# Patient Record
Sex: FEMALE | Race: WHITE | Employment: FULL TIME | URBAN - NONMETROPOLITAN AREA
[De-identification: names, ages, dates, MRNs, and addresses within clinical notes are randomized per-mention and may not be internally consistent; named-entity substitution may affect disease eponyms.]

---

## 2017-04-13 ENCOUNTER — HOSPITAL ENCOUNTER (OUTPATIENT)
Dept: LAB | Age: 67
Discharge: HOME | End: 2017-04-13
Attending: CLINIC/CENTER | Admitting: CLINIC/CENTER

## 2017-04-13 LAB
BASOPHILS ABSOLUTE: 0.1 K/CU MM
BASOPHILS RELATIVE PERCENT: 0.6 % (ref 0–1)
DIFFERENTIAL TYPE: ABNORMAL
EOSINOPHILS ABSOLUTE: 0.3 K/CU MM
EOSINOPHILS RELATIVE PERCENT: 2.9 % (ref 0–3)
HCT VFR BLD CALC: 44.8 % (ref 37–47)
HEMOGLOBIN: 14.2 GM/DL (ref 12.5–16)
IMMATURE NEUTROPHIL %: 0.4 % (ref 0–0.43)
LYMPHOCYTES ABSOLUTE: 1.7 K/CU MM
LYMPHOCYTES RELATIVE PERCENT: 16.5 % (ref 24–44)
MCH RBC QN AUTO: 30.6 PG (ref 27–31)
MCHC RBC AUTO-ENTMCNC: 31.7 % (ref 32–36)
MCV RBC AUTO: 96.6 FL (ref 78–100)
MONOCYTES ABSOLUTE: 0.7 K/CU MM
MONOCYTES RELATIVE PERCENT: 6.5 % (ref 0–4)
PDW BLD-RTO: 12.8 % (ref 11.7–14.9)
PLATELET # BLD: 262 K/CU MM (ref 140–440)
PMV BLD AUTO: 9.9 FL (ref 7.5–11.1)
RBC # BLD: 4.64 M/CU MM (ref 4.2–5.4)
SEGMENTED NEUTROPHILS ABSOLUTE COUNT: 7.7 K/CU MM
SEGMENTED NEUTROPHILS RELATIVE PERCENT: 73.1 % (ref 36–66)
TOTAL IMMATURE NEUTOROPHIL: 0.04 K/CU MM
WBC # BLD: 10.5 K/CU MM (ref 4–10.5)

## 2017-04-14 LAB — CLOSTRIDIUM DIFFICILE, PCR: NORMAL

## 2017-05-02 ENCOUNTER — HOSPITAL ENCOUNTER (OUTPATIENT)
Dept: GENERAL RADIOLOGY | Age: 67
Discharge: HOME | End: 2017-05-02
Attending: UROLOGY | Admitting: UROLOGY

## 2017-05-02 DIAGNOSIS — N20.0 KIDNEY STONE: ICD-10-CM

## 2017-11-01 ENCOUNTER — HOSPITAL ENCOUNTER (OUTPATIENT)
Dept: OTHER | Age: 67
Discharge: HOME | End: 2017-11-01
Attending: SKILLED NURSING FACILITY | Admitting: SKILLED NURSING FACILITY

## 2017-11-07 LAB
HCT VFR BLD CALC: 30.7 % (ref 37–47)
HEMOGLOBIN: 9.5 GM/DL (ref 12.5–16)
MCH RBC QN AUTO: 30.9 PG (ref 27–31)
MCHC RBC AUTO-ENTMCNC: 30.9 % (ref 32–36)
MCV RBC AUTO: 100 FL (ref 78–100)
PDW BLD-RTO: 15 % (ref 11.7–14.9)
PLATELET # BLD: 440 K/CU MM (ref 140–440)
PMV BLD AUTO: 9.4 FL (ref 7.5–11.1)
RBC # BLD: 3.07 M/CU MM (ref 4.2–5.4)
WBC # BLD: 13.3 K/CU MM (ref 4–10.5)

## 2017-11-07 PROCEDURE — 99306 1ST NF CARE HIGH MDM 50: CPT | Performed by: INTERNAL MEDICINE

## 2017-11-08 ENCOUNTER — OUTSIDE SERVICES (OUTPATIENT)
Dept: INTERNAL MEDICINE CLINIC | Age: 67
End: 2017-11-08

## 2017-11-14 LAB
HCT VFR BLD CALC: 31.7 % (ref 37–47)
HEMOGLOBIN: 10 GM/DL (ref 12.5–16)
MCH RBC QN AUTO: 31.3 PG (ref 27–31)
MCHC RBC AUTO-ENTMCNC: 31.5 % (ref 32–36)
MCV RBC AUTO: 99.1 FL (ref 78–100)
PDW BLD-RTO: 14.2 % (ref 11.7–14.9)
PLATELET # BLD: 379 K/CU MM (ref 140–440)
PMV BLD AUTO: 9.7 FL (ref 7.5–11.1)
RBC # BLD: 3.2 M/CU MM (ref 4.2–5.4)
WBC # BLD: 8.6 K/CU MM (ref 4–10.5)

## 2017-11-15 PROCEDURE — 99315 NF DSCHRG MGMT 30 MIN/LESS: CPT | Performed by: INTERNAL MEDICINE

## 2017-11-16 LAB
ALBUMIN SERPL-MCNC: 3.3 GM/DL (ref 3.4–5)
ALP BLD-CCNC: 136 IU/L (ref 40–129)
ALT SERPL-CCNC: 21 U/L (ref 10–40)
ANION GAP SERPL CALCULATED.3IONS-SCNC: 11 MMOL/L (ref 4–16)
AST SERPL-CCNC: 27 IU/L (ref 15–37)
BILIRUB SERPL-MCNC: 0.3 MG/DL (ref 0–1)
BUN BLDV-MCNC: 14 MG/DL (ref 6–23)
CALCIUM SERPL-MCNC: 9.1 MG/DL (ref 8.3–10.6)
CHLORIDE BLD-SCNC: 100 MMOL/L (ref 99–110)
CO2: 28 MMOL/L (ref 21–32)
CREAT SERPL-MCNC: 0.9 MG/DL (ref 0.6–1.1)
GFR AFRICAN AMERICAN: >60 ML/MIN/1.73M2
GFR NON-AFRICAN AMERICAN: >60 ML/MIN/1.73M2
GLUCOSE BLD-MCNC: 159 MG/DL (ref 70–140)
POTASSIUM SERPL-SCNC: 4.2 MMOL/L (ref 3.5–5.1)
SODIUM BLD-SCNC: 139 MMOL/L (ref 135–145)
TOTAL PROTEIN: 6.9 GM/DL (ref 6.4–8.2)

## 2017-11-17 ENCOUNTER — OUTSIDE SERVICES (OUTPATIENT)
Dept: INTERNAL MEDICINE CLINIC | Age: 67
End: 2017-11-17

## 2017-11-17 DIAGNOSIS — Z79.4 DIABETES MELLITUS TYPE 2, INSULIN DEPENDENT (HCC): ICD-10-CM

## 2017-11-17 DIAGNOSIS — T81.89XD NON-HEALING SURGICAL WOUND, SUBSEQUENT ENCOUNTER: ICD-10-CM

## 2017-11-17 DIAGNOSIS — J45.909 MODERATE ASTHMA WITHOUT COMPLICATION, UNSPECIFIED WHETHER PERSISTENT: ICD-10-CM

## 2017-11-17 DIAGNOSIS — Z90.710 STATUS POST TOTAL ABDOMINAL HYSTERECTOMY: ICD-10-CM

## 2017-11-17 DIAGNOSIS — C54.1 ENDOMETRIAL CARCINOMA (HCC): Primary | ICD-10-CM

## 2017-11-17 DIAGNOSIS — E11.9 DIABETES MELLITUS TYPE 2, INSULIN DEPENDENT (HCC): ICD-10-CM

## 2017-11-17 DIAGNOSIS — E03.9 HYPOTHYROIDISM, UNSPECIFIED TYPE: ICD-10-CM

## 2017-11-21 ENCOUNTER — HOSPITAL ENCOUNTER (OUTPATIENT)
Dept: WOUND CARE | Age: 67
Discharge: HOME | End: 2017-11-21
Attending: INTERNAL MEDICINE | Admitting: INTERNAL MEDICINE

## 2017-11-21 VITALS
RESPIRATION RATE: 16 BRPM | TEMPERATURE: 97.8 F | SYSTOLIC BLOOD PRESSURE: 152 MMHG | HEART RATE: 108 BPM | DIASTOLIC BLOOD PRESSURE: 78 MMHG

## 2017-11-21 DIAGNOSIS — T81.32XA POSTOPERATIVE DEHISCENCE OF INTERNAL WOUND, INITIAL ENCOUNTER: ICD-10-CM

## 2017-11-21 DIAGNOSIS — T81.89XA NON-HEALING SURGICAL WOUND, INITIAL ENCOUNTER: ICD-10-CM

## 2017-11-21 PROCEDURE — 11045 DBRDMT SUBQ TISS EACH ADDL: CPT | Performed by: INTERNAL MEDICINE

## 2017-11-21 PROCEDURE — 11042 DBRDMT SUBQ TIS 1ST 20SQCM/<: CPT | Performed by: INTERNAL MEDICINE

## 2017-11-21 NOTE — PLAN OF CARE
Problem: Wound:  Intervention: Assess pain status  See flow sheet  Intervention: Assess wound size, appearance and drainage  See flow sheet    Goal: Will show signs of wound healing; wound closure and no evidence of infection  Will show signs of wound healing; wound closure and no evidence of infection  Outcome: Ongoing

## 2017-11-21 NOTE — PROGRESS NOTES
Wound cleansing:                           Do not scrub or use excessive force. Wash hands with soap and water before and after dressing changes. Prior to applying a clean dressing, cleanse wound with normal saline,                          wound cleanser, or mild soap and water. Ask your physician or nurse before getting the wound(s) wet in the shower. Daily Wound management:                          Keep weight off wounds and reposition every 2 hours. Avoid standing for long periods of time. Apply wraps/stockings in AM and remove at bedtime. Elevate legs to the level of the heart or above for 30 minutes 4-5 times a day and/or when sitting. When taking antibiotics take entire prescription as ordered by MD do not stop taking until medicine is all gone.                                                                 Orders for this week:  11/21/17              Lower abd incision--WOUND VAC THERAPY:     APPLY santyl to wound bed, cover with BLACK FOAM TO WOUND BED and also tuck black foam to tunnel at 0300--5.0 depth,  MAY USE DUODERM TO PERIWOUND FOR PROTECTION.      SET WOUND VAC  CONTINUOUS SUCTION.  CANISTER CHANGE WITH EACH DRESSING CHANGE OR ACCORDING TO VOLUME OF DRAINAGE.     WOUND VAC DRESSING TO BE CHANGED Thursday and Saturday per homecare and clinic to change on Tuesday during doctors visit        Follow up with Dr Jessica Cisneros  In 1 week in the wound care centerWound care order history:                 ERASMO's   Right       Left               Date none--abd wound              Vascular studies:   Date NA abd wound              Imaging:   Date               Cultures:   Date               Labs/ HbA1c:   Date VsW2f--60.0-              Grafts:  Date               HBO:  To be determined Antibiotics: bactrim--11/10/17              Earlier Wound care treatments:                Authorizations:                        Consults:   Date                           PCP: Zahraa Clark     Continuing wound care orders and information:              Residence: Home              [place] [facility]              Your wound-care supplies will be provided by: Ghassan GOLDSTEIN provider:              Compression with              Off loading:  Date               Wound Meds:              Wound cleansing:                           Do not scrub or use excessive force. Wash hands with soap and water before and after dressing changes. Prior to applying a clean dressing, cleanse wound with normal saline,                          wound cleanser, or mild soap and water. Ask your physician or nurse before getting the wound(s) wet in the shower. Daily Wound management:                          Keep weight off wounds and reposition every 2 hours. Avoid standing for long periods of time. Apply wraps/stockings in AM and remove at bedtime. Elevate legs to the level of the heart or above for 30 minutes 4-5 times a day and/or when sitting. When taking antibiotics take entire prescription as ordered by MD do not stop taking until medicine is all gone.                                                                 Orders for this week:  11/21/17              Lower abd incision--WOUND VAC THERAPY:     APPLY santyl to wound bed, cover with BLACK FOAM TO WOUND BED and also tuck black foam to tunnel at 0300--5.0 depth,  MAY USE DUODERM TO PERIWOUND FOR PROTECTION.      SET WOUND VAC  CONTINUOUS SUCTION.  CANISTER CHANGE WITH EACH DRESSING CHANGE OR ACCORDING TO VOLUME OF DRAINAGE.     WOUND VAC DRESSING TO BE CHANGED

## 2017-11-22 NOTE — H&P
Normal S1 and S2. No gallops or murmurs. Rate appears to be  regular. ABDOMEN:  The patient has a large abdominal hernia present more to the  right side of the abdomen. Below the abdominal hernia, she has a large transverse  dehisced surgical wound. See the measurements in the wound document. There is an associated tunnel. EXTREMITIES:  The patient has 2+ pitting edema bilaterally. This wound is debrided. See separate debridement this date. She will have  the wound VAC replaced with appropriate foam including foam pushed into the  tunnel area. She will follow up in the Wound Clinic on Tuesday with Dr. Isa Broussard. IMPRESSION:  1. Nonhealing abdominal wound. 2.  Dehisced surgical wound.         John Brizuela MD    D: 11/21/2017 16:23:20       T: 11/21/2017 19:53:57     [email or web address]  Job#: [id number]     Doc#: [id number]    CC:   MD Mary Morales MD

## 2017-11-23 ENCOUNTER — HOSPITAL ENCOUNTER (OUTPATIENT)
Dept: OTHER | Age: 67
Discharge: HOME | End: 2017-11-23

## 2017-11-26 LAB
CULTURE: NORMAL
ORGANISM: NORMAL
REPORT STATUS: NORMAL
REQUEST PROBLEM: NORMAL
SPECIMEN: NORMAL
TOTAL COLONY COUNT: NORMAL

## 2017-12-05 ENCOUNTER — HOSPITAL ENCOUNTER (OUTPATIENT)
Dept: WOUND CARE | Age: 67
Discharge: HOME | End: 2017-12-05
Attending: SURGERY | Admitting: SURGERY

## 2017-12-05 VITALS — HEART RATE: 112 BPM | SYSTOLIC BLOOD PRESSURE: 164 MMHG | TEMPERATURE: 97.6 F | DIASTOLIC BLOOD PRESSURE: 72 MMHG

## 2017-12-05 DIAGNOSIS — T81.89XA NON-HEALING SURGICAL WOUND, INITIAL ENCOUNTER: Primary | ICD-10-CM

## 2017-12-05 NOTE — PLAN OF CARE
Problem: Wound:  Intervention: Assess pain status  See flowsheet  Intervention: Assess wound size, appearance and drainage  See flowsheet    Goal: Will show signs of wound healing; wound closure and no evidence of infection  Will show signs of wound healing; wound closure and no evidence of infection   Outcome: Ongoing
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
## 2017-12-05 NOTE — PROGRESS NOTES
Nonselective enzymatic debridement performed with Santyl per physician order to wound(s) of the lower abdomen  Patient tolerated the procedure well.

## 2017-12-21 ENCOUNTER — HOSPITAL ENCOUNTER (OUTPATIENT)
Dept: WOUND CARE | Age: 67
Discharge: HOME | End: 2017-12-21
Attending: INTERNAL MEDICINE | Admitting: INTERNAL MEDICINE

## 2017-12-21 VITALS
HEART RATE: 82 BPM | RESPIRATION RATE: 18 BRPM | SYSTOLIC BLOOD PRESSURE: 142 MMHG | TEMPERATURE: 96.8 F | DIASTOLIC BLOOD PRESSURE: 79 MMHG

## 2017-12-21 DIAGNOSIS — T81.32XA POSTOPERATIVE DEHISCENCE OF INTERNAL WOUND, INITIAL ENCOUNTER: ICD-10-CM

## 2017-12-21 DIAGNOSIS — T81.89XA NON-HEALING SURGICAL WOUND, INITIAL ENCOUNTER: Primary | ICD-10-CM

## 2017-12-21 NOTE — PROGRESS NOTES
Nonselective enzymatic debridement performed with Santyl per physician order to wound(s) of the ABD   Patient tolerated the procedure well.

## 2017-12-21 NOTE — PROGRESS NOTES
status: Former Smoker     Quit date: 1/24/1995    Smokeless tobacco: Never Used    Alcohol use No       ALLERGIES    Allergies   Allergen Reactions    Erythromycin Nausea Only    Gabapentin Other (See Comments)     Dizziness      Lyrica [Pregabalin] Swelling     With rapid weight gain       MEDICATIONS    Current Outpatient Prescriptions on File Prior to Encounter   Medication Sig Dispense Refill    potassium chloride (MICRO-K) 10 MEQ extended release capsule Take 10 mEq by mouth 2 times daily      Flaxseed, Linseed, (FLAXSEED OIL) 1000 MG CAPS Take 1 capsule by mouth daily      potassium chloride (KLOR-CON M) 20 MEQ extended release tablet Take 1 tablet by mouth 2 times daily for 7 days 14 tablet 0    Fluticasone Furoate-Vilanterol (BREO ELLIPTA) 100-25 MCG/INH AEPB Inhale 100 mcg into the lungs 2 times daily      naproxen (NAPROSYN) 500 MG tablet Take 500 mg by mouth 2 times daily (with meals)      Insulin Aspart (NOVOLOG SC) Inject 60 Units into the skin 2 times daily (with meals) Lunch and dinner      Multiple Vitamins-Minerals (THERAPEUTIC MULTIVITAMIN-MINERALS) tablet Take 1 tablet by mouth daily      rosuvastatin (CRESTOR) 20 MG tablet Take 5 mg by mouth daily       furosemide (LASIX) 40 MG tablet Take 1 tablet by mouth daily. 30 tablet 1    insulin glargine (LANTUS) 100 UNIT/ML injection Inject 80 Units into the skin nightly.  levothyroxine (SYNTHROID) 100 MCG tablet Take 100 mcg by mouth daily. No current facility-administered medications on file prior to encounter. REVIEW OF SYSTEMS    Pertinent items are noted in HPI. Constitutional: Negative for systemic symptoms including fever, chills and malaise. Objective:      BP (!) 142/79   Pulse 82   Temp 96.8 °F (36 °C) (Temporal)   Resp 18     PHYSICAL EXAM      General: The patient is in no acute distress. Mental status:  Patient is appropriate, is  oriented to place and plan of care.   Dermatologic exam: Visual inspection of the periwound reveals the skin to be normal in turgor and texture  Wound exam: see wound description below in procedure note      Assessment:     Problem List Items Addressed This Visit     WD-Non-healing surgical wound of the abdomen - Primary    WD-Postoperative dehiscence of internal wound, initial encounter      Other Visit Diagnoses    None. Procedure Note    Indications:  Based on my examination of this patient's wound(s) today, sharp excision into necrotic epidermis, dermis and subcutaneous tissue is required to promote healing and evaluate the extent of previous healing. Performed by: Mary Gannon MD    Consent obtained: Yes    Time out taken:  Yes    Pain Control: none needed       Debridement:Excisional Debridement    Using curette, scissors and forceps the wound(s) was/were sharply debrided down through and including the removal of epidermis, dermis and subcutaneous tissue. Devitalized Tissue Debrided:  fibrin, biofilm and slough I also removed some black sponge that had been enveloped in the wound bed. Pre Debridement Measurements:  Are located in the Wound Documentation Flow Sheet    All active wounds listed below with today's date are evaluated  Wound(s)    debrided this date include # : 4     Post  Debridement Measurements:  Negative Pressure Wound Therapy Abdomen (Active)   Wound Type Surgical 12/5/2017 11:58 AM   Unit Type KCI 12/5/2017 11:58 AM   Dressing Type Black foam 12/5/2017 11:58 AM   Number of pieces used 1 12/5/2017 11:58 AM   Cycle Continuous 12/5/2017 11:58 AM   Target Pressure (mmHg) 125 12/5/2017 11:58 AM   Intensity 5 12/5/2017 11:58 AM   Canister changed? Yes 12/5/2017 11:58 AM   Dressing Changed Changed/New 12/5/2017 11:58 AM   Drainage Amount Large 12/5/2017 11:58 AM   Drainage Description Serosanguinous 12/5/2017 11:58 AM   Dressing Change Due 12/07/17 12/5/2017 11:58 AM   Wound Assessment Red;Slough; Yellow 12/5/2017 11:58 AM   Tiffany-wound Assessment Red 12/5/2017 11:58 AM   Odor None 12/5/2017 11:58 AM   Number of days: 29       Wound 11/21/17 Abdomen wound 4 left distal abdomin( onset 3 weeks ago)--nonhealing surgical (Active)   Wound Image   12/5/2017 10:25 AM   Wound Type Wound 12/21/2017  8:58 AM   Wound Other 11/21/2017  1:11 PM   Dressing Status Clean;Dry; Intact 12/5/2017 11:58 AM   Dressing Changed Changed/New 12/5/2017 11:58 AM   Wound Cleansed Wound cleanser 12/21/2017  8:58 AM   Wound Length (cm) 5.5 cm 12/21/2017  9:25 AM   Wound Width (cm) 24.3 cm 12/21/2017  9:25 AM   Wound Depth (cm)  4.1 12/21/2017  9:25 AM   Calculated Wound Size (cm^2) (l*w) 133.65 cm^2 12/21/2017  9:25 AM   Change in Wound Size % (l*w) 27.95 12/21/2017  9:25 AM   Distance Tunneling (cm) 2.5 cm 12/21/2017  8:58 AM   Tunneling Position ___ O'Clock 3 12/21/2017  8:58 AM   Undermining Starts ___ O'Clock 0 12/21/2017  8:58 AM   Undermining Ends___ O'Clock 0 12/21/2017  8:58 AM   Undermining Maxium Distance (cm) 0 12/21/2017  8:58 AM   Wound Assessment Red;Slough; Yellow 12/21/2017  8:58 AM   Drainage Amount Large 12/21/2017  8:58 AM   Drainage Description Serosanguinous 12/21/2017  8:58 AM   Odor None 12/21/2017  8:58 AM   Margins Defined edges; Undefined edges 12/21/2017  8:58 AM   Tiffany-wound Assessment Red 12/21/2017  8:58 AM   Non-staged Wound Description Full thickness 12/21/2017  8:58 AM   [place]%Wound Bed 0 12/21/2017  8:58 AM   Red%Wound Bed 90 12/21/2017  8:58 AM   Yellow%Wound Bed 10 12/21/2017  8:58 AM   Black%Wound Bed 0 12/21/2017  8:58 AM   Purple%Wound Bed 0 12/21/2017  8:58 AM   Other%Wound Bed 0 12/21/2017  8:58 AM   Debridement per physician Subcutaneous 12/21/2017  9:25 AM   Number of days: 29       Percent of Wound(s) Debrided: approximately 50%    Total  Area  Debrided:  66 sq cm     Bleeding:  Minimal    Hemostasis Achieved:  by pressure    Procedural Pain:  0  / 10     Post Procedural Pain:  0 / 10     Response to treatment:  Well tolerated by patient. Status of wound progress and description from last visit:   Wound is measuring smaller. I would continue with the current. Plan:       Discharge Instructions         Note:   PHYSICIAN ORDERS AND DISCHARGE INSTRUCTIONS     NOTE: Upon discharge from the [address], you will receive a patient experience survey. We would be grateful if you would take the time to fill this survey out.     Wound care order history:                 ERASMO's   Right       Left               Date none--abd wound              Vascular studies:   Date NA abd wound              Imaging:   Date               Cultures:   Date               Labs/ HbA1c:   Date CrT8k--71.0-              Grafts:  Date               HBO:  To be determined              Antibiotics: bactrim--11/10/17              Earlier Wound care treatments:                Authorizations:                        Consults:   Date                           PCP: Earle     Continuing wound care orders and information:              Residence: Home              [facility]              Your wound-care supplies will be provided by: Margot Ramsay provider:              [id number] with              Off loading:  Date               CTA Meds:              PCACA cleansing:                           YL not scrub or use excessive force.                          Wash hands with soap and water before and after dressing changes.                           Prior to applying a clean dressing, cleanse wound with normal saline,                          wound cleanser, or mild soap and water.                           Ask your physician or nurse before getting the wound(s) wet in the shower.              Daily Wound management:                          Keep weight off wounds and reposition every 2 hours.                          EUQDG standing for long periods of time.                          IZFHG wraps/stockings in AM and remove at bedtime.                          Elevate legs to the level of the heart or above for 30 minutes 4-5 times a day and/or when sitting.                                               When taking antibiotics take entire prescription as ordered by MD do not stop taking until medicine is all gone.                                                                 Orders for this week:  12/21/17              Lower abd incision--WOUND VAC THERAPY:     APPLY santyl to wound bed, cover with BLACK FOAM TO WOUND BED and also tuck black foam to tunnel at 0300--2.5 depth, AND TO DEPTH AT RIGHT LATERAL SIDE OF WOUND TO DEPTH OF 2.8.   MAY USE DUODERM TO PERIWOUND FOR PROTECTION.      SET WOUND VAC  CONTINUOUS SUCTION. CANISTER CHANGE WITH EACH DRESSING CHANGE OR ACCORDING TO VOLUME OF DRAINAGE.     WOUND VAC DRESSING TO BE CHANGED Thursday and Saturday per homecare and clinic to change on Tuesday during doctors visit        Follow up with Dr José Luis England 1 week in the wound care center     Call [phone] for any questions or concerns.   Date__________   Time____________                             Treatment Note      Written Patient Dismissal Instructions Given            Electronically signed by Tmaera Diaz MD on 12/21/2017 at 9:35 AM

## 2017-12-26 ENCOUNTER — HOSPITAL ENCOUNTER (OUTPATIENT)
Dept: WOUND CARE | Age: 67
Discharge: HOME | End: 2017-12-26
Attending: INTERNAL MEDICINE | Admitting: INTERNAL MEDICINE

## 2017-12-26 VITALS
DIASTOLIC BLOOD PRESSURE: 75 MMHG | RESPIRATION RATE: 16 BRPM | HEART RATE: 106 BPM | SYSTOLIC BLOOD PRESSURE: 126 MMHG | TEMPERATURE: 96.6 F

## 2017-12-26 DIAGNOSIS — T81.89XA NON-HEALING SURGICAL WOUND, INITIAL ENCOUNTER: Primary | ICD-10-CM

## 2017-12-26 DIAGNOSIS — T81.32XA POSTOPERATIVE DEHISCENCE OF INTERNAL WOUND, INITIAL ENCOUNTER: ICD-10-CM

## 2017-12-26 PROCEDURE — 11042 DBRDMT SUBQ TIS 1ST 20SQCM/<: CPT | Performed by: INTERNAL MEDICINE

## 2017-12-26 NOTE — PROGRESS NOTES
Undermining Max[place] Distance (cm) 0 12/26/2017  9:39 AM   Wound Assessment Granulation tissue;Red;Slough; Yellow 12/26/2017  9:39 AM   Drainage Amount Large 12/26/2017  9:39 AM   Drainage Description Serosanguinous 12/26/2017  9:39 AM   Odor None 12/26/2017  9:39 AM   Margins Defined edges; Unattached edges 12/26/2017  9:39 AM   Tiffany-wound Assessment [place];Pink 12/26/2017  9:39 AM   Non-staged Wound Description Full thickness 12/26/2017  9:39 AM   [place]%Wound Bed 0 12/26/2017  9:39 AM   Red%Wound Bed 90 12/26/2017  9:39 AM   Yellow%Wound Bed 10 12/26/2017  9:39 AM   Black%Wound Bed 0 12/26/2017  9:39 AM   Purple%Wound Bed 0 12/26/2017  9:39 AM   Other%Wound Bed 0 12/26/2017  9:39 AM   Debridement per physician Subcutaneous 12/26/2017 10:11 AM   Number of days: 35       Percent of Wound(s) Debrided: approximately 10%    Total  Area  Debrided:  13.2 sq cm     Bleeding:  Minimal    Hemostasis Achieved:  by pressure and by silver nitrate stick    Procedural Pain:  0  / 10     Post Procedural Pain:  0 / 10     Response to treatment:  Well tolerated by patient. Status of wound progress and description from last visit:   Wound is looking better with good granulation tissue. There are some areas that may have hypergranulation tissue present. A trial of silver nitrate was tried at the upper surface of the wound. Plan:       Discharge Instructions         Note:   PHYSICIAN ORDERS AND DISCHARGE INSTRUCTIONS     NOTE: Upon discharge from the [address], you will receive a patient experience survey.  We would be grateful if you would take the time to fill this survey out.     Wound care order history:                 ERASMO's   Right       Left               Date none--abd wound              Vascular studies:   Date NA abd wound              Imaging:   Date               Cultures:   Date               Labs/ HbA1c:   Date XfX0c--58.0-              Grafts:  Date               HBO:  To be determined              Antibiotics: bactrim--11/10/17              Earlier Wound care treatments:                Authorizations:                        Consults:   Date                           PCP: Earle     Continuing wound care orders and information:              Residence: Home              Continue home health care with: [address] [facility]              Your wound-care supplies will be provided by: Doyle Clement provider:              MICHAEL with  Matthew Daniel loading:  Date               JGCGX Meds: Lukasz Byers              KDAZA cleansing:                           KL not scrub or use excessive force.                          Wash hands with soap and water before and after dressing changes.                         Prior to applying a clean dressing, cleanse wound with normal saline,                          wound cleanser, or mild soap and water.                           Ask your physician or nurse before getting the wound(s) wet in the shower.              Daily Wound management:                          Keep weight off wounds and reposition every 2 hours.                          Avoid standing for long periods of time.                          Apply wraps/stockings in AM and remove at bedtime.                          Elevate legs to the level of the heart or above for 30 minutes 4-5 times a day and/or when sitting.                                               When taking antibiotics take entire prescription as ordered by MD do not stop taking until medicine is all gone.                                                                 Orders for this week:  12/26/17              Lower abd incision--WOUND VAC THERAPY:     APPLY santyl to wound bed, cover with BLACK FOAM TO WOUND BED and also tuck black foam to tunnel at 0300--2.5 depth, AND TO DEPTH AT RIGHT LATERAL SIDE OF WOUND TO DEPTH OF 2.8.   MAY USE DUODERM TO PERIWOUND FOR PROTECTION.      SET WOUND VAC  CONTINUOUS SUCTION.  CANISTER CHANGE WITH EACH DRESSING CHANGE OR ACCORDING TO VOLUME OF DRAINAGE.     WOUND VAC DRESSING TO BE CHANGED Thursday and Saturday per homecare and clinic to change on Tuesday during doctors visit        Follow up with Dr Lisha Abrams 1 week in the wound care center     Call [phone] for any questions or concerns.   Date__________   Time____________                             Treatment Note Wound 11/21/17 Abdomen wound 4 left distal abdomin( onset 3 weeks ago)--nonhealing surgical-Dressing/Treatment: Vacuum dressing (duoderm-bernice, sanytl, black foam(4), vac drape,)    Written Patient Dismissal Instructions Given            Electronically signed by Estevan Armenta MD on 12/26/2017 at 2:37 PM

## 2017-12-26 NOTE — PROGRESS NOTES
Nonselective enzymatic debridement performed with Santyl per physician order to wound(s) of the abdomen  Patient tolerated the procedure well.

## 2018-01-02 ENCOUNTER — HOSPITAL ENCOUNTER (OUTPATIENT)
Dept: WOUND CARE | Age: 68
Discharge: HOME | End: 2018-01-02
Attending: SURGERY | Admitting: SURGERY

## 2018-01-02 VITALS
TEMPERATURE: 96.2 F | HEART RATE: 103 BPM | SYSTOLIC BLOOD PRESSURE: 136 MMHG | DIASTOLIC BLOOD PRESSURE: 82 MMHG | RESPIRATION RATE: 16 BRPM

## 2018-01-02 DIAGNOSIS — L98.492 ABDOMINAL WALL SKIN ULCER, WITH FAT LAYER EXPOSED (HCC): Chronic | ICD-10-CM

## 2018-01-02 DIAGNOSIS — T81.32XA POSTOPERATIVE DEHISCENCE OF INTERNAL WOUND, INITIAL ENCOUNTER: Primary | ICD-10-CM

## 2018-01-09 ENCOUNTER — HOSPITAL ENCOUNTER (OUTPATIENT)
Dept: WOUND CARE | Age: 68
Discharge: HOME | End: 2018-01-09
Attending: SURGERY | Admitting: SURGERY

## 2018-01-09 VITALS
DIASTOLIC BLOOD PRESSURE: 77 MMHG | TEMPERATURE: 96.6 F | RESPIRATION RATE: 16 BRPM | HEART RATE: 96 BPM | SYSTOLIC BLOOD PRESSURE: 145 MMHG

## 2018-01-09 DIAGNOSIS — L98.493 ABDOMINAL WALL SKIN ULCER, WITH NECROSIS OF MUSCLE (HCC): Primary | Chronic | ICD-10-CM

## 2018-01-09 NOTE — PROGRESS NOTES
treatment:  Well tolerated by patient. Status of wound progress and description from last visit:   Slightly improved. Majority of wound has excellent bed of granulation tissue present. Plan:       Discharge Instructions         Note:   PHYSICIAN ORDERS AND DISCHARGE INSTRUCTIONS     NOTE: Upon discharge from the [address], you will receive a patient experience survey. We would be grateful if you would take the time to fill this survey out.     Wound care order history:                 ERASMO's   Right       Left               Date none--abd wound              Vascular studies:   Date NA abd wound              Imaging:   Date               Cultures:   Date               Labs/ HbA1c:   Date TkO0a--06.0-              Grafts:  Date               HBO:  To be determined              Antibiotics: bactrim--11/10/17              Earlier Wound care treatments:                Authorizations:                        Consults:   Date                           PCP: Earle     Continuing wound care orders and information:              Residence: Home              Continue home health care with: [facility]              Your wound-care supplies will be provided by: Azar Evangelista provider:              JEROME with  Kristal Mays loading:  Date               [facility] Med[facility]: Misha Rosenthal              CFU cleansing:                           VA not scrub or use excessive force.                          Wash hands with soap and water before and after dressing changes.                           Prior to applying a clean dressing, cleanse wound with normal saline,                          wound cleanser, or mild soap and water.                           Ask your physician or nurse before getting the wound(s) wet in the shower.              Daily Wound management:                          Keep weight off wounds and reposition every 2 hours.                          DKXRQ standing for long periods of

## 2018-01-16 ENCOUNTER — HOSPITAL ENCOUNTER (OUTPATIENT)
Dept: WOUND CARE | Age: 68
Discharge: HOME | End: 2018-01-16
Attending: SURGERY | Admitting: SURGERY

## 2018-01-16 VITALS
BODY MASS INDEX: 35.97 KG/M2 | RESPIRATION RATE: 16 BRPM | SYSTOLIC BLOOD PRESSURE: 147 MMHG | HEART RATE: 51 BPM | DIASTOLIC BLOOD PRESSURE: 67 MMHG | HEIGHT: 63 IN | WEIGHT: 203 LBS | TEMPERATURE: 97.4 F

## 2018-01-16 DIAGNOSIS — L98.492 ABDOMINAL WALL SKIN ULCER, WITH FAT LAYER EXPOSED (HCC): Primary | Chronic | ICD-10-CM

## 2018-01-16 NOTE — PLAN OF CARE
Problem: Wound:  Intervention: Assess pain status  See Flowsheet  Intervention: Assess wound size, appearance and drainage  See Flowsheet  Intervention: Assess pedal pulses bilaterally if patient has a foot or leg ulcer  See Flowsheet  Intervention: Doppler if unable to palpate pedal pulse  See Parviz    Goal: Will show signs of wound healing; wound closure and no evidence of infection  Will show signs of wound healing; wound closure and no evidence of infection   Outcome: Ongoing  See Flowsheet

## 2018-01-16 NOTE — PROGRESS NOTES
Diabetes Maternal Grandmother     Diabetes Maternal Grandfather     Diabetes Paternal Grandmother     Diabetes Paternal Grandfather        SOCIAL HISTORY    Social History   Substance Use Topics    Smoking status: Former Smoker     Quit date: 1/24/1995    Smokeless tobacco: Never Used    Alcohol use No       ALLERGIES    Allergies   Allergen Reactions    Erythromycin Nausea Only    Gabapentin Other (See Comments)     Dizziness      Lyrica [Pregabalin] Swelling     With rapid weight gain       MEDICATIONS    Current Outpatient Prescriptions on File Prior to Encounter   Medication Sig Dispense Refill    potassium chloride (MICRO-K) 10 MEQ extended release capsule Take 10 mEq by mouth 2 times daily      Flaxseed, Linseed, (FLAXSEED OIL) 1000 MG CAPS Take 1 capsule by mouth daily      potassium chloride (KLOR-CON M) 20 MEQ extended release tablet Take 1 tablet by mouth 2 times daily for 7 days 14 tablet 0    Fluticasone Furoate-Vilanterol (BREO ELLIPTA) 100-25 MCG/INH AEPB Inhale 100 mcg into the lungs 2 times daily      naproxen (NAPROSYN) 500 MG tablet Take 500 mg by mouth 2 times daily (with meals)      Insulin Aspart (NOVOLOG SC) Inject 60 Units into the skin 2 times daily (with meals) Lunch and dinner      Multiple Vitamins-Minerals (THERAPEUTIC MULTIVITAMIN-MINERALS) tablet Take 1 tablet by mouth daily      rosuvastatin (CRESTOR) 20 MG tablet Take 5 mg by mouth daily       furosemide (LASIX) 40 MG tablet Take 1 tablet by mouth daily. 30 tablet 1    insulin glargine (LANTUS) 100 UNIT/ML injection Inject 80 Units into the skin nightly.  levothyroxine (SYNTHROID) 100 MCG tablet Take 100 mcg by mouth daily. No current facility-administered medications on file prior to encounter. REVIEW OF SYSTEMS      Constitutional: Negative for systemic symptoms including fever, chills and malaise.     Objective:      BP (!) 147/67   Pulse 51   Temp 97.4 °F (36.3 °C) (Temporal)   Resp 16 Ht 5' 3\" (1.6 m)   Wt 203 lb (92.1 kg)   BMI 35.96 kg/m²     PHYSICAL EXAM      General: The patient is in no acute distress. Mental status:  Patient is appropriate, is  oriented to place and plan of care. Dermatologic exam: Visual inspection of the periwound reveals the skin to be normal in turgor and texture. Wound exam:  see wound description below     All active wounds listed below with today's date are evaluated      Negative Pressure Wound Therapy Abdomen (Active)   Wound Type Surgical 1/9/2018 10:19 AM   Unit Type KCI 1/9/2018 10:19 AM   Dressing Type Black foam 1/9/2018 10:19 AM   Number of pieces used 3 1/9/2018 10:19 AM   Cycle Continuous 1/9/2018 10:19 AM   Target Pressure (mmHg) 125 1/9/2018 10:19 AM   Intensity 5 1/9/2018 10:19 AM   Canister changed? Yes 1/9/2018 10:19 AM   Dressing Status Clean;Dry; Intact 1/9/2018 10:19 AM   Dressing Changed Changed/New 1/9/2018 10:19 AM   Drainage Amount Large 1/9/2018 10:19 AM   Drainage Description Serosanguinous 1/9/2018 10:19 AM   Dressing Change Due 01/11/18 1/9/2018 10:19 AM   Wound Assessment Red;Yellow 1/9/2018 10:19 AM   Tiffany-wound Assessment [place] 1/9/2018 10:19 AM   Odor None 1/9/2018 10:19 AM   Number of days: 55       Wound 11/21/17 Abdomen wound 4 left distal abdomin( onset 3 weeks ago)--nonhealing surgical (Active)   Wound Image   12/5/2017 10:25 AM   Wound Type Wound 1/16/2018  8:57 AM   Wound Other 11/21/2017  1:11 PM   Dressing Status Clean;Dry; Intact 1/16/2018  9:49 AM   Dressing Changed Changed/New 1/16/2018  9:49 AM   Dressing/Treatment Vacuum dressing 1/9/2018 10:19 AM   Wound Cleansed Wound cleanser 1/16/2018  8:57 AM   Wound Length (cm) 3.5 cm 1/16/2018  8:57 AM   Wound Width (cm) 21.3 cm 1/16/2018  8:57 AM   Wound Depth (cm)  3.0 1/16/2018  8:57 AM   Calculated Wound Size (cm^2) (l*w) 74.55 cm^2 1/16/2018  8:57 AM   Change in Wound Size % (l*w) 59.81 1/16/2018  8:57 AM   Distance Tunneling (cm) 1.5 cm 1/16/2018  8:57 AM Tunneling Position ___ O'Clock 300 1/16/2018  8:57 AM   Undermining Starts ___ O'Clock 0 1/16/2018  8:57 AM   Undermining Ends___ O'Clock 0 1/16/2018  8:57 AM   Undermining Maxium Distance (cm) 0 1/16/2018  8:57 AM   Wound Assessment Red;Yellow 1/16/2018  8:57 AM   Drainage Amount Large 1/16/2018  8:57 AM   Drainage Description Serosanguinous 1/16/2018  8:57 AM   Odor None 1/16/2018  8:57 AM   Margins Defined edges; Unattached edges 1/16/2018  8:57 AM   Tiffany-wound Assessment Pink;[place]; White 1/16/2018  8:57 AM   Non-staged Wound Description Full thickness 1/16/2018  8:57 AM   [place]%Wound Bed 0 1/16/2018  8:57 AM   Red%Wound Bed 95 1/16/2018  8:57 AM   Yellow%Wound Bed 5 1/16/2018  8:57 AM   Black%Wound Bed 0 1/16/2018  8:57 AM   Purple%Wound Bed 0 1/16/2018  8:57 AM   Other%Wound Bed 0 1/16/2018  8:57 AM   Debridement per physician Subcutaneous 1/9/2018 10:00 AM   Number of days: 55       Assessment:       Problem List Items Addressed This Visit     Abdominal wall skin ulcer, 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 Status of wound progress and description from last visit:   Moderately improved. Plan:     Discharge Instructions         Note:   PHYSICIAN ORDERS AND DISCHARGE INSTRUCTIONS     NOTE: Upon discharge from the [address], you will receive a patient experience survey.  We would be grateful if you would take the time to fill this survey out.     Wound care order history:                 ERASMO's   Right       Left               Date none--abd wound              Vascular studies:   Date NA abd wound              Imaging:   Date               Cultures:   Date               Labs/ HbA1c:   Date YvH3g--63.0-              Grafts:  Date               HBO:  To be determined              Antibiotics: bactrim--11/10/17              Earlier Wound care treatments:                Authorizations:                        Consults:   Date 125 CONTINUOUS SUCTION. CANISTER CHANGE WITH EACH DRESSING CHANGE OR ACCORDING TO VOLUME OF DRAINAGE.     WOUND VAC DRESSING TO BE CHANGED Thursday and Saturday per homecare and clinic to change on Tuesday during doctors visit      Follow up with Dr Rolf Pyle 1 week in the wound care center     Call [phone] for any questions or concerns.   Date__________   Time____________                                      Treatment Note Wound 11/21/17 Abdomen wound 4 left distal abdomin( onset 3 weeks ago)--nonhealing surgical-Dressing/Treatment:  (Sorbact, Calcium alginate, ABD vac drape )    Written Patient Dismissal Instructions Given            Electronically signed by Lalla Merlin, MD on 1/16/2018 at 10:01 AM

## 2018-01-23 ENCOUNTER — HOSPITAL ENCOUNTER (OUTPATIENT)
Dept: WOUND CARE | Age: 68
Discharge: HOME | End: 2018-01-23
Attending: SURGERY | Admitting: SURGERY

## 2018-01-23 ENCOUNTER — HOSPITAL ENCOUNTER (OUTPATIENT)
Dept: LAB | Age: 68
Discharge: HOME | End: 2018-01-23
Attending: FAMILY MEDICINE | Admitting: FAMILY MEDICINE

## 2018-01-23 VITALS
DIASTOLIC BLOOD PRESSURE: 77 MMHG | SYSTOLIC BLOOD PRESSURE: 125 MMHG | HEART RATE: 75 BPM | RESPIRATION RATE: 16 BRPM | TEMPERATURE: 96.7 F

## 2018-01-23 DIAGNOSIS — L98.492 ABDOMINAL WALL SKIN ULCER, WITH FAT LAYER EXPOSED (HCC): ICD-10-CM

## 2018-01-23 LAB
ALBUMIN SERPL-MCNC: 3.2 GM/DL (ref 3.4–5)
ALP BLD-CCNC: 133 IU/L (ref 40–129)
ALT SERPL-CCNC: 16 U/L (ref 10–40)
ANION GAP SERPL CALCULATED.3IONS-SCNC: 6 MMOL/L (ref 4–16)
AST SERPL-CCNC: 27 IU/L (ref 15–37)
BASOPHILS ABSOLUTE: 0 K/CU MM
BASOPHILS RELATIVE PERCENT: 0.4 % (ref 0–1)
BILIRUB SERPL-MCNC: 0.3 MG/DL (ref 0–1)
BILIRUBIN DIRECT: 0.2 MG/DL (ref 0–0.3)
BILIRUBIN, INDIRECT: 0.1 MG/DL (ref 0–0.7)
BUN BLDV-MCNC: 13 MG/DL (ref 6–23)
CALCIUM SERPL-MCNC: 9.1 MG/DL (ref 8.3–10.6)
CHLORIDE BLD-SCNC: 101 MMOL/L (ref 99–110)
CHOLESTEROL, FASTING: 176 MG/DL
CO2: 35 MMOL/L (ref 21–32)
CREAT SERPL-MCNC: 0.7 MG/DL (ref 0.6–1.1)
CREATININE URINE: 80.8 MG/DL (ref 28–217)
DIFFERENTIAL TYPE: ABNORMAL
EOSINOPHILS ABSOLUTE: 0.4 K/CU MM
EOSINOPHILS RELATIVE PERCENT: 5.6 % (ref 0–3)
ESTIMATED AVERAGE GLUCOSE: 169 MG/DL
GFR AFRICAN AMERICAN: >60 ML/MIN/1.73M2
GFR NON-AFRICAN AMERICAN: >60 ML/MIN/1.73M2
GLUCOSE FASTING: 108 MG/DL (ref 70–99)
HBA1C MFR BLD: 7.5 % (ref 4.2–6.3)
HCT VFR BLD CALC: 37.9 % (ref 37–47)
HDLC SERPL-MCNC: 45 MG/DL
HEMOGLOBIN: 12.2 GM/DL (ref 12.5–16)
IMMATURE NEUTROPHIL %: 0.6 % (ref 0–0.43)
LDL CHOLESTEROL DIRECT: 110 MG/DL
LYMPHOCYTES ABSOLUTE: 1.4 K/CU MM
LYMPHOCYTES RELATIVE PERCENT: 20.6 % (ref 24–44)
MCH RBC QN AUTO: 30.3 PG (ref 27–31)
MCHC RBC AUTO-ENTMCNC: 32.2 % (ref 32–36)
MCV RBC AUTO: 94.3 FL (ref 78–100)
MICROALBUMIN/CREAT 24H UR: 4 MG/DL
MICROALBUMIN/CREAT UR-RTO: 49.5 MG/G CREAT (ref 0–30)
MONOCYTES ABSOLUTE: 0.5 K/CU MM
MONOCYTES RELATIVE PERCENT: 7.7 % (ref 0–4)
PDW BLD-RTO: 13.4 % (ref 11.7–14.9)
PLATELET # BLD: 288 K/CU MM (ref 140–440)
PMV BLD AUTO: 9 FL (ref 7.5–11.1)
POTASSIUM SERPL-SCNC: 3.9 MMOL/L (ref 3.5–5.1)
RBC # BLD: 4.02 M/CU MM (ref 4.2–5.4)
SEGMENTED NEUTROPHILS ABSOLUTE COUNT: 4.5 K/CU MM
SEGMENTED NEUTROPHILS RELATIVE PERCENT: 65.1 % (ref 36–66)
SODIUM BLD-SCNC: 142 MMOL/L (ref 135–145)
T3 FREE: 2.3 PG/ML (ref 2.3–4.2)
T4 FREE: 1.35 NG/DL (ref 0.9–1.8)
TOTAL IMMATURE NEUTOROPHIL: 0.04 K/CU MM
TOTAL PROTEIN: 7.3 GM/DL (ref 6.4–8.2)
TRIGLYCERIDE, FASTING: 131 MG/DL
TSH HIGH SENSITIVITY: 0.37 UIU/ML (ref 0.27–4.2)
WBC # BLD: 6.8 K/CU MM (ref 4–10.5)

## 2018-01-30 ENCOUNTER — HOSPITAL ENCOUNTER (OUTPATIENT)
Dept: WOUND CARE | Age: 68
Discharge: HOME | End: 2018-01-30
Attending: SURGERY | Admitting: SURGERY

## 2018-01-30 VITALS
DIASTOLIC BLOOD PRESSURE: 73 MMHG | RESPIRATION RATE: 16 BRPM | TEMPERATURE: 97.5 F | SYSTOLIC BLOOD PRESSURE: 126 MMHG | HEART RATE: 83 BPM

## 2018-01-30 DIAGNOSIS — L98.492 ABDOMINAL WALL SKIN ULCER, WITH FAT LAYER EXPOSED (HCC): Primary | Chronic | ICD-10-CM

## 2018-01-30 NOTE — PLAN OF CARE
Problem: Wound:  Intervention: Assess pain status  SEE FLOW SHEET  Intervention: Assess wound size, appearance and drainage  SEE FLOW SHEET    Goal: Will show signs of wound healing; wound closure and no evidence of infection  Will show signs of wound healing; wound closure and no evidence of infection   Outcome: Ongoing  SEE FLOW SHEET

## 2018-01-30 NOTE — PROGRESS NOTES
Maternal Grandmother     Diabetes Maternal Grandfather     Diabetes Paternal Grandmother     Diabetes Paternal Grandfather        SOCIAL HISTORY    Social History   Substance Use Topics    Smoking status: Former Smoker     Quit date: 1/24/1995    Smokeless tobacco: Never Used    Alcohol use No       ALLERGIES    Allergies   Allergen Reactions    Erythromycin Nausea Only    Gabapentin Other (See Comments)     Dizziness      Lyrica [Pregabalin] Swelling     With rapid weight gain       MEDICATIONS    Current Outpatient Prescriptions on File Prior to Encounter   Medication Sig Dispense Refill    potassium chloride (MICRO-K) 10 MEQ extended release capsule Take 10 mEq by mouth 2 times daily      Flaxseed, Linseed, (FLAXSEED OIL) 1000 MG CAPS Take 1 capsule by mouth daily      potassium chloride (KLOR-CON M) 20 MEQ extended release tablet Take 1 tablet by mouth 2 times daily for 7 days 14 tablet 0    Fluticasone Furoate-Vilanterol (BREO ELLIPTA) 100-25 MCG/INH AEPB Inhale 100 mcg into the lungs 2 times daily      naproxen (NAPROSYN) 500 MG tablet Take 500 mg by mouth 2 times daily (with meals)      Insulin Aspart (NOVOLOG SC) Inject 60 Units into the skin 2 times daily (with meals) Lunch and dinner      Multiple Vitamins-Minerals (THERAPEUTIC MULTIVITAMIN-MINERALS) tablet Take 1 tablet by mouth daily      rosuvastatin (CRESTOR) 20 MG tablet Take 5 mg by mouth daily       furosemide (LASIX) 40 MG tablet Take 1 tablet by mouth daily. 30 tablet 1    insulin glargine (LANTUS) 100 UNIT/ML injection Inject 80 Units into the skin nightly.  levothyroxine (SYNTHROID) 100 MCG tablet Take 100 mcg by mouth daily. No current facility-administered medications on file prior to encounter. REVIEW OF SYSTEMS    Pertinent items are noted in HPI. Constitutional: Negative for systemic symptoms including fever, chills and malaise.     Objective:      /73   Pulse 83   Temp 97.5 °F (36.4 °C)

## 2018-02-06 ENCOUNTER — HOSPITAL ENCOUNTER (OUTPATIENT)
Dept: WOUND CARE | Age: 68
Discharge: HOME | End: 2018-02-06
Attending: SURGERY | Admitting: SURGERY

## 2018-02-06 VITALS — SYSTOLIC BLOOD PRESSURE: 127 MMHG | HEART RATE: 81 BPM | RESPIRATION RATE: 16 BRPM | DIASTOLIC BLOOD PRESSURE: 79 MMHG

## 2018-02-06 DIAGNOSIS — L98.492 ABDOMINAL WALL SKIN ULCER, WITH FAT LAYER EXPOSED (HCC): Primary | Chronic | ICD-10-CM

## 2018-02-06 ASSESSMENT — PAIN SCALES - GENERAL: PAINLEVEL_OUTOF10: 0

## 2018-02-06 ASSESSMENT — PAIN DESCRIPTION - ORIENTATION: ORIENTATION: MID

## 2018-02-06 ASSESSMENT — PAIN DESCRIPTION - LOCATION: LOCATION: ABDOMEN

## 2018-02-06 ASSESSMENT — PAIN DESCRIPTION - DESCRIPTORS: DESCRIPTORS: SORE

## 2018-02-06 NOTE — PLAN OF CARE
Problem: Wound:  Intervention: Assess pain status  See flowsheet  Intervention: Assess wound size, appearance and drainage  See flowsheet    Goal: Will show signs of wound healing; wound closure and no evidence of infection  Will show signs of wound healing; wound closure and no evidence of infection   Outcome: Ongoing  See Flowsheet

## 2018-02-06 NOTE — PROGRESS NOTES
 Diabetes Maternal Grandmother     Diabetes Maternal Grandfather     Diabetes Paternal Grandmother     Diabetes Paternal Grandfather        SOCIAL HISTORY    Social History   Substance Use Topics    Smoking status: Former Smoker     Quit date: 1/24/1995    Smokeless tobacco: Never Used    Alcohol use No       ALLERGIES    Allergies   Allergen Reactions    Erythromycin Nausea Only    Gabapentin Other (See Comments)     Dizziness      Lyrica [Pregabalin] Swelling     With rapid weight gain       MEDICATIONS    Current Outpatient Prescriptions on File Prior to Encounter   Medication Sig Dispense Refill    potassium chloride (MICRO-K) 10 MEQ extended release capsule Take 10 mEq by mouth 2 times daily      Flaxseed, Linseed, (FLAXSEED OIL) 1000 MG CAPS Take 1 capsule by mouth daily      Fluticasone Furoate-Vilanterol (BREO ELLIPTA) 100-25 MCG/INH AEPB Inhale 100 mcg into the lungs 2 times daily      naproxen (NAPROSYN) 500 MG tablet Take 500 mg by mouth 2 times daily (with meals)      Insulin Aspart (NOVOLOG SC) Inject 60 Units into the skin 2 times daily (with meals) Lunch and dinner      Multiple Vitamins-Minerals (THERAPEUTIC MULTIVITAMIN-MINERALS) tablet Take 1 tablet by mouth daily      rosuvastatin (CRESTOR) 20 MG tablet Take 5 mg by mouth daily       furosemide (LASIX) 40 MG tablet Take 1 tablet by mouth daily. 30 tablet 1    insulin glargine (LANTUS) 100 UNIT/ML injection Inject 80 Units into the skin nightly.  levothyroxine (SYNTHROID) 100 MCG tablet Take 100 mcg by mouth daily.  potassium chloride (KLOR-CON M) 20 MEQ extended release tablet Take 1 tablet by mouth 2 times daily for 7 days 14 tablet 0     No current facility-administered medications on file prior to encounter. REVIEW OF SYSTEMS      Constitutional: Negative for systemic symptoms including fever, chills and malaise.     Objective:      /79   Pulse 81   Resp 16     PHYSICAL EXAM      General: [facility]-              Your wound-care supplies will be provided by: Gemini Panchal provider:              [id number] with  Alberte Caller loading:  Date               YBLJT Meds: Tamym RICKETTS cleansing:                           OM not scrub or use excessive force.                          Wash hands with soap and water before and after dressing changes.                         Prior to applying a clean dressing, cleanse wound with normal saline,                          wound cleanser, or mild soap and water.                           Ask your physician or nurse before getting the wound(s) wet in the shower.              Daily Wound management:                          Keep weight off wounds and reposition every 2 hours.                          Avoid standing for long periods of time.                          Apply wraps/stockings in AM and remove at bedtime.                          Elevate legs to the level of the heart or above for 30 minutes 4-5 times a day and/or when sitting.                                               When taking antibiotics take entire prescription as ordered by MD do not stop taking until medicine is all gone.                                                                 Orders for this week:  1/30/18              Lower abd incision-- WOUND VAC THERAPY: right portion of incision--apply sorbact and fluffed 4x4 to area--cover with vac drape--change with vac dressing on left portion of incison (wider area)---sorbact to wound bed-- please tuck sorbact to depth of wound as well, cover with BLACK FOAM TO WOUND BED--DO NOT TUCK BLACK FOAM INTO DEEP AREA JUST SINGLE LAYER ON TOP OF WOUND,  PLEASE USE DUODERM TO PERIWOUND FOR PROTECTION.      SET WOUND VAC  CONTINUOUS SUCTION.  CANISTER CHANGE WITH EACH DRESSING CHANGE OR ACCORDING TO VOLUME OF DRAINAGE.     WOUND VAC DRESSING TO BE CHANGED Thursday and Saturday per homecare and clinic to change on Tuesday during doctors

## 2018-02-13 ENCOUNTER — HOSPITAL ENCOUNTER (OUTPATIENT)
Dept: WOUND CARE | Age: 68
Discharge: HOME | End: 2018-02-13
Attending: SURGERY | Admitting: SURGERY

## 2018-02-13 VITALS
SYSTOLIC BLOOD PRESSURE: 115 MMHG | TEMPERATURE: 96.4 F | RESPIRATION RATE: 16 BRPM | HEART RATE: 80 BPM | DIASTOLIC BLOOD PRESSURE: 71 MMHG

## 2018-02-13 DIAGNOSIS — L98.492 ABDOMINAL WALL SKIN ULCER, WITH FAT LAYER EXPOSED (HCC): Primary | Chronic | ICD-10-CM

## 2018-02-13 NOTE — PROGRESS NOTES
Resp 16     PHYSICAL EXAM      General: The patient is in no acute distress. Mental status:  Patient is appropriate, is  oriented to place and plan of care. Dermatologic exam: Visual inspection of the periwound reveals the skin to be moist.  Wound exam:  see wound description below     All active wounds listed below with today's date are evaluated      Negative Pressure Wound Therapy Abdomen (Active)   Wound Type Surgical 1/30/2018 10:05 AM   Unit Type KCI 1/30/2018 10:05 AM   Dressing Type Black foam 1/30/2018 10:05 AM   Number of pieces used 1 1/30/2018 10:05 AM   Cycle Continuous 1/30/2018 10:05 AM   Target Pressure (mmHg) 125 1/30/2018 10:05 AM   Intensity 5 1/30/2018 10:05 AM   Canister changed? Yes 1/30/2018 10:05 AM   Dressing Status Clean;Dry; Intact 1/30/2018 10:05 AM   Dressing Changed Changed/New 1/30/2018 10:05 AM   Drainage Amount Large 1/30/2018 10:05 AM   Drainage Description Serosanguinous 1/30/2018 10:05 AM   Dressing Change Due 02/01/18 1/30/2018 10:05 AM   Wound Assessment Red;Yellow 1/30/2018 10:05 AM   Tiffany-wound Assessment [place] 1/30/2018 10:05 AM   Odor None 1/9/2018 10:19 AM   Number of days: 83       Wound 11/21/17 Abdomen #4 wound  left distal abdomin( onset 3 weeks ago)--nonhealing surgical (Active)   Wound Image   2/13/2018  8:57 AM   Wound Type Wound 2/13/2018  8:57 AM   Wound Other 2/13/2018  8:57 AM   Dressing Status Clean;Dry; Intact 2/6/2018  9:40 AM   Dressing Changed Changed/New 2/6/2018  9:40 AM   Dressing/Treatment ABD; Silver dressing 1/23/2018  9:53 AM   Wound Cleansed Wound cleanser 2/13/2018  8:57 AM   Wound Length (cm) 2.5 cm 2/13/2018  8:57 AM   Wound Width (cm) 11 cm 2/13/2018  8:57 AM   Wound Depth (cm)  2.0 2/13/2018  8:57 AM   Calculated Wound Size (cm^2) (l*w) 27.5 cm^2 2/13/2018  8:57 AM   Change in Wound Size % (l*w) 85.18 2/13/2018  8:57 AM   Distance Tunneling (cm) 1.9 cm 2/13/2018  8:57 AM   Tunneling Position ___ O'Clock 0300 2/13/2018  8:57 AM

## 2018-02-20 ENCOUNTER — HOSPITAL ENCOUNTER (OUTPATIENT)
Dept: WOUND CARE | Age: 68
Discharge: HOME | End: 2018-02-20
Attending: SURGERY | Admitting: SURGERY

## 2018-02-20 VITALS
DIASTOLIC BLOOD PRESSURE: 70 MMHG | TEMPERATURE: 97.4 F | SYSTOLIC BLOOD PRESSURE: 115 MMHG | HEART RATE: 91 BPM | RESPIRATION RATE: 16 BRPM

## 2018-02-20 DIAGNOSIS — L98.492 ABDOMINAL WALL SKIN ULCER, WITH FAT LAYER EXPOSED (HCC): Primary | Chronic | ICD-10-CM
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
## 2018-03-06 ENCOUNTER — HOSPITAL ENCOUNTER (OUTPATIENT)
Dept: WOUND CARE | Age: 68
Discharge: HOME | End: 2018-03-06
Attending: SURGERY | Admitting: NURSE PRACTITIONER

## 2018-03-06 VITALS
RESPIRATION RATE: 16 BRPM | TEMPERATURE: 97.9 F | SYSTOLIC BLOOD PRESSURE: 120 MMHG | DIASTOLIC BLOOD PRESSURE: 77 MMHG | HEART RATE: 75 BPM

## 2018-03-06 DIAGNOSIS — T81.32XA POSTOPERATIVE DEHISCENCE OF INTERNAL WOUND, INITIAL ENCOUNTER: Primary | ICD-10-CM

## 2018-03-06 DIAGNOSIS — T81.89XD NON-HEALING SURGICAL WOUND, SUBSEQUENT ENCOUNTER: ICD-10-CM

## 2018-03-06 PROCEDURE — 11042 DBRDMT SUBQ TIS 1ST 20SQCM/<: CPT | Performed by: NURSE PRACTITIONER

## 2018-03-06 NOTE — PROGRESS NOTES
Nonselective enzymatic debridement performed with Santyl per physician order to wound(s) of the right hip  Patient tolerated the procedure well.
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
## 2018-03-13 ENCOUNTER — HOSPITAL ENCOUNTER (OUTPATIENT)
Dept: WOUND CARE | Age: 68
Discharge: HOME | End: 2018-03-13
Attending: SURGERY | Admitting: SURGERY

## 2018-03-13 VITALS
RESPIRATION RATE: 16 BRPM | SYSTOLIC BLOOD PRESSURE: 122 MMHG | HEART RATE: 79 BPM | TEMPERATURE: 98 F | DIASTOLIC BLOOD PRESSURE: 81 MMHG

## 2018-03-13 DIAGNOSIS — L98.492 ABDOMINAL WALL SKIN ULCER, WITH FAT LAYER EXPOSED (HCC): Primary | Chronic | ICD-10-CM

## 2018-03-13 NOTE — PROGRESS NOTES
oriented to place and plan of care. Dermatologic exam: Visual inspection of the periwound reveals the skin to be scaly. Wound exam:  see wound description below     All active wounds listed below with today's date are evaluated      Negative Pressure Wound Therapy Abdomen (Active)   Wound Type Surgical 1/9/2018 10:19 AM   Unit Type KCI 1/9/2018 10:19 AM   Dressing Type Black foam 1/9/2018 10:19 AM   Number of pieces used 3 1/9/2018 10:19 AM   Cycle Continuous 1/9/2018 10:19 AM   Target Pressure (mmHg) 125 1/9/2018 10:19 AM   Intensity 5 1/9/2018 10:19 AM   Canister changed? Yes 1/9/2018 10:19 AM   Dressing Status Clean;Dry; Intact 1/9/2018 10:19 AM   Dressing Changed Changed/New 1/9/2018 10:19 AM   Drainage Amount Large 1/9/2018 10:19 AM   Drainage Description Serosanguinous 1/9/2018 10:19 AM   Dressing Change Due 01/11/18 1/9/2018 10:19 AM   Wound Assessment Red;Yellow 1/9/2018 10:19 AM   Tiffany-wound Assessment [employer];[place] 1/9/2018 10:19 AM   Odor None 1/9/2018 10:19 AM   Number of days: 111       Wound 11/21/17 Abdomen #4 wound  left distal abdomin( onset 3 weeks ago)--nonhealing surgical (Active)   Wound Image   3/6/2018  9:06 AM   Wound Type Wound 3/13/2018  9:04 AM   Wound Other 3/13/2018  9:04 AM   Dressing Status Clean;Dry; Intact 3/13/2018  9:53 AM   Dressing Changed Changed/New 3/13/2018  9:53 AM   Dressing/Treatment ABD; Silver dressing 1/23/2018  9:53 AM   Wound Cleansed Wound cleanser 3/13/2018  9:04 AM   Wound Length (cm) 0.6 cm 3/13/2018  9:04 AM   Wound Width (cm) 5.4 cm 3/13/2018  9:04 AM   Wound Depth (cm)  0.3 3/13/2018  9:04 AM   Calculated Wound Size (cm^2) (l*w) 3.24 cm^2 3/13/2018  9:04 AM   Change in Wound Size % (l*w) 98.25 3/13/2018  9:04 AM   Distance Tunneling (cm) 0 cm 3/13/2018  9:04 AM   Tunneling Position ___ O'Clock 0 3/13/2018  9:04 AM   Undermining Starts ___ O'Clock 0 3/13/2018  9:04 AM   Undermining Ends___ O'Clock 0 3/13/2018  9:04 AM   Undermining Maxium Distance (cm) 0

## 2018-03-20 ENCOUNTER — HOSPITAL ENCOUNTER (OUTPATIENT)
Dept: WOUND CARE | Age: 68
Discharge: HOME | End: 2018-03-20
Attending: SURGERY | Admitting: SURGERY

## 2018-03-20 VITALS
SYSTOLIC BLOOD PRESSURE: 135 MMHG | HEART RATE: 77 BPM | DIASTOLIC BLOOD PRESSURE: 78 MMHG | RESPIRATION RATE: 16 BRPM | TEMPERATURE: 97.4 F

## 2018-03-20 DIAGNOSIS — L98.492 ABDOMINAL WALL SKIN ULCER, WITH FAT LAYER EXPOSED (HCC): Primary | Chronic | ICD-10-CM

## 2018-03-20 NOTE — PROGRESS NOTES
Diabetes Father     Heart Disease Father     Arthritis Father     Diabetes Maternal Grandmother     Diabetes Maternal Grandfather     Diabetes Paternal Grandmother     Diabetes Paternal Grandfather        SOCIAL HISTORY    Social History   Substance Use Topics    Smoking status: Former Smoker     Quit date: 1/24/1995    Smokeless tobacco: Never Used    Alcohol use No       ALLERGIES    Allergies   Allergen Reactions    Erythromycin Nausea Only    Gabapentin Other (See Comments)     Dizziness      Lyrica [Pregabalin] Swelling     With rapid weight gain       MEDICATIONS    Current Outpatient Prescriptions on File Prior to Encounter   Medication Sig Dispense Refill    Insulin Degludec (TRESIBA FLEXTOUCH) 100 UNIT/ML SOPN Inject 60 Units into the skin      potassium chloride (MICRO-K) 10 MEQ extended release capsule Take 10 mEq by mouth 2 times daily      Fluticasone Furoate-Vilanterol (BREO ELLIPTA) 100-25 MCG/INH AEPB Inhale 100 mcg into the lungs 2 times daily      Insulin Aspart (NOVOLOG SC) Inject 60 Units into the skin 2 times daily (with meals) Lunch and dinner      Multiple Vitamins-Minerals (THERAPEUTIC MULTIVITAMIN-MINERALS) tablet Take 1 tablet by mouth daily      rosuvastatin (CRESTOR) 20 MG tablet Take 5 mg by mouth daily       furosemide (LASIX) 40 MG tablet Take 1 tablet by mouth daily. 30 tablet 1    levothyroxine (SYNTHROID) 100 MCG tablet Take 100 mcg by mouth daily.  potassium chloride (KLOR-CON M) 20 MEQ extended release tablet Take 1 tablet by mouth 2 times daily for 7 days 14 tablet 0    naproxen (NAPROSYN) 500 MG tablet Take 500 mg by mouth 2 times daily (with meals)       No current facility-administered medications on file prior to encounter. REVIEW OF SYSTEMS    Pertinent items are noted in HPI. Constitutional: Negative for systemic symptoms including fever, chills and malaise.     Objective:      /78   Pulse 77   Temp 97.4 °F (36.3 °C) (Oral)   Resp

## 2018-03-27 ENCOUNTER — HOSPITAL ENCOUNTER (OUTPATIENT)
Dept: WOUND CARE | Age: 68
Discharge: HOME | End: 2018-03-27
Attending: NURSE PRACTITIONER | Admitting: NURSE PRACTITIONER

## 2018-03-27 VITALS — TEMPERATURE: 96.4 F | RESPIRATION RATE: 16 BRPM

## 2018-03-27 DIAGNOSIS — T81.32XA POSTOPERATIVE DEHISCENCE OF INTERNAL WOUND, INITIAL ENCOUNTER: Primary | ICD-10-CM

## 2018-03-27 DIAGNOSIS — T81.89XD NON-HEALING SURGICAL WOUND, SUBSEQUENT ENCOUNTER: ICD-10-CM

## 2018-03-27 DIAGNOSIS — L98.492 ABDOMINAL WALL SKIN ULCER, WITH FAT LAYER EXPOSED (HCC): Chronic | ICD-10-CM

## 2018-03-27 PROCEDURE — 11042 DBRDMT SUBQ TIS 1ST 20SQCM/<: CPT | Performed by: NURSE PRACTITIONER

## 2018-04-03 ENCOUNTER — HOSPITAL ENCOUNTER (OUTPATIENT)
Dept: WOUND CARE | Age: 68
Discharge: HOME | End: 2018-04-03
Attending: SURGERY | Admitting: SURGERY

## 2018-04-03 VITALS
SYSTOLIC BLOOD PRESSURE: 122 MMHG | DIASTOLIC BLOOD PRESSURE: 57 MMHG | HEART RATE: 85 BPM | TEMPERATURE: 97 F | RESPIRATION RATE: 16 BRPM

## 2018-04-03 DIAGNOSIS — L98.492 ABDOMINAL WALL SKIN ULCER, WITH FAT LAYER EXPOSED (HCC): Primary | Chronic | ICD-10-CM

## 2018-04-10 ENCOUNTER — HOSPITAL ENCOUNTER (OUTPATIENT)
Dept: WOUND CARE | Age: 68
Discharge: HOME | End: 2018-04-10
Attending: SURGERY | Admitting: SURGERY

## 2018-04-10 VITALS
SYSTOLIC BLOOD PRESSURE: 129 MMHG | DIASTOLIC BLOOD PRESSURE: 76 MMHG | TEMPERATURE: 96.6 F | RESPIRATION RATE: 18 BRPM | HEART RATE: 72 BPM

## 2018-04-10 DIAGNOSIS — L98.492 ABDOMINAL WALL SKIN ULCER, WITH FAT LAYER EXPOSED (HCC): Primary | Chronic | ICD-10-CM

## 2018-04-17 ENCOUNTER — HOSPITAL ENCOUNTER (OUTPATIENT)
Dept: WOUND CARE | Age: 68
Discharge: HOME | End: 2018-04-17
Attending: SURGERY | Admitting: SURGERY

## 2018-04-17 VITALS
RESPIRATION RATE: 18 BRPM | SYSTOLIC BLOOD PRESSURE: 129 MMHG | TEMPERATURE: 97.9 F | HEART RATE: 75 BPM | DIASTOLIC BLOOD PRESSURE: 77 MMHG

## 2018-04-17 DIAGNOSIS — L98.492 ABDOMINAL WALL SKIN ULCER, WITH FAT LAYER EXPOSED (HCC): Primary | Chronic | ICD-10-CM

## 2018-04-24 ENCOUNTER — HOSPITAL ENCOUNTER (OUTPATIENT)
Dept: WOUND CARE | Age: 68
Discharge: HOME | End: 2018-04-24
Attending: SURGERY | Admitting: SURGERY

## 2018-04-24 VITALS
TEMPERATURE: 97.4 F | RESPIRATION RATE: 18 BRPM | DIASTOLIC BLOOD PRESSURE: 69 MMHG | SYSTOLIC BLOOD PRESSURE: 122 MMHG | HEART RATE: 75 BPM

## 2018-04-24 DIAGNOSIS — L98.492 ABDOMINAL WALL SKIN ULCER, WITH FAT LAYER EXPOSED (HCC): Primary | Chronic | ICD-10-CM

## 2018-05-01 ENCOUNTER — HOSPITAL ENCOUNTER (OUTPATIENT)
Dept: WOUND CARE | Age: 68
Discharge: HOME | End: 2018-05-01
Attending: SURGERY | Admitting: SURGERY

## 2018-05-01 VITALS
DIASTOLIC BLOOD PRESSURE: 72 MMHG | RESPIRATION RATE: 16 BRPM | HEART RATE: 68 BPM | TEMPERATURE: 97.6 F | SYSTOLIC BLOOD PRESSURE: 140 MMHG

## 2018-05-01 DIAGNOSIS — L98.492 ABDOMINAL WALL SKIN ULCER, WITH FAT LAYER EXPOSED (HCC): Primary | Chronic | ICD-10-CM

## 2018-05-08 ENCOUNTER — HOSPITAL ENCOUNTER (OUTPATIENT)
Dept: WOUND CARE | Age: 68
Discharge: HOME | End: 2018-05-08
Attending: SURGERY | Admitting: SURGERY

## 2018-05-08 VITALS
DIASTOLIC BLOOD PRESSURE: 69 MMHG | TEMPERATURE: 97.3 F | SYSTOLIC BLOOD PRESSURE: 134 MMHG | HEART RATE: 70 BPM | RESPIRATION RATE: 16 BRPM

## 2018-05-08 DIAGNOSIS — L98.492 ABDOMINAL WALL SKIN ULCER, WITH FAT LAYER EXPOSED (HCC): Primary | Chronic | ICD-10-CM

## 2018-05-15 ENCOUNTER — HOSPITAL ENCOUNTER (OUTPATIENT)
Dept: WOUND CARE | Age: 68
Discharge: HOME | End: 2018-05-15
Attending: SURGERY | Admitting: SURGERY

## 2018-05-15 VITALS
RESPIRATION RATE: 18 BRPM | SYSTOLIC BLOOD PRESSURE: 139 MMHG | DIASTOLIC BLOOD PRESSURE: 81 MMHG | HEART RATE: 71 BPM | TEMPERATURE: 97 F

## 2018-05-15 DIAGNOSIS — L98.492 ABDOMINAL WALL SKIN ULCER, WITH FAT LAYER EXPOSED (HCC): Primary | Chronic | ICD-10-CM

## 2018-05-22 ENCOUNTER — HOSPITAL ENCOUNTER (OUTPATIENT)
Dept: WOUND CARE | Age: 68
Discharge: HOME | End: 2018-05-21
Attending: SURGERY | Admitting: SURGERY

## 2018-05-29 ENCOUNTER — HOSPITAL ENCOUNTER (OUTPATIENT)
Dept: WOUND CARE | Age: 68
Discharge: HOME | End: 2018-05-29
Attending: SURGERY | Admitting: SURGERY

## 2018-05-29 VITALS
TEMPERATURE: 97.8 F | HEART RATE: 70 BPM | RESPIRATION RATE: 16 BRPM | DIASTOLIC BLOOD PRESSURE: 72 MMHG | SYSTOLIC BLOOD PRESSURE: 129 MMHG

## 2018-05-29 DIAGNOSIS — L98.492 ABDOMINAL WALL SKIN ULCER, WITH FAT LAYER EXPOSED (HCC): Primary | Chronic | ICD-10-CM

## 2018-09-26 ENCOUNTER — APPOINTMENT (OUTPATIENT)
Dept: CT IMAGING | Age: 68
End: 2018-09-26
Payer: COMMERCIAL

## 2018-09-26 ENCOUNTER — HOSPITAL ENCOUNTER (EMERGENCY)
Age: 68
Discharge: HOME | End: 2018-09-26
Attending: EMERGENCY MEDICINE
Payer: COMMERCIAL

## 2018-09-26 VITALS
OXYGEN SATURATION: 96 % | WEIGHT: 215 LBS | RESPIRATION RATE: 18 BRPM | BODY MASS INDEX: 36.7 KG/M2 | HEART RATE: 101 BPM | TEMPERATURE: 96.8 F | DIASTOLIC BLOOD PRESSURE: 73 MMHG | SYSTOLIC BLOOD PRESSURE: 137 MMHG | HEIGHT: 64 IN

## 2018-09-26 DIAGNOSIS — N13.30 HYDROURETERONEPHROSIS: ICD-10-CM

## 2018-09-26 DIAGNOSIS — N30.00 ACUTE CYSTITIS WITHOUT HEMATURIA: Primary | ICD-10-CM

## 2018-09-26 LAB
BACTERIA: ABNORMAL /HPF
BILIRUBIN URINE: NEGATIVE MG/DL
BLOOD, URINE: ABNORMAL
CAST TYPE: ABNORMAL /HPF
CLARITY: ABNORMAL
COLOR: YELLOW
CRYSTAL TYPE: ABNORMAL /HPF
EPITHELIAL CELLS, UA: ABNORMAL /HPF
GLUCOSE, URINE: NEGATIVE MG/DL
KETONES, URINE: NEGATIVE MG/DL
LEUKOCYTE ESTERASE, URINE: ABNORMAL
MUCUS: NEGATIVE HPF
NITRITE URINE, QUANTITATIVE: NEGATIVE
PH, URINE: 7.5 (ref 5–8)
PROTEIN UA: 100 MG/DL
RBC URINE: ABNORMAL /HPF (ref 0–6)
SPECIFIC GRAVITY UA: 1.01 (ref 1–1.03)
UROBILINOGEN, URINE: 1 MG/DL (ref 0.2–1)
VOLUME, (UVOL): 12 ML (ref 10–12)
WBC UA: ABNORMAL /HPF (ref 0–5)

## 2018-09-26 PROCEDURE — 74176 CT ABD & PELVIS W/O CONTRAST: CPT

## 2018-09-26 PROCEDURE — 6370000000 HC RX 637 (ALT 250 FOR IP): Performed by: EMERGENCY MEDICINE

## 2018-09-26 PROCEDURE — 99284 EMERGENCY DEPT VISIT MOD MDM: CPT

## 2018-09-26 PROCEDURE — 81001 URINALYSIS AUTO W/SCOPE: CPT

## 2018-09-26 PROCEDURE — 87086 URINE CULTURE/COLONY COUNT: CPT

## 2018-09-26 RX ORDER — SULFAMETHOXAZOLE AND TRIMETHOPRIM 800; 160 MG/1; MG/1
1 TABLET ORAL 2 TIMES DAILY
Qty: 20 TABLET | Refills: 0 | Status: SHIPPED | OUTPATIENT
Start: 2018-09-26 | End: 2018-10-06

## 2018-09-26 RX ORDER — LANOLIN ALCOHOL/MO/W.PET/CERES
2000 CREAM (GRAM) TOPICAL DAILY
COMMUNITY
End: 2021-08-03 | Stop reason: ALTCHOICE

## 2018-09-26 RX ORDER — KETOROLAC TROMETHAMINE 10 MG/1
10 TABLET, FILM COATED ORAL EVERY 6 HOURS PRN
Qty: 20 TABLET | Refills: 0 | Status: SHIPPED | OUTPATIENT
Start: 2018-09-26 | End: 2021-08-03 | Stop reason: ALTCHOICE

## 2018-09-26 RX ORDER — CHOLECALCIFEROL (VITAMIN D3) 125 MCG
CAPSULE ORAL
COMMUNITY
End: 2021-08-03 | Stop reason: ALTCHOICE

## 2018-09-26 RX ORDER — HYDROCODONE BITARTRATE AND ACETAMINOPHEN 5; 325 MG/1; MG/1
1 TABLET ORAL EVERY 6 HOURS PRN
Qty: 10 TABLET | Refills: 0 | Status: SHIPPED | OUTPATIENT
Start: 2018-09-26 | End: 2018-09-29

## 2018-09-26 RX ORDER — PHENAZOPYRIDINE HYDROCHLORIDE 100 MG/1
200 TABLET, FILM COATED ORAL 3 TIMES DAILY PRN
Qty: 18 TABLET | Refills: 0 | Status: SHIPPED | OUTPATIENT
Start: 2018-09-26 | End: 2018-09-29

## 2018-09-26 RX ORDER — KETOROLAC TROMETHAMINE 10 MG/1
10 TABLET, FILM COATED ORAL ONCE
Status: COMPLETED | OUTPATIENT
Start: 2018-09-26 | End: 2018-09-26

## 2018-09-26 RX ADMIN — KETOROLAC TROMETHAMINE 10 MG: 10 TABLET, FILM COATED ORAL at 12:23

## 2018-09-26 ASSESSMENT — PAIN SCALES - GENERAL: PAINLEVEL_OUTOF10: 5

## 2018-09-26 ASSESSMENT — PAIN DESCRIPTION - DESCRIPTORS: DESCRIPTORS: SHARP;STABBING

## 2018-09-26 ASSESSMENT — PAIN DESCRIPTION - LOCATION: LOCATION: VAGINA

## 2018-09-26 NOTE — ED PROVIDER NOTES
encounter of 09/26/18   Urinalysis with microscopic   Result Value Ref Range    Color, UA YELLOW UYELL    Clarity, UA CLOUDY (A) CLEAR    Glucose, Urine NEGATIVE NEG MG/DL    Bilirubin Urine NEGATIVE NEG MG/DL    Ketones, Urine NEGATIVE NEG MG/DL    Specific Gravity, UA 1.010 1.001 - 1.035    Blood, Urine SMALL (A) NEG    pH, Urine 7.5 5.0 - 8.0    Protein,  (A) NEG MG/DL    Urobilinogen, Urine 1.0 0.2 - 1.0 MG/DL    Nitrite Urine, Quantitative NEGATIVE NEG    Leukocyte Esterase, Urine LARGE (A) NEG    Volume, (UVOL) 12 10 - 12 ML    RBC, UA 0 TO 2 0 - 6 /HPF    WBC, UA TOO NUMEROUS TO COUNT 0 - 5 /HPF    Epi Cells 0 TO 3 /HPF    Cast Type NO CAST FORMS SEEN NCFS /HPF    Bacteria, UA FEW (A) NEG /HPF    Crystal Type NONE SEEN NEG /HPF    Mucus, UA NEGATIVE NEG HPF         Radiographs (if obtained):  [] The following radiograph was interpreted by myself in the absence of a radiologist:  [x] Radiologist's Report reviewed at time of ED visit:  CT ABDOMEN PELVIS WO CONTRAST   Final Result   There are multiple nonobstructing left renal stones. No left ureteral stones   or evidence for left-sided urinary tract obstruction. No right-sided urinary tract stones are seen. However, there is a mild   degree of right-sided hydroureteronephrosis without obvious etiology. Colonic diverticulosis without evidence for diverticulitis. ED Course and MDM:  Patient has urinary tract infection and it appears that she has hydronephrosis from no apparent obstructive process that can be seen by the radiologist.  She will be discharged in stable condition to follow-up with Dr. Jose Camarena. I will start her on Bactrim, Norco, and Toradol. She is instructed return for condition worsens. Urine culture is pending. Final Impression:  1. Acute cystitis without hematuria    2.  Hydroureteronephrosis      DISPOSITION Decision To Discharge    Patient referred to:  Susannah Rodney MD  [address] [facility], [address] [place]  [phone]    In 2 days  For follow up    Discharge medications:  Discharge Medication List as of 9/26/2018  1:51 PM      START taking these medications    Details   sulfamethoxazole-trimethoprim (BACTRIM DS) 800-160 MG per tablet Take 1 tablet by mouth 2 times daily for 10 days, Disp-20 tablet, R-0Print      phenazopyridine (PYRIDIUM) 100 MG tablet Take 2 tablets by mouth 3 times daily as needed for Pain, Disp-18 tablet, R-0Print      HYDROcodone-acetaminophen (NORCO) 5-325 MG per tablet Take 1 tablet by mouth every 6 hours as needed for Pain for up to 10 doses. ., Disp-10 tablet, R-0Print      ketorolac (TORADOL) 10 MG tablet Take 1 tablet by mouth every 6 hours as needed for Pain, Disp-20 tablet, R-0Print           (Please note that portions of this note may have been completed with a voice recognition program. Efforts were made to edit the dictations but occasionally words are mis-transcribed.)    Madalyn Decker, , [address] [facility]  Board certified in [address] [place] [address]  09/26/18 [address] [place] [address],   09/26/18 [id number]

## 2018-09-26 NOTE — ED NOTES
Patient given AVS, discharge instructions and prescriptions. Verbalizes understanding no further needs identified at this time.      Raya Bonner RN  09/26/18 [id number]

## 2018-09-28 LAB
CULTURE: NORMAL
Lab: NORMAL
REPORT STATUS: NORMAL
SPECIMEN: NORMAL

## 2019-06-03 ENCOUNTER — HOSPITAL ENCOUNTER (OUTPATIENT)
Age: 69
Discharge: HOME | End: 2019-06-03
Payer: COMMERCIAL

## 2019-06-03 PROCEDURE — 87086 URINE CULTURE/COLONY COUNT: CPT

## 2019-06-04 LAB
CULTURE: NORMAL
Lab: NORMAL
SPECIMEN: NORMAL

## 2019-06-10 ENCOUNTER — HOSPITAL ENCOUNTER (OUTPATIENT)
Age: 69
Discharge: HOME | End: 2019-06-10
Payer: COMMERCIAL

## 2019-06-10 ENCOUNTER — HOSPITAL ENCOUNTER (OUTPATIENT)
Dept: GENERAL RADIOLOGY | Age: 69
Discharge: HOME | End: 2019-06-10
Payer: COMMERCIAL

## 2019-06-10 DIAGNOSIS — L59.9 DISORDER OF THE SKIN AND SUBCUTANEOUS TISSUE RELATED TO RADIATION, UNSPECIFIED: ICD-10-CM

## 2019-06-10 DIAGNOSIS — E08.3512 DIABETES MELLITUS DUE TO UNDERLYING CONDITION WITH LEFT EYE AFFECTED BY PROLIFERATIVE RETINOPATHY AND MACULAR EDEMA, WITH LONG-TERM CURRENT USE OF INSULIN (HCC): ICD-10-CM

## 2019-06-10 DIAGNOSIS — Z79.899 ENCOUNTER FOR LONG-TERM (CURRENT) USE OF OTHER MEDICATIONS: ICD-10-CM

## 2019-06-10 DIAGNOSIS — Z79.4 DIABETES MELLITUS DUE TO UNDERLYING CONDITION WITH LEFT EYE AFFECTED BY PROLIFERATIVE RETINOPATHY AND MACULAR EDEMA, WITH LONG-TERM CURRENT USE OF INSULIN (HCC): ICD-10-CM

## 2019-06-10 DIAGNOSIS — Z01.811 PRE-OP CHEST EXAM: ICD-10-CM

## 2019-06-10 LAB
ANION GAP SERPL CALCULATED.3IONS-SCNC: 13 MMOL/L (ref 4–16)
BASOPHILS ABSOLUTE: 0.1 K/CU MM
BASOPHILS RELATIVE PERCENT: 0.7 % (ref 0–1)
BUN BLDV-MCNC: 17 MG/DL (ref 6–23)
CALCIUM SERPL-MCNC: 9.4 MG/DL (ref 8.3–10.6)
CHLORIDE BLD-SCNC: 103 MMOL/L (ref 99–110)
CO2: 24 MMOL/L (ref 21–32)
CREAT SERPL-MCNC: 0.7 MG/DL (ref 0.6–1.1)
DIFFERENTIAL TYPE: ABNORMAL
EKG ATRIAL RATE: 95 BPM
EKG DIAGNOSIS: NORMAL
EKG P AXIS: 0 DEGREES
EKG P-R INTERVAL: 148 MS
EKG Q-T INTERVAL: 358 MS
EKG QRS DURATION: 90 MS
EKG QTC CALCULATION (BAZETT): 449 MS
EKG R AXIS: -18 DEGREES
EKG T AXIS: 18 DEGREES
EKG VENTRICULAR RATE: 95 BPM
EOSINOPHILS ABSOLUTE: 0.4 K/CU MM
EOSINOPHILS RELATIVE PERCENT: 4.4 % (ref 0–3)
GFR AFRICAN AMERICAN: >60 ML/MIN/1.73M2
GFR NON-AFRICAN AMERICAN: >60 ML/MIN/1.73M2
GLUCOSE FASTING: 107 MG/DL (ref 70–99)
HCT VFR BLD CALC: 43 % (ref 37–47)
HEMOGLOBIN: 13.5 GM/DL (ref 12.5–16)
IMMATURE NEUTROPHIL %: 0.7 % (ref 0–0.43)
LYMPHOCYTES ABSOLUTE: 1.4 K/CU MM
LYMPHOCYTES RELATIVE PERCENT: 14.8 % (ref 24–44)
MCH RBC QN AUTO: 30.9 PG (ref 27–31)
MCHC RBC AUTO-ENTMCNC: 31.4 % (ref 32–36)
MCV RBC AUTO: 98.4 FL (ref 78–100)
MONOCYTES ABSOLUTE: 0.7 K/CU MM
MONOCYTES RELATIVE PERCENT: 7 % (ref 0–4)
PDW BLD-RTO: 13.2 % (ref 11.7–14.9)
PLATELET # BLD: 247 K/CU MM (ref 140–440)
PMV BLD AUTO: 9.3 FL (ref 7.5–11.1)
POTASSIUM SERPL-SCNC: 4.4 MMOL/L (ref 3.5–5.1)
RBC # BLD: 4.37 M/CU MM (ref 4.2–5.4)
SEGMENTED NEUTROPHILS ABSOLUTE COUNT: 7 K/CU MM
SEGMENTED NEUTROPHILS RELATIVE PERCENT: 72.4 % (ref 36–66)
SODIUM BLD-SCNC: 140 MMOL/L (ref 135–145)
TOTAL IMMATURE NEUTOROPHIL: 0.07 K/CU MM
WBC # BLD: 9.7 K/CU MM (ref 4–10.5)

## 2019-06-10 PROCEDURE — 80048 BASIC METABOLIC PNL TOTAL CA: CPT

## 2019-06-10 PROCEDURE — 93010 ELECTROCARDIOGRAM REPORT: CPT | Performed by: INTERNAL MEDICINE

## 2019-06-10 PROCEDURE — 85025 COMPLETE CBC W/AUTO DIFF WBC: CPT

## 2019-06-10 PROCEDURE — 36415 COLL VENOUS BLD VENIPUNCTURE: CPT

## 2019-06-10 PROCEDURE — 93005 ELECTROCARDIOGRAM TRACING: CPT | Performed by: UROLOGY

## 2019-06-10 PROCEDURE — 87086 URINE CULTURE/COLONY COUNT: CPT

## 2019-06-10 PROCEDURE — 71046 X-RAY EXAM CHEST 2 VIEWS: CPT

## 2019-06-12 LAB
CULTURE: NORMAL
Lab: NORMAL
SPECIMEN: NORMAL

## 2019-06-22 ENCOUNTER — HOSPITAL ENCOUNTER (OUTPATIENT)
Age: 69
Discharge: HOME | End: 2019-06-22
Payer: COMMERCIAL

## 2019-06-22 ENCOUNTER — HOSPITAL ENCOUNTER (OUTPATIENT)
Dept: GENERAL RADIOLOGY | Age: 69
Discharge: HOME | End: 2019-06-22
Payer: COMMERCIAL

## 2019-06-22 DIAGNOSIS — N20.0 KIDNEY STONE: ICD-10-CM

## 2019-06-22 PROCEDURE — 74018 RADEX ABDOMEN 1 VIEW: CPT

## 2019-07-03 ENCOUNTER — HOSPITAL ENCOUNTER (OUTPATIENT)
Dept: MAMMOGRAPHY | Age: 69
Discharge: HOME | End: 2019-07-03
Payer: COMMERCIAL

## 2019-07-03 DIAGNOSIS — Z12.31 SCREENING MAMMOGRAM, ENCOUNTER FOR: ICD-10-CM

## 2019-07-03 PROCEDURE — 77067 SCR MAMMO BI INCL CAD: CPT

## 2019-08-07 ENCOUNTER — HOSPITAL ENCOUNTER (OUTPATIENT)
Age: 69
Discharge: HOME | End: 2019-08-07
Payer: COMMERCIAL

## 2019-08-07 PROCEDURE — 81383 HLA II TYPING 1 ALLELE HR: CPT

## 2019-08-07 PROCEDURE — 36415 COLL VENOUS BLD VENIPUNCTURE: CPT

## 2019-08-07 PROCEDURE — 81376 HLA II TYPING 1 LOCUS LR: CPT

## 2019-08-14 LAB
CELIAC (HLA-DQ8): NEGATIVE
CELIAC (HLA-DQB1*02): POSITIVE
CELIAC HLA-DQA1*05: POSITIVE
INTERPRETATION: ABNORMAL
INTERPRETATION: ABNORMAL
SPECIMEN: ABNORMAL
SPECIMEN: ABNORMAL

## 2019-09-18 ENCOUNTER — HOSPITAL ENCOUNTER (OUTPATIENT)
Dept: GENERAL RADIOLOGY | Age: 69
Discharge: HOME | End: 2019-09-18
Payer: COMMERCIAL

## 2019-09-18 ENCOUNTER — HOSPITAL ENCOUNTER (OUTPATIENT)
Age: 69
Discharge: HOME | End: 2019-09-18
Payer: COMMERCIAL

## 2019-09-18 DIAGNOSIS — N20.0 KIDNEY STONE: ICD-10-CM

## 2019-09-18 PROCEDURE — 74018 RADEX ABDOMEN 1 VIEW: CPT

## 2020-03-07 ENCOUNTER — HOSPITAL ENCOUNTER (OUTPATIENT)
Dept: GENERAL RADIOLOGY | Age: 70
Discharge: HOME | End: 2020-03-07
Payer: COMMERCIAL

## 2020-03-07 ENCOUNTER — HOSPITAL ENCOUNTER (OUTPATIENT)
Age: 70
Discharge: HOME | End: 2020-03-07
Payer: COMMERCIAL

## 2020-03-07 LAB
ALBUMIN SERPL-MCNC: 3.4 GM/DL (ref 3.4–5)
ALP BLD-CCNC: 147 IU/L (ref 40–129)
ALT SERPL-CCNC: 27 U/L (ref 10–40)
ANION GAP SERPL CALCULATED.3IONS-SCNC: 10 MMOL/L (ref 4–16)
AST SERPL-CCNC: 38 IU/L (ref 15–37)
BILIRUB SERPL-MCNC: 0.5 MG/DL (ref 0–1)
BUN BLDV-MCNC: 19 MG/DL (ref 6–23)
CALCIUM SERPL-MCNC: 9.5 MG/DL (ref 8.3–10.6)
CHLORIDE BLD-SCNC: 105 MMOL/L (ref 99–110)
CHOLESTEROL, FASTING: 145 MG/DL
CO2: 28 MMOL/L (ref 21–32)
CREAT SERPL-MCNC: 0.8 MG/DL (ref 0.6–1.1)
ESTIMATED AVERAGE GLUCOSE: 166 MG/DL
GFR AFRICAN AMERICAN: >60 ML/MIN/1.73M2
GFR NON-AFRICAN AMERICAN: >60 ML/MIN/1.73M2
GLUCOSE FASTING: 67 MG/DL (ref 70–99)
HBA1C MFR BLD: 7.4 % (ref 4.2–6.3)
HDLC SERPL-MCNC: 62 MG/DL
LDL CHOLESTEROL DIRECT: 168 MG/DL
POTASSIUM SERPL-SCNC: 4.3 MMOL/L (ref 3.5–5.1)
SODIUM BLD-SCNC: 143 MMOL/L (ref 135–145)
TOTAL PROTEIN: 7.7 GM/DL (ref 6.4–8.2)
TRIGLYCERIDE, FASTING: 60 MG/DL

## 2020-03-07 PROCEDURE — 80053 COMPREHEN METABOLIC PANEL: CPT

## 2020-03-07 PROCEDURE — 80061 LIPID PANEL: CPT

## 2020-03-07 PROCEDURE — 74018 RADEX ABDOMEN 1 VIEW: CPT

## 2020-03-07 PROCEDURE — 36415 COLL VENOUS BLD VENIPUNCTURE: CPT

## 2020-03-07 PROCEDURE — 83036 HEMOGLOBIN GLYCOSYLATED A1C: CPT

## 2020-07-14 ENCOUNTER — APPOINTMENT (OUTPATIENT)
Dept: CT IMAGING | Age: 70
End: 2020-07-14
Payer: COMMERCIAL

## 2020-07-14 ENCOUNTER — HOSPITAL ENCOUNTER (EMERGENCY)
Age: 70
Discharge: HOME | End: 2020-07-14
Attending: EMERGENCY MEDICINE
Payer: COMMERCIAL

## 2020-07-14 VITALS
DIASTOLIC BLOOD PRESSURE: 85 MMHG | TEMPERATURE: 98.5 F | BODY MASS INDEX: 39.87 KG/M2 | SYSTOLIC BLOOD PRESSURE: 156 MMHG | HEIGHT: 63 IN | OXYGEN SATURATION: 97 % | RESPIRATION RATE: 19 BRPM | WEIGHT: 225 LBS | HEART RATE: 86 BPM

## 2020-07-14 LAB
ANION GAP SERPL CALCULATED.3IONS-SCNC: 6 MMOL/L (ref 4–16)
BACTERIA: ABNORMAL /HPF
BASOPHILS ABSOLUTE: 0.1 K/CU MM
BASOPHILS RELATIVE PERCENT: 1.1 % (ref 0–1)
BILIRUBIN URINE: NEGATIVE MG/DL
BLOOD, URINE: ABNORMAL
BUN BLDV-MCNC: 16 MG/DL (ref 6–23)
CALCIUM SERPL-MCNC: 9.6 MG/DL (ref 8.3–10.6)
CAST TYPE: ABNORMAL /HPF
CHLORIDE BLD-SCNC: 99 MMOL/L (ref 99–110)
CLARITY: CLEAR
CO2: 35 MMOL/L (ref 21–32)
COLOR: YELLOW
CREAT SERPL-MCNC: 0.8 MG/DL (ref 0.6–1.1)
CRYSTAL TYPE: NEGATIVE /HPF
DIFFERENTIAL TYPE: ABNORMAL
EOSINOPHILS ABSOLUTE: 0.3 K/CU MM
EOSINOPHILS RELATIVE PERCENT: 3.8 % (ref 0–3)
EPITHELIAL CELLS, UA: 2 /HPF
GFR AFRICAN AMERICAN: >60 ML/MIN/1.73M2
GFR NON-AFRICAN AMERICAN: >60 ML/MIN/1.73M2
GLUCOSE BLD-MCNC: 165 MG/DL (ref 70–99)
GLUCOSE, URINE: NEGATIVE MG/DL
HCT VFR BLD CALC: 47.6 % (ref 37–47)
HEMOGLOBIN: 14.9 GM/DL (ref 12.5–16)
IMMATURE NEUTROPHIL %: 0.6 % (ref 0–0.43)
KETONES, URINE: NEGATIVE MG/DL
LEUKOCYTE ESTERASE, URINE: NEGATIVE
LYMPHOCYTES ABSOLUTE: 1.2 K/CU MM
LYMPHOCYTES RELATIVE PERCENT: 17.3 % (ref 24–44)
MCH RBC QN AUTO: 31.1 PG (ref 27–31)
MCHC RBC AUTO-ENTMCNC: 31.3 % (ref 32–36)
MCV RBC AUTO: 99.4 FL (ref 78–100)
MONOCYTES ABSOLUTE: 0.5 K/CU MM
MONOCYTES RELATIVE PERCENT: 7.2 % (ref 0–4)
NITRITE URINE, QUANTITATIVE: NEGATIVE
PDW BLD-RTO: 13.3 % (ref 11.7–14.9)
PH, URINE: 6.5 (ref 5–8)
PLATELET # BLD: 203 K/CU MM (ref 140–440)
PMV BLD AUTO: 10 FL (ref 7.5–11.1)
POTASSIUM SERPL-SCNC: 4 MMOL/L (ref 3.5–5.1)
PROTEIN UA: NEGATIVE MG/DL
RBC # BLD: 4.79 M/CU MM (ref 4.2–5.4)
RBC URINE: 1 /HPF (ref 0–6)
SEGMENTED NEUTROPHILS ABSOLUTE COUNT: 5 K/CU MM
SEGMENTED NEUTROPHILS RELATIVE PERCENT: 70 % (ref 36–66)
SODIUM BLD-SCNC: 140 MMOL/L (ref 135–145)
SPECIFIC GRAVITY UA: 1.01 (ref 1–1.03)
TOTAL IMMATURE NEUTOROPHIL: 0.04 K/CU MM
UROBILINOGEN, URINE: 0.2 MG/DL (ref 0.2–1)
WBC # BLD: 7.1 K/CU MM (ref 4–10.5)
WBC UA: 1 /HPF (ref 0–5)

## 2020-07-14 PROCEDURE — 74176 CT ABD & PELVIS W/O CONTRAST: CPT

## 2020-07-14 PROCEDURE — 81001 URINALYSIS AUTO W/SCOPE: CPT

## 2020-07-14 PROCEDURE — 80048 BASIC METABOLIC PNL TOTAL CA: CPT

## 2020-07-14 PROCEDURE — 6370000000 HC RX 637 (ALT 250 FOR IP): Performed by: EMERGENCY MEDICINE

## 2020-07-14 PROCEDURE — 99284 EMERGENCY DEPT VISIT MOD MDM: CPT

## 2020-07-14 PROCEDURE — 96374 THER/PROPH/DIAG INJ IV PUSH: CPT

## 2020-07-14 PROCEDURE — 6360000002 HC RX W HCPCS: Performed by: EMERGENCY MEDICINE

## 2020-07-14 PROCEDURE — 85025 COMPLETE CBC W/AUTO DIFF WBC: CPT

## 2020-07-14 RX ORDER — ROSUVASTATIN CALCIUM 5 MG/1
5 TABLET, COATED ORAL DAILY
COMMUNITY

## 2020-07-14 RX ORDER — POTASSIUM CHLORIDE 750 MG/1
20 TABLET, FILM COATED, EXTENDED RELEASE ORAL DAILY
Status: ON HOLD | COMMUNITY
Start: 2020-04-18 | End: 2020-12-20 | Stop reason: SDUPTHER

## 2020-07-14 RX ORDER — AMOXICILLIN AND CLAVULANATE POTASSIUM 875; 125 MG/1; MG/1
1 TABLET, FILM COATED ORAL 2 TIMES DAILY
Qty: 20 TABLET | Refills: 0 | Status: SHIPPED | OUTPATIENT
Start: 2020-07-14 | End: 2020-07-24

## 2020-07-14 RX ORDER — KETOROLAC TROMETHAMINE 15 MG/ML
15 INJECTION, SOLUTION INTRAMUSCULAR; INTRAVENOUS ONCE
Status: COMPLETED | OUTPATIENT
Start: 2020-07-14 | End: 2020-07-14

## 2020-07-14 RX ORDER — AMOXICILLIN AND CLAVULANATE POTASSIUM 875; 125 MG/1; MG/1
1 TABLET, FILM COATED ORAL ONCE
Status: COMPLETED | OUTPATIENT
Start: 2020-07-14 | End: 2020-07-14

## 2020-07-14 RX ORDER — LEVOTHYROXINE SODIUM 100 UG/1
100 CAPSULE ORAL EVERY MORNING
Status: ON HOLD | COMMUNITY
End: 2020-12-16

## 2020-07-14 RX ORDER — FUROSEMIDE 40 MG/1
40 TABLET ORAL DAILY
Status: ON HOLD | COMMUNITY
End: 2020-12-16

## 2020-07-14 RX ORDER — ALBUTEROL SULFATE 90 UG/1
AEROSOL, METERED RESPIRATORY (INHALATION)
COMMUNITY
Start: 2020-05-15

## 2020-07-14 RX ORDER — ACETAMINOPHEN 160 MG
2000 TABLET,DISINTEGRATING ORAL DAILY
Status: ON HOLD | COMMUNITY
End: 2020-12-16

## 2020-07-14 RX ORDER — UBIDECARENONE 75 MG
100 CAPSULE ORAL DAILY
Status: ON HOLD | COMMUNITY
End: 2020-12-16

## 2020-07-14 RX ADMIN — AMOXICILLIN AND CLAVULANATE POTASSIUM 1 TABLET: 875; 125 TABLET, FILM COATED ORAL at 14:36

## 2020-07-14 RX ADMIN — KETOROLAC TROMETHAMINE 15 MG: 15 INJECTION, SOLUTION INTRAMUSCULAR; INTRAVENOUS at 13:46

## 2020-07-14 ASSESSMENT — ENCOUNTER SYMPTOMS
RESPIRATORY NEGATIVE: 1
EYES NEGATIVE: 1
ABDOMINAL PAIN: 1

## 2020-07-14 ASSESSMENT — PAIN DESCRIPTION - ORIENTATION: ORIENTATION: MID

## 2020-07-14 ASSESSMENT — PAIN DESCRIPTION - DESCRIPTORS: DESCRIPTORS: CRAMPING;PRESSURE

## 2020-07-14 ASSESSMENT — PAIN DESCRIPTION - LOCATION: LOCATION: ABDOMEN

## 2020-07-14 ASSESSMENT — PAIN DESCRIPTION - FREQUENCY: FREQUENCY: CONTINUOUS

## 2020-07-14 ASSESSMENT — PAIN SCALES - GENERAL
PAINLEVEL_OUTOF10: 5
PAINLEVEL_OUTOF10: 5

## 2020-07-14 NOTE — ED TRIAGE NOTES
Arrived ambulatory to room 7-1 for triage. Tolerated without difficulty. Bed in lowest position. Call light given. Gowned for exam. Rita Jackson obtained.
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
## 2020-07-14 NOTE — ED NOTES
Discharge instructions reviewed with patient. Reviewed prescriptions with patient. No additional questions asked. Voiced understanding. Encouraged patient to follow up as discussed by the ED physician. Discharge instructions reviewed with patient. Medications discussed. Encouraged to take medication exactly as prescribed and until the entire antibiotic prescription is finished. Patient advised to not stop the medication even if they begin feeling better. Patient voiced understanding.      Shrari Jaramillo RN  07/14/20 3234

## 2020-12-16 ENCOUNTER — APPOINTMENT (OUTPATIENT)
Dept: GENERAL RADIOLOGY | Age: 70
DRG: 202 | End: 2020-12-16
Payer: COMMERCIAL

## 2020-12-16 ENCOUNTER — HOSPITAL ENCOUNTER (INPATIENT)
Age: 70
LOS: 4 days | Discharge: HOME | DRG: 202 | End: 2020-12-20
Attending: EMERGENCY MEDICINE | Admitting: INTERNAL MEDICINE
Payer: COMMERCIAL

## 2020-12-16 ENCOUNTER — APPOINTMENT (OUTPATIENT)
Dept: CT IMAGING | Age: 70
DRG: 202 | End: 2020-12-16
Payer: COMMERCIAL

## 2020-12-16 ENCOUNTER — APPOINTMENT (OUTPATIENT)
Dept: ULTRASOUND IMAGING | Age: 70
DRG: 202 | End: 2020-12-16
Payer: COMMERCIAL

## 2020-12-16 PROBLEM — E11.9 TYPE 2 DIABETES MELLITUS (HCC): Status: ACTIVE | Noted: 2020-12-16

## 2020-12-16 PROBLEM — J45.31 MILD PERSISTENT ASTHMA WITH (ACUTE) EXACERBATION: Status: ACTIVE | Noted: 2020-12-16

## 2020-12-16 PROBLEM — J45.901 MODERATE ASTHMA WITH ACUTE EXACERBATION: Status: ACTIVE | Noted: 2020-12-16

## 2020-12-16 LAB
ADENOVIRUS DETECTION BY PCR: NOT DETECTED
ALBUMIN SERPL-MCNC: 3.1 GM/DL (ref 3.4–5)
ALP BLD-CCNC: 139 IU/L (ref 40–129)
ALT SERPL-CCNC: 24 U/L (ref 10–40)
ANION GAP SERPL CALCULATED.3IONS-SCNC: 5 MMOL/L (ref 4–16)
AST SERPL-CCNC: 45 IU/L (ref 15–37)
BASE EXCESS MIXED: 3.7 (ref 0–2.3)
BASE EXCESS: ABNORMAL (ref 0–2.4)
BASOPHILS ABSOLUTE: 0.1 K/CU MM
BASOPHILS RELATIVE PERCENT: 1.1 % (ref 0–1)
BILIRUB SERPL-MCNC: 0.6 MG/DL (ref 0–1)
BORDETELLA PARAPERTUSSIS BY PCR: NOT DETECTED
BORDETELLA PERTUSSIS PCR: NOT DETECTED
BUN BLDV-MCNC: 11 MG/DL (ref 6–23)
CALCIUM SERPL-MCNC: 8.9 MG/DL (ref 8.3–10.6)
CHLAMYDOPHILA PNEUMONIA PCR: NOT DETECTED
CHLORIDE BLD-SCNC: 104 MMOL/L (ref 99–110)
CO2 CONTENT: 31.6 MMOL/L (ref 19–24)
CO2: 33 MMOL/L (ref 21–32)
CORONAVIRUS 229E PCR: NOT DETECTED
CORONAVIRUS HKU1 PCR: NOT DETECTED
CORONAVIRUS NL63 PCR: NOT DETECTED
CORONAVIRUS OC43 PCR: NOT DETECTED
CREAT SERPL-MCNC: 0.7 MG/DL (ref 0.6–1.1)
D DIMER: 588 NG/ML(DDU)
DIFFERENTIAL TYPE: ABNORMAL
EOSINOPHILS ABSOLUTE: 0.3 K/CU MM
EOSINOPHILS RELATIVE PERCENT: 4.6 % (ref 0–3)
GFR AFRICAN AMERICAN: >60 ML/MIN/1.73M2
GFR NON-AFRICAN AMERICAN: >60 ML/MIN/1.73M2
GLUCOSE BLD-MCNC: 109 MG/DL (ref 70–99)
GLUCOSE BLD-MCNC: 273 MG/DL (ref 70–99)
HCO3 VENOUS: 30.1 MMOL/L (ref 19–25)
HCT VFR BLD CALC: 42 % (ref 37–47)
HEMOGLOBIN: 13.3 GM/DL (ref 12.5–16)
HUMAN METAPNEUMOVIRUS PCR: NOT DETECTED
IMMATURE NEUTROPHIL %: 0.6 % (ref 0–0.43)
INFLUENZA A BY PCR: NOT DETECTED
INFLUENZA A H1 (2009) PCR: NOT DETECTED
INFLUENZA A H1 PANDEMIC PCR: NOT DETECTED
INFLUENZA A H3 PCR: NOT DETECTED
INFLUENZA B BY PCR: NOT DETECTED
LYMPHOCYTES ABSOLUTE: 1.2 K/CU MM
LYMPHOCYTES RELATIVE PERCENT: 17.4 % (ref 24–44)
MCH RBC QN AUTO: 32 PG (ref 27–31)
MCHC RBC AUTO-ENTMCNC: 31.7 % (ref 32–36)
MCV RBC AUTO: 101 FL (ref 78–100)
MONOCYTES ABSOLUTE: 0.6 K/CU MM
MONOCYTES RELATIVE PERCENT: 8.4 % (ref 0–4)
MYCOPLASMA PNEUMONIAE PCR: NOT DETECTED
O2 SAT, VEN: 86.8 % (ref 50–70)
PARAINFLUENZA 1 PCR: NOT DETECTED
PARAINFLUENZA 2 PCR: NOT DETECTED
PARAINFLUENZA 3 PCR: NOT DETECTED
PARAINFLUENZA 4 PCR: NOT DETECTED
PCO2, VEN: 50.8 MMHG (ref 38–52)
PDW BLD-RTO: 13.9 % (ref 11.7–14.9)
PH VENOUS: 7.38 (ref 7.32–7.42)
PLATELET # BLD: 177 K/CU MM (ref 140–440)
PMV BLD AUTO: 9 FL (ref 7.5–11.1)
PO2, VEN: 54.7 MMHG (ref 28–48)
POTASSIUM SERPL-SCNC: 4.1 MMOL/L (ref 3.5–5.1)
RBC # BLD: 4.16 M/CU MM (ref 4.2–5.4)
RHINOVIRUS ENTEROVIRUS PCR: NOT DETECTED
RSV PCR: NOT DETECTED
SARS-COV-2, NAAT: NOT DETECTED
SARS-COV-2: NOT DETECTED
SEGMENTED NEUTROPHILS ABSOLUTE COUNT: 4.9 K/CU MM
SEGMENTED NEUTROPHILS RELATIVE PERCENT: 67.9 % (ref 36–66)
SODIUM BLD-SCNC: 142 MMOL/L (ref 135–145)
SOURCE, BLOOD GAS: ABNORMAL
SOURCE: NORMAL
TOTAL IMMATURE NEUTOROPHIL: 0.04 K/CU MM
TOTAL PROTEIN: 7 GM/DL (ref 6.4–8.2)
WBC # BLD: 7.1 K/CU MM (ref 4–10.5)

## 2020-12-16 PROCEDURE — 82962 GLUCOSE BLOOD TEST: CPT

## 2020-12-16 PROCEDURE — 85025 COMPLETE CBC W/AUTO DIFF WBC: CPT

## 2020-12-16 PROCEDURE — 94640 AIRWAY INHALATION TREATMENT: CPT

## 2020-12-16 PROCEDURE — 6370000000 HC RX 637 (ALT 250 FOR IP): Performed by: NURSE PRACTITIONER

## 2020-12-16 PROCEDURE — 99284 EMERGENCY DEPT VISIT MOD MDM: CPT

## 2020-12-16 PROCEDURE — 96374 THER/PROPH/DIAG INJ IV PUSH: CPT

## 2020-12-16 PROCEDURE — 2140000000 HC CCU INTERMEDIATE R&B

## 2020-12-16 PROCEDURE — 82800 BLOOD PH: CPT

## 2020-12-16 PROCEDURE — 80053 COMPREHEN METABOLIC PANEL: CPT

## 2020-12-16 PROCEDURE — 71045 X-RAY EXAM CHEST 1 VIEW: CPT

## 2020-12-16 PROCEDURE — 6360000004 HC RX CONTRAST MEDICATION: Performed by: EMERGENCY MEDICINE

## 2020-12-16 PROCEDURE — 6370000000 HC RX 637 (ALT 250 FOR IP): Performed by: EMERGENCY MEDICINE

## 2020-12-16 PROCEDURE — 6360000002 HC RX W HCPCS: Performed by: EMERGENCY MEDICINE

## 2020-12-16 PROCEDURE — U0002 COVID-19 LAB TEST NON-CDC: HCPCS

## 2020-12-16 PROCEDURE — 2580000003 HC RX 258: Performed by: NURSE PRACTITIONER

## 2020-12-16 PROCEDURE — 93971 EXTREMITY STUDY: CPT

## 2020-12-16 PROCEDURE — 0202U NFCT DS 22 TRGT SARS-COV-2: CPT

## 2020-12-16 PROCEDURE — 6360000002 HC RX W HCPCS: Performed by: NURSE PRACTITIONER

## 2020-12-16 PROCEDURE — 71275 CT ANGIOGRAPHY CHEST: CPT

## 2020-12-16 PROCEDURE — 85379 FIBRIN DEGRADATION QUANT: CPT

## 2020-12-16 PROCEDURE — 2700000000 HC OXYGEN THERAPY PER DAY

## 2020-12-16 RX ORDER — PREDNISONE 20 MG/1
40 TABLET ORAL DAILY
Status: DISCONTINUED | OUTPATIENT
Start: 2020-12-19 | End: 2020-12-17

## 2020-12-16 RX ORDER — DEXAMETHASONE SODIUM PHOSPHATE 10 MG/ML
8 INJECTION, SOLUTION INTRAMUSCULAR; INTRAVENOUS ONCE
Status: COMPLETED | OUTPATIENT
Start: 2020-12-16 | End: 2020-12-16

## 2020-12-16 RX ORDER — SODIUM CHLORIDE 0.9 % (FLUSH) 0.9 %
10 SYRINGE (ML) INJECTION PRN
Status: DISCONTINUED | OUTPATIENT
Start: 2020-12-16 | End: 2020-12-20 | Stop reason: HOSPADM

## 2020-12-16 RX ORDER — M-VIT,TX,IRON,MINS/CALC/FOLIC 27MG-0.4MG
1 TABLET ORAL DAILY
Status: DISCONTINUED | OUTPATIENT
Start: 2020-12-16 | End: 2020-12-17

## 2020-12-16 RX ORDER — BUDESONIDE AND FORMOTEROL FUMARATE DIHYDRATE 160; 4.5 UG/1; UG/1
2 AEROSOL RESPIRATORY (INHALATION) 2 TIMES DAILY
Status: DISCONTINUED | OUTPATIENT
Start: 2020-12-16 | End: 2020-12-20 | Stop reason: HOSPADM

## 2020-12-16 RX ORDER — SODIUM CHLORIDE 9 MG/ML
INJECTION, SOLUTION INTRAVENOUS CONTINUOUS
Status: DISCONTINUED | OUTPATIENT
Start: 2020-12-16 | End: 2020-12-17

## 2020-12-16 RX ORDER — POTASSIUM CHLORIDE 750 MG/1
10 CAPSULE, EXTENDED RELEASE ORAL 2 TIMES DAILY
Status: DISCONTINUED | OUTPATIENT
Start: 2020-12-16 | End: 2020-12-16 | Stop reason: ALTCHOICE

## 2020-12-16 RX ORDER — ONDANSETRON 2 MG/ML
4 INJECTION INTRAMUSCULAR; INTRAVENOUS EVERY 6 HOURS PRN
Status: DISCONTINUED | OUTPATIENT
Start: 2020-12-16 | End: 2020-12-20 | Stop reason: HOSPADM

## 2020-12-16 RX ORDER — ALBUTEROL SULFATE 90 UG/1
1 AEROSOL, METERED RESPIRATORY (INHALATION) EVERY 4 HOURS PRN
Status: DISCONTINUED | OUTPATIENT
Start: 2020-12-16 | End: 2020-12-20 | Stop reason: HOSPADM

## 2020-12-16 RX ORDER — FUROSEMIDE 40 MG/1
40 TABLET ORAL DAILY
Status: DISCONTINUED | OUTPATIENT
Start: 2020-12-16 | End: 2020-12-17

## 2020-12-16 RX ORDER — POTASSIUM CHLORIDE 20 MEQ/1
20 TABLET, EXTENDED RELEASE ORAL
Status: DISCONTINUED | OUTPATIENT
Start: 2020-12-17 | End: 2020-12-20 | Stop reason: HOSPADM

## 2020-12-16 RX ORDER — NICOTINE POLACRILEX 4 MG
15 LOZENGE BUCCAL PRN
Status: DISCONTINUED | OUTPATIENT
Start: 2020-12-16 | End: 2020-12-20 | Stop reason: HOSPADM

## 2020-12-16 RX ORDER — DEXTROSE MONOHYDRATE 25 G/50ML
12.5 INJECTION, SOLUTION INTRAVENOUS PRN
Status: DISCONTINUED | OUTPATIENT
Start: 2020-12-16 | End: 2020-12-20 | Stop reason: HOSPADM

## 2020-12-16 RX ORDER — METHYLPREDNISOLONE SODIUM SUCCINATE 40 MG/ML
40 INJECTION, POWDER, LYOPHILIZED, FOR SOLUTION INTRAMUSCULAR; INTRAVENOUS EVERY 12 HOURS
Status: COMPLETED | OUTPATIENT
Start: 2020-12-16 | End: 2020-12-18

## 2020-12-16 RX ORDER — UBIDECARENONE 75 MG
100 CAPSULE ORAL DAILY
Status: DISCONTINUED | OUTPATIENT
Start: 2020-12-16 | End: 2020-12-16

## 2020-12-16 RX ORDER — PROMETHAZINE HYDROCHLORIDE 12.5 MG/1
12.5 TABLET ORAL EVERY 6 HOURS PRN
Status: DISCONTINUED | OUTPATIENT
Start: 2020-12-16 | End: 2020-12-20 | Stop reason: HOSPADM

## 2020-12-16 RX ORDER — SODIUM CHLORIDE 0.9 % (FLUSH) 0.9 %
10 SYRINGE (ML) INJECTION EVERY 12 HOURS SCHEDULED
Status: DISCONTINUED | OUTPATIENT
Start: 2020-12-16 | End: 2020-12-20 | Stop reason: HOSPADM

## 2020-12-16 RX ORDER — ACETAMINOPHEN 325 MG/1
650 TABLET ORAL EVERY 6 HOURS PRN
Status: DISCONTINUED | OUTPATIENT
Start: 2020-12-16 | End: 2020-12-20 | Stop reason: HOSPADM

## 2020-12-16 RX ORDER — LEVOTHYROXINE SODIUM 0.1 MG/1
100 TABLET ORAL DAILY
Status: DISCONTINUED | OUTPATIENT
Start: 2020-12-16 | End: 2020-12-20 | Stop reason: HOSPADM

## 2020-12-16 RX ORDER — POTASSIUM CHLORIDE 20 MEQ/1
20 TABLET, EXTENDED RELEASE ORAL 2 TIMES DAILY
Status: DISCONTINUED | OUTPATIENT
Start: 2020-12-16 | End: 2020-12-16

## 2020-12-16 RX ORDER — VITAMIN B COMPLEX
2000 TABLET ORAL DAILY
Status: DISCONTINUED | OUTPATIENT
Start: 2020-12-16 | End: 2020-12-20 | Stop reason: HOSPADM

## 2020-12-16 RX ORDER — ACETAMINOPHEN 650 MG/1
650 SUPPOSITORY RECTAL EVERY 6 HOURS PRN
Status: DISCONTINUED | OUTPATIENT
Start: 2020-12-16 | End: 2020-12-20 | Stop reason: HOSPADM

## 2020-12-16 RX ORDER — DEXTROSE MONOHYDRATE 50 MG/ML
100 INJECTION, SOLUTION INTRAVENOUS PRN
Status: DISCONTINUED | OUTPATIENT
Start: 2020-12-16 | End: 2020-12-20 | Stop reason: HOSPADM

## 2020-12-16 RX ORDER — POLYETHYLENE GLYCOL 3350 17 G/17G
17 POWDER, FOR SOLUTION ORAL DAILY PRN
Status: DISCONTINUED | OUTPATIENT
Start: 2020-12-16 | End: 2020-12-20 | Stop reason: HOSPADM

## 2020-12-16 RX ORDER — ALBUTEROL SULFATE 90 UG/1
2 AEROSOL, METERED RESPIRATORY (INHALATION) ONCE
Status: COMPLETED | OUTPATIENT
Start: 2020-12-16 | End: 2020-12-16

## 2020-12-16 RX ORDER — ALBUTEROL SULFATE 2.5 MG/3ML
2.5 SOLUTION RESPIRATORY (INHALATION)
Status: DISCONTINUED | OUTPATIENT
Start: 2020-12-16 | End: 2020-12-20 | Stop reason: HOSPADM

## 2020-12-16 RX ORDER — ROSUVASTATIN CALCIUM 5 MG/1
5 TABLET, COATED ORAL DAILY
Status: DISCONTINUED | OUTPATIENT
Start: 2020-12-16 | End: 2020-12-20 | Stop reason: HOSPADM

## 2020-12-16 RX ADMIN — METHYLPREDNISOLONE SODIUM SUCCINATE 40 MG: 40 INJECTION, POWDER, FOR SOLUTION INTRAMUSCULAR; INTRAVENOUS at 22:56

## 2020-12-16 RX ADMIN — ALBUTEROL SULFATE 2 PUFF: 90 AEROSOL, METERED RESPIRATORY (INHALATION) at 13:47

## 2020-12-16 RX ADMIN — BUDESONIDE AND FORMOTEROL FUMARATE DIHYDRATE 2 PUFF: 160; 4.5 AEROSOL RESPIRATORY (INHALATION) at 21:25

## 2020-12-16 RX ADMIN — ENOXAPARIN SODIUM 30 MG: 100 INJECTION SUBCUTANEOUS at 21:54

## 2020-12-16 RX ADMIN — ALBUTEROL SULFATE 1 PUFF: 90 AEROSOL, METERED RESPIRATORY (INHALATION) at 21:24

## 2020-12-16 RX ADMIN — INSULIN LISPRO 2 UNITS: 100 INJECTION, SOLUTION INTRAVENOUS; SUBCUTANEOUS at 21:55

## 2020-12-16 RX ADMIN — IOPAMIDOL 75 ML: 755 INJECTION, SOLUTION INTRAVENOUS at 15:57

## 2020-12-16 RX ADMIN — DEXAMETHASONE SODIUM PHOSPHATE 8 MG: 10 INJECTION, SOLUTION INTRAMUSCULAR; INTRAVENOUS at 14:36

## 2020-12-16 RX ADMIN — SODIUM CHLORIDE, PRESERVATIVE FREE 10 ML: 5 INJECTION INTRAVENOUS at 22:56

## 2020-12-16 ASSESSMENT — PAIN DESCRIPTION - ORIENTATION
ORIENTATION: RIGHT
ORIENTATION: RIGHT

## 2020-12-16 ASSESSMENT — PAIN DESCRIPTION - PAIN TYPE
TYPE: ACUTE PAIN
TYPE: ACUTE PAIN

## 2020-12-16 ASSESSMENT — PAIN DESCRIPTION - FREQUENCY
FREQUENCY: INTERMITTENT
FREQUENCY: INTERMITTENT

## 2020-12-16 ASSESSMENT — PAIN DESCRIPTION - LOCATION
LOCATION: LEG
LOCATION: LEG

## 2020-12-16 ASSESSMENT — PAIN DESCRIPTION - DESCRIPTORS
DESCRIPTORS: ACHING
DESCRIPTORS: ACHING;SHARP

## 2020-12-16 ASSESSMENT — PAIN SCALES - GENERAL
PAINLEVEL_OUTOF10: 5
PAINLEVEL_OUTOF10: 2

## 2020-12-16 NOTE — ED NOTES
COVID specimen collected from the throat - patient tolerated well without difficulty. This nurse wore appropriate PPE including gown, gloves, N95 and face shield. Specimen taken to the lab.      [place]bania Hopper RN  12/16/20 7455
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
## 2020-12-16 NOTE — ED NOTES
Patient to CT scan via bed escorted by Manuela Brooke, radiology tech     Alli Belle, BRISEYDA  12/16/20 8001

## 2020-12-17 LAB
ANION GAP SERPL CALCULATED.3IONS-SCNC: 6 MMOL/L (ref 4–16)
BASOPHILS ABSOLUTE: 0 K/CU MM
BASOPHILS RELATIVE PERCENT: 0.1 % (ref 0–1)
BUN BLDV-MCNC: 15 MG/DL (ref 6–23)
CALCIUM SERPL-MCNC: 8.9 MG/DL (ref 8.3–10.6)
CHLORIDE BLD-SCNC: 102 MMOL/L (ref 99–110)
CO2: 30 MMOL/L (ref 21–32)
CREAT SERPL-MCNC: 0.6 MG/DL (ref 0.6–1.1)
DIFFERENTIAL TYPE: ABNORMAL
EOSINOPHILS ABSOLUTE: 0 K/CU MM
EOSINOPHILS RELATIVE PERCENT: 0 % (ref 0–3)
ESTIMATED AVERAGE GLUCOSE: 169 MG/DL
GFR AFRICAN AMERICAN: >60 ML/MIN/1.73M2
GFR NON-AFRICAN AMERICAN: >60 ML/MIN/1.73M2
GLUCOSE BLD-MCNC: 215 MG/DL (ref 70–99)
GLUCOSE BLD-MCNC: 296 MG/DL (ref 70–99)
GLUCOSE BLD-MCNC: 320 MG/DL (ref 70–99)
GLUCOSE BLD-MCNC: 344 MG/DL (ref 70–99)
GLUCOSE BLD-MCNC: 369 MG/DL (ref 70–99)
HBA1C MFR BLD: 7.5 % (ref 4.2–6.3)
HCT VFR BLD CALC: 43.5 % (ref 37–47)
HEMOGLOBIN: 13.5 GM/DL (ref 12.5–16)
HIGH SENSITIVE C-REACTIVE PROTEIN: 38.4 MG/L
IMMATURE NEUTROPHIL %: 0.4 % (ref 0–0.43)
LEGIONELLA URINARY AG: NEGATIVE
LV EF: 53 %
LVEF MODALITY: NORMAL
LYMPHOCYTES ABSOLUTE: 0.7 K/CU MM
LYMPHOCYTES RELATIVE PERCENT: 7.4 % (ref 24–44)
MCH RBC QN AUTO: 31.8 PG (ref 27–31)
MCHC RBC AUTO-ENTMCNC: 31 % (ref 32–36)
MCV RBC AUTO: 102.4 FL (ref 78–100)
MONOCYTES ABSOLUTE: 0.1 K/CU MM
MONOCYTES RELATIVE PERCENT: 1.5 % (ref 0–4)
PDW BLD-RTO: 13.7 % (ref 11.7–14.9)
PLATELET # BLD: 171 K/CU MM (ref 140–440)
PMV BLD AUTO: 9.4 FL (ref 7.5–11.1)
POTASSIUM SERPL-SCNC: 4.6 MMOL/L (ref 3.5–5.1)
PROCALCITONIN: 0.1
RBC # BLD: 4.25 M/CU MM (ref 4.2–5.4)
SEGMENTED NEUTROPHILS ABSOLUTE COUNT: 8.5 K/CU MM
SEGMENTED NEUTROPHILS RELATIVE PERCENT: 90.6 % (ref 36–66)
SODIUM BLD-SCNC: 138 MMOL/L (ref 135–145)
STREP PNEUMONIAE ANTIGEN: NORMAL
TOTAL IMMATURE NEUTOROPHIL: 0.04 K/CU MM
TSH HIGH SENSITIVITY: 0.73 UIU/ML (ref 0.27–4.2)
WBC # BLD: 9.3 K/CU MM (ref 4–10.5)

## 2020-12-17 PROCEDURE — 87899 AGENT NOS ASSAY W/OPTIC: CPT

## 2020-12-17 PROCEDURE — 2700000000 HC OXYGEN THERAPY PER DAY

## 2020-12-17 PROCEDURE — 6370000000 HC RX 637 (ALT 250 FOR IP): Performed by: NURSE PRACTITIONER

## 2020-12-17 PROCEDURE — 83036 HEMOGLOBIN GLYCOSYLATED A1C: CPT

## 2020-12-17 PROCEDURE — 87449 NOS EACH ORGANISM AG IA: CPT

## 2020-12-17 PROCEDURE — 84443 ASSAY THYROID STIM HORMONE: CPT

## 2020-12-17 PROCEDURE — 36415 COLL VENOUS BLD VENIPUNCTURE: CPT

## 2020-12-17 PROCEDURE — 2580000003 HC RX 258: Performed by: INTERNAL MEDICINE

## 2020-12-17 PROCEDURE — 82962 GLUCOSE BLOOD TEST: CPT

## 2020-12-17 PROCEDURE — 2140000000 HC CCU INTERMEDIATE R&B

## 2020-12-17 PROCEDURE — 86141 C-REACTIVE PROTEIN HS: CPT

## 2020-12-17 PROCEDURE — 85025 COMPLETE CBC W/AUTO DIFF WBC: CPT

## 2020-12-17 PROCEDURE — 80048 BASIC METABOLIC PNL TOTAL CA: CPT

## 2020-12-17 PROCEDURE — 93306 TTE W/DOPPLER COMPLETE: CPT

## 2020-12-17 PROCEDURE — 84145 PROCALCITONIN (PCT): CPT

## 2020-12-17 PROCEDURE — 94761 N-INVAS EAR/PLS OXIMETRY MLT: CPT

## 2020-12-17 PROCEDURE — 6360000002 HC RX W HCPCS: Performed by: NURSE PRACTITIONER

## 2020-12-17 PROCEDURE — 84439 ASSAY OF FREE THYROXINE: CPT

## 2020-12-17 PROCEDURE — 6360000002 HC RX W HCPCS: Performed by: INTERNAL MEDICINE

## 2020-12-17 PROCEDURE — 94640 AIRWAY INHALATION TREATMENT: CPT

## 2020-12-17 PROCEDURE — 2580000003 HC RX 258: Performed by: NURSE PRACTITIONER

## 2020-12-17 PROCEDURE — 6370000000 HC RX 637 (ALT 250 FOR IP): Performed by: INTERNAL MEDICINE

## 2020-12-17 RX ORDER — M-VIT,TX,IRON,MINS/CALC/FOLIC 27MG-0.4MG
1 TABLET ORAL
Status: DISCONTINUED | OUTPATIENT
Start: 2020-12-17 | End: 2020-12-20 | Stop reason: HOSPADM

## 2020-12-17 RX ORDER — FUROSEMIDE 10 MG/ML
40 INJECTION INTRAMUSCULAR; INTRAVENOUS 2 TIMES DAILY
Status: DISCONTINUED | OUTPATIENT
Start: 2020-12-17 | End: 2020-12-20

## 2020-12-17 RX ORDER — DOXYCYCLINE HYCLATE 100 MG
100 TABLET ORAL EVERY 12 HOURS SCHEDULED
Status: DISCONTINUED | OUTPATIENT
Start: 2020-12-17 | End: 2020-12-20 | Stop reason: HOSPADM

## 2020-12-17 RX ORDER — GUAIFENESIN 600 MG/1
600 TABLET, EXTENDED RELEASE ORAL 2 TIMES DAILY
Status: DISCONTINUED | OUTPATIENT
Start: 2020-12-17 | End: 2020-12-20 | Stop reason: HOSPADM

## 2020-12-17 RX ADMIN — POTASSIUM CHLORIDE 20 MEQ: 1500 TABLET, EXTENDED RELEASE ORAL at 10:21

## 2020-12-17 RX ADMIN — MULTIPLE VITAMINS W/ MINERALS TAB 1 TABLET: TAB at 12:18

## 2020-12-17 RX ADMIN — CEFTRIAXONE SODIUM 1 G: 1 INJECTION, POWDER, FOR SOLUTION INTRAMUSCULAR; INTRAVENOUS at 10:19

## 2020-12-17 RX ADMIN — Medication 2000 UNITS: at 10:21

## 2020-12-17 RX ADMIN — METHYLPREDNISOLONE SODIUM SUCCINATE 40 MG: 40 INJECTION, POWDER, FOR SOLUTION INTRAMUSCULAR; INTRAVENOUS at 20:08

## 2020-12-17 RX ADMIN — SODIUM CHLORIDE, PRESERVATIVE FREE 10 ML: 5 INJECTION INTRAVENOUS at 20:08

## 2020-12-17 RX ADMIN — LEVOTHYROXINE SODIUM 100 MCG: 100 TABLET ORAL at 10:21

## 2020-12-17 RX ADMIN — DOXYCYCLINE HYCLATE 100 MG: 100 TABLET, COATED ORAL at 10:35

## 2020-12-17 RX ADMIN — INSULIN LISPRO 2 UNITS: 100 INJECTION, SOLUTION INTRAVENOUS; SUBCUTANEOUS at 20:08

## 2020-12-17 RX ADMIN — FUROSEMIDE 40 MG: 10 INJECTION, SOLUTION INTRAVENOUS at 17:55

## 2020-12-17 RX ADMIN — BUDESONIDE AND FORMOTEROL FUMARATE DIHYDRATE 2 PUFF: 160; 4.5 AEROSOL RESPIRATORY (INHALATION) at 08:25

## 2020-12-17 RX ADMIN — BUDESONIDE AND FORMOTEROL FUMARATE DIHYDRATE 2 PUFF: 160; 4.5 AEROSOL RESPIRATORY (INHALATION) at 20:28

## 2020-12-17 RX ADMIN — ENOXAPARIN SODIUM 30 MG: 100 INJECTION SUBCUTANEOUS at 17:55

## 2020-12-17 RX ADMIN — ALBUTEROL SULFATE 1 PUFF: 90 AEROSOL, METERED RESPIRATORY (INHALATION) at 20:27

## 2020-12-17 RX ADMIN — DOXYCYCLINE HYCLATE 100 MG: 100 TABLET, COATED ORAL at 20:08

## 2020-12-17 RX ADMIN — ACETAMINOPHEN 650 MG: 325 TABLET ORAL at 00:47

## 2020-12-17 RX ADMIN — SODIUM CHLORIDE, PRESERVATIVE FREE 10 ML: 5 INJECTION INTRAVENOUS at 10:19

## 2020-12-17 RX ADMIN — SODIUM CHLORIDE: 9 INJECTION, SOLUTION INTRAVENOUS at 00:47

## 2020-12-17 RX ADMIN — FUROSEMIDE 40 MG: 10 INJECTION, SOLUTION INTRAVENOUS at 10:35

## 2020-12-17 RX ADMIN — GUAIFENESIN 600 MG: 600 TABLET, EXTENDED RELEASE ORAL at 10:21

## 2020-12-17 RX ADMIN — ACETAMINOPHEN 650 MG: 325 TABLET ORAL at 15:26

## 2020-12-17 RX ADMIN — GUAIFENESIN 600 MG: 600 TABLET, EXTENDED RELEASE ORAL at 20:08

## 2020-12-17 RX ADMIN — METHYLPREDNISOLONE SODIUM SUCCINATE 40 MG: 40 INJECTION, POWDER, FOR SOLUTION INTRAMUSCULAR; INTRAVENOUS at 10:20

## 2020-12-17 RX ADMIN — ROSUVASTATIN CALCIUM 5 MG: 5 TABLET, COATED ORAL at 10:21

## 2020-12-17 RX ADMIN — INSULIN GLARGINE 35 UNITS: 100 INJECTION, SOLUTION SUBCUTANEOUS at 20:09

## 2020-12-17 ASSESSMENT — PAIN DESCRIPTION - PAIN TYPE: TYPE: ACUTE PAIN

## 2020-12-17 ASSESSMENT — PAIN SCALES - GENERAL
PAINLEVEL_OUTOF10: 3
PAINLEVEL_OUTOF10: 3

## 2020-12-17 ASSESSMENT — PAIN DESCRIPTION - DESCRIPTORS: DESCRIPTORS: HEADACHE

## 2020-12-17 NOTE — CARE COORDINATION
CM met with the patient for discharge planning, patient sitting on the edge of the bed watching her cell phone, stated that she was feeling better. Patient lives at home with family, has insurance with Rx coverage & PCP, is still employed (working from home), and still drives. Patient stated that she uses a cane at home but only as needed and does not require home oxygen but does use 2 inhalers. Patient stated that she has two adult children living in her home now because they both lost their home due to the pandemic. Patient stated that she plans to return home upon discharge and family will be able to provide transportation. Patient is unable to identify any needs at this time. CM available if needs arise.

## 2020-12-17 NOTE — PLAN OF CARE
Problem: Pain:  Goal: Pain level will decrease  Description: Pain level will decrease  Outcome: Ongoing  Goal: Control of acute pain  Description: Control of acute pain  Outcome: Ongoing  Goal: Control of chronic pain  Description: Control of chronic pain  Outcome: Ongoing     Problem: Falls - Risk of:  Goal: Will remain free from falls  Description: Will remain free from falls  Outcome: Ongoing  Goal: Absence of physical injury  Description: Absence of physical injury  Outcome: Ongoing     Problem: Discharge Planning:  Goal: Discharged to appropriate level of care  Description: Discharged to appropriate level of care  Outcome: Ongoing  Goal: Participates in care planning  Description: Participates in care planning  Outcome: Ongoing     Problem: Airway Clearance - Ineffective:  Goal: Clear lung sounds  Description: Clear lung sounds  Outcome: Ongoing  Goal: Ability to maintain a clear airway will improve  Description: Ability to maintain a clear airway will improve  Outcome: Ongoing     Problem: Gas Exchange - Impaired:  Goal: Levels of oxygenation will improve  Description: Levels of oxygenation will improve  Outcome: Ongoing

## 2020-12-17 NOTE — PROGRESS NOTES
Hospitalist Progress Note         Admit Date: 12/16/2020    PCP: Susan Cooley DO     Chief Complaint   Patient presents with    Cough     non productive. Onset last Thursday.  Shortness of Breath    Leg Pain     right leg pain behind knee with swelling with increased use. Onset 3 weeks ago. Assessment and Plan:     -Acute on chronic diastolic heart failure Echo with normal EF. DC IVF. Start Lasix IV and metoprolol for better BP control. Measure accurate I/os and BMP  -Possible asthma exacerbation/acute bronchitis doxy, Solu-Medrol IV, nebs and O2 support  -Uncontrolled diabetes mellitus start Lantus, prandial insulin and SSI. Follow Accu-Cheks  -Hypothyroidism continue Synthroid. Check TSH/free T4.  -Morbid obesity diet and exercise counseling.  -Hyperlipidemia continue Crestor. VTE prophylaxis LMWH.     Current Facility-Administered Medications   Medication Dose Route Frequency Provider Last Rate Last Admin    guaiFENesin (MUCINEX) extended release tablet 600 mg  600 mg Oral BID All Gallardo MD   600 mg at 12/17/20 1021    doxycycline hyclate (VIBRA-TABS) tablet 100 mg  100 mg Oral 2 times per day All Gallardo MD   100 mg at 12/17/20 1035    therapeutic multivitamin-minerals 1 tablet  1 tablet Oral Lunch All Gallardo MD   1 tablet at 12/17/20 1218    furosemide (LASIX) injection 40 mg  40 mg Intravenous BID All Gallardo MD   40 mg at 12/17/20 1035    insulin glargine (LANTUS;BASAGLAR) injection pen 35 Units  35 Units Subcutaneous Nightly All Gallardo MD        insulin lispro (HUMALOG) injection vial 15 Units  15 Units Subcutaneous TID WC All Gallardo MD   15 Units at 12/17/20 1218    albuterol sulfate  (90 Base) MCG/ACT inhaler 1 puff  1 puff Inhalation Q4H PRN BRYN Dunlap CNP   1 puff at 12/16/20 2124    budesonide-formoterol (SYMBICORT) 160-4.5 MCG/ACT inhaler 2 puff  2 puff Inhalation BID BRYN Dunlap CNP   2 puff at  glucagon (rDNA) injection 1 mg  1 mg Intramuscular PRN AlexisBRYN Espinoza CNP        dextrose 5 % solution  100 mL/hr Intravenous PRN Alexis Douglass APRN - CNP        influenza quadrivalent split vaccine (FLUZONE;FLUARIX;FLULAVAL;AFLURIA) injection 0.5 mL  0.5 mL Intramuscular Prior to discharge BRYN Dunlap CNP        potassium chloride (KLOR-CON M) extended release tablet 20 mEq  20 mEq Oral Daily with breakfast BRYN Dunlap - CNP   20 mEq at 12/17/20 1021       Subjective:     Patient reports shortness of breath, cough with scanty sputum. Also reported worsening leg swelling. Denied any chest pain, fever/chills. No acute events since admission. Objective:   No intake or output data in the 24 hours ending 12/17/20 1609   Vitals:   Vitals:    12/17/20 0825   BP:    Pulse:    Resp:    Temp:    SpO2: 98%     Physical Exam:  General Appearance:    Alert, cooperative, mild respiratory distress +  Head:      Normocephalic, without obvious abnormality, atraumatic  Eyes:       Conjunctiva/corneas clear, EOM's intact  Lungs:    B/L basal rhonchi and crackles +  Heart:                Regular rate and rhythm, S1 and S2 normal, no murmur,   rub or gallop  Abdomen:     Soft, non-tender, bowel sounds active, no masses, no organomegaly  Extremities:   B/L pedal edema 2+  Neurological:   Grossly Intact. Significant Diagnostic Studies:   DATA:    CBC   Recent Labs     12/16/20  1420 12/17/20  0600   WBC 7.1 9.3   HGB 13.3 13.5   HCT 42.0 43.5    171      BMP   Recent Labs     12/16/20  1420 12/17/20  0600    138   K 4.1 4.6    102   CO2 33* 30   BUN 11 15   CREATININE 0.7 0.6     LFT'S   Recent Labs     12/16/20  1420   AST 45*   ALT 24   BILITOT 0.6   ALKPHOS 139*     COAG No results for input(s): INR in the last 72 hours.   POC:   Lab Results   Component Value Date    POCGLU 369 12/17/2020    POCGLU 296 12/17/2020    POCGLU 273 12/16/2020     [id number]:  Lab Results Component Value Date    LABA1C 7.5 12/17/2020     CARDIAC ENZYMES  No results for input(s): CKTOTAL, CKMB, CKMBINDEX, TROPONINI in the last 72 hours. Troponin: No results for input(s): TROPONINT in the last 72 hours. BNP: No results for input(s): PROBNP in the last 72 hours. U/A:    Lab Results   Component Value Date    COLORU YELLOW 07/14/2020    WBCUA 1 07/14/2020    RBCUA 1 07/14/2020    MUCUS NEGATIVE 09/26/2018    BACTERIA FEW 07/14/2020    CLARITYU CLEAR 07/14/2020    SPECGRAV 1.015 07/14/2020    LEUKOCYTESUR NEGATIVE 07/14/2020    BLOODU TRACE 07/14/2020       Echo Complete 2d W Doppler W Color    Result Date: 12/17/2020  Transthoracic Echocardiography Report (TTE)  Demographics   Patient Name       Diann Correa      Date of Study       12/17/2020   Date of Birth      1950         Gender              Female   Age                69 year(s)         Race                   Patient Number     [id number]         Room Number         014   Visit Number       [id number]   Corporate ID       [id number]   Accession Number   [id number]         [facility]   Ordering Physician Constance Gaines MD           Physician           MD  Procedure Type of Study   TTE procedure:ECHOCARDIOGRAM COMPLETE 2D W DOPPLER W COLOR. Procedure Date Date: 12/17/2020 Start: 11:15 AM Study Location: Portable Technical Quality: Poor visualization due to breast augmentation. Indications:Dyspnea/SOB. Patient Status: Routine Height: 63 inches Weight: 242.01 pounds BSA: 2.1 m2 BMI: 42.87 kg/m2  Conclusions   Summary  Technically Difficult Study. Mild concentric LVH with normal systolic function. EF is 50-55 % . The left atrium is Mildly dilated. Impaired relaxation compatible with diastolic dysfunction. Right ventricular systolic pressure of 38 mm Hg consistent with mild  pulmonary hypertension. Mitral valve sclerosis without stenosis. No evidence of pericardial effusion. Signature   ------------------------------------------------------------------  Electronically signed by Russell Carlin MD (Interpreting  physician) on 12/17/2020 at 02:45 PM  ------------------------------------------------------------------   Findings   Left Ventricle  Mild concentric left ventricular hypertrophy. EF is 50-55 % . Impaired relaxation compatible with diastolic dysfunction. ( reversed E/A  ratio)   Left Atrium  The left atrium is Mildly dilated. Right Atrium  Normal right atrium size and structure. Right Ventricle  Right ventricular systolic pressure of 38 mm Hg consistent with mild  pulmonary hypertension. Aortic Valve  Normal aortic valve structure and function. Mitral Valve  Mitral valve sclerosis without stenosis. Mitral annular calcification is present. Tricuspid Valve  Mild tricuspid regurgitation . Pericardial Effusion  No evidence of pericardial effusion.   M-Mode/2D Measurements & Calculations   LV Diastolic Dimension:   LV Systolic Dimension:   LA Dimension: 4.2 cmAO  4.95 cm                   3.94 cm                  Root Dimension: 3.3 cm  LV FS:20.4 %              LV Volume Diastolic: 226  LV PW Diastolic: 3.31 cm  ml  LV PW Systolic: 8.47 cm   LV Volume Systolic: 80.4  Septum Diastolic: 4.47 cm ml                       RV Diastolic Dimension:  Septum Systolic: 4.91 cm  LV EDV/LV EDV Index: 121 2.95 cm                            ml/58 m2LV ESV/LV ESV                            Index: 61.2 ml/29 m2     LA/Aorta: 1.27                            EF Calculated (A4C):                            49.4 %                            EF Calculated (2D): 41.8                            %                             LVOT: 2 cm  Doppler Measurements & Calculations   MV Peak E-Wave: 99 cm/s AV Peak Velocity: 130 cm/s  LVOT Peak Velocity: 100  MV Peak A-Wave: 129     AV Peak Gradient: 6.76 mmHg cm/s  cm/s                    AV Mean Velocity: 69 cm/s   LVOT Mean Velocity: 53.9  MV E/A Ratio: 0.77      AV Mean Gradient: 3 mmHg    cm/s  MV Peak Gradient: 3.92  AV VTI: 25.2 cm             LVOT Peak Gradient: 4  mmHg                    AV Area (Continuity):3.21   mmHgLVOT Mean Gradient:  MV Mean Gradient: 4     cm2                         2 mmHg  mmHg                                                Estimated RVSP: 39 mmHg  MV Mean Velocity: 89.5  LVOT VTI: 25.8 cm           Estimated RAP:10 mmHg  cm/s  MV P1/2t: 70 msec       Estimated PASP: 38.52 mmHg  MVA by PHT:3.14 cm2                                 TR Velocity:267 cm/s  MV Area (continuity):                               TR Gradient:28.52 mmHg  2.11 cm2      Xr Chest Portable    Result Date: 12/16/2020  EXAMINATION: ONE XRAY VIEW OF THE CHEST 12/16/2020 1:22 pm COMPARISON: 06/10/2019 HISTORY: ORDERING SYSTEM PROVIDED HISTORY: chest pain TECHNOLOGIST PROVIDED HISTORY: Reason for exam:->chest pain Additional signs and symptoms: sob Initial encounter FINDINGS: Low lung volumes. There is elevation the right hemidiaphragm. Right basilar atelectasis. Patchy opacity in left perihilar region. No other focal consolidation, pleural effusion or pneumothorax. The cardiomediastinal silhouette is stable. No overt pulmonary edema. The osseous structures are stable. Low lung volumes with elevation the right hemidiaphragm and right basilar atelectasis. Patchy opacity in the left perihilar region, which may reflect pneumonia in the appropriate clinical setting. Cta Pulmonary W Contrast    Result Date: 12/16/2020  EXAMINATION: CTA OF THE CHEST 12/16/2020 3:41 pm TECHNIQUE: CTA of the chest was performed after the administration of intravenous contrast.  Multiplanar reformatted images are provided for review. MIP images are provided for review. Dose modulation, iterative reconstruction, and/or weight based adjustment of the mA/kV was utilized to reduce the radiation dose to as low as reasonably achievable. COMPARISON: None. HISTORY: ORDERING SYSTEM PROVIDED HISTORY: Elevated dimer, shortness of breath TECHNOLOGIST PROVIDED HISTORY: Reason for exam:->Elevated dimer, shortness of breath Reason for Exam: Elevated dimer, shortness of breath Acuity: Acute Type of Exam: Subsequent/Follow-up Additional signs and symptoms: 75ml isovue 370 @ lt anti @ 1555hrs, gfr>60, creat 0.7 12-, elevated d-dimer 588, sob, covid symptoms FINDINGS: Pulmonary Arteries: Pulmonary arteries are adequately opacified for evaluation. No evidence of intraluminal filling defect to suggest pulmonary embolism. Main pulmonary artery is normal in caliber. Mediastinum: Shotty mediastinal lymph nodes. The heart and pericardium demonstrate no acute abnormality. There is no acute abnormality of the thoracic aorta. Lungs/pleura: The lungs are without acute process. No focal consolidation or pulmonary edema. No evidence of pleural effusion or pneumothorax. There is scattered, subsegmental atelectasis. Linear scarring/atelectasis within the right middle lobe, right lower lobe, and lingula. Upper Abdomen: Limited images of the upper abdomen are unremarkable. Soft Tissues/Bones: No acute bone or soft tissue abnormality. 1. No pulmonary embolus. 2. Expiratory exam with scattered atelectasis. No definite consolidation. Vl Dup Lower Extremity Venous Right    Result Date: 12/16/2020  EXAMINATION: DUPLEX VENOUS ULTRASOUND OF THE RIGHT LOWER EXTREMITY, 12/16/2020 1:17 pm TECHNIQUE: Duplex ultrasound and Doppler images were obtained of the right lower extremity. COMPARISON: None. HISTORY: ORDERING SYSTEM PROVIDED HISTORY: Pain and swelling right lower extremity TECHNOLOGIST PROVIDED HISTORY: Reason for exam:->Pain and swelling right lower extremity Reason for Exam: edema FINDINGS: The visualized veins of the right lower extremity are patent and free of echogenic thrombus. The veins are normally compressible and have normal phasic flow.      No evidence of DVT in the right lower extremity.            Hawa Restrepo  [facility]
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
## 2020-12-18 LAB
ANION GAP SERPL CALCULATED.3IONS-SCNC: 7 MMOL/L (ref 4–16)
BASOPHILS ABSOLUTE: 0 K/CU MM
BASOPHILS RELATIVE PERCENT: 0.1 % (ref 0–1)
BUN BLDV-MCNC: 23 MG/DL (ref 6–23)
CALCIUM SERPL-MCNC: 9 MG/DL (ref 8.3–10.6)
CHLORIDE BLD-SCNC: 101 MMOL/L (ref 99–110)
CO2: 30 MMOL/L (ref 21–32)
CREAT SERPL-MCNC: 0.8 MG/DL (ref 0.6–1.1)
DIFFERENTIAL TYPE: ABNORMAL
EOSINOPHILS ABSOLUTE: 0 K/CU MM
EOSINOPHILS RELATIVE PERCENT: 0 % (ref 0–3)
GFR AFRICAN AMERICAN: >60 ML/MIN/1.73M2
GFR NON-AFRICAN AMERICAN: >60 ML/MIN/1.73M2
GLUCOSE BLD-MCNC: 195 MG/DL (ref 70–99)
GLUCOSE BLD-MCNC: 226 MG/DL (ref 70–99)
GLUCOSE BLD-MCNC: 256 MG/DL (ref 70–99)
GLUCOSE BLD-MCNC: 290 MG/DL (ref 70–99)
GLUCOSE BLD-MCNC: 327 MG/DL (ref 70–99)
HCT VFR BLD CALC: 40.8 % (ref 37–47)
HEMOGLOBIN: 12.9 GM/DL (ref 12.5–16)
IMMATURE NEUTROPHIL %: 0.8 % (ref 0–0.43)
LYMPHOCYTES ABSOLUTE: 0.9 K/CU MM
LYMPHOCYTES RELATIVE PERCENT: 5.6 % (ref 24–44)
MCH RBC QN AUTO: 32 PG (ref 27–31)
MCHC RBC AUTO-ENTMCNC: 31.6 % (ref 32–36)
MCV RBC AUTO: 101.2 FL (ref 78–100)
MONOCYTES ABSOLUTE: 0.5 K/CU MM
MONOCYTES RELATIVE PERCENT: 3.1 % (ref 0–4)
PDW BLD-RTO: 13.8 % (ref 11.7–14.9)
PLATELET # BLD: 157 K/CU MM (ref 140–440)
PMV BLD AUTO: 9.4 FL (ref 7.5–11.1)
POTASSIUM SERPL-SCNC: 4.5 MMOL/L (ref 3.5–5.1)
RBC # BLD: 4.03 M/CU MM (ref 4.2–5.4)
SEGMENTED NEUTROPHILS ABSOLUTE COUNT: 14.3 K/CU MM
SEGMENTED NEUTROPHILS RELATIVE PERCENT: 90.4 % (ref 36–66)
SODIUM BLD-SCNC: 138 MMOL/L (ref 135–145)
T4 FREE: 1.2 NG/DL (ref 0.9–1.8)
TOTAL IMMATURE NEUTOROPHIL: 0.12 K/CU MM
WBC # BLD: 15.9 K/CU MM (ref 4–10.5)

## 2020-12-18 PROCEDURE — 6370000000 HC RX 637 (ALT 250 FOR IP): Performed by: NURSE PRACTITIONER

## 2020-12-18 PROCEDURE — 87449 NOS EACH ORGANISM AG IA: CPT

## 2020-12-18 PROCEDURE — 2580000003 HC RX 258: Performed by: NURSE PRACTITIONER

## 2020-12-18 PROCEDURE — 80048 BASIC METABOLIC PNL TOTAL CA: CPT

## 2020-12-18 PROCEDURE — 82962 GLUCOSE BLOOD TEST: CPT

## 2020-12-18 PROCEDURE — 6360000002 HC RX W HCPCS: Performed by: NURSE PRACTITIONER

## 2020-12-18 PROCEDURE — 6360000002 HC RX W HCPCS: Performed by: INTERNAL MEDICINE

## 2020-12-18 PROCEDURE — 94640 AIRWAY INHALATION TREATMENT: CPT

## 2020-12-18 PROCEDURE — 2140000000 HC CCU INTERMEDIATE R&B

## 2020-12-18 PROCEDURE — 6370000000 HC RX 637 (ALT 250 FOR IP): Performed by: INTERNAL MEDICINE

## 2020-12-18 PROCEDURE — 87899 AGENT NOS ASSAY W/OPTIC: CPT

## 2020-12-18 PROCEDURE — 85025 COMPLETE CBC W/AUTO DIFF WBC: CPT

## 2020-12-18 RX ORDER — METHYLPREDNISOLONE SODIUM SUCCINATE 40 MG/ML
20 INJECTION, POWDER, LYOPHILIZED, FOR SOLUTION INTRAMUSCULAR; INTRAVENOUS EVERY 12 HOURS
Status: DISCONTINUED | OUTPATIENT
Start: 2020-12-18 | End: 2020-12-18 | Stop reason: SDUPTHER

## 2020-12-18 RX ORDER — ALOGLIPTIN 12.5 MG/1
25 TABLET, FILM COATED ORAL DAILY
Status: DISCONTINUED | OUTPATIENT
Start: 2020-12-18 | End: 2020-12-20 | Stop reason: HOSPADM

## 2020-12-18 RX ORDER — POLYVINYL ALCOHOL 14 MG/ML
1 SOLUTION/ DROPS OPHTHALMIC PRN
Status: DISCONTINUED | OUTPATIENT
Start: 2020-12-18 | End: 2020-12-20 | Stop reason: HOSPADM

## 2020-12-18 RX ORDER — METHYLPREDNISOLONE SODIUM SUCCINATE 40 MG/ML
20 INJECTION, POWDER, LYOPHILIZED, FOR SOLUTION INTRAMUSCULAR; INTRAVENOUS EVERY 12 HOURS
Status: DISCONTINUED | OUTPATIENT
Start: 2020-12-18 | End: 2020-12-19

## 2020-12-18 RX ADMIN — Medication 2000 UNITS: at 08:54

## 2020-12-18 RX ADMIN — BUDESONIDE AND FORMOTEROL FUMARATE DIHYDRATE 2 PUFF: 160; 4.5 AEROSOL RESPIRATORY (INHALATION) at 20:16

## 2020-12-18 RX ADMIN — MULTIPLE VITAMINS W/ MINERALS TAB 1 TABLET: TAB at 12:16

## 2020-12-18 RX ADMIN — DOXYCYCLINE HYCLATE 100 MG: 100 TABLET, COATED ORAL at 08:54

## 2020-12-18 RX ADMIN — BUDESONIDE AND FORMOTEROL FUMARATE DIHYDRATE 2 PUFF: 160; 4.5 AEROSOL RESPIRATORY (INHALATION) at 09:00

## 2020-12-18 RX ADMIN — METOPROLOL TARTRATE 25 MG: 25 TABLET, FILM COATED ORAL at 08:54

## 2020-12-18 RX ADMIN — METHYLPREDNISOLONE SODIUM SUCCINATE 20 MG: 40 INJECTION, POWDER, FOR SOLUTION INTRAMUSCULAR; INTRAVENOUS at 20:04

## 2020-12-18 RX ADMIN — FUROSEMIDE 40 MG: 10 INJECTION, SOLUTION INTRAVENOUS at 08:54

## 2020-12-18 RX ADMIN — ACETAMINOPHEN 650 MG: 325 TABLET ORAL at 14:35

## 2020-12-18 RX ADMIN — ROSUVASTATIN CALCIUM 5 MG: 5 TABLET, COATED ORAL at 08:54

## 2020-12-18 RX ADMIN — FUROSEMIDE 40 MG: 10 INJECTION, SOLUTION INTRAVENOUS at 18:55

## 2020-12-18 RX ADMIN — SODIUM CHLORIDE, PRESERVATIVE FREE 10 ML: 5 INJECTION INTRAVENOUS at 08:53

## 2020-12-18 RX ADMIN — DOXYCYCLINE HYCLATE 100 MG: 100 TABLET, COATED ORAL at 20:04

## 2020-12-18 RX ADMIN — GUAIFENESIN 600 MG: 600 TABLET, EXTENDED RELEASE ORAL at 08:54

## 2020-12-18 RX ADMIN — POTASSIUM CHLORIDE 20 MEQ: 1500 TABLET, EXTENDED RELEASE ORAL at 08:54

## 2020-12-18 RX ADMIN — ALBUTEROL SULFATE 1 PUFF: 90 AEROSOL, METERED RESPIRATORY (INHALATION) at 09:05

## 2020-12-18 RX ADMIN — METOPROLOL TARTRATE 25 MG: 25 TABLET, FILM COATED ORAL at 20:04

## 2020-12-18 RX ADMIN — INSULIN LISPRO 2 UNITS: 100 INJECTION, SOLUTION INTRAVENOUS; SUBCUTANEOUS at 20:05

## 2020-12-18 RX ADMIN — INSULIN GLARGINE 35 UNITS: 100 INJECTION, SOLUTION SUBCUTANEOUS at 20:05

## 2020-12-18 RX ADMIN — METHYLPREDNISOLONE SODIUM SUCCINATE 40 MG: 40 INJECTION, POWDER, FOR SOLUTION INTRAMUSCULAR; INTRAVENOUS at 08:54

## 2020-12-18 RX ADMIN — LEVOTHYROXINE SODIUM 100 MCG: 100 TABLET ORAL at 08:54

## 2020-12-18 RX ADMIN — ALOGLIPTIN 25 MG: 12.5 TABLET, FILM COATED ORAL at 08:54

## 2020-12-18 RX ADMIN — ENOXAPARIN SODIUM 30 MG: 100 INJECTION SUBCUTANEOUS at 18:55

## 2020-12-18 RX ADMIN — GUAIFENESIN 600 MG: 600 TABLET, EXTENDED RELEASE ORAL at 20:04

## 2020-12-18 ASSESSMENT — PAIN SCALES - GENERAL
PAINLEVEL_OUTOF10: 0
PAINLEVEL_OUTOF10: 3
PAINLEVEL_OUTOF10: 0

## 2020-12-18 NOTE — PROGRESS NOTES
Hospitalist Progress Note         Admit Date: 12/16/2020    PCP: Félix Anne DO     Chief Complaint   Patient presents with    Cough     non productive. Onset last Thursday.  Shortness of Breath    Leg Pain     right leg pain behind knee with swelling with increased use. Onset 3 weeks ago. Assessment and Plan:     -Acute on chronic diastolic heart failure Echo with normal EF. Improving on Lasix IV. on Metoprolol for better BP control. Measure accurate I/os and BMP  -Possible asthma exacerbation/acute bronchitis doxy, Solu-Medrol IV, nebs and O2 support  -Uncontrolled diabetes mellitus continue Lantus, prandial insulin and SSI. Add alogliptin. -Hypothyroidism continue Synthroid. Normal TSH/free T4.  -Morbid obesity diet and exercise counseling.  -Hyperlipidemia continue Crestor. VTE prophylaxis LMWH.     Current Facility-Administered Medications   Medication Dose Route Frequency Provider Last Rate Last Admin    alogliptin (NESINA) tablet 25 mg  25 mg Oral Daily Wild Le MD   25 mg at 12/18/20 0854    methylPREDNISolone sodium (SOLU-MEDROL) injection 20 mg  20 mg Intravenous Q12H Wild Le MD        guaiFENesin [facility]) extended release tablet 600 mg  600 mg Oral BID Wild Le MD   600 mg at 12/18/20 0854    doxycycline hyclate (VIBRA-TABS) tablet 100 mg  100 mg Oral 2 times per day Wild Le MD   100 mg at 12/18/20 0854    therapeutic multivitamin-minerals 1 tablet  1 tablet Oral Lunch Wild Le MD   1 tablet at 12/17/20 1218    furosemide (LASIX) injection 40 mg  40 mg Intravenous BID Wild Le MD   40 mg at 12/18/20 0854    insulin glargine (LANTUS;BASAGLAR) injection pen 35 Units  35 Units Subcutaneous Nightly Wild Le MD   35 Units at 12/17/20 2009    insulin lispro (HUMALOG) injection vial 15 Units  15 Units Subcutaneous TID WC Wild Le MD   15 Units at 12/18/20 0857    metoprolol tartrate (LOPRESSOR) tablet 25 mg  25 mg Oral BID Petrona Cochran MD   25 mg at 12/18/20 0854    albuterol sulfate  (90 Base) MCG/ACT inhaler 1 puff  1 puff Inhalation Q4H PRN Alexis Akaecesar, APRN - CNP   1 puff at 12/18/20 0905    budesonide-formoterol (SYMBICORT) 160-4.5 MCG/ACT inhaler 2 puff  2 puff Inhalation BID Alexis Akaeze, APRN - CNP   2 puff at 12/18/20 0900    Vitamin D (CHOLECALCIFEROL) tablet 2,000 Units  2,000 Units Oral Daily Alexis Akaeze, APRN - CNP   2,000 Units at 12/18/20 0854    levothyroxine (SYNTHROID) tablet 100 mcg  100 mcg Oral Daily Alexis Akaeze, APRN - CNP   100 mcg at 12/18/20 0854    rosuvastatin (CRESTOR) tablet 5 mg  5 mg Oral Daily Alexis Akaeze, APRN - CNP   5 mg at 12/18/20 0854    sodium chloride flush 0.9 % injection 10 mL  10 mL Intravenous 2 times per day Trini Shah APRN - CNP   10 mL at 12/18/20 0853    sodium chloride flush 0.9 % injection 10 mL  10 mL Intravenous PRN Alexis Akaeze, APRN - CNP        enoxaparin (LOVENOX) injection 30 mg  30 mg Subcutaneous QPM Alexis Akaeze, APRN - CNP   30 mg at 12/17/20 1755    promethazine (PHENERGAN) tablet 12.5 mg  12.5 mg Oral Q6H PRN Alexis Akaeze, APRN - CNP        Or    ondansetron (ZOFRAN) injection 4 mg  4 mg Intravenous Q6H PRN Alexis Akaeze, APRN - CNP        polyethylene glycol (GLYCOLAX) packet 17 g  17 g Oral Daily PRN Alexis Akaeze, APRN - CNP        acetaminophen (TYLENOL) tablet 650 mg  650 mg Oral Q6H PRN Alexis Akaeze, APRN - CNP   650 mg at 12/17/20 1526    Or    acetaminophen (TYLENOL) suppository 650 mg  650 mg Rectal Q6H PRN Alexis Akaeze, APRN - CNP        albuterol (PROVENTIL) nebulizer solution 2.5 mg  2.5 mg Nebulization Q2H PRN BRYN Dunlap CNP        insulin lispro (HUMALOG) injection vial 0-6 Units  0-6 Units Subcutaneous TID  BRYN Dunlap CNP   3 Units at 12/18/20 0855    insulin lispro (HUMALOG) injection vial 0-3 Units  0-3 Units Subcutaneous Nightly BRYN Garcia - CNP   2 Units at 12/17/20 2008    glucose (GLUTOSE) 40 % oral gel 15 g  15 g Oral PRN Alexis Akaeze, APRN - CNP        dextrose 50 % IV solution  12.5 g Intravenous PRN Alexis Akaeze, APRN - CNP        glucagon (rDNA) injection 1 mg  1 mg Intramuscular PRN Alexis Akaeze, APRN - CNP        dextrose 5 % solution  100 mL/hr Intravenous PRN Alexis Akaeze, APRN - CNP        influenza quadrivalent split vaccine (FLUZONE;FLUARIX;FLULAVAL;AFLURIA) injection 0.5 mL  0.5 mL Intramuscular Prior to discharge Alexis Nolascoze, APRN - CNP        potassium chloride (KLOR-CON M) extended release tablet 20 mEq  20 mEq Oral Daily with breakfast Alexis Constanceze, APRN - CNP   20 mEq at 12/18/20 0854       Subjective:     Patient reports improving shortness of breath, cough with scanty sputum. Also reported improving leg swelling. Denied any chest pain, fever/chills. No acute events overnight. Objective: Intake/Output Summary (Last 24 hours) at 12/18/2020 1047  Last data filed at 12/18/2020 0522  Gross per 24 hour   Intake 120 ml   Output 1600 ml   Net -1480 ml      Vitals:   Vitals:    12/18/20 0905   BP:    Pulse:    Resp:    Temp:    SpO2: 94%     Physical Exam:  General Appearance:    Alert, cooperative, improved respiratory distress  Head:      Normocephalic, without obvious abnormality, atraumatic  Eyes:       Conjunctiva/corneas clear, EOM's intact  Lungs:    Improving B/L basal rhonchi and crackles with occasional wheeze +  Heart:                Regular rate and rhythm, S1 and S2 normal, no murmur,   rub or gallop  Abdomen:     Soft, non-tender, bowel sounds active, no masses, no organomegaly  Extremities:   Improving B/L pedal edema 1-2+  Neurological:   Grossly Intact.     Significant Diagnostic Studies:   DATA:    CBC   Recent Labs     12/16/20  1420 12/17/20  0600 12/18/20  0545   WBC 7.1 9.3 15.9*   HGB 13.3 13.5 12.9   HCT 42.0 43.5 40.8    171 157      BMP   Recent Labs     12/16/20  1420 12/17/20  0600 12/18/20  0545    138 138   K 4.1 4.6 4.5    102 101   CO2 33* 30 30   BUN 11 15 23   CREATININE 0.7 0.6 0.8     LFT'S   Recent Labs     12/16/20  1420   AST 45*   ALT 24   BILITOT 0.6   ALKPHOS 139*     COAG No results for input(s): INR in the last 72 hours. POC:   Lab Results   Component Value Date    POCGLU 256 12/18/2020    POCGLU 344 12/17/2020    POCGLU 215 12/17/2020    POCGLU 369 12/17/2020     [id number]:  Lab Results   Component Value Date    LABA1C 7.5 12/17/2020     CARDIAC ENZYMES  No results for input(s): CKTOTAL, CKMB, CKMBINDEX, TROPONINI in the last 72 hours. Troponin: No results for input(s): TROPONINT in the last 72 hours. BNP: No results for input(s): PROBNP in the last 72 hours. U/A:    Lab Results   Component Value Date    COLORU YELLOW 07/14/2020    WBCUA 1 07/14/2020    RBCUA 1 07/14/2020    MUCUS NEGATIVE 09/26/2018    BACTERIA FEW 07/14/2020    CLARITYU CLEAR 07/14/2020    SPECGRAV 1.015 07/14/2020    LEUKOCYTESUR NEGATIVE 07/14/2020    BLOODU TRACE 07/14/2020       Echo Complete 2d W Doppler W Color    Result Date: 12/17/2020  Transthoracic Echocardiography Report (TTE)  Demographics   Patient Name       [facility]      Date of Study       12/17/2020   Date of Birth      1950         Gender              Female   Age                69 year(s)         Race                   Patient Number     [id number]         Room Number         014   Visit Number       [id number]   Corporate ID       [id number]   Accession Number   [id number]         [facility]   Ordering Physician Phillip Florez MD           Physician           MD  Procedure Type of Study   TTE procedure:ECHOCARDIOGRAM COMPLETE 2D W DOPPLER W COLOR. Procedure Date Date: 12/17/2020 Start: 11:15 AM Study Location: Portable Technical Quality: Poor visualization due to breast augmentation. Indications:Dyspnea/SOB. Patient Status: Routine Height: 63 inches Weight: 242.01 pounds BSA: 2.1 m2 BMI: 42.87 kg/m2  Conclusions   Summary  Technically Difficult Study. Mild concentric LVH with normal systolic function. EF is 50-55 % . The left atrium is Mildly dilated. Impaired relaxation compatible with diastolic dysfunction. Right ventricular systolic pressure of 38 mm Hg consistent with mild  pulmonary hypertension. Mitral valve sclerosis without stenosis. No evidence of pericardial effusion. Signature   ------------------------------------------------------------------  Electronically signed by Bryan Zavaleta MD (Interpreting  physician) on 12/17/2020 at 02:45 PM  ------------------------------------------------------------------   Findings   Left Ventricle  Mild concentric left ventricular hypertrophy. EF is 50-55 % . Impaired relaxation compatible with diastolic dysfunction. ( reversed E/A  ratio)   Left Atrium  The left atrium is Mildly dilated. Right Atrium  Normal right atrium size and structure. Right Ventricle  Right ventricular systolic pressure of 38 mm Hg consistent with mild  pulmonary hypertension. Aortic Valve  Normal aortic valve structure and function. Mitral Valve  Mitral valve sclerosis without stenosis. Mitral annular calcification is present. Tricuspid Valve  Mild tricuspid regurgitation . Pericardial Effusion  No evidence of pericardial effusion.   M-Mode/2D Measurements & Calculations   LV Diastolic Dimension:   LV Systolic Dimension:   LA Dimension: 4.2 cmAO  4.95 cm                   3.94 cm                  Root Dimension: 3.3 cm  LV FS:20.4 %              LV Volume Diastolic: 438  LV PW Diastolic: 6.91 cm  ml  LV PW Systolic: 4.82 cm   LV Volume Systolic: 89.2  Septum Diastolic: 7.46 cm ml                       RV Diastolic Dimension:  Septum Systolic: 8.47 cm  LV EDV/LV EDV Index: 121 2.95 cm                            ml/58 m2LV ESV/LV ESV Index: 61.2 ml/29 m2     LA/Aorta: 1.27                            EF Calculated (A4C):                            49.4 %                            EF Calculated (2D): 41.8                            %                             LVOT: 2 cm  Doppler Measurements & Calculations   MV Peak E-Wave: 99 cm/s AV Peak Velocity: 130 cm/s  LVOT Peak Velocity: 100  MV Peak A-Wave: 129     AV Peak Gradient: 6.76 mmHg cm/s  cm/s                    AV Mean Velocity: 69 cm/s   LVOT Mean Velocity: 53.9  MV E/A Ratio: 0.77      AV Mean Gradient: 3 mmHg    cm/s  MV Peak Gradient: 3.92  AV VTI: 25.2 cm             LVOT Peak Gradient: 4  mmHg                    AV Area (Continuity):3.21   mmHgLVOT Mean Gradient:  MV Mean Gradient: 4     cm2                         2 mmHg  mmHg                                                Estimated RVSP: 39 mmHg  MV Mean Velocity: 89.5  LVOT VTI: 25.8 cm           Estimated RAP:10 mmHg  cm/s  MV P1/2t: 70 msec       Estimated PASP: 38.52 mmHg  MVA by PHT:3.14 cm2                                 TR Velocity:267 cm/s  MV Area (continuity):                               TR Gradient:28.52 mmHg  2.11 cm2      Xr Chest Portable    Result Date: 12/16/2020  EXAMINATION: ONE XRAY VIEW OF THE CHEST 12/16/2020 1:22 pm COMPARISON: 06/10/2019 HISTORY: ORDERING SYSTEM PROVIDED HISTORY: chest pain TECHNOLOGIST PROVIDED HISTORY: Reason for exam:->chest pain Additional signs and symptoms: sob Initial encounter FINDINGS: Low lung volumes. There is elevation the right hemidiaphragm. Right basilar atelectasis. Patchy opacity in left perihilar region. No other focal consolidation, pleural effusion or pneumothorax. The cardiomediastinal silhouette is stable. No overt pulmonary edema. The osseous structures are stable. Low lung volumes with elevation the right hemidiaphragm and right basilar atelectasis.  Patchy opacity in the left perihilar region, which may reflect pneumonia in the appropriate clinical setting. Cta Pulmonary W Contrast    Result Date: 12/16/2020  EXAMINATION: CTA OF THE CHEST 12/16/2020 3:41 pm TECHNIQUE: CTA of the chest was performed after the administration of intravenous contrast.  Multiplanar reformatted images are provided for review. MIP images are provided for review. Dose modulation, iterative reconstruction, and/or weight based adjustment of the mA/kV was utilized to reduce the radiation dose to as low as reasonably achievable. COMPARISON: None. HISTORY: ORDERING SYSTEM PROVIDED HISTORY: Elevated dimer, shortness of breath TECHNOLOGIST PROVIDED HISTORY: Reason for exam:->Elevated dimer, shortness of breath Reason for Exam: Elevated dimer, shortness of breath Acuity: Acute Type of Exam: Subsequent/Follow-up Additional signs and symptoms: 75ml isovue 370 @ lt anti @ 1555hrs, gfr>60, creat 0.7 12-, elevated d-dimer 588, sob, covid symptoms FINDINGS: Pulmonary Arteries: Pulmonary arteries are adequately opacified for evaluation. No evidence of intraluminal filling defect to suggest pulmonary embolism. Main pulmonary artery is normal in caliber. Mediastinum: Shotty mediastinal lymph nodes. The heart and pericardium demonstrate no acute abnormality. There is no acute abnormality of the thoracic aorta. Lungs/pleura: The lungs are without acute process. No focal consolidation or pulmonary edema. No evidence of pleural effusion or pneumothorax. There is scattered, subsegmental atelectasis. Linear scarring/atelectasis within the right middle lobe, right lower lobe, and lingula. Upper Abdomen: Limited images of the upper abdomen are unremarkable. Soft Tissues/Bones: No acute bone or soft tissue abnormality. 1. No pulmonary embolus. 2. Expiratory exam with scattered atelectasis. No definite consolidation.      Vl Dup Lower Extremity Venous Right    Result Date: 12/16/2020  EXAMINATION: DUPLEX VENOUS ULTRASOUND OF THE RIGHT LOWER EXTREMITY, 12/16/2020 1:17 pm TECHNIQUE: Duplex

## 2020-12-19 PROBLEM — I50.33 ACUTE ON CHRONIC DIASTOLIC HEART FAILURE (HCC): Status: ACTIVE | Noted: 2020-12-19

## 2020-12-19 LAB
ANION GAP SERPL CALCULATED.3IONS-SCNC: 0 MMOL/L (ref 4–16)
BASOPHILS ABSOLUTE: 0 K/CU MM
BASOPHILS RELATIVE PERCENT: 0.1 % (ref 0–1)
BUN BLDV-MCNC: 27 MG/DL (ref 6–23)
CALCIUM SERPL-MCNC: 8.9 MG/DL (ref 8.3–10.6)
CHLORIDE BLD-SCNC: 101 MMOL/L (ref 99–110)
CO2: 37 MMOL/L (ref 21–32)
CREAT SERPL-MCNC: 0.8 MG/DL (ref 0.6–1.1)
DIFFERENTIAL TYPE: ABNORMAL
EOSINOPHILS ABSOLUTE: 0 K/CU MM
EOSINOPHILS RELATIVE PERCENT: 0 % (ref 0–3)
GFR AFRICAN AMERICAN: >60 ML/MIN/1.73M2
GFR NON-AFRICAN AMERICAN: >60 ML/MIN/1.73M2
GLUCOSE BLD-MCNC: 135 MG/DL (ref 70–99)
GLUCOSE BLD-MCNC: 180 MG/DL (ref 70–99)
GLUCOSE BLD-MCNC: 214 MG/DL (ref 70–99)
GLUCOSE BLD-MCNC: 242 MG/DL (ref 70–99)
GLUCOSE BLD-MCNC: 378 MG/DL (ref 70–99)
HCT VFR BLD CALC: 42.6 % (ref 37–47)
HEMOGLOBIN: 13.4 GM/DL (ref 12.5–16)
IMMATURE NEUTROPHIL %: 0.5 % (ref 0–0.43)
LEGIONELLA URINARY AG: NEGATIVE
LYMPHOCYTES ABSOLUTE: 0.9 K/CU MM
LYMPHOCYTES RELATIVE PERCENT: 5.6 % (ref 24–44)
MCH RBC QN AUTO: 31.9 PG (ref 27–31)
MCHC RBC AUTO-ENTMCNC: 31.5 % (ref 32–36)
MCV RBC AUTO: 101.4 FL (ref 78–100)
MONOCYTES ABSOLUTE: 0.5 K/CU MM
MONOCYTES RELATIVE PERCENT: 3.4 % (ref 0–4)
PDW BLD-RTO: 13.8 % (ref 11.7–14.9)
PLATELET # BLD: 176 K/CU MM (ref 140–440)
PMV BLD AUTO: 9.5 FL (ref 7.5–11.1)
POTASSIUM SERPL-SCNC: 4.6 MMOL/L (ref 3.5–5.1)
RBC # BLD: 4.2 M/CU MM (ref 4.2–5.4)
SEGMENTED NEUTROPHILS ABSOLUTE COUNT: 14.2 K/CU MM
SEGMENTED NEUTROPHILS RELATIVE PERCENT: 90.4 % (ref 36–66)
SODIUM BLD-SCNC: 138 MMOL/L (ref 135–145)
STREP PNEUMONIAE ANTIGEN: NORMAL
TOTAL IMMATURE NEUTOROPHIL: 0.08 K/CU MM
WBC # BLD: 15.7 K/CU MM (ref 4–10.5)

## 2020-12-19 PROCEDURE — 85025 COMPLETE CBC W/AUTO DIFF WBC: CPT

## 2020-12-19 PROCEDURE — 6360000002 HC RX W HCPCS: Performed by: NURSE PRACTITIONER

## 2020-12-19 PROCEDURE — 6370000000 HC RX 637 (ALT 250 FOR IP): Performed by: INTERNAL MEDICINE

## 2020-12-19 PROCEDURE — 82962 GLUCOSE BLOOD TEST: CPT

## 2020-12-19 PROCEDURE — 2580000003 HC RX 258: Performed by: NURSE PRACTITIONER

## 2020-12-19 PROCEDURE — 80048 BASIC METABOLIC PNL TOTAL CA: CPT

## 2020-12-19 PROCEDURE — 6360000002 HC RX W HCPCS: Performed by: INTERNAL MEDICINE

## 2020-12-19 PROCEDURE — 6370000000 HC RX 637 (ALT 250 FOR IP): Performed by: NURSE PRACTITIONER

## 2020-12-19 PROCEDURE — 94640 AIRWAY INHALATION TREATMENT: CPT

## 2020-12-19 PROCEDURE — 2140000000 HC CCU INTERMEDIATE R&B

## 2020-12-19 RX ORDER — PREDNISONE 1 MG/1
5 TABLET ORAL DAILY
Status: DISCONTINUED | OUTPATIENT
Start: 2020-12-28 | End: 2020-12-20 | Stop reason: HOSPADM

## 2020-12-19 RX ORDER — PREDNISONE 20 MG/1
20 TABLET ORAL DAILY
Status: DISCONTINUED | OUTPATIENT
Start: 2020-12-19 | End: 2020-12-20 | Stop reason: HOSPADM

## 2020-12-19 RX ORDER — PREDNISONE 10 MG/1
10 TABLET ORAL DAILY
Status: DISCONTINUED | OUTPATIENT
Start: 2020-12-25 | End: 2020-12-20 | Stop reason: HOSPADM

## 2020-12-19 RX ADMIN — POTASSIUM CHLORIDE 20 MEQ: 1500 TABLET, EXTENDED RELEASE ORAL at 08:33

## 2020-12-19 RX ADMIN — ENOXAPARIN SODIUM 30 MG: 100 INJECTION SUBCUTANEOUS at 18:03

## 2020-12-19 RX ADMIN — Medication 2000 UNITS: at 08:33

## 2020-12-19 RX ADMIN — MULTIPLE VITAMINS W/ MINERALS TAB 1 TABLET: TAB at 12:03

## 2020-12-19 RX ADMIN — FUROSEMIDE 40 MG: 10 INJECTION, SOLUTION INTRAVENOUS at 08:31

## 2020-12-19 RX ADMIN — METHYLPREDNISOLONE SODIUM SUCCINATE 20 MG: 40 INJECTION, POWDER, FOR SOLUTION INTRAMUSCULAR; INTRAVENOUS at 08:31

## 2020-12-19 RX ADMIN — BUDESONIDE AND FORMOTEROL FUMARATE DIHYDRATE 2 PUFF: 160; 4.5 AEROSOL RESPIRATORY (INHALATION) at 08:40

## 2020-12-19 RX ADMIN — BUDESONIDE AND FORMOTEROL FUMARATE DIHYDRATE 2 PUFF: 160; 4.5 AEROSOL RESPIRATORY (INHALATION) at 19:58

## 2020-12-19 RX ADMIN — GUAIFENESIN 600 MG: 600 TABLET, EXTENDED RELEASE ORAL at 08:32

## 2020-12-19 RX ADMIN — METOPROLOL TARTRATE 25 MG: 25 TABLET, FILM COATED ORAL at 08:32

## 2020-12-19 RX ADMIN — LEVOTHYROXINE SODIUM 100 MCG: 100 TABLET ORAL at 08:33

## 2020-12-19 RX ADMIN — INSULIN LISPRO 3 UNITS: 100 INJECTION, SOLUTION INTRAVENOUS; SUBCUTANEOUS at 19:56

## 2020-12-19 RX ADMIN — FUROSEMIDE 40 MG: 10 INJECTION, SOLUTION INTRAVENOUS at 18:03

## 2020-12-19 RX ADMIN — DOXYCYCLINE HYCLATE 100 MG: 100 TABLET, COATED ORAL at 08:32

## 2020-12-19 RX ADMIN — INSULIN GLARGINE 35 UNITS: 100 INJECTION, SOLUTION SUBCUTANEOUS at 19:57

## 2020-12-19 RX ADMIN — METOPROLOL TARTRATE 25 MG: 25 TABLET, FILM COATED ORAL at 19:56

## 2020-12-19 RX ADMIN — PREDNISONE 20 MG: 20 TABLET ORAL at 13:08

## 2020-12-19 RX ADMIN — GUAIFENESIN 600 MG: 600 TABLET, EXTENDED RELEASE ORAL at 19:56

## 2020-12-19 RX ADMIN — DOXYCYCLINE HYCLATE 100 MG: 100 TABLET, COATED ORAL at 19:56

## 2020-12-19 RX ADMIN — SODIUM CHLORIDE, PRESERVATIVE FREE 10 ML: 5 INJECTION INTRAVENOUS at 20:02

## 2020-12-19 RX ADMIN — ALOGLIPTIN 25 MG: 12.5 TABLET, FILM COATED ORAL at 08:33

## 2020-12-19 RX ADMIN — ROSUVASTATIN CALCIUM 5 MG: 5 TABLET, COATED ORAL at 08:33

## 2020-12-19 ASSESSMENT — PAIN SCALES - GENERAL
PAINLEVEL_OUTOF10: 0

## 2020-12-19 NOTE — PROGRESS NOTES
therapeutic multivitamin-minerals 1 tablet  1 tablet Oral Lunch Kalin Rossi MD   1 tablet at 12/18/20 1216    furosemide (LASIX) injection 40 mg  40 mg Intravenous BID Kalin Rossi MD   40 mg at 12/19/20 0831    insulin glargine (LANTUS;BASAGLAR) injection pen 35 Units  35 Units Subcutaneous Nightly Kalin Rossi MD   35 Units at 12/18/20 2005    insulin lispro (HUMALOG) injection vial 15 Units  15 Units Subcutaneous TID WC Kalin Rossi MD   15 Units at 12/19/20 0834    metoprolol tartrate (LOPRESSOR) tablet 25 mg  25 mg Oral BID Kalin Rossi MD   25 mg at 12/19/20 0832    albuterol sulfate  (90 Base) MCG/ACT inhaler 1 puff  1 puff Inhalation Q4H PRN Alexis Douglass APRN - CNP   1 puff at 12/18/20 0905    budesonide-formoterol (SYMBICORT) 160-4.5 MCG/ACT inhaler 2 puff  2 puff Inhalation BID Alexis Douglass APRN - CNP   2 puff at 12/19/20 0840    Vitamin D (CHOLECALCIFEROL) tablet 2,000 Units  2,000 Units Oral Daily Alexismali Douglass, APRN - CNP   2,000 Units at 12/19/20 6130    levothyroxine (SYNTHROID) tablet 100 mcg  100 mcg Oral Daily Alexis Rafat, APRN - CNP   100 mcg at 12/19/20 1157    rosuvastatin (CRESTOR) tablet 5 mg  5 mg Oral Daily Alexis Douglass, APRN - CNP   5 mg at 12/19/20 5997    sodium chloride flush 0.9 % injection 10 mL  10 mL Intravenous 2 times per day Agus Galvez APRN - CNP   10 mL at 12/18/20 0853    sodium chloride flush 0.9 % injection 10 mL  10 mL Intravenous PRN Alexis Douglass APRN - CNP        enoxaparin (LOVENOX) injection 30 mg  30 mg Subcutaneous QPM Alexis Douglass APRN - CNP   30 mg at 12/18/20 1855    promethazine (PHENERGAN) tablet 12.5 mg  12.5 mg Oral Q6H PRN Alexis Akaeze, APRN - CNP        Or    ondansetron (ZOFRAN) injection 4 mg  4 mg Intravenous Q6H PRN Alexis Aksoumyaze, APRN - CNP        polyethylene glycol (GLYCOLAX) packet 17 g  17 g Oral Daily PRN Alexis Douglass, APRN - CNP        acetaminophen (TYLENOL) tablet 650 mg  650 mg Oral Q6H PRN Janay Stone APRN - CNP   650 mg at 12/18/20 1435    Or    acetaminophen (TYLENOL) suppository 650 mg  650 mg Rectal Q6H PRN Alexis Douglass, APRN - CNP        albuterol (PROVENTIL) nebulizer solution 2.5 mg  2.5 mg Nebulization Q2H PRN Alexis Douglass, APRN - CNP        insulin lispro (HUMALOG) injection vial 0-6 Units  0-6 Units Subcutaneous TID WC Alexis Akaeze, APRN - CNP   1 Units at 12/19/20 0831    insulin lispro (HUMALOG) injection vial 0-3 Units  0-3 Units Subcutaneous Nightly Alexis Aksoumyaze, APRN - CNP   2 Units at 12/18/20 2005    glucose (GLUTOSE) 40 % oral gel 15 g  15 g Oral PRN Alexis Aksoumyaze, APRN - CNP        dextrose 50 % IV solution  12.5 g Intravenous PRN Alexis Douglass, APRN - CNP        glucagon (rDNA) injection 1 mg  1 mg Intramuscular PRN Alexis Douglass, APRN - CNP        dextrose 5 % solution  100 mL/hr Intravenous PRN Alexis Akaeze, APRN - CNP        influenza quadrivalent split vaccine (FLUZONE;FLUARIX;FLULAVAL;AFLURIA) injection 0.5 mL  0.5 mL Intramuscular Prior to discharge Alexis Douglass APRN - CNP        potassium chloride (KLOR-CON M) extended release tablet 20 mEq  20 mEq Oral Daily with breakfast Alexis Douglass APRN - CNP   20 mEq at 12/19/20 3920       Subjective:     Patient reports improving shortness of breath and leg swelling,  No acute events overnight. Objective:        Intake/Output Summary (Last 24 hours) at 12/19/2020 1103  Last data filed at 12/19/2020 0841  Gross per 24 hour   Intake 480 ml   Output 1600 ml   Net -1120 ml      Vitals:   Vitals:    12/19/20 0722   BP: (!) 127/59   Pulse: 62   Resp: 16   Temp: 97.6 °F (36.4 °C)   SpO2: 93%     Physical Exam:  General Appearance:    Alert, cooperative, not in distress  Head:      Normocephalic, without obvious abnormality, atraumatic  Eyes:       Conjunctiva/corneas clear, EOM's intact  Lungs:    Improved B/L basal rhonchi and crackles with occasional wheeze +  Heart:                Regular rate and rhythm, S1 and S2 normal, no murmur,   rub or gallop  Abdomen:     Soft, non-tender, bowel sounds active, no masses, no organomegaly  Extremities:   Improving B/L pedal edema 1+  Neurological:   Grossly Intact. Significant Diagnostic Studies:   DATA:    CBC   Recent Labs     12/17/20  0600 12/18/20  0545 12/19/20  0530   WBC 9.3 15.9* 15.7*   HGB 13.5 12.9 13.4   HCT 43.5 40.8 42.6    157 176      BMP   Recent Labs     12/17/20  0600 12/18/20  0545 12/19/20  0530    138 138   K 4.6 4.5 4.6    101 101   CO2 30 30 37*   BUN 15 23 27*   CREATININE 0.6 0.8 0.8     LFT'S   Recent Labs     12/16/20  1420   AST 45*   ALT 24   BILITOT 0.6   ALKPHOS 139*     COAG No results for input(s): INR in the last 72 hours. POC:   Lab Results   Component Value Date    POCGLU 180 12/19/2020    POCGLU 290 12/18/2020    POCGLU 226 12/18/2020    POCGLU 195 12/18/2020     AdxpprnpmyW3P:  Lab Results   Component Value Date    LABA1C 7.5 12/17/2020     CARDIAC ENZYMES  No results for input(s): CKTOTAL, CKMB, CKMBINDEX, TROPONINI in the last 72 hours. Troponin: No results for input(s): TROPONINT in the last 72 hours. BNP: No results for input(s): PROBNP in the last 72 hours.   U/A:    Lab Results   Component Value Date    COLORU YELLOW 07/14/2020    WBCUA 1 07/14/2020    RBCUA 1 07/14/2020    MUCUS NEGATIVE 09/26/2018    BACTERIA FEW 07/14/2020    CLARITYU CLEAR 07/14/2020    SPECGRAV 1.015 07/14/2020    LEUKOCYTESUR NEGATIVE 07/14/2020    BLOODU TRACE 07/14/2020       Echo Complete 2d W Doppler W Color    Result Date: 12/17/2020  Transthoracic Echocardiography Report (TTE)  Demographics   Patient Name       Shant Hinkle      Date of Study       12/17/2020   Date of Birth      1950         Gender              Female   Age                69 year(s)         Race                   Patient Number     [id number]         Room Number         344   Visit Number       [id number]   Corporate ID [id number]   Accession Number   [id number]         [facility]   Ordering Physician Lara Hanna MD           Physician           MD  Procedure Type of Study   TTE procedure:ECHOCARDIOGRAM COMPLETE 2D W DOPPLER W COLOR. Procedure Date Date: 12/17/2020 Start: 11:15 AM Study Location: Portable Technical Quality: Poor visualization due to breast augmentation. Indications:Dyspnea/SOB. Patient Status: Routine Height: 63 inches Weight: 242.01 pounds BSA: 2.1 m2 BMI: 42.87 kg/m2  Conclusions   Summary  Technically Difficult Study. Mild concentric LVH with normal systolic function. EF is 50-55 % . The left atrium is Mildly dilated. Impaired relaxation compatible with diastolic dysfunction. Right ventricular systolic pressure of 38 mm Hg consistent with mild  pulmonary hypertension. Mitral valve sclerosis without stenosis. No evidence of pericardial effusion. Signature   ------------------------------------------------------------------  Electronically signed by Rona Espana MD (Interpreting  physician) on 12/17/2020 at 02:45 PM  ------------------------------------------------------------------   Findings   Left Ventricle  Mild concentric left ventricular hypertrophy. EF is 50-55 % . Impaired relaxation compatible with diastolic dysfunction. ( reversed E/A  ratio)   Left Atrium  The left atrium is Mildly dilated. Right Atrium  Normal right atrium size and structure. Right Ventricle  Right ventricular systolic pressure of 38 mm Hg consistent with mild  pulmonary hypertension. Aortic Valve  Normal aortic valve structure and function. Mitral Valve  Mitral valve sclerosis without stenosis. Mitral annular calcification is present. Tricuspid Valve  Mild tricuspid regurgitation . Pericardial Effusion  No evidence of pericardial effusion.   M-Mode/2D Measurements & Calculations   LV Diastolic Dimension:   LV Systolic Dimension:   LA Dimension: 4.2 cmAO  4.95 cm                   3.94 cm                  Root Dimension: 3.3 cm  LV FS:20.4 %              LV Volume Diastolic: 092  LV PW Diastolic: 5.01 cm  ml  LV PW Systolic: 6.54 cm   LV Volume Systolic: 45.0  Septum Diastolic: 9.18 cm ml                       RV Diastolic Dimension:  Septum Systolic: 4.44 cm  LV EDV/LV EDV Index: 121 2.95 cm                            ml/58 m2LV ESV/LV ESV                            Index: 61.2 ml/29 m2     LA/Aorta: 1.27                            EF Calculated (A4C):                            49.4 %                            EF Calculated (2D): 41.8                            %                             LVOT: 2 cm  Doppler Measurements & Calculations   MV Peak E-Wave: 99 cm/s AV Peak Velocity: 130 cm/s  LVOT Peak Velocity: 100  MV Peak A-Wave: 129     AV Peak Gradient: 6.76 mmHg cm/s  cm/s                    AV Mean Velocity: 69 cm/s   LVOT Mean Velocity: 53.9  MV E/A Ratio: 0.77      AV Mean Gradient: 3 mmHg    cm/s  MV Peak Gradient: 3.92  AV VTI: 25.2 cm             LVOT Peak Gradient: 4  mmHg                    AV Area (Continuity):3.21   mmHgLVOT Mean Gradient:  MV Mean Gradient: 4     cm2                         2 mmHg  mmHg                                                Estimated RVSP: 39 mmHg  MV Mean Velocity: 89.5  LVOT VTI: 25.8 cm           Estimated RAP:10 mmHg  cm/s  MV P1/2t: 70 msec       Estimated PASP: 38.52 mmHg  MVA by PHT:3.14 cm2                                 TR Velocity:267 cm/s  MV Area (continuity):                               TR Gradient:28.52 mmHg  2.11 cm2      Xr Chest Portable    Result Date: 12/16/2020  EXAMINATION: ONE XRAY VIEW OF THE CHEST 12/16/2020 1:22 pm COMPARISON: 06/10/2019 HISTORY: ORDERING SYSTEM PROVIDED HISTORY: chest pain TECHNOLOGIST PROVIDED HISTORY: Reason for exam:->chest pain Additional signs and symptoms: sob Initial encounter FINDINGS: Low lung volumes.   There is elevation the right hemidiaphragm. Right basilar atelectasis. Patchy opacity in left perihilar region. No other focal consolidation, pleural effusion or pneumothorax. The cardiomediastinal silhouette is stable. No overt pulmonary edema. The osseous structures are stable. Low lung volumes with elevation the right hemidiaphragm and right basilar atelectasis. Patchy opacity in the left perihilar region, which may reflect pneumonia in the appropriate clinical setting. Cta Pulmonary W Contrast    Result Date: 12/16/2020  EXAMINATION: CTA OF THE CHEST 12/16/2020 3:41 pm TECHNIQUE: CTA of the chest was performed after the administration of intravenous contrast.  Multiplanar reformatted images are provided for review. MIP images are provided for review. Dose modulation, iterative reconstruction, and/or weight based adjustment of the mA/kV was utilized to reduce the radiation dose to as low as reasonably achievable. COMPARISON: None. HISTORY: ORDERING SYSTEM PROVIDED HISTORY: Elevated dimer, shortness of breath TECHNOLOGIST PROVIDED HISTORY: Reason for exam:->Elevated dimer, shortness of breath Reason for Exam: Elevated dimer, shortness of breath Acuity: Acute Type of Exam: Subsequent/Follow-up Additional signs and symptoms: 75ml isovue 370 @ lt anti @ 1555hrs, gfr>60, creat 0.7 12-, elevated d-dimer 588, sob, covid symptoms FINDINGS: Pulmonary Arteries: Pulmonary arteries are adequately opacified for evaluation. No evidence of intraluminal filling defect to suggest pulmonary embolism. Main pulmonary artery is normal in caliber. Mediastinum: Shotty mediastinal lymph nodes. The heart and pericardium demonstrate no acute abnormality. There is no acute abnormality of the thoracic aorta. Lungs/pleura: The lungs are without acute process. No focal consolidation or pulmonary edema. No evidence of pleural effusion or pneumothorax. There is scattered, subsegmental atelectasis.   Linear scarring/atelectasis within the right middle lobe, right lower lobe, and lingula. Upper Abdomen: Limited images of the upper abdomen are unremarkable. Soft Tissues/Bones: No acute bone or soft tissue abnormality. 1. No pulmonary embolus. 2. Expiratory exam with scattered atelectasis. No definite consolidation. Vl Dup Lower Extremity Venous Right    Result Date: 12/16/2020  EXAMINATION: DUPLEX VENOUS ULTRASOUND OF THE RIGHT LOWER EXTREMITY, 12/16/2020 1:17 pm TECHNIQUE: Duplex ultrasound and Doppler images were obtained of the right lower extremity. COMPARISON: None. HISTORY: ORDERING SYSTEM PROVIDED HISTORY: Pain and swelling right lower extremity TECHNOLOGIST PROVIDED HISTORY: Reason for exam:->Pain and swelling right lower extremity Reason for Exam: edema FINDINGS: The visualized veins of the right lower extremity are patent and free of echogenic thrombus. The veins are normally compressible and have normal phasic flow. No evidence of DVT in the right lower extremity.            Samuel De Jesus  [facility] Hospitalist

## 2020-12-20 VITALS
HEART RATE: 59 BPM | BODY MASS INDEX: 42.89 KG/M2 | WEIGHT: 242.1 LBS | HEIGHT: 63 IN | RESPIRATION RATE: 16 BRPM | DIASTOLIC BLOOD PRESSURE: 51 MMHG | OXYGEN SATURATION: 95 % | TEMPERATURE: 96.2 F | SYSTOLIC BLOOD PRESSURE: 109 MMHG

## 2020-12-20 LAB
ANION GAP SERPL CALCULATED.3IONS-SCNC: 7 MMOL/L (ref 4–16)
BASOPHILS ABSOLUTE: 0 K/CU MM
BASOPHILS RELATIVE PERCENT: 0.2 % (ref 0–1)
BUN BLDV-MCNC: 28 MG/DL (ref 6–23)
CALCIUM SERPL-MCNC: 8.7 MG/DL (ref 8.3–10.6)
CHLORIDE BLD-SCNC: 101 MMOL/L (ref 99–110)
CO2: 32 MMOL/L (ref 21–32)
CREAT SERPL-MCNC: 0.8 MG/DL (ref 0.6–1.1)
DIFFERENTIAL TYPE: ABNORMAL
EOSINOPHILS ABSOLUTE: 0 K/CU MM
EOSINOPHILS RELATIVE PERCENT: 0 % (ref 0–3)
GFR AFRICAN AMERICAN: >60 ML/MIN/1.73M2
GFR NON-AFRICAN AMERICAN: >60 ML/MIN/1.73M2
GLUCOSE BLD-MCNC: 153 MG/DL (ref 70–99)
GLUCOSE BLD-MCNC: 79 MG/DL (ref 70–99)
GLUCOSE BLD-MCNC: 98 MG/DL (ref 70–99)
HCT VFR BLD CALC: 41.6 % (ref 37–47)
HEMOGLOBIN: 13.1 GM/DL (ref 12.5–16)
IMMATURE NEUTROPHIL %: 0.6 % (ref 0–0.43)
LYMPHOCYTES ABSOLUTE: 1.5 K/CU MM
LYMPHOCYTES RELATIVE PERCENT: 11.7 % (ref 24–44)
MCH RBC QN AUTO: 31.6 PG (ref 27–31)
MCHC RBC AUTO-ENTMCNC: 31.5 % (ref 32–36)
MCV RBC AUTO: 100.5 FL (ref 78–100)
MONOCYTES ABSOLUTE: 1.1 K/CU MM
MONOCYTES RELATIVE PERCENT: 8.6 % (ref 0–4)
PDW BLD-RTO: 13.7 % (ref 11.7–14.9)
PLATELET # BLD: 169 K/CU MM (ref 140–440)
PMV BLD AUTO: 9.5 FL (ref 7.5–11.1)
POTASSIUM SERPL-SCNC: 4.1 MMOL/L (ref 3.5–5.1)
RBC # BLD: 4.14 M/CU MM (ref 4.2–5.4)
SEGMENTED NEUTROPHILS ABSOLUTE COUNT: 10 K/CU MM
SEGMENTED NEUTROPHILS RELATIVE PERCENT: 78.9 % (ref 36–66)
SODIUM BLD-SCNC: 140 MMOL/L (ref 135–145)
TOTAL IMMATURE NEUTOROPHIL: 0.07 K/CU MM
WBC # BLD: 12.6 K/CU MM (ref 4–10.5)

## 2020-12-20 PROCEDURE — G0008 ADMIN INFLUENZA VIRUS VAC: HCPCS | Performed by: NURSE PRACTITIONER

## 2020-12-20 PROCEDURE — 36415 COLL VENOUS BLD VENIPUNCTURE: CPT

## 2020-12-20 PROCEDURE — 6370000000 HC RX 637 (ALT 250 FOR IP): Performed by: NURSE PRACTITIONER

## 2020-12-20 PROCEDURE — 82962 GLUCOSE BLOOD TEST: CPT

## 2020-12-20 PROCEDURE — 85025 COMPLETE CBC W/AUTO DIFF WBC: CPT

## 2020-12-20 PROCEDURE — 94640 AIRWAY INHALATION TREATMENT: CPT

## 2020-12-20 PROCEDURE — 90686 IIV4 VACC NO PRSV 0.5 ML IM: CPT | Performed by: NURSE PRACTITIONER

## 2020-12-20 PROCEDURE — 80048 BASIC METABOLIC PNL TOTAL CA: CPT

## 2020-12-20 PROCEDURE — 6360000002 HC RX W HCPCS: Performed by: NURSE PRACTITIONER

## 2020-12-20 PROCEDURE — 6370000000 HC RX 637 (ALT 250 FOR IP): Performed by: INTERNAL MEDICINE

## 2020-12-20 RX ORDER — TORSEMIDE 20 MG/1
20 TABLET ORAL DAILY
Qty: 30 TABLET | Refills: 3 | Status: ON HOLD | OUTPATIENT
Start: 2020-12-21 | End: 2021-08-29 | Stop reason: SDUPTHER

## 2020-12-20 RX ORDER — METHYLPREDNISOLONE 4 MG/1
TABLET ORAL
Qty: 1 KIT | Refills: 0 | Status: SHIPPED | OUTPATIENT
Start: 2020-12-20 | End: 2020-12-26

## 2020-12-20 RX ORDER — SPIRONOLACTONE 25 MG/1
25 TABLET ORAL DAILY
Qty: 30 TABLET | Refills: 3 | Status: ON HOLD | OUTPATIENT
Start: 2020-12-21 | End: 2021-08-29 | Stop reason: SDUPTHER

## 2020-12-20 RX ORDER — DOXYCYCLINE HYCLATE 100 MG
100 TABLET ORAL EVERY 12 HOURS SCHEDULED
Qty: 6 TABLET | Refills: 0 | Status: SHIPPED | OUTPATIENT
Start: 2020-12-20 | End: 2020-12-23

## 2020-12-20 RX ORDER — POTASSIUM CHLORIDE 750 MG/1
20 TABLET, FILM COATED, EXTENDED RELEASE ORAL 2 TIMES DAILY
Qty: 60 TABLET | Refills: 3 | Status: ON HOLD | OUTPATIENT
Start: 2020-12-20 | End: 2021-08-29 | Stop reason: SDUPTHER

## 2020-12-20 RX ORDER — TORSEMIDE 20 MG/1
20 TABLET ORAL DAILY
Status: DISCONTINUED | OUTPATIENT
Start: 2020-12-20 | End: 2020-12-20 | Stop reason: HOSPADM

## 2020-12-20 RX ORDER — SPIRONOLACTONE 25 MG/1
25 TABLET ORAL DAILY
Status: DISCONTINUED | OUTPATIENT
Start: 2020-12-20 | End: 2020-12-20 | Stop reason: HOSPADM

## 2020-12-20 RX ORDER — GUAIFENESIN 600 MG/1
600 TABLET, EXTENDED RELEASE ORAL 2 TIMES DAILY
Qty: 20 TABLET | Refills: 0 | Status: SHIPPED | OUTPATIENT
Start: 2020-12-20

## 2020-12-20 RX ADMIN — POTASSIUM CHLORIDE 20 MEQ: 1500 TABLET, EXTENDED RELEASE ORAL at 08:34

## 2020-12-20 RX ADMIN — PREDNISONE 20 MG: 20 TABLET ORAL at 08:34

## 2020-12-20 RX ADMIN — GUAIFENESIN 600 MG: 600 TABLET, EXTENDED RELEASE ORAL at 08:34

## 2020-12-20 RX ADMIN — BUDESONIDE AND FORMOTEROL FUMARATE DIHYDRATE 2 PUFF: 160; 4.5 AEROSOL RESPIRATORY (INHALATION) at 07:22

## 2020-12-20 RX ADMIN — MULTIPLE VITAMINS W/ MINERALS TAB 1 TABLET: TAB at 12:26

## 2020-12-20 RX ADMIN — ROSUVASTATIN CALCIUM 5 MG: 5 TABLET, COATED ORAL at 08:34

## 2020-12-20 RX ADMIN — LEVOTHYROXINE SODIUM 100 MCG: 100 TABLET ORAL at 08:35

## 2020-12-20 RX ADMIN — Medication 2000 UNITS: at 08:35

## 2020-12-20 RX ADMIN — SPIRONOLACTONE 25 MG: 25 TABLET ORAL at 08:34

## 2020-12-20 RX ADMIN — METOPROLOL TARTRATE 25 MG: 25 TABLET, FILM COATED ORAL at 08:34

## 2020-12-20 RX ADMIN — ALOGLIPTIN 25 MG: 12.5 TABLET, FILM COATED ORAL at 08:35

## 2020-12-20 RX ADMIN — INFLUENZA A VIRUS A/VICTORIA/2454/2019 IVR-207 (H1N1) ANTIGEN (PROPIOLACTONE INACTIVATED), INFLUENZA A VIRUS A/HONG KONG/2671/2019 IVR-208 (H3N2) ANTIGEN (PROPIOLACTONE INACTIVATED), INFLUENZA B VIRUS B/VICTORIA/705/2018 BVR-11 ANTIGEN (PROPIOLACTONE INACTIVATED), INFLUENZA B VIRUS B/PHUKET/3073/2013 BVR-1B ANTIGEN (PROPIOLACTONE INACTIVATED) 0.5 ML: 15; 15; 15; 15 INJECTION, SUSPENSION INTRAMUSCULAR at 12:27

## 2020-12-20 RX ADMIN — DOXYCYCLINE HYCLATE 100 MG: 100 TABLET, COATED ORAL at 08:34

## 2020-12-20 RX ADMIN — TORSEMIDE 20 MG: 20 TABLET ORAL at 08:35

## 2020-12-20 ASSESSMENT — PAIN SCALES - GENERAL
PAINLEVEL_OUTOF10: 0

## 2021-03-07 ENCOUNTER — APPOINTMENT (OUTPATIENT)
Dept: CT IMAGING | Age: 71
End: 2021-03-07
Payer: COMMERCIAL

## 2021-03-07 ENCOUNTER — APPOINTMENT (OUTPATIENT)
Dept: GENERAL RADIOLOGY | Age: 71
End: 2021-03-07
Payer: COMMERCIAL

## 2021-03-07 ENCOUNTER — HOSPITAL ENCOUNTER (EMERGENCY)
Age: 71
Discharge: HOME | End: 2021-03-07
Attending: EMERGENCY MEDICINE
Payer: COMMERCIAL

## 2021-03-07 VITALS
HEIGHT: 63 IN | TEMPERATURE: 97.9 F | OXYGEN SATURATION: 99 % | BODY MASS INDEX: 38.98 KG/M2 | HEART RATE: 73 BPM | SYSTOLIC BLOOD PRESSURE: 127 MMHG | RESPIRATION RATE: 16 BRPM | WEIGHT: 220 LBS | DIASTOLIC BLOOD PRESSURE: 52 MMHG

## 2021-03-07 DIAGNOSIS — S42.91XA SHOULDER FRACTURE, RIGHT, CLOSED, INITIAL ENCOUNTER: Primary | ICD-10-CM

## 2021-03-07 DIAGNOSIS — S80.02XA CONTUSION OF LEFT KNEE, INITIAL ENCOUNTER: ICD-10-CM
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
## 2021-03-07 PROCEDURE — 73562 X-RAY EXAM OF KNEE 3: CPT

## 2021-03-07 PROCEDURE — 6360000002 HC RX W HCPCS: Performed by: EMERGENCY MEDICINE

## 2021-03-07 PROCEDURE — 96375 TX/PRO/DX INJ NEW DRUG ADDON: CPT

## 2021-03-07 PROCEDURE — 96374 THER/PROPH/DIAG INJ IV PUSH: CPT

## 2021-03-07 PROCEDURE — 73060 X-RAY EXAM OF HUMERUS: CPT

## 2021-03-07 PROCEDURE — 99284 EMERGENCY DEPT VISIT MOD MDM: CPT

## 2021-03-07 PROCEDURE — 73200 CT UPPER EXTREMITY W/O DYE: CPT

## 2021-03-07 RX ORDER — HYDROCODONE BITARTRATE AND ACETAMINOPHEN 5; 325 MG/1; MG/1
1 TABLET ORAL EVERY 6 HOURS PRN
Qty: 20 TABLET | Refills: 0 | Status: SHIPPED | OUTPATIENT
Start: 2021-03-07 | End: 2021-03-14

## 2021-03-07 RX ORDER — ONDANSETRON 2 MG/ML
4 INJECTION INTRAMUSCULAR; INTRAVENOUS EVERY 30 MIN PRN
Status: DISCONTINUED | OUTPATIENT
Start: 2021-03-07 | End: 2021-03-07 | Stop reason: HOSPADM

## 2021-03-07 RX ORDER — FENTANYL CITRATE 50 UG/ML
50 INJECTION, SOLUTION INTRAMUSCULAR; INTRAVENOUS ONCE
Status: COMPLETED | OUTPATIENT
Start: 2021-03-07 | End: 2021-03-07

## 2021-03-07 RX ADMIN — ONDANSETRON 4 MG: 2 INJECTION INTRAMUSCULAR; INTRAVENOUS at 13:13

## 2021-03-07 RX ADMIN — FENTANYL CITRATE 50 MCG: 50 INJECTION, SOLUTION INTRAMUSCULAR; INTRAVENOUS at 13:13

## 2021-03-07 ASSESSMENT — PAIN DESCRIPTION - ORIENTATION: ORIENTATION: RIGHT;UPPER

## 2021-03-07 ASSESSMENT — PAIN DESCRIPTION - LOCATION: LOCATION: ARM

## 2021-03-07 ASSESSMENT — PAIN SCALES - GENERAL
PAINLEVEL_OUTOF10: 8
PAINLEVEL_OUTOF10: 8

## 2021-03-07 NOTE — ED NOTES
[facility]. States she fell over a parking block. Pt has pain to her right upper arm and left knee. Pt does have an abrasion to her left knee. Pt states severe pain to her right humerus. Arm elevated on pillow. Pt denies any head, neck or back pain. Denies LOC.       Diedre Claude, RN  03/07/21 6099
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
## 2021-05-04 ENCOUNTER — HOSPITAL ENCOUNTER (OUTPATIENT)
Dept: PHYSICAL THERAPY | Age: 71
Setting detail: THERAPIES SERIES
Discharge: HOME | End: 2021-05-04
Payer: COMMERCIAL

## 2021-05-04 PROCEDURE — 97110 THERAPEUTIC EXERCISES: CPT

## 2021-05-04 PROCEDURE — 97016 VASOPNEUMATIC DEVICE THERAPY: CPT

## 2021-05-04 PROCEDURE — 97162 PT EVAL MOD COMPLEX 30 MIN: CPT

## 2021-05-04 NOTE — FLOWSHEET NOTE
Outpatient Physical Therapy  [place]           [] Phone: [phone]   Fax: [phone]  [facility]           [x] Phone: [phone]   Fax: [phone]        Physical Therapy Daily Treatment Note  Date:  2021    Patient Name:  Marguerite Guthrie    :  1950  MRN: [id number]  Restrictions/Precautions:  fall risk  Diagnosis:     S42.221D displaced fracture of R humerus  Date of Injury/Surgery: 3/7/21  Treatment Diagnosis:   M62.81 muscle weakness, R26.89 abnormality of gait   Insurance/Certification information:  Shamika Mayer 150   Referring Physician:    Dr Jerry Atkinson MD   Plan of care signed (Y/N):  maureen [email or web address]  Outcome Measure: Quick DASH: 34.1  Visit# / total visits:     Pain level: 0/10   Goals:       Short term goals to be achieved by Peyton 15, 2021:  Short term goal 1: Pt will report compliance with current HEP as prescribed in order to improve ROM and strength. Short term goal 2: Pt will demonstrate AROM R shoulder flexion to 100 degrees in order to improve ROM. Short term goal 3: Pt will demonstrate AROM R shoulder abd to 100 degrees in order to improve ROM  Short term goal 4: Pt will demonstrate R  strength with an average of at least 45 lbs in order to improve strength. Short term goal 5: Pt will demonstrate a score of no more than 28 on the Quick DASH in order to improve quality of life.     Subjective:  See eval     Any changes in Ambulatory Summary Sheet?   None    Objective:  See eval     COVID screening questions were asked and patient attested that there had been no contact or symptoms    Exercises: (No more than 4 columns)   Exercise/Equipment Date: 21 Date Date           WARM UP       UBE        pulley      TABLE      Wand flexion 1x10      Wand abd 1x10     Putty exercises x10 yellow                    STANDING      Ball on table                                               PROPRIOCEPTION                                    MODALITIES      vaso x10'              Other

## 2021-05-04 NOTE — PROGRESS NOTES
[facility] Physical Therapy  [address] [place] [id number]  Phone: [phone]  Fax: [phone]      PHYSICAL THERAPY INITIAL ASSESSMENT      Date: 2021  Patient Name: Ja Ayala   : 1950  Referred by: Dr Johnson Medrano MD  Reason for Referral: S42.221D displaced fracture of R humerus  PT Impression: M62.81 muscle weakness, R26.89 abnormality of gait  Insurance: Shamika Mayer 150  Restrictions/Precautions: fall risk     Subjective   Chart Reviewed: Yes   Patient assessed for rehabilitation services?: Yes   Family / Caregiver Present: No   Follows Commands: Within Functional Limits   Date of onset:  2021 pt had a fall and fx her R humerus. Subjective: Pt reports she puts ice and heat on it and it is still   Current Situation: Pt reports she doesn't wear her sling around the house  Observation: Pt presented with sling on R UE. Medication: updated in EMR    Pain Screening   Patient Currently in Pain:   Pain Assessment: 0-10   Pain Level: 0/10    Worst pain: 0/29 when move certain ways   Best pain:   0/10   Sensation: unimpaired. Vision/Hearing   Vision: impaired. Pt wears glasses all the time. Hearing: Within functional limits     Home Living  Lives With: sons and fiance. 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 Prior level of function:  Pt reports no problems prior to this.  She reports she did the housework, but is not able to as much  Patient reports hardest things at home: 1) washing R side in shower  2) running the sweeper 3) laundry  Patient goals: to get better and do the things \"I need to do\"    Orientation: WNL    Objective  AROM:   Shoulder:   Right Left    Flexion      76  degrees      129  degrees    Extension       35 degrees       58 degrees    ABduction      89 degrees       113 degrees     Elbow:    Right Left   Flexion       145  degrees      140  degrees   Extension       5 degree lag       10 degree lag     Strength:  Shoulder:   Right Left    Flexion        2+/5        3+/5    Extension        2+/5        4+/5    ABduction        2+/5        3+/5    ADduction        2+/5        4+/5     Elbow:    Right Left   Flexion         3/5        4/5   Extension        3-/5        4/5   :  R: 40,35,35 Av.36 lbs  L :42,42,39 Av lbs      Transfers  Sit to Stand: Mod I  Stand to sit: Mod I    Additional Tests: Quick DASH: 34.1    Assessment   Decreased functional mobility ; Decreased strength;Decreased endurance;Decreased high-level IADLs;Decreased ADL status; Decreased ROM; Assessment: Pt is a pleasant 70 yo female who would benefit from skilled PT to address decreased ROM, strength, balance, endurance, functional mobility, and increased pain. Prognosis: Good  Discharge Recommendations: Patient would benefit from additional therapy;Continue to assess pending progress,   Requires PT Follow Up: Yes  Activity Tolerance: Patient Tolerated treatment well. Treatment Administered: See flowsheet  Patient Education: See flowsheet  Learning Style: Any    Plan   Plan of care initiated  Frequency and duration of tx:  2x/week x12 weeks  Barriers include: none  Treatment:  1. Therapeutic exercises including ROM, PREs, stretching, strengthening, and stability  2. Therapeutic activity  3. Gait training  4. Stair training  5. Neuro re-ed  6. Coordination training and body awareness  7. Positioning and postural awareness  8. Modalities including e-stim, ultrasound, heat, cold, and foam roll  9. Manual therapy including: STM, MFR, and TPR  10. Aquatic Therapy  11. Therapeutic taping  12. HEP and education    Goals  Short term goals to be achieved by Peyton 15, 2021:  Short term goal 1: Pt will report compliance with current HEP as prescribed in order to improve ROM and strength. Short term goal 2: Pt will demonstrate AROM R shoulder flexion to 100 degrees in order to improve ROM. Short term goal 3: Pt will demonstrate AROM R shoulder abd to 100 degrees in order to improve ROM  Short term goal 4: Pt will demonstrate R  strength with an average of at least 45 lbs in order to improve strength. Short term goal 5: Pt will demonstrate a score of no more than 28 on the Quick DASH in order to improve quality of life. Long term goals to be achieved by July 27, 2021 :   Long term goal 1: Pt will demonstrate I with current HEP as prescribed in order to increase ROM and strength. Long term goal 2: Pt will demonstrate R shoulder flexion and abd to at least 115 degrees in order to improve ROM. Long term goal 3: Pt will demonstrate R shoulder extension to at least 50 degrees in order to improve ROM. Long term goal 4: Pt will demonstrate R shoulder strength at least 4/5 in order to improve strength. Long term goal 5: Pt will demonstrate a score of no more than 20 on the Quick DASH in order to improve quality of life. Note: Goals, frequency, plan, and recommendations will be updated as needed. Goals and treatment plan discussed with family and mutually agreed upon. YES   Will discharge patient when therapy goals have been met or when therapy is no longer deemed necessary. This plan was reviewed with the patient/family and they were in agreement. Electronically signed by:  Charity Hatfield DPT [id number] Date: 5/4/2021, Time: 25:04 AM      I certify that the above patient is under my care and requires the above skilled services. These professional services are to be provided from an established plan, reviewed by me at least every 90 days. These services are related to the diagnosis stated above and are medically necessary.     Date last seen by physician:_________________________________________________    Physician Signature:____________________________________________Date:_______________

## 2021-05-11 ENCOUNTER — HOSPITAL ENCOUNTER (OUTPATIENT)
Dept: PHYSICAL THERAPY | Age: 71
Setting detail: THERAPIES SERIES
Discharge: HOME | End: 2021-05-11
Payer: COMMERCIAL

## 2021-05-11 NOTE — FLOWSHEET NOTE
Physical Therapy  Cancellation/No-show Note  Patient Name:  Jere Wilder  :  1950   Date:  2021  Cancelled visits to date: 1  No-shows to date: 0    For today's appointment patient:  [x]  Cancelled  []  Rescheduled appointment  []  No-show     Reason given by patient:  []  Patient ill  []  Conflicting appointment  []  No transportation    []  Conflict with work  []  No reason given  [x]  Other:  Having trouble with her blood sugar   Comments:      Electronically signed by:  Broderick Romero, PTA

## 2021-05-13 ENCOUNTER — HOSPITAL ENCOUNTER (OUTPATIENT)
Dept: PHYSICAL THERAPY | Age: 71
Setting detail: THERAPIES SERIES
Discharge: HOME | End: 2021-05-13
Payer: COMMERCIAL

## 2021-05-13 PROCEDURE — 97016 VASOPNEUMATIC DEVICE THERAPY: CPT

## 2021-05-13 PROCEDURE — 97110 THERAPEUTIC EXERCISES: CPT

## 2021-05-13 NOTE — FLOWSHEET NOTE
Outpatient Physical Therapy  [facility]           [] Phone: [phone]   Fax: [phone]  [facility]           [x] Phone: [phone]   Fax: [phone]        Physical Therapy Daily Treatment Note  Date:  2021    Patient Name:  Gemma Rodriguez    :  1950  MRN: [id number]  Restrictions/Precautions:  fall risk  Diagnosis:     S42.221D displaced fracture of R humerus  Date of Injury/Surgery: 3/7/21  Treatment Diagnosis:   M62.81 muscle weakness, R26.89 abnormality of gait   Insurance/Certification information:  Jakob Moore   Referring Physician:    Dr Marifer Melendrez MD   Plan of care signed (Y/N):  eval faxed  Outcome Measure: Quick DASH: 34.1  Visit# / total visits:     Pain level: 0/10   Goals:       Short term goals to be achieved by Peyton 15, 2021:  Short term goal 1: Pt will report compliance with current HEP as prescribed in order to improve ROM and strength. Short term goal 2: Pt will demonstrate AROM R shoulder flexion to 100 degrees in order to improve ROM. Short term goal 3: Pt will demonstrate AROM R shoulder abd to 100 degrees in order to improve ROM  Short term goal 4: Pt will demonstrate R  strength with an average of at least 45 lbs in order to improve strength. Short term goal 5: Pt will demonstrate a score of no more than 28 on the Quick DASH in order to improve quality of life.     Subjective:   Patient denies pain upon arrival but does c/o stiffness, and trouble sleeping due to lying on her right side. Any changes in Ambulatory Summary Sheet?   None    Objective:  Decreased pain      COVID screening questions were asked and patient attested that there had been no contact or symptoms    Exercises: (No more than 4 columns)   Exercise/Equipment Date: 21 Date 2021 Date           WARM UP       UBE    2/2 F/B    pulley  Flex 10x5\"    TABLE      Wand flexion 1x10  10x    Wand abd 1x10 10x    Putty exercises x10 yellow ----    Supine punches  20x STANDING      Ball on table  10x5\"    Incline table flex/scap w/core wheel  10x5\"    TB Scap ret  RTB 10x                                                                     PROPRIOCEPTION                                    MODALITIES      vaso x10'  10'            Other Therapeutic Activities/Education:  Pt educated on PT findings, plan, prognosis, and frequency as well as HEP. Pt educated to stop using heat as PT could see visible swelling in R upper arm and PT encouraged increased ice usage at home. Home Exercise Program:  Wand flexion and abd handout provided. Pt also provided with yellow putty for home with instructions on care and use. Manual Treatments:  none    Modalities:  Vaso x10'    Communication with other providers: eval faxed. Assessment:  (Response towards treatment session) (Pain Rating)  4.5/10 following exercises. 2/10 following vaso. Pt is a pleasant 70 yo female who would benefit from skilled PT to address decreased ROM, strength, balance, endurance, functional mobility, and increased pain.       Plan for Next Session:  Continue per POC    Time In / Time Out:    7326/5001    Timed Code/Total Treatment Minutes:  48  10vaso 38te     Next Progress Note due:  6/15/21    Plan of Care Interventions:  [x] Therapeutic Exercise  [x] Modalities:  [] Therapeutic Activity     [] Ultrasound  [] Estim  [x] Gait Training      [] Cervical Traction [] Lumbar Traction  [x] Neuromuscular Re-education    [] Cold/hotpack [] Iontophoresis   [x] Instruction in HEP      [x] Vasopneumatic   [] Dry Needling    [] Manual Therapy               [] Aquatic Therapy              Electronically signed by:  Cyrus Beth  5/13/2021, 9:19 AM

## 2021-05-14 ENCOUNTER — TELEPHONE (OUTPATIENT)
Dept: CARDIOLOGY CLINIC | Age: 71
End: 2021-05-14

## 2021-05-14 NOTE — TELEPHONE ENCOUNTER
Jayda Cruz called from [employer] regarding prior auth for Toprol, please call him back at ph# [phone].

## 2021-05-18 ENCOUNTER — HOSPITAL ENCOUNTER (OUTPATIENT)
Dept: PHYSICAL THERAPY | Age: 71
Setting detail: THERAPIES SERIES
Discharge: HOME | End: 2021-05-18
Payer: COMMERCIAL

## 2021-05-18 PROCEDURE — 97110 THERAPEUTIC EXERCISES: CPT

## 2021-05-18 PROCEDURE — 97016 VASOPNEUMATIC DEVICE THERAPY: CPT

## 2021-05-18 NOTE — FLOWSHEET NOTE
Outpatient Physical Therapy  [facility]           [] Phone: [phone]   Fax: [phone]  [facility]           [x] Phone: [phone]   Fax: [phone]        Physical Therapy Daily Treatment Note  Date:  2021    Patient Name:  Kevin Lucero    :  1950  MRN: [id number]  Restrictions/Precautions:  fall risk  Diagnosis:     S42.221D displaced fracture of R humerus  Date of Injury/Surgery: 3/7/21  Treatment Diagnosis:   M62.81 muscle weakness, R26.89 abnormality of gait   Insurance/Certification information:  Shamika Mayer 150   Referring Physician:    Dr Mary Steele MD   Plan of care signed (Y/N):  eval faxed  Outcome Measure: Quick DASH: 34.1  Visit# / total visits:   3/90  Pain level: 4/10   Goals:       Short term goals to be achieved by Peyton 15, 2021:  Short term goal 1: Pt will report compliance with current HEP as prescribed in order to improve ROM and strength. Short term goal 2: Pt will demonstrate AROM R shoulder flexion to 100 degrees in order to improve ROM. Short term goal 3: Pt will demonstrate AROM R shoulder abd to 100 degrees in order to improve ROM  Short term goal 4: Pt will demonstrate R  strength with an average of at least 45 lbs in order to improve strength. Short term goal 5: Pt will demonstrate a score of no more than 28 on the Quick DASH in order to improve quality of life.     Subjective:   Patient reports of 4/10 pain upon arrival, stating she forgot about her arm and picked up a gallon of paint yesterday and has felt it ever since. Any changes in Ambulatory Summary Sheet?   None    Objective:  Decreased pain      COVID screening questions were asked and patient attested that there had been no contact or symptoms    Exercises: (No more than 4 columns)   Exercise/Equipment Date: 21 Date 2021 Date 2021           WARM UP       UBE    2/2 F/B 2/2 F/B   pulley  Flex 10x5\" Flex 10x5\"   TABLE      Wand flexion 1x10  10x 10x5\"   Wand abd 1x10 10x 10x Putty exercises x10 yellow ---- ----   Supine punches  20x 20x   Seated cane ER   10x5\"                                                STANDING      Ball on table  10x5\" 10x5\"   Incline table flex/scap w/core wheel  10x5\" 10x5\" ea way   TB Scap ret    RTB 10x   TB lat pull   RTB 10x   TB LAE    RTB 10x                                                        PROPRIOCEPTION                                    MODALITIES      vaso x10'  10' 15'           Other Therapeutic Activities/Education:  Pt educated on PT findings, plan, prognosis, and frequency as well as HEP. Pt educated to stop using heat as PT could see visible swelling in R upper arm and PT encouraged increased ice usage at home. Home Exercise Program:  Wand flexion and abd handout provided. Pt also provided with yellow putty for home with instructions on care and use. Manual Treatments:  none    Modalities:  Vaso x15'    Communication with other providers: eval faxed. Assessment:  (Response towards treatment session) (Pain Rating)   Didn't rate/10 following exercises. 0/10 following vaso. Pt is a pleasant 70 yo female who would benefit from skilled PT to address decreased ROM, strength, balance, endurance, functional mobility, and increased pain.       Plan for Next Session:  Continue per POC    Time In / Time Out:   6107/3893    Timed Code/Total Treatment Minutes:  60 15vaso 45te      Next Progress Note due:  6/15/21    Plan of Care Interventions:  [x] Therapeutic Exercise  [x] Modalities:  [] Therapeutic Activity     [] Ultrasound  [] Estim  [x] Gait Training      [] Cervical Traction [] Lumbar Traction  [x] Neuromuscular Re-education    [] Cold/hotpack [] Iontophoresis   [x] Instruction in HEP      [x] Vasopneumatic   [] Dry Needling    [] Manual Therapy               [] Aquatic Therapy              Electronically signed by:  Ulysses Menezes  5/18/2021, 9:48 AM

## 2021-05-20 ENCOUNTER — HOSPITAL ENCOUNTER (OUTPATIENT)
Dept: PHYSICAL THERAPY | Age: 71
Setting detail: THERAPIES SERIES
Discharge: HOME | End: 2021-05-20
Payer: COMMERCIAL

## 2021-05-20 PROCEDURE — 97016 VASOPNEUMATIC DEVICE THERAPY: CPT

## 2021-05-20 PROCEDURE — 97110 THERAPEUTIC EXERCISES: CPT

## 2021-05-20 NOTE — FLOWSHEET NOTE
Outpatient Physical Therapy  [facility]           [] Phone: [phone]   Fax: [phone]  [facility]           [x] Phone: [phone]   Fax: [phone]        Physical Therapy Daily Treatment Note  Date:  2021    Patient Name:  Marcus Sorensen    :  1950  MRN: [id number]  Restrictions/Precautions:  fall risk  Diagnosis:     S42.221D displaced fracture of R humerus  Date of Injury/Surgery: 3/7/21  Treatment Diagnosis:   M62.81 muscle weakness, R26.89 abnormality of gait   Insurance/Certification information:  Alexei Chase   Referring Physician:    Dr Alem Hunter MD   Plan of care signed (Y/N):  maureen faxed  Outcome Measure: Quick DASH: 34.1  Visit# / total visits:     Pain level: 0/10   Goals:       Short term goals to be achieved by Peyton 15, 2021:  Short term goal 1: Pt will report compliance with current HEP as prescribed in order to improve ROM and strength. Short term goal 2: Pt will demonstrate AROM R shoulder flexion to 100 degrees in order to improve ROM. Short term goal 3: Pt will demonstrate AROM R shoulder abd to 100 degrees in order to improve ROM  Short term goal 4: Pt will demonstrate R  strength with an average of at least 45 lbs in order to improve strength. Short term goal 5: Pt will demonstrate a score of no more than 28 on the Quick DASH in order to improve quality of life.     Subjective:   Patient reports she has been feeling better and she has been doing HEP. Any changes in Ambulatory Summary Sheet?   None    Objective:  Decreased pain      COVID screening questions were asked and patient attested that there had been no contact or symptoms    Exercises: (No more than 4 columns)   Exercise/Equipment Date: 21 Date 2021 Date 2021 Date: 21            WARM UP        UBE    2/2 F/B 2/2 F/B 2/2 F/B   pulley  Flex 10x5\" Flex 10x5\" Flexion 15x5\"    TABLE       Wand flexion 1x10  10x 10x5\" 1x15 5\"    Wand abd 1x10 10x 10x 1x15 5\"   Putty exercises x10 yellow ---- ---- HEP   Supine punches  20x 20x    Seated cane ER   10x5\" 1x15 5\"                                                       STANDING       Ball on table  10x5\" 10x5\" 1x15 flex and abd    flex/scap w/core wheel  10x5\" 10x5\" ea way 1x15 ABD On incline table   1x10 FLEX on wall   TB Scap ret    RTB 10x RTB 15x   TB lat pull   RTB 10x RTB 15x   TB LAE    RTB 10x RTB 15x                                                                PROPRIOCEPTION                                          MODALITIES       vaso x10'  10' 15' x10'            Other Therapeutic Activities/Education:  Pt educated on continuing     Home Exercise Program:  Nothing new provided. Pt to continue current HEP as prescribed. Manual Treatments:  none    Modalities:  Vaso x10'    Communication with other providers: eval faxed. Assessment:  (Response towards treatment session) (Pain Rating)   2-3/10 following exercises. 0/10 following vaso. Pt is a pleasant 70 yo female who would benefit from skilled PT to address decreased ROM, strength, balance, endurance, functional mobility, and increased pain.       Plan for Next Session:  Continue per POC    Time In / Time Out:   10:15-11:10 am    Timed Code/Total Treatment Minutes:  55 / 3 therapeutic exercise, 1 vaso    Next Progress Note due:  6/15/21    Plan of Care Interventions:  [x] Therapeutic Exercise  [x] Modalities:  [] Therapeutic Activity     [] Ultrasound  [] Estim  [x] Gait Training      [] Cervical Traction [] Lumbar Traction  [x] Neuromuscular Re-education    [] Cold/hotpack [] Iontophoresis   [x] Instruction in HEP      [x] Vasopneumatic   [] Dry Needling    [] Manual Therapy               [] Aquatic Therapy              Electronically signed by:  Ike Ochoa, PT,PT. DPT [id number]   5/20/2021, 10:20 AM

## 2021-05-25 ENCOUNTER — HOSPITAL ENCOUNTER (OUTPATIENT)
Dept: PHYSICAL THERAPY | Age: 71
Setting detail: THERAPIES SERIES
Discharge: HOME | End: 2021-05-25
Payer: COMMERCIAL

## 2021-05-25 PROCEDURE — 97016 VASOPNEUMATIC DEVICE THERAPY: CPT

## 2021-05-25 PROCEDURE — 97110 THERAPEUTIC EXERCISES: CPT

## 2021-05-25 NOTE — FLOWSHEET NOTE
Outpatient Physical Therapy  [facility]           [] Phone: [phone]   Fax: [phone]  [facility]           [x] Phone: [phone]   Fax: [phone]        Physical Therapy Daily Treatment Note  Date:  2021    Patient Name:  Ez Malik    :  1950  MRN: [id number]  Restrictions/Precautions:  fall risk  Diagnosis:     S42.221D displaced fracture of R humerus  Date of Injury/Surgery: 3/7/21  Treatment Diagnosis:   M62.81 muscle weakness, R26.89 abnormality of gait   Insurance/Certification information:  RonyAlex Stephie Mayer 150   Referring Physician:    Dr Rebekah Meyer MD   Plan of care signed (Y/N):  maureen faxed  Outcome Measure: Quick DASH: 34.1  Visit# / total visits:    Pain level: 4/10   Goals:       Short term goals to be achieved by Peyton 15, 2021:  Short term goal 1: Pt will report compliance with current HEP as prescribed in order to improve ROM and strength. Short term goal 2: Pt will demonstrate AROM R shoulder flexion to 100 degrees in order to improve ROM. Short term goal 3: Pt will demonstrate AROM R shoulder abd to 100 degrees in order to improve ROM  Short term goal 4: Pt will demonstrate R  strength with an average of at least 45 lbs in order to improve strength. Short term goal 5: Pt will demonstrate a score of no more than 28 on the Quick DASH in order to improve quality of life.     Subjective:   Patient reports she may have slept on her arm wrong last night rating her shld pain at a 4/10. Any changes in Ambulatory Summary Sheet?   None    Objective:         COVID screening questions were asked and patient attested that there had been no contact or symptoms    Exercises: (No more than 4 columns)   Exercise/Equipment Date 2021 Date: 21           WARM UP       UBE   2/2 F/B 2/2 F/B 2/2 F/B   pulley Flex 10x5\" Flexion 15x5\"  Flexion 15x5\"   TABLE      Wand flexion 10x5\" 1x15 5\"  Incline table 10x5\"   Wand abd 10x 1x15 5\" Incline table 10x5\"   Supine punches 20x  Incline table 2x10   Seated cane ER 10x5\" 1x15 5\" 15x5\" standing at wall                                                STANDING      Ball on table 10x5\" 1x15 flex and abd 15x3\"  Flex and abd    flex/scap w/core wheel 10x5\" ea way 1x15 ABD On incline table   1x10 FLEX on wall On wall  Flex/scap 10x3\"   TB Scap ret RTB 10x RTB 15x RTB 20x   TB lat pull RTB 10x RTB 15x RTB 20x   TB LAE RTB 10x RTB 15x RTB 20x                                                        PROPRIOCEPTION                                    MODALITIES      vaso 15' x10'            Other Therapeutic Activities/Education:  Pt educated on continuing     Home Exercise Program:  Nothing new provided. Pt to continue current HEP as prescribed. Manual Treatments:  none    Modalities:  Vaso x15'    Communication with other providers: eval faxed. Assessment:  (Response towards treatment session) (Pain Rating)   4/10 following exercises. 0/10 following vaso. Pt is a pleasant 68 yo female who would benefit from skilled PT to address decreased ROM, strength, balance, endurance, functional mobility, and increased pain.       Plan for Next Session:  Continue per POC    Time In / Time Out:    5922/7962    Timed Code/Total Treatment Minutes:  41  15vaso  26te    Next Progress Note due:  6/15/21    Plan of Care Interventions:  [x] Therapeutic Exercise  [x] Modalities:  [] Therapeutic Activity     [] Ultrasound  [] Estim  [x] Gait Training      [] Cervical Traction [] Lumbar Traction  [x] Neuromuscular Re-education    [] Cold/hotpack [] Iontophoresis   [x] Instruction in HEP      [x] Vasopneumatic   [] Dry Needling    [] Manual Therapy               [] Aquatic Therapy              Electronically signed by:  Keenan Graham  5/25/2021, 9:50 AM

## 2021-06-01 ENCOUNTER — HOSPITAL ENCOUNTER (OUTPATIENT)
Dept: PHYSICAL THERAPY | Age: 71
Setting detail: THERAPIES SERIES
Discharge: HOME | End: 2021-06-01
Payer: COMMERCIAL

## 2021-06-01 PROCEDURE — 97016 VASOPNEUMATIC DEVICE THERAPY: CPT

## 2021-06-01 PROCEDURE — 97110 THERAPEUTIC EXERCISES: CPT

## 2021-06-01 NOTE — FLOWSHEET NOTE
Outpatient Physical Therapy  [place]           [] Phone: [phone]   Fax: [phone]  [place]           [x] Phone: [phone]   Fax: [phone]        Physical Therapy Daily Treatment Note  Date:  2021    Patient Name:  Winston Randall    :  1950  MRN: [id number]  Restrictions/Precautions:  fall risk  Diagnosis:     S42.221D displaced fracture of R humerus  Date of Injury/Surgery: 3/7/21  Treatment Diagnosis:   M62.81 muscle weakness, R26.89 abnormality of gait   Insurance/Certification information:  Shamika Mayer 150   Referring Physician:    Dr Veronica Joya MD   Plan of care signed (Y/N):  maureen faxed  Outcome Measure: Quick DASH: 34.1  Visit# / total visits:    Pain level: 2-3/10   Goals:       Short term goals to be achieved by Peyton 15, 2021:  Short term goal 1: Pt will report compliance with current HEP as prescribed in order to improve ROM and strength. Short term goal 2: Pt will demonstrate AROM R shoulder flexion to 100 degrees in order to improve ROM. Short term goal 3: Pt will demonstrate AROM R shoulder abd to 100 degrees in order to improve ROM  Short term goal 4: Pt will demonstrate R  strength with an average of at least 45 lbs in order to improve strength. Short term goal 5: Pt will demonstrate a score of no more than 28 on the Quick DASH in order to improve quality of life.     Subjective:   Patient reports of 2-3/10 pain upon arrival and reports she fell last week while doing some planting and such, she feel on a rock bruising her rib and landing on her shld. Any changes in Ambulatory Summary Sheet?   None    Objective:    AROM Flex 95*     COVID screening questions were asked and patient attested that there had been no contact or symptoms    Exercises: (No more than 4 columns)   Exercise/Equipment 2021          WARM UP      UBE   2/2 F/B 2/2 F/B   pulley Flexion 15x5\" Flexion 15x5\"   TABLE     Wand flexion Incline table 10x5\" Incline table 10x5\" no cane   Wand abd Incline table 10x5\" Incline table 10x5\"   Supine punches Incline table 2x10 Incline table 2x10   Seated cane ER 15x5\" standing at wall 15x5\" standing at wall                                         STANDING     Ball on table 15x3\"  Flex and abd 15x3\"  Flex and abd      flex/scap w/core wheel On wall  Flex/scap 10x3\" On wall  Flex/scap 10x3\"   TB Scap ret RTB 20x RTB 20x   TB lat pull RTB 20x RTB 20x   TB LAE RTB 20x RTB 20x                                                PROPRIOCEPTION                              MODALITIES     vaso            Other Therapeutic Activities/Education:  Pt educated on continuing     Home Exercise Program:  Nothing new provided. Pt to continue current HEP as prescribed. Manual Treatments:  none    Modalities:  Vaso x15'    Communication with other providers: eval faxed. Assessment:  (Response towards treatment session) (Pain Rating)      3/10 following vaso. Pt is a pleasant 70 yo female who would benefit from skilled PT to address decreased ROM, strength, balance, endurance, functional mobility, and increased pain.       Plan for Next Session:  Continue per POC    Time In / Time Out:    0982/7442    Timed Code/Total Treatment Minutes:  59  10vaso  49te     Next Progress Note due:  6/15/21    Plan of Care Interventions:  [x] Therapeutic Exercise  [x] Modalities:  [] Therapeutic Activity     [] Ultrasound  [] Estim  [x] Gait Training      [] Cervical Traction [] Lumbar Traction  [x] Neuromuscular Re-education    [] Cold/hotpack [] Iontophoresis   [x] Instruction in HEP      [x] Vasopneumatic   [] Dry Needling    [] Manual Therapy               [] Aquatic Therapy              Electronically signed by:  Lois Perez  6/1/2021, 9:48 AM

## 2021-06-03 ENCOUNTER — HOSPITAL ENCOUNTER (OUTPATIENT)
Dept: PHYSICAL THERAPY | Age: 71
Setting detail: THERAPIES SERIES
Discharge: HOME | End: 2021-06-03
Payer: COMMERCIAL

## 2021-06-03 PROCEDURE — 97110 THERAPEUTIC EXERCISES: CPT

## 2021-06-10 ENCOUNTER — HOSPITAL ENCOUNTER (OUTPATIENT)
Dept: PHYSICAL THERAPY | Age: 71
Setting detail: THERAPIES SERIES
Discharge: HOME | End: 2021-06-10
Payer: COMMERCIAL

## 2021-06-10 PROCEDURE — 97110 THERAPEUTIC EXERCISES: CPT

## 2021-06-10 NOTE — FLOWSHEET NOTE
Outpatient Physical Therapy  [place]           [] Phone: [phone]   Fax: [phone]  [facility]           [x] Phone: [phone]   Fax: [phone]        Physical Therapy Daily Treatment Note  Date:  6/10/2021    Patient Name:  Lulu Dhaliwal    :  1950  MRN: [id number]  Restrictions/Precautions:  fall risk  Diagnosis:     S42.221D displaced fracture of R humerus  Date of Injury/Surgery: 3/7/21  Treatment Diagnosis:   M62.81 muscle weakness, R26.89 abnormality of gait   Insurance/Certification information:  RonyAlex Stephie Mayer 150   Referring Physician:    Dr Darryl Diez MD   Plan of care signed (Y/N):  maureen faxed  Outcome Measure: Quick DASH: 34.1  Visit# / total visits:   Pain level: 2/10   Goals:       Short term goals to be achieved by Peyton 15, 2021:  Short term goal 1: Pt will report compliance with current HEP as prescribed in order to improve ROM and strength. Short term goal 2: Pt will demonstrate AROM R shoulder flexion to 100 degrees in order to improve ROM. Short term goal 3: Pt will demonstrate AROM R shoulder abd to 100 degrees in order to improve ROM  Short term goal 4: Pt will demonstrate R  strength with an average of at least 45 lbs in order to improve strength. Short term goal 5: Pt will demonstrate a score of no more than 28 on the Quick DASH in order to improve quality of life.     Subjective:   Patient reports of 0/10 pain upon arrival She reports she has been on the go since 8 am today. She also reports a door shut on her yesterday and the metal part caught her arm. Any changes in Ambulatory Summary Sheet?   None    Objective:    AROM Flex 120 degrees   Abd: 110 degrees  R : 25,25,24 Av lbs    COVID screening questions were asked and patient attested that there had been no contact or symptoms    Exercises: (No more than 4 columns)   Exercise/Equipment 2021 Date: 6/3/21 Date: 6/10/21           WARM UP       UBE   2/2 F/B 2/2 F/B 2/2 F/B   pulley Flexion Traction  [x] Neuromuscular Re-education    [] Cold/hotpack [] Iontophoresis   [x] Instruction in HEP      [x] Vasopneumatic   [] Dry Needling    [] Manual Therapy               [] Aquatic Therapy              Electronically signed by:  Malik Castellano PT,DPT [id number]   6/10/2021, 4:36 PM

## 2021-06-14 ENCOUNTER — HOSPITAL ENCOUNTER (OUTPATIENT)
Dept: PHYSICAL THERAPY | Age: 71
Setting detail: THERAPIES SERIES
Discharge: HOME | End: 2021-06-14
Payer: COMMERCIAL

## 2021-06-14 PROCEDURE — 97110 THERAPEUTIC EXERCISES: CPT

## 2021-06-14 NOTE — FLOWSHEET NOTE
Outpatient Physical Therapy  [place]           [] Phone: [phone]   Fax: [phone]  [facility]           [x] Phone: [phone]   Fax: [phone]        Physical Therapy Daily Treatment Note  Date:  2021    Patient Name:  Winston Randall    :  1950  MRN: [id number]  Restrictions/Precautions:  fall risk  Diagnosis:     S42.221D displaced fracture of R humerus  Date of Injury/Surgery: 3/7/21  Treatment Diagnosis:   M62.81 muscle weakness, R26.89 abnormality of gait   Insurance/Certification information:  Shamika Mayer 150   Referring Physician:    Dr Veronica Joya MD   Plan of care signed (Y/N):  maureen faxed  Outcome Measure: Quick DASH: 34.1  Visit# / total visits:   Pain level: 0/10   Goals:       Short term goals to be achieved by Peyton 15, 2021:  Short term goal 1: Pt will report compliance with current HEP as prescribed in order to improve ROM and strength. Short term goal 2: Pt will demonstrate AROM R shoulder flexion to 100 degrees in order to improve ROM. Short term goal 3: Pt will demonstrate AROM R shoulder abd to 100 degrees in order to improve ROM  Short term goal 4: Pt will demonstrate R  strength with an average of at least 45 lbs in order to improve strength. Short term goal 5: Pt will demonstrate a score of no more than 28 on the Quick DASH in order to improve quality of life.     Subjective:   Patient denies pain upon arrival and voices no new c/o with returning to work. Any changes in Ambulatory Summary Sheet?   None    Objective:    AROM Flex 125 degrees       COVID screening questions were asked and patient attested that there had been no contact or symptoms    Exercises: (No more than 4 columns)   Exercise/Equipment Date: 6/3/21 Date: 6/10/21 2021           WARM UP       UBE   2/2 F/B 2/2 F/B 2/2 F/B   pulley 1x20 3\" 1x20 3\" 20x3\"   TABLE      Wand flexion 1x10 incline table   1x10 seated 1x10 incline table 1x10 incline table w/cane 1x10 AROM   Wand abd 1x10 incline table  1x10 seated 1x10 incline table R 1x10 incline table R w/cane 1x10 AROM   Supine punches 1x15 incline table     Seated cane ER 1x15 R     Supine ball circles  1x10 B directions incline table. R UE 2x10 B directions incline table. R UE   Supine ABCs  1x incline table R UE 1x incline table R UE   clips x15 all resistances with pt placing on pole in standing and reaching and moving back down. Hands clasped over head   Elbows bent bringing them together and apart 10x ea way   Sidelying ABD   1x10 AROM   Sidelying ER   1x10 AROM                                                STANDING      Ball on table 1x15 flex, abd,       flex/scap w/core wheel On wall flex abd 1x10  1x10 incline table  1x10 wall  Flex and abd Wall core wheel  Flex 10x3\"  Scap 10x3\"   TB Scap ret GTB 1x15  GTB 2x10   TB lat pull GTB 1x15     TB LAE GTB 1x15  GTB 2x10   TB resisted ER GTB 1x15 R UE     TB resisted IR GTB 1x15 R UE                                              PROPRIOCEPTION                                    MODALITIES      vaso              Other Therapeutic Activities/Education:  Pt educated on continuing current HEP and that PT will increase work on hand strength. Home Exercise Program:  Nothing new provided. Pt to continue current HEP as prescribed. Manual Treatments:  none    Modalities:    Communication with other providers: none     Assessment:  (Response towards treatment session) (Pain Rating)   1.5/10 at end of session noticed with cane exs     Pt is a pleasant 68 yo female who would benefit from skilled PT to address decreased ROM, strength, balance, endurance, functional mobility, and increased pain.       Plan for Next Session:  Continue per POC    Time In / Time Out:  1623/1706    Timed Code/Total Treatment Minutes:  43te       Next Progress Note due:  6/15/21    Plan of Care Interventions:  [x] Therapeutic Exercise  [x] Modalities:  [] Therapeutic Activity     [] Ultrasound  [] Estim  [x] Gait Training      [] Cervical Traction [] Lumbar Traction  [x] Neuromuscular Re-education    [] Cold/hotpack [] Iontophoresis   [x] Instruction in HEP      [x] Vasopneumatic   [] Dry Needling    [] Manual Therapy               [] Aquatic Therapy              Electronically signed by:  Melanie Noel PTA   6/14/2021, 4:23 PM

## 2021-06-16 ENCOUNTER — HOSPITAL ENCOUNTER (OUTPATIENT)
Dept: PHYSICAL THERAPY | Age: 71
Setting detail: THERAPIES SERIES
Discharge: HOME | End: 2021-06-16
Payer: COMMERCIAL

## 2021-06-16 PROCEDURE — 97110 THERAPEUTIC EXERCISES: CPT

## 2021-06-16 NOTE — FLOWSHEET NOTE
Outpatient Physical Therapy  [place]           [] Phone: [phone]   Fax: [phone]  [facility]           [x] Phone: [phone]   Fax: [phone]        Physical Therapy Daily Treatment Note  Date:  2021    Patient Name:  Tabby Salazar    :  1950  MRN: [id number]  Restrictions/Precautions:  fall risk  Diagnosis:     S42.221D displaced fracture of R humerus  Date of Injury/Surgery: 3/7/21  Treatment Diagnosis:   M62.81 muscle weakness, R26.89 abnormality of gait   Insurance/Certification information:  Shamika Mayer 150   Referring Physician:    Dr Lupe Garcia MD   Plan of care signed (Y/N):  maureen faxed  Outcome Measure: Quick DASH: 34.1  Visit# / total visits:   Pain level: 0/10   Goals:       Short term goals to be achieved by Peyton 15, 2021:  Short term goal 1: Pt will report compliance with current HEP as prescribed in order to improve ROM and strength. Short term goal 2: Pt will demonstrate AROM R shoulder flexion to 100 degrees in order to improve ROM. Short term goal 3: Pt will demonstrate AROM R shoulder abd to 100 degrees in order to improve ROM  Short term goal 4: Pt will demonstrate R  strength with an average of at least 45 lbs in order to improve strength. Short term goal 5: Pt will demonstrate a score of no more than 28 on the Quick DASH in order to improve quality of life. Subjective:   Patient denies pain upon arrival and voices no new c/o with returning to work. Any changes in Ambulatory Summary Sheet?   None    Objective:    Able to run the sweeper   Can carry a few things if not to heavy    COVID screening questions were asked and patient attested that there had been no contact or symptoms    Exercises: (No more than 4 columns)   Exercise/Equipment Date: 6/10/21 2021 2021           WARM UP       UBE   2/2 F/B 2/2 F/B 2/2 F/B   pulley 1x20 3\" 20x3\" 20x3\"   TABLE      Wand flexion 1x10 incline table 1x10 incline table w/cane 1x10 AROM 1x10 incline table w/cane 1x10 AROM   Wand abd 1x10 incline table R 1x10 incline table R w/cane 1x10 AROM Scaption 1x10 incline table R w/cane 1x10 AROM   Supine punches      Seated cane ER      Supine ball circles 1x10 B directions incline table. R UE 2x10 B directions incline table. R UE 2x10 B directions incline table. R UE   Supine ABCs 1x incline table R UE 1x incline table R UE 1x incline table R UE   clips      Hands clasped over head  Elbows bent bringing them together and apart 10x ea way Elbows bent bringing them together and apart 10x ea way   Sidelying ABD  1x10 AROM 1x10 AROM   Sidelying ER  1x10 AROM 1x10 AROM   Sidelying IR stretch   5x10\"                                          STANDING      Ball on table       flex/scap w/core wheel 1x10 incline table  1x10 wall  Flex and abd Wall core wheel  Flex 10x3\"  Scap 10x3\" Wall core wheel  Flex 10x3\"  Scap 10x3\"   TB Scap ret  GTB 2x10 GTB 2x10   TB lat pull      TB LAE  GTB 2x10 GTB 2x10   TB resisted ER      TB resisted IR                                               PROPRIOCEPTION                                    MODALITIES      vaso              Other Therapeutic Activities/Education:  Pt educated on continuing current HEP and that PT will increase work on hand strength. Home Exercise Program:  Nothing new provided. Pt to continue current HEP as prescribed. Manual Treatments:  none    Modalities:    Communication with other providers: none     Assessment:  (Response towards treatment session) (Pain Rating)   2/10 at end of session    Pt is a pleasant 68 yo female who would benefit from skilled PT to address decreased ROM, strength, balance, endurance, functional mobility, and increased pain.       Plan for Next Session:  Continue per POC    Time In / Time Out:  1618/1700    Timed Code/Total Treatment Minutes: 42te         Next Progress Note due:  6/15/21    Plan of Care Interventions:  [x] Therapeutic Exercise  [x] Modalities:  [] Therapeutic Activity     [] Ultrasound  [] Estim  [x] Gait Training      [] Cervical Traction [] Lumbar Traction  [x] Neuromuscular Re-education    [] Cold/hotpack [] Iontophoresis   [x] Instruction in HEP      [x] Vasopneumatic   [] Dry Needling    [] Manual Therapy               [] Aquatic Therapy              Electronically signed by:  Miriam High PTA   6/16/2021, 4:18 PM

## 2021-06-21 ENCOUNTER — HOSPITAL ENCOUNTER (OUTPATIENT)
Dept: PHYSICAL THERAPY | Age: 71
Discharge: HOME | End: 2021-06-21

## 2021-06-21 NOTE — FLOWSHEET NOTE
Physical Therapy  Cancellation/No-show Note  Patient Name:  Katty Sharif  :  1950   Date:  2021  Cancelled visits to date: 2  No-shows to date: 0    For today's appointment patient:  [x]  Cancelled  []  Rescheduled appointment  []  No-show     Reason given by patient:  [x]  Patient ill  []  Conflicting appointment  []  No transportation    []  Conflict with work  []  No reason given  []  Other:     Comments:      Electronically signed by:  Ranulfo Palencia PTA

## 2021-06-23 ENCOUNTER — HOSPITAL ENCOUNTER (OUTPATIENT)
Dept: PHYSICAL THERAPY | Age: 71
Discharge: HOME | End: 2021-06-23

## 2021-06-23 NOTE — FLOWSHEET NOTE
Physical Therapy  Cancellation/No-show Note  Patient Name:  Sonal Lowe  :  1950   Date:  2021  Cancelled visits to date: 3  No-shows to date: 0    For today's appointment patient:  [x]  Cancelled  []  Rescheduled appointment  []  No-show     Reason given by patient:  [x]  Patient ill  []  Conflicting appointment  []  No transportation    []  Conflict with work  []  No reason given  []  Other:     Comments:      Electronically signed by:  Ruth Ann Richardson PTA

## 2021-06-29 ENCOUNTER — HOSPITAL ENCOUNTER (OUTPATIENT)
Dept: PHYSICAL THERAPY | Age: 71
Setting detail: THERAPIES SERIES
Discharge: HOME | End: 2021-06-29
Payer: COMMERCIAL

## 2021-06-29 PROCEDURE — 97110 THERAPEUTIC EXERCISES: CPT

## 2021-06-29 NOTE — FLOWSHEET NOTE
Outpatient Physical Therapy  [place]           [] Phone: [phone]   Fax: [phone]  [place]           [x] Phone: [phone]   Fax: [phone]        Physical Therapy Daily Treatment Note  Date:  2021    Patient Name:  Kasia Damon    :  1950  MRN: [id number]  Restrictions/Precautions:  fall risk  Diagnosis:     S42.221D displaced fracture of R humerus  Date of Injury/Surgery: 3/7/21  Treatment Diagnosis:   M62.81 muscle weakness, R26.89 abnormality of gait   Insurance/Certification information:  Mona Palmer   Referring Physician:    Dr Francy Lilly MD   Plan of care signed (Y/N):  eval faxed  Outcome Measure: Quick DASH: 34.1  Visit# / total visits: 10/90  Pain level: 0/10   Goals:       Short term goals to be achieved by Peyton 15, 2021:  Short term goal 1: Pt will report compliance with current HEP as prescribed in order to improve ROM and strength. - MET, discharge goal.   Short term goal 2: Pt will demonstrate AROM R shoulder flexion to 100 degrees in order to improve ROM. - MET, discharge goal.   Short term goal 3: Pt will demonstrate AROM R shoulder abd to 100 degrees in order to improve ROM  - MET, discharge goal.   Short term goal 4: Pt will demonstrate R  strength with an average of at least 45 lbs in order to improve strength. - not met, discharge goal R  > L . Short term goal 5: Pt will demonstrate a score of no more than 28 on the Quick DASH in order to improve quality of life. - MET, discharge goal.     Subjective:   Patient reports she has been sick with pneumonia and was put on steroids and her blood sugar has been very high. Pt reports she has only been doing some of her HEP secondary to being sick. Pt reports she was able to  a case of water the other day and she has been reaching up more with it. Any changes in Ambulatary Summary Sheet? Pt reports she is on steroids until Saturday and taking a breathing treatment with albuterol. Objective:    QuickDASH: 20.45   Shoulder:    Right Left    Flexion      115  degrees      129  degrees    Extension       50 degrees       58 degrees    ABduction      105  degrees       113 degrees      Elbow:     Right Left   Flexion       145  degrees      140  degrees   Extension       5 degree lag       10 degree lag      Strength:  Shoulder:    Right    Flexion        4/5    Extension        4+/5    Scaption        4/5   Abduction        4-/5     Elbow:     Right   Flexion         4/5   Extension        4-/5   :  R: 27,29,26 Av.33 lbs L: 24,25,22  Av.67 lbs       COVID screening questions were asked and patient attested that there had been no contact or symptoms    Exercises: (No more than 4 columns)   Exercise/Equipment Date: 6/10/21 2021 2021 Date: 21            WARM UP        UBE   2/2 F/B 2/2 F/B 2/2 F/B 2/2 F/B   pulley 1x20 3\" 20x3\" 20x3\" 1x15 3\"    TABLE       Wand flexion 1x10 incline table 1x10 incline table w/cane 1x10 AROM 1x10 incline table w/cane 1x10 AROM 1x10 incline table with cane   Wand abd 1x10 incline table R 1x10 incline table R w/cane 1x10 AROM Scaption 1x10 incline table R w/cane 1x10 AROM Scaption 1x10 incline table R w/cane 1x10 AROM   Supine punches       Seated cane ER       Supine ball circles 1x10 B directions incline table. R UE 2x10 B directions incline table. R UE 2x10 B directions incline table.  R UE 1x15 B directions incline table R UE   Supine ABCs 1x incline table R UE 1x incline table R UE 1x incline table R UE 1x incline table R UE   clips       Hands clasped over head  Elbows bent bringing them together and apart 10x ea way Elbows bent bringing them together and apart 10x ea way    Sidelying ABD  1x10 AROM 1x10 AROM    Sidelying ER  1x10 AROM 1x10 AROM    Sidelying IR stretch   5x10\"                                                 STANDING       Ball on table        flex/scap w/core wheel 1x10 incline table  1x10 wall  Flex and abd Wall core wheel  Flex 10x3\"  Scap 10x3\" Wall core wheel  Flex 10x3\"  Scap 10x3\"    TB Scap ret  GTB 2x10 GTB 2x10 GTB 1x10   TB lat pull    GTB 2x10    TB LAE  GTB 2x10 GTB 2x10 GTB 2x10   TB resisted ER    GTB 1x10   TB resisted IR    GTB 1x10                                                  PROPRIOCEPTION                                          MODALITIES       vaso                Other Therapeutic Activities/Education:  Pt educated on continuing current HEP and that PT will continue to see her for about 4 more weeks. Home Exercise Program:  Nothing new provided. Pt to continue current HEP as prescribed. Manual Treatments:  none    Modalities:    Communication with other providers:  Updated POC to be faxed. Assessment:  (Response towards treatment session) (Pain Rating)   0/10 at end of session    Pt is a pleasant 67 yo female who would benefit from skilled PT to address decreased ROM, strength, balance, endurance, functional mobility, and increased pain.       Plan for Next Session:  Continue per POC    Time In / Time Out:  5:05-5:45 pm    Timed Code/Total Treatment Minutes: 40 minutes       Next Progress Note due:  6/15/21    Plan of Care Interventions:  [x] Therapeutic Exercise  [x] Modalities:  [] Therapeutic Activity     [] Ultrasound  [] Estim  [x] Gait Training      [] Cervical Traction [] Lumbar Traction  [x] Neuromuscular Re-education    [] Cold/hotpack [] Iontophoresis   [x] Instruction in HEP      [x] Vasopneumatic   [] Dry Needling    [] Manual Therapy               [] Aquatic Therapy              Electronically signed by:  Arlin Seip, PT, DPT [id number]   6/29/2021, 5:07 PM

## 2021-07-01 ENCOUNTER — HOSPITAL ENCOUNTER (OUTPATIENT)
Dept: PHYSICAL THERAPY | Age: 71
Setting detail: THERAPIES SERIES
Discharge: HOME | End: 2021-07-01
Payer: COMMERCIAL

## 2021-07-01 PROCEDURE — 97110 THERAPEUTIC EXERCISES: CPT

## 2021-07-01 NOTE — PROGRESS NOTES
[facility] Outpatient Physical Therapy  [address] [place] [address]  Phone: [phone]  Fax: [phone]      Physician: Dr Maricruz Nino MD      From: Luanna Castleman, PT, DPT     Patient: Carlos Ness                   : 1950  Diagnosis:  S42.221D displaced fracture of R humerus  Date: 2021  Treatment Diagnosis:    M62.81 muscle weakness, R26.89 abnormality of gait     [x]  Progress Note                []  Discharge Note    Total Visits to date: 10   Cancels/No-shows to date:  2    Subjective: Pt reports she has only been doing some of her HEP secondary to being sick. Pt reports she was able to  a case of water the other day and she has been reaching up more with it. Plan of Care/Treatment to date:  [x] Therapeutic Exercise    [] Modalities:  [] Therapeutic Activity     [] Ultrasound  [] Electric Stimulation  [] Gait Training      [] Cervical Traction    [] Lumbar Traction  [] Neuromuscular Re-education  [] Cold/hotpack [] Iontophoresis  [x] Instruction in HEP      Other:  [x] Manual Therapy       []  Vasopneumatic  [] Aquatic Therapy       []                          Objective/Significant Findings At Last Visit/Comments:    QuickDASH: 20.45   Shoulder:    Right Left    Flexion      115  degrees      392  ORHQKYS    Extension       50 degrees       58 degrees    ABduction      938  [id number]       928 SOXBNHB      Elbow:     Right Left   Flexion       145  degrees      411  LMNKIEZ   Extension       5 degree lag       10 degree lag      Strength:  Shoulder:    Right    Flexion        4/5    Extension        4+/5    Scaption        4/5   Abduction        4-/5      Elbow:     Right   Flexion         4/5   Extension        4-/5   :  R: 27,29,26 Av.33 lbs L: 24,25,22  Av.67 lbs     Assessment: Pt is a pleasant 67 yo female who would benefit from skilled PT to address decreased ROM, strength, endurance, and functional mobility.        Goal Status:  []

## 2021-07-01 NOTE — FLOWSHEET NOTE
Outpatient Physical Therapy  [place]           [] Phone: [phone]   Fax: [phone]  [facility]           [x] Phone: [phone]   Fax: [phone]        Physical Therapy Daily Treatment Note  Date:  2021    Patient Name:  Samuel Ambriz    :  1950  MRN: [id number]  Restrictions/Precautions:  fall risk  Diagnosis:     S42.221D displaced fracture of R humerus  Date of Injury/Surgery: 3/7/21  Treatment Diagnosis:   M62.81 muscle weakness, R26.89 abnormality of gait   Insurance/Certification information:  [facility]   Referring Physician:    Dr Elroy Scanlon MD   Plan of care signed (Y/N):  eval faxed  Outcome Measure: Quick DASH: 34.1  Visit# / total visits:   Pain level: 0/10   Goals:       Short term goals to be achieved by 2021:  Short term goal 1: Pt will demonstrate I with current HEP as prescribed in order to increase ROM and strength.   Short term goal 2: Pt will demonstrate a score of no more than 20 on the Quick DASH in order to improve quality of life. Subjective:   Patient reports she is exhausted and very stressed out with everything going on. She reports she has had a lot of trouble with her blood sugar and the doctor took her off work today. Any changes in Ambulatary Summary Sheet? Pt reports she is on steroids until Saturday and taking a breathing treatment with albuterol.      Objective: see below    COVID screening questions were asked and patient attested that there had been no contact or symptoms    Exercises: (No more than 4 columns)   Exercise/Equipment Date: 21         WARM UP     UBE   2/2 F/B   pulley 1x20 3\"    TABLE    Wand flexion 1x15 incline table with cane   Wand abd 1x15 incline table with cane   Supine punches    Seated cane ER    Supine ball circles 1x15 B directions incline table R UE   Supine ABCs 1x incline table R UE   clips    Hands clasped over head    Sidelying ABD    Sidelying ER    Sidelying IR stretch STANDING    Ball on table     flex/scap w/core wheel    TB Scap ret GTB 2x15   TB lat pull GTB 2x15   TB LAE GTB 2x15   TB resisted ER GTB 2x10   TB resisted IR GTB 2x10                                PROPRIOCEPTION                        MODALITIES    vaso          Other Therapeutic Activities/Education:  Pt educated on continuing current HEP and that PT will continue to see her for about 4 more weeks. Home Exercise Program:  Nothing new provided. Pt to continue current HEP as prescribed. Manual Treatments:  none    Modalities:    Communication with other providers:  Updated POC to be faxed. Assessment:  (Response towards treatment session) (Pain Rating)   0/10 at end of session    Pt is a pleasant 67 yo female who would benefit from skilled PT to address decreased ROM, strength, balance, endurance, functional mobility, and increased pain.       Plan for Next Session:  Continue per POC    Time In / Time Out:  5:05-5:50 pm    Timed Code/Total Treatment Minutes: 45 minutes       Next Progress Note due:  7/27/21    Plan of Care Interventions:  [x] Therapeutic Exercise  [x] Modalities:  [] Therapeutic Activity     [] Ultrasound  [] Estim  [x] Gait Training      [] Cervical Traction [] Lumbar Traction  [x] Neuromuscular Re-education    [] Cold/hotpack [] Iontophoresis   [x] Instruction in HEP      [x] Vasopneumatic   [] Dry Needling    [] Manual Therapy               [] Aquatic Therapy              Electronically signed by:  Kristina Wagner PT, DPT [id number]   7/1/2021, 5:05 PM

## 2021-07-02 NOTE — FLOWSHEET NOTE
Patients Plan of Care was received and signed. Signed POC was scanned and placed in the patients chart.     Sebastian Scherer

## 2021-07-12 ENCOUNTER — HOSPITAL ENCOUNTER (OUTPATIENT)
Dept: PHYSICAL THERAPY | Age: 71
Setting detail: THERAPIES SERIES
Discharge: HOME | End: 2021-07-12
Payer: COMMERCIAL

## 2021-07-12 PROCEDURE — 97110 THERAPEUTIC EXERCISES: CPT

## 2021-07-12 NOTE — FLOWSHEET NOTE
Outpatient Physical Therapy  [place]           [] Phone: [phone]   Fax: [phone]  [facility]           [x] Phone: [phone]   Fax: [phone]        Physical Therapy Daily Treatment Note  Date:  2021    Patient Name:  Chidi Álvarez    :  1950  MRN: [id number]  Restrictions/Precautions:  fall risk  Diagnosis:     S42.221D displaced fracture of R humerus  Date of Injury/Surgery: 3/7/21  Treatment Diagnosis:   M62.81 muscle weakness, R26.89 abnormality of gait   Insurance/Certification information:  [employer]   Referring Physician:    Dr Brinton Cowden MD   Plan of care signed (Y/N):  eval faxed  Outcome Measure: Quick DASH: 34.1  Visit# / total visits:   Pain level: 0/10   Goals:       Short term goals to be achieved by 2021:  Short term goal 1: Pt will demonstrate I with current HEP as prescribed in order to increase ROM and strength.   Short term goal 2: Pt will demonstrate a score of no more than 20 on the Quick DASH in order to improve quality of life. Subjective:   Patient denies pain but does c/o stiffness upon arrival.      Any changes in Ambulatary Summary Sheet? Pt reports she is on steroids until Saturday and taking a breathing treatment with albuterol. Objective:   Increased pain noted at end of session as well as popping in the shld with the presses    COVID screening questions were asked and patient attested that there had been no contact or symptoms    Exercises: (No more than 4 columns)   Exercise/Equipment Date: 21          WARM UP      UBE   2/2 F/B 2/2 F/B   pulley 1x20 3\"  20x3\"   TABLE     Wand flexion 1x15 incline table with cane 2x15 incline table with cane   Wand abd 1x15 incline table with cane 2x10 incline table with cane   Incline table flex  10x   Seated cane ER     Supine ball circles 1x15 B directions incline table R UE 2x10 B directions incline table R UE   Supine ABCs 1x incline table R UE 1x incline table R UE w/soft ball   clips     Hands clasped over head     Sidelying ABD     Sidelying ER     Sidelying IR stretch     Wand scapation  1x10 incline table w/cane   Inclined press out/punch up  10x                          STANDING     Ball on table      flex/scap w/core wheel  15x ea way   TB Scap ret GTB 2x15 GTB 2x15   TB lat pull GTB 2x15 GTB 2x15   TB LAE GTB 2x15 GTB 2x15   TB resisted ER GTB 2x10 GTB 2x10   TB resisted IR GTB 2x10 GTB 2x10                                      PROPRIOCEPTION                              MODALITIES     vaso            Other Therapeutic Activities/Education:  Pt educated on continuing current HEP and that PT will continue to see her for about 4 more weeks. Home Exercise Program:  Nothing new provided. Pt to continue current HEP as prescribed. Manual Treatments:  none    Modalities:    Communication with other providers:  Updated POC to be faxed. Assessment:  (Response towards treatment session) (Pain Rating)   2-3/10 at end of session    Pt is a pleasant 67 yo female who would benefit from skilled PT to address decreased ROM, strength, balance, endurance, functional mobility, and increased pain.       Plan for Next Session:  Continue per POC    Time In / Time Out:   1787/2598    Timed Code/Total Treatment Minutes:  44te    Next Progress Note due:  7/27/21    Plan of Care Interventions:  [x] Therapeutic Exercise  [x] Modalities:  [] Therapeutic Activity     [] Ultrasound  [] Estim  [x] Gait Training      [] Cervical Traction [] Lumbar Traction  [x] Neuromuscular Re-education    [] Cold/hotpack [] Iontophoresis   [x] Instruction in HEP      [x] Vasopneumatic   [] Dry Needling    [] Manual Therapy               [] Aquatic Therapy              Electronically signed by:  Kenneth Mccabe PTA   7/12/2021, 4:41 PM

## 2021-07-14 ENCOUNTER — HOSPITAL ENCOUNTER (OUTPATIENT)
Dept: PHYSICAL THERAPY | Age: 71
Setting detail: THERAPIES SERIES
Discharge: HOME | End: 2021-07-14
Payer: COMMERCIAL

## 2021-07-14 PROCEDURE — 97110 THERAPEUTIC EXERCISES: CPT

## 2021-07-14 NOTE — FLOWSHEET NOTE
Outpatient Physical Therapy  [place]           [] Phone: [phone]   Fax: [phone]  [facility]           [x] Phone: [phone]   Fax: [phone]        Physical Therapy Daily Treatment Note  Date:  2021    Patient Name:  Jules Lacy    :  1950  MRN: [id number]  Restrictions/Precautions:  fall risk  Diagnosis:     S42.221D displaced fracture of R humerus  Date of Injury/Surgery: 3/7/21  Treatment Diagnosis:   M62.81 muscle weakness, R26.89 abnormality of gait   Insurance/Certification information:  [employer] [employer]   Referring Physician:    Dr Shilpa Calvo MD   Plan of care signed (Y/N):  maureen faxed  Outcome Measure: Quick DASH: 34.1  Visit# / total visits:   Pain level: 0/10   Goals:       Short term goals to be achieved by 2021:  Short term goal 1: Pt will demonstrate I with current HEP as prescribed in order to increase ROM and strength.   Short term goal 2: Pt will demonstrate a score of no more than 20 on the Quick DASH in order to improve quality of life. Subjective:   Patient denies pain but does c/o soreness upon arrival.      Any changes in Ambulatary Summary Sheet? Pt reports she is on steroids until Saturday and taking a breathing treatment with albuterol. Objective:   Increased pain noted at end of session as well as popping in the shld with the presses    COVID screening questions were asked and patient attested that there had been no contact or symptoms    Exercises: (No more than 4 columns)   Exercise/Equipment Date: 21           WARM UP       UBE   2/2 F/B 2/2 F/B 2/2 F/B   pulley 1x20 3\"  20x3\" 20x3\"   TABLE      Wand flexion 1x15 incline table with cane 2x15 incline table with cane 2x15 incline table with cane   Wand abd 1x15 incline table with cane 2x10 incline table with cane 2x10 incline table with cane   Incline table flex  10x 10x   Seated cane ER      Supine ball circles 1x15 B directions incline table R UE 2x10 B directions incline table R UE 2x10 B directions incline table R UE   Supine ABCs 1x incline table R UE 1x incline table R UE w/soft ball 1x incline table R UE w/soft ball   clips      Hands clasped over head      Sidelying ABD      Sidelying ER      Sidelying IR stretch      Wand scapation  1x10 incline table w/cane 1x10 incline table w/cane   Inclined press out/punch up  10x 10x                              STANDING      Ball on table       flex/scap w/core wheel  15x ea way 15x ea way   TB Scap ret GTB 2x15 GTB 2x15 GTB 3x10   TB lat pull GTB 2x15 GTB 2x15 GTB 3x10   TB LAE GTB 2x15 GTB 2x15 GTB 3x10   TB resisted ER GTB 2x10 GTB 2x10 GTB 2x10   TB resisted IR GTB 2x10 GTB 2x10 GTB 2x10                                            PROPRIOCEPTION                                    MODALITIES      vaso              Other Therapeutic Activities/Education:  Pt educated on continuing current HEP and that PT will continue to see her for about 4 more weeks. Home Exercise Program:  Nothing new provided. Pt to continue current HEP as prescribed. Manual Treatments:  none    Modalities:    Communication with other providers:  Updated POC to be faxed. Assessment:  (Response towards treatment session) (Pain Rating)  0/10 at end of session but does c/o soreness     Pt is a pleasant 67 yo female who would benefit from skilled PT to address decreased ROM, strength, balance, endurance, functional mobility, and increased pain.       Plan for Next Session:  Continue per POC    Time In / Time Out:   2646/9297    Timed Code/Total Treatment Minutes:  55te       Next Progress Note due:  7/27/21    Plan of Care Interventions:  [x] Therapeutic Exercise  [x] Modalities:  [] Therapeutic Activity     [] Ultrasound  [] Estim  [x] Gait Training      [] Cervical Traction [] Lumbar Traction  [x] Neuromuscular Re-education    [] Cold/hotpack [] Iontophoresis   [x] Instruction in HEP      [x] Vasopneumatic   [] Dry Needling    [] Manual Therapy               [] Aquatic Therapy              Electronically signed by:  Marti Glass, MARCUS   7/14/2021, 4:38 PM

## 2021-07-19 ENCOUNTER — HOSPITAL ENCOUNTER (OUTPATIENT)
Dept: PHYSICAL THERAPY | Age: 71
Setting detail: THERAPIES SERIES
Discharge: HOME | End: 2021-07-19
Payer: COMMERCIAL

## 2021-07-19 PROCEDURE — 97110 THERAPEUTIC EXERCISES: CPT

## 2021-07-19 NOTE — FLOWSHEET NOTE
Outpatient Physical Therapy  [place]           [] Phone: [phone]   Fax: [phone]  [facility]           [x] Phone: [phone]   Fax: [phone]        Physical Therapy Daily Treatment Note  Date:  2021    Patient Name:  Keith Kinsey    :  1950  MRN: [id number]  Restrictions/Precautions:  fall risk  Diagnosis:     S42.221D displaced fracture of R humerus  Date of Injury/Surgery: 3/7/21  Treatment Diagnosis:   M62.81 muscle weakness, R26.89 abnormality of gait   Insurance/Certification information:  [employer]   Referring Physician:    Dr Selina Rogel MD   Plan of care signed (Y/N):  eval faxed  Outcome Measure: Quick DASH: 34.1  Visit# / total visits:   Pain level: 0/10   Goals:       Short term goals to be achieved by 2021:  Short term goal 1: Pt will demonstrate I with current HEP as prescribed in order to increase ROM and strength.   Short term goal 2: Pt will demonstrate a score of no more than 20 on the Quick DASH in order to improve quality of life. Subjective:   Patient denies pain but does c/o a stiff neck upon arrival.      Any changes in Ambulatary Summary Sheet? Pt reports she is on steroids until Saturday and taking a breathing treatment with albuterol. Objective:   Increased pain noted at end of session in the shld with the presses and scap today    COVID screening questions were asked and patient attested that there had been no contact or symptoms    Exercises: (No more than 4 columns)   Exercise/Equipment 2021           WARM UP       UBE   2/2 F/B 2/2 F/B 2/2 F/B   pulley 20x3\" 20x3\" 35x3\"   TABLE      Wand flexion 2x15 incline table with cane 2x15 incline table with cane 2x15 incline table with cane   Wand abd 2x10 incline table with cane 2x10 incline table with cane 2x10 incline table with cane   Incline table flex 10x 10x 10x   Seated cane ER      Supine ball circles 2x10 B directions incline table R UE 2x10 B directions incline table R UE 2x10 B directions incline table R UE   Supine ABCs 1x incline table R UE w/soft ball 1x incline table R UE w/soft ball 1x incline table R UE w/green ball   Hands clasped over head      Sidelying ABD      Sidelying ER      Sidelying IR stretch      Wand scapation 1x10 incline table w/cane 1x10 incline table w/cane 1x10 incline table w/cane   Inclined press out/punch up 10x 10x 10x                              STANDING      Ball on table       flex/scap w/core wheel 15x ea way 15x ea way 15x ea way   TB Scap ret GTB 2x15 GTB 3x10 GTB 3x10   TB lat pull GTB 2x15 GTB 3x10 GTB 3x10   TB LAE GTB 2x15 GTB 3x10 GTB 3x10   TB resisted ER GTB 2x10 GTB 2x10 GTB 2x10   TB resisted IR GTB 2x10 GTB 2x10 GTB 2x10                                            PROPRIOCEPTION                                    MODALITIES      vaso              Other Therapeutic Activities/Education:  Pt educated on continuing current HEP and that PT will continue to see her for about 4 more weeks. Home Exercise Program:  Nothing new provided. Pt to continue current HEP as prescribed. Manual Treatments:  none    Modalities:    Communication with other providers:  Updated POC to be faxed. Assessment:  (Response towards treatment session) (Pain Rating)  3/10 at end of session but does c/o soreness     Pt is a pleasant 65 yo female who would benefit from skilled PT to address decreased ROM, strength, balance, endurance, functional mobility, and increased pain.       Plan for Next Session:  Continue per POC    Time In / Time Out:   6936/6910    Timed Code/Total Treatment Minutes:  50te       Next Progress Note due:  7/27/21    Plan of Care Interventions:  [x] Therapeutic Exercise  [x] Modalities:  [] Therapeutic Activity     [] Ultrasound  [] Estim  [x] Gait Training      [] Cervical Traction [] Lumbar Traction  [x] Neuromuscular Re-education    [] Cold/hotpack [] Iontophoresis   [x] Instruction in HEP      [x] Vasopneumatic [] Dry Needling    [] Manual Therapy               [] Aquatic Therapy              Electronically signed by:  Fanta Maciel PTA   7/19/2021, 4:34 PM

## 2021-07-22 ENCOUNTER — HOSPITAL ENCOUNTER (OUTPATIENT)
Dept: PHYSICAL THERAPY | Age: 71
Setting detail: THERAPIES SERIES
Discharge: HOME | End: 2021-07-22
Payer: COMMERCIAL

## 2021-07-22 PROCEDURE — 97110 THERAPEUTIC EXERCISES: CPT

## 2021-07-22 NOTE — DISCHARGE SUMMARY
[facility] Outpatient Physical Therapy  [address] [place] [address]  Phone: [phone]  Fax: [phone]      Physician: Dr Odell Leija MD      From: Shamika Galindo, PT, DPT     Patient: Nathan Walton                   : 1950  Diagnosis:  S42.221D displaced fracture of R humerus   Date: 2021  Treatment Diagnosis:  M62.81 muscle weakness,    []  Progress Note                [x]  Discharge Note    Total Visits to date: 16   Cancels/No-shows to date:  3    Subjective: Pt reports compliance with current HEP and that she trimmed her moms trees yesterday with no problem. Plan of Care/Treatment to date:  [x] Therapeutic Exercise    [x] Modalities:  [x] Therapeutic Activity     [] Ultrasound  [] Electric Stimulation  [] Gait Training      [] Cervical Traction    [] Lumbar Traction  [] Neuromuscular Re-education  [] Cold/hotpack [] Iontophoresis  [x] Instruction in HEP      Other:  [x] Manual Therapy       [x]  Vasopneumatic  [] Aquatic Therapy       []                          Objective/Significant Findings At Last Visit/Comments:    Quick DASH: 6.81  Shoulder:    Right    Flexion      373  [id number]    Extension       60 degrees    ABduction      754  [id number]      Elbow:     Right   Flexion       671  OSDHQCZ   Extension       8 degree lag      Strength:  Shoulder:    Right    Flexion        4+/5    Extension        4+/5    Scaption        4+/5   Abduction        4+/5      Elbow:     Right   Flexion         4+/5   Extension        4+/5        Assessment: Pt is a pleasant 65 yo female who no longer requires skilled PT. Goal Status:  [x] Achieved [] Partially Achieved  [] Not Achieved   Short term goal 1: Pt will demonstrate I with current HEP as prescribed in order to increase ROM and strength. - MET, discharge goal.   Short term goal 2: Pt will demonstrate a score of no more than 20 on the Quick DASH in order to improve quality of life.  - MET, discharge goal.     Patient Status: [] Continue per initial plan of Care     [x] Patient now discharged     [] Additional visits requested, Please re-certify for additional visits: If we are requesting more visits, we fully anticipate the patient's condition is expected to improve within the treatment timeframe we are requesting. Electronically signed by:  Enid Mayo PT, DPT, [id number]  7/22/2021, 5:44 PM    If you have any questions or concerns, please don't hesitate to call.   Thank you for your referral.

## 2021-07-22 NOTE — FLOWSHEET NOTE
Outpatient Physical Therapy  [place]           [] Phone: [phone]   Fax: [phone]  [facility]           [x] Phone: [phone]   Fax: [phone]        Physical Therapy Daily Treatment Note  Date:  2021    Patient Name:  Anju Tobar    :  1950  MRN: [id number]  Restrictions/Precautions:  fall risk  Diagnosis:     S42.221D displaced fracture of R humerus  Date of Injury/Surgery: 3/7/21  Treatment Diagnosis:   M62.81 muscle weakness, R26.89 abnormality of gait   Insurance/Certification information:  [employer]   Referring Physician:    Dr Summer Ness MD   Plan of care signed (Y/N):  eval faxed  Outcome Measure: Quick DASH: 34.1  Visit# / total visits: 15/90  Pain level: 0/10 in UE and 3-4/10. Goals:       Short term goals to be achieved by 2021:  Short term goal 1: Pt will demonstrate I with current HEP as prescribed in order to increase ROM and strength.   Short term goal 2: Pt will demonstrate a score of no more than 20 on the Quick DASH in order to improve quality of life. Subjective:   Patient reports she was up last night several times with leg cramps. She reports she currently has pain in calf. Pt reports she has been doing HEP and trimmed her moms tree last night with no difficulty.      Any changes in Ambulatary Summary Sheet?  none    Objective:    Shoulder:    Right    Flexion      273  OWCDOGH    Extension       60 degrees    ABduction      443  [id number]      Elbow:     Right   Flexion       145  degrees   Extension       8 degree lag      Strength:  Shoulder:    Right    Flexion        4+/5    Extension        4+/5    Scaption        4+/5   Abduction        4+/5      Elbow:     Right   Flexion         4+/5   Extension        4+/5     COVID screening questions were asked and patient attested that there had been no contact or symptoms    Exercises: (No more than 4 columns)   Exercise/Equipment 2021 Date: 21            WARM UP UBE   2/2 F/B 2/2 F/B 2/2 F/B 2.5/2.5 F/B    pulley 20x3\" 20x3\" 35x3\" 20 x3\"    TABLE       Wand flexion 2x15 incline table with cane 2x15 incline table with cane 2x15 incline table with cane 1x20 incline table with cane   Wand abd 2x10 incline table with cane 2x10 incline table with cane 2x10 incline table with cane 1x15 incline table with cane   Incline table flex 10x 10x 10x    Seated cane ER       Supine ball circles 2x10 B directions incline table R UE 2x10 B directions incline table R UE 2x10 B directions incline table R UE 1x15 B directions incline table R UE   Supine ABCs 1x incline table R UE w/soft ball 1x incline table R UE w/soft ball 1x incline table R UE w/green ball 1x incline table R UE w/green ball   Hands clasped over head       Sidelying ABD       Sidelying ER       Sidelying IR stretch       Wand scapation 1x10 incline table w/cane 1x10 incline table w/cane 1x10 incline table w/cane    Inclined press out/punch up 10x 10x 10x 1x15                                  STANDING       Ball on table        flex/scap w/core wheel 15x ea way 15x ea way 15x ea way 15x ea way   TB Scap ret GTB 2x15 GTB 3x10 GTB 3x10 GTB 2x15   TB lat pull GTB 2x15 GTB 3x10 GTB 3x10 GTB 2x15    TB LAE GTB 2x15 GTB 3x10 GTB 3x10 GTB 2x15   TB resisted ER GTB 2x10 GTB 2x10 GTB 2x10 GTB 2x15   TB resisted IR GTB 2x10 GTB 2x10 GTB 2x10 GTB 2x15   TB resisted scapular protraction    GTB 1x15                                           PROPRIOCEPTION                                          MODALITIES       vaso                Other Therapeutic Activities/Education:  Pt educated on continuing current HEP and that she will be discharged after today. Home Exercise Program:  Nothing new provided. Pt to continue current HEP as prescribed.      Manual Treatments:  none    Modalities:    Communication with other providers:  Discharge to be faxed    Assessment:  (Response towards treatment session) (Pain Rating)  0/10 at end of session but does c/o soreness   Pt is a pleasant 67 yo female who will be discharged. Plan for Next Session:  Pt will be discharged.      Time In / Time Out: 4:28 - 5:16 pm  pm    Timed Code/Total Treatment Minutes:  48 te       Next Progress Note due:  7/27/21    Plan of Care Interventions:  [x] Therapeutic Exercise  [x] Modalities:  [] Therapeutic Activity     [] Ultrasound  [] Estim  [x] Gait Training      [] Cervical Traction [] Lumbar Traction  [x] Neuromuscular Re-education    [] Cold/hotpack [] Iontophoresis   [x] Instruction in HEP      [x] Vasopneumatic   [] Dry Needling    [] Manual Therapy               [] Aquatic Therapy              Electronically signed by:  Home Gerber PT, DPT [id number]    7/22/2021, 4:28 PM

## 2021-07-29 ENCOUNTER — HOSPITAL ENCOUNTER (OUTPATIENT)
Age: 71
Discharge: HOME | End: 2021-07-29
Payer: COMMERCIAL

## 2021-07-29 LAB
ANION GAP SERPL CALCULATED.3IONS-SCNC: 7 MMOL/L (ref 4–16)
BUN BLDV-MCNC: 23 MG/DL (ref 6–23)
CALCIUM SERPL-MCNC: 9.9 MG/DL (ref 8.3–10.6)
CHLORIDE BLD-SCNC: 99 MMOL/L (ref 99–110)
CO2: 30 MMOL/L (ref 21–32)
CREAT SERPL-MCNC: 1 MG/DL (ref 0.6–1.1)
GFR AFRICAN AMERICAN: >60 ML/MIN/1.73M2
GFR NON-AFRICAN AMERICAN: 55 ML/MIN/1.73M2
GLUCOSE BLD-MCNC: 147 MG/DL (ref 70–99)
MAGNESIUM: 1.7 MG/DL (ref 1.8–2.4)
POTASSIUM SERPL-SCNC: 3.7 MMOL/L (ref 3.5–5.1)
SODIUM BLD-SCNC: 136 MMOL/L (ref 135–145)

## 2021-07-29 PROCEDURE — 83880 ASSAY OF NATRIURETIC PEPTIDE: CPT

## 2021-07-29 PROCEDURE — 83735 ASSAY OF MAGNESIUM: CPT

## 2021-07-29 PROCEDURE — 80048 BASIC METABOLIC PNL TOTAL CA: CPT

## 2021-07-29 PROCEDURE — 36415 COLL VENOUS BLD VENIPUNCTURE: CPT

## 2021-07-30 LAB — PRO-BNP: 191.2 PG/ML

## 2021-08-03 ENCOUNTER — INITIAL CONSULT (OUTPATIENT)
Dept: CARDIOLOGY CLINIC | Age: 71
End: 2021-08-03
Payer: COMMERCIAL

## 2021-08-03 VITALS
HEIGHT: 63 IN | DIASTOLIC BLOOD PRESSURE: 66 MMHG | SYSTOLIC BLOOD PRESSURE: 126 MMHG | BODY MASS INDEX: 40.75 KG/M2 | WEIGHT: 230 LBS | HEART RATE: 79 BPM

## 2021-08-03 DIAGNOSIS — R06.02 SOB (SHORTNESS OF BREATH): Primary | ICD-10-CM

## 2021-08-03 DIAGNOSIS — E11.9 DIABETES MELLITUS TYPE 2, INSULIN DEPENDENT (HCC): ICD-10-CM

## 2021-08-03 DIAGNOSIS — E78.2 MIXED HYPERLIPIDEMIA: ICD-10-CM

## 2021-08-03 DIAGNOSIS — I25.10 ASCVD (ARTERIOSCLEROTIC CARDIOVASCULAR DISEASE): ICD-10-CM

## 2021-08-03 DIAGNOSIS — Z79.4 DIABETES MELLITUS TYPE 2, INSULIN DEPENDENT (HCC): ICD-10-CM

## 2021-08-03 DIAGNOSIS — I87.303 VENOUS HYPERTENSION OF BOTH LOWER EXTREMITIES: ICD-10-CM

## 2021-08-03 PROBLEM — L98.492 ABDOMINAL WALL SKIN ULCER, WITH FAT LAYER EXPOSED (HCC): Chronic | Status: RESOLVED | Noted: 2018-01-02 | Resolved: 2021-08-03

## 2021-08-03 PROCEDURE — 99244 OFF/OP CNSLTJ NEW/EST MOD 40: CPT | Performed by: INTERNAL MEDICINE

## 2021-08-03 PROCEDURE — 93000 ELECTROCARDIOGRAM COMPLETE: CPT | Performed by: INTERNAL MEDICINE
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
## 2021-08-03 NOTE — ASSESSMENT & PLAN NOTE
Patient is on Crestor 5 mg daily. Last LDL was 168 on March 7, 2020. I would repeat lipids and optimize lipid-lowering therapy. LDL goal is less than 100.

## 2021-08-03 NOTE — ASSESSMENT & PLAN NOTE
Patient has multiple risk factors for coronary artery disease we will evaluate noninvasively to rule out any significant underlying ischemic heart disease causing dyspnea on exertion.

## 2021-08-03 NOTE — PATIENT INSTRUCTIONS
Continue current medications. Lexiscan stress Cardiolite to assess CAD status. Primary prevention is the goal by aggressive risk modification, healthy and therapeutic life style changes for cardiovascular risk reduction. Various goals are discussed and questions answered. Counseled for calorie counting, reduction in serving size and exercise and lifestyle modification for weight loss. Multiple questions answered. Patient verbalizes understanding and asks relevant questions. Follow up in 4 weeks, , sooner if needed.

## 2021-08-03 NOTE — ASSESSMENT & PLAN NOTE
We will repeat echocardiogram to evaluate systolic and diastolic LV function. Continue compression stockings and elevate feet when resting and limit salt intake.

## 2021-08-03 NOTE — ASSESSMENT & PLAN NOTE
Evaluate noninvasively for any significant ischemic heart disease. Continue aggressive risk factor modification for primary prevention.

## 2021-08-03 NOTE — ASSESSMENT & PLAN NOTE
Reports her recent hemoglobin A1c was above 8. She follows up with PCP. Counseled for diet and weight management also.

## 2021-08-10 ENCOUNTER — PROCEDURE VISIT (OUTPATIENT)
Dept: CARDIOLOGY CLINIC | Age: 71
End: 2021-08-10
Payer: COMMERCIAL

## 2021-08-10 DIAGNOSIS — E11.9 DIABETES MELLITUS TYPE 2, INSULIN DEPENDENT (HCC): ICD-10-CM

## 2021-08-10 DIAGNOSIS — Z79.4 DIABETES MELLITUS TYPE 2, INSULIN DEPENDENT (HCC): ICD-10-CM

## 2021-08-10 DIAGNOSIS — R06.02 SOB (SHORTNESS OF BREATH): ICD-10-CM

## 2021-08-10 LAB
LV EF: 58 %
LVEF MODALITY: NORMAL

## 2021-08-10 PROCEDURE — A9500 TC99M SESTAMIBI: HCPCS | Performed by: INTERNAL MEDICINE

## 2021-08-10 PROCEDURE — 93015 CV STRESS TEST SUPVJ I&R: CPT | Performed by: INTERNAL MEDICINE

## 2021-08-10 PROCEDURE — 78452 HT MUSCLE IMAGE SPECT MULT: CPT | Performed by: INTERNAL MEDICINE
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
## 2021-08-11 ENCOUNTER — TELEPHONE (OUTPATIENT)
Dept: CARDIOLOGY CLINIC | Age: 71
End: 2021-08-11

## 2021-08-11 NOTE — TELEPHONE ENCOUNTER
I called patient to give results of NM. Results were abnormal and patient scheduled for 8/16/2021 for follow up.

## 2021-08-14 ENCOUNTER — APPOINTMENT (OUTPATIENT)
Dept: CT IMAGING | Age: 71
End: 2021-08-14
Payer: COMMERCIAL

## 2021-08-14 ENCOUNTER — HOSPITAL ENCOUNTER (EMERGENCY)
Age: 71
Discharge: HOME | End: 2021-08-14
Attending: EMERGENCY MEDICINE
Payer: COMMERCIAL

## 2021-08-14 VITALS
HEIGHT: 63 IN | SYSTOLIC BLOOD PRESSURE: 132 MMHG | BODY MASS INDEX: 40.64 KG/M2 | DIASTOLIC BLOOD PRESSURE: 72 MMHG | TEMPERATURE: 98.5 F | RESPIRATION RATE: 14 BRPM | WEIGHT: 229.38 LBS | HEART RATE: 84 BPM | OXYGEN SATURATION: 97 %

## 2021-08-14 DIAGNOSIS — R79.89 ELEVATED LIVER FUNCTION TESTS: ICD-10-CM

## 2021-08-14 DIAGNOSIS — B34.9 VIRAL ILLNESS: ICD-10-CM

## 2021-08-14 DIAGNOSIS — M54.2 NECK PAIN: ICD-10-CM

## 2021-08-14 DIAGNOSIS — M54.50 ACUTE BILATERAL LOW BACK PAIN WITHOUT SCIATICA: ICD-10-CM

## 2021-08-14 DIAGNOSIS — R10.31 RIGHT LOWER QUADRANT ABDOMINAL PAIN: Primary | ICD-10-CM

## 2021-08-14 DIAGNOSIS — O21.9 VOMITING OF PREGNANCY, ANTEPARTUM: ICD-10-CM

## 2021-08-14 LAB
ALBUMIN SERPL-MCNC: 3.2 GM/DL (ref 3.4–5)
ALP BLD-CCNC: 167 IU/L (ref 40–129)
ALT SERPL-CCNC: 66 U/L (ref 10–40)
ANION GAP SERPL CALCULATED.3IONS-SCNC: 9 MMOL/L (ref 4–16)
AST SERPL-CCNC: 119 IU/L (ref 15–37)
BACTERIA: ABNORMAL /HPF
BASOPHILS ABSOLUTE: 0 K/CU MM
BASOPHILS RELATIVE PERCENT: 0.6 % (ref 0–1)
BILIRUB SERPL-MCNC: 0.4 MG/DL (ref 0–1)
BILIRUBIN URINE: NEGATIVE MG/DL
BLOOD, URINE: NEGATIVE
BUN BLDV-MCNC: 18 MG/DL (ref 6–23)
CALCIUM SERPL-MCNC: 8.9 MG/DL (ref 8.3–10.6)
CAST TYPE: NEGATIVE /HPF
CHLORIDE BLD-SCNC: 101 MMOL/L (ref 99–110)
CLARITY: ABNORMAL
CO2: 26 MMOL/L (ref 21–32)
COLOR: YELLOW
CREAT SERPL-MCNC: 0.8 MG/DL (ref 0.6–1.1)
CRYSTAL TYPE: NEGATIVE /HPF
DIFFERENTIAL TYPE: ABNORMAL
EOSINOPHILS ABSOLUTE: 0 K/CU MM
EOSINOPHILS RELATIVE PERCENT: 1.2 % (ref 0–3)
EPITHELIAL CELLS, UA: ABNORMAL /HPF
GFR AFRICAN AMERICAN: >60 ML/MIN/1.73M2
GFR NON-AFRICAN AMERICAN: >60 ML/MIN/1.73M2
GLUCOSE BLD-MCNC: 298 MG/DL (ref 70–99)
GLUCOSE, URINE: 250 MG/DL
HCT VFR BLD CALC: 39.9 % (ref 37–47)
HEMOGLOBIN: 12.4 GM/DL (ref 12.5–16)
IMMATURE NEUTROPHIL %: 1.2 % (ref 0–0.43)
KETONES, URINE: NEGATIVE MG/DL
LACTATE: 1.9 MMOL/L (ref 0.4–2)
LEUKOCYTE ESTERASE, URINE: NEGATIVE
LIPASE: 42 IU/L (ref 13–60)
LYMPHOCYTES ABSOLUTE: 0.6 K/CU MM
LYMPHOCYTES RELATIVE PERCENT: 17.7 % (ref 24–44)
MCH RBC QN AUTO: 31.8 PG (ref 27–31)
MCHC RBC AUTO-ENTMCNC: 31.1 % (ref 32–36)
MCV RBC AUTO: 102.3 FL (ref 78–100)
MONOCYTES ABSOLUTE: 0.4 K/CU MM
MONOCYTES RELATIVE PERCENT: 12.9 % (ref 0–4)
NITRITE URINE, QUANTITATIVE: NEGATIVE
PDW BLD-RTO: 14.8 % (ref 11.7–14.9)
PH, URINE: 6 (ref 5–8)
PLATELET # BLD: 80 K/CU MM (ref 140–440)
PMV BLD AUTO: 9.9 FL (ref 7.5–11.1)
POTASSIUM SERPL-SCNC: 4.7 MMOL/L (ref 3.5–5.1)
PROTEIN UA: ABNORMAL MG/DL
RBC # BLD: 3.9 M/CU MM (ref 4.2–5.4)
RBC URINE: NEGATIVE /HPF (ref 0–6)
SARS-COV-2, NAAT: NOT DETECTED
SEGMENTED NEUTROPHILS ABSOLUTE COUNT: 2.2 K/CU MM
SEGMENTED NEUTROPHILS RELATIVE PERCENT: 66.4 % (ref 36–66)
SODIUM BLD-SCNC: 136 MMOL/L (ref 135–145)
SOURCE: NORMAL
SPECIFIC GRAVITY UA: 1.03 (ref 1–1.03)
TOTAL IMMATURE NEUTOROPHIL: 0.04 K/CU MM
TOTAL PROTEIN: 6.9 GM/DL (ref 6.4–8.2)
UROBILINOGEN, URINE: 1 MG/DL (ref 0.2–1)
WBC # BLD: 3.3 K/CU MM (ref 4–10.5)
WBC UA: NEGATIVE /HPF (ref 0–5)

## 2021-08-14 PROCEDURE — 6360000002 HC RX W HCPCS: Performed by: EMERGENCY MEDICINE

## 2021-08-14 PROCEDURE — 96375 TX/PRO/DX INJ NEW DRUG ADDON: CPT

## 2021-08-14 PROCEDURE — 6360000004 HC RX CONTRAST MEDICATION: Performed by: EMERGENCY MEDICINE

## 2021-08-14 PROCEDURE — 83690 ASSAY OF LIPASE: CPT

## 2021-08-14 PROCEDURE — 96374 THER/PROPH/DIAG INJ IV PUSH: CPT

## 2021-08-14 PROCEDURE — 80053 COMPREHEN METABOLIC PANEL: CPT

## 2021-08-14 PROCEDURE — 83605 ASSAY OF LACTIC ACID: CPT

## 2021-08-14 PROCEDURE — 81001 URINALYSIS AUTO W/SCOPE: CPT

## 2021-08-14 PROCEDURE — 74177 CT ABD & PELVIS W/CONTRAST: CPT

## 2021-08-14 PROCEDURE — 85025 COMPLETE CBC W/AUTO DIFF WBC: CPT

## 2021-08-14 PROCEDURE — 87635 SARS-COV-2 COVID-19 AMP PRB: CPT

## 2021-08-14 PROCEDURE — 99283 EMERGENCY DEPT VISIT LOW MDM: CPT

## 2021-08-14 RX ORDER — MORPHINE SULFATE 4 MG/ML
4 INJECTION, SOLUTION INTRAMUSCULAR; INTRAVENOUS EVERY 30 MIN PRN
Status: DISCONTINUED | OUTPATIENT
Start: 2021-08-14 | End: 2021-08-14 | Stop reason: HOSPADM

## 2021-08-14 RX ORDER — KETOROLAC TROMETHAMINE 15 MG/ML
15 INJECTION, SOLUTION INTRAMUSCULAR; INTRAVENOUS ONCE
Status: COMPLETED | OUTPATIENT
Start: 2021-08-14 | End: 2021-08-14

## 2021-08-14 RX ORDER — CYCLOBENZAPRINE HCL 10 MG
10 TABLET ORAL 3 TIMES DAILY PRN
Qty: 30 TABLET | Refills: 0 | Status: ON HOLD | OUTPATIENT
Start: 2021-08-14 | End: 2021-08-28 | Stop reason: HOSPADM

## 2021-08-14 RX ORDER — HYDROCODONE BITARTRATE AND ACETAMINOPHEN 5; 325 MG/1; MG/1
1 TABLET ORAL EVERY 6 HOURS PRN
Qty: 10 TABLET | Refills: 0 | Status: SHIPPED | OUTPATIENT
Start: 2021-08-14 | End: 2021-08-17

## 2021-08-14 RX ORDER — ORPHENADRINE CITRATE 30 MG/ML
60 INJECTION INTRAMUSCULAR; INTRAVENOUS ONCE
Status: COMPLETED | OUTPATIENT
Start: 2021-08-14 | End: 2021-08-14

## 2021-08-14 RX ORDER — ONDANSETRON 2 MG/ML
4 INJECTION INTRAMUSCULAR; INTRAVENOUS EVERY 30 MIN PRN
Status: DISCONTINUED | OUTPATIENT
Start: 2021-08-14 | End: 2021-08-14 | Stop reason: HOSPADM

## 2021-08-14 RX ORDER — ONDANSETRON 4 MG/1
4 TABLET, ORALLY DISINTEGRATING ORAL EVERY 6 HOURS PRN
Qty: 10 TABLET | Refills: 0 | Status: ON HOLD | OUTPATIENT
Start: 2021-08-14 | End: 2021-08-28 | Stop reason: HOSPADM

## 2021-08-14 RX ADMIN — ORPHENADRINE CITRATE 60 MG: 60 INJECTION INTRAMUSCULAR; INTRAVENOUS at 15:20

## 2021-08-14 RX ADMIN — KETOROLAC TROMETHAMINE 15 MG: 15 INJECTION, SOLUTION INTRAMUSCULAR; INTRAVENOUS at 15:19

## 2021-08-14 RX ADMIN — IOPAMIDOL 100 ML: 755 INJECTION, SOLUTION INTRAVENOUS at 16:39

## 2021-08-14 ASSESSMENT — PAIN SCALES - GENERAL
PAINLEVEL_OUTOF10: 8
PAINLEVEL_OUTOF10: 4
PAINLEVEL_OUTOF10: 8

## 2021-08-14 ASSESSMENT — PAIN DESCRIPTION - PAIN TYPE: TYPE: ACUTE PAIN

## 2021-08-14 ASSESSMENT — PAIN DESCRIPTION - ORIENTATION: ORIENTATION: RIGHT;LOWER

## 2021-08-14 ASSESSMENT — PAIN DESCRIPTION - FREQUENCY: FREQUENCY: CONTINUOUS

## 2021-08-14 ASSESSMENT — PAIN DESCRIPTION - LOCATION: LOCATION: ABDOMEN
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
## 2021-08-14 NOTE — ED NOTES
Discharge instructions and Rx discussed. Verbalizes understanding. Ambulates without difficulty to private vehicle.      Christopher Headley, BRISEYDA  08/14/21 [id number]

## 2021-08-16 ENCOUNTER — OFFICE VISIT (OUTPATIENT)
Dept: CARDIOLOGY CLINIC | Age: 71
End: 2021-08-16
Payer: COMMERCIAL

## 2021-08-16 VITALS
WEIGHT: 228 LBS | BODY MASS INDEX: 40.4 KG/M2 | HEIGHT: 63 IN | HEART RATE: 90 BPM | DIASTOLIC BLOOD PRESSURE: 70 MMHG | SYSTOLIC BLOOD PRESSURE: 118 MMHG

## 2021-08-16 DIAGNOSIS — R06.02 SOB (SHORTNESS OF BREATH): ICD-10-CM

## 2021-08-16 DIAGNOSIS — E11.9 DIABETES MELLITUS TYPE 2, INSULIN DEPENDENT (HCC): ICD-10-CM

## 2021-08-16 DIAGNOSIS — Z79.4 DIABETES MELLITUS TYPE 2, INSULIN DEPENDENT (HCC): ICD-10-CM

## 2021-08-16 DIAGNOSIS — E78.2 MIXED HYPERLIPIDEMIA: ICD-10-CM

## 2021-08-16 DIAGNOSIS — I87.303 VENOUS HYPERTENSION OF BOTH LOWER EXTREMITIES: ICD-10-CM

## 2021-08-16 DIAGNOSIS — I25.10 ASCVD (ARTERIOSCLEROTIC CARDIOVASCULAR DISEASE): Primary | ICD-10-CM

## 2021-08-16 PROCEDURE — 99213 OFFICE O/P EST LOW 20 MIN: CPT | Performed by: INTERNAL MEDICINE
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
## 2021-08-21 ENCOUNTER — HOSPITAL ENCOUNTER (EMERGENCY)
Age: 71
Discharge: ANOTHER HOSPITAL | End: 2021-08-22
Attending: EMERGENCY MEDICINE
Payer: COMMERCIAL

## 2021-08-21 DIAGNOSIS — U07.1 COVID-19 VIRUS INFECTION: Primary | ICD-10-CM

## 2021-08-21 DIAGNOSIS — E83.42 HYPOMAGNESEMIA: ICD-10-CM

## 2021-08-21 DIAGNOSIS — J18.9 PNEUMONIA OF BOTH LUNGS DUE TO INFECTIOUS ORGANISM, UNSPECIFIED PART OF LUNG: ICD-10-CM

## 2021-08-21 PROCEDURE — 96365 THER/PROPH/DIAG IV INF INIT: CPT

## 2021-08-21 PROCEDURE — 96368 THER/DIAG CONCURRENT INF: CPT

## 2021-08-21 PROCEDURE — 96361 HYDRATE IV INFUSION ADD-ON: CPT

## 2021-08-21 PROCEDURE — 96367 TX/PROPH/DG ADDL SEQ IV INF: CPT

## 2021-08-21 PROCEDURE — 96375 TX/PRO/DX INJ NEW DRUG ADDON: CPT

## 2021-08-21 PROCEDURE — 96366 THER/PROPH/DIAG IV INF ADDON: CPT

## 2021-08-21 PROCEDURE — 99285 EMERGENCY DEPT VISIT HI MDM: CPT

## 2021-08-21 RX ORDER — 0.9 % SODIUM CHLORIDE 0.9 %
1000 INTRAVENOUS SOLUTION INTRAVENOUS ONCE
Status: DISCONTINUED | OUTPATIENT
Start: 2021-08-22 | End: 2021-08-22

## 2021-08-21 RX ORDER — SODIUM CHLORIDE 9 MG/ML
INJECTION, SOLUTION INTRAVENOUS CONTINUOUS
Status: DISCONTINUED | OUTPATIENT
Start: 2021-08-22 | End: 2021-08-22 | Stop reason: HOSPADM

## 2021-08-21 RX ORDER — ONDANSETRON 2 MG/ML
4 INJECTION INTRAMUSCULAR; INTRAVENOUS EVERY 30 MIN PRN
Status: DISCONTINUED | OUTPATIENT
Start: 2021-08-21 | End: 2021-08-22 | Stop reason: HOSPADM

## 2021-08-21 RX ORDER — LORAZEPAM 2 MG/ML
1 INJECTION INTRAMUSCULAR ONCE
Status: COMPLETED | OUTPATIENT
Start: 2021-08-22 | End: 2021-08-22

## 2021-08-22 ENCOUNTER — HOSPITAL ENCOUNTER (INPATIENT)
Age: 71
LOS: 7 days | Discharge: HOME | DRG: 871 | End: 2021-08-29
Attending: HOSPITALIST
Payer: COMMERCIAL

## 2021-08-22 ENCOUNTER — APPOINTMENT (OUTPATIENT)
Dept: GENERAL RADIOLOGY | Age: 71
End: 2021-08-22
Payer: COMMERCIAL

## 2021-08-22 VITALS
SYSTOLIC BLOOD PRESSURE: 92 MMHG | WEIGHT: 220 LBS | RESPIRATION RATE: 18 BRPM | HEIGHT: 63 IN | OXYGEN SATURATION: 95 % | DIASTOLIC BLOOD PRESSURE: 46 MMHG | TEMPERATURE: 99.7 F | HEART RATE: 64 BPM | BODY MASS INDEX: 38.98 KG/M2

## 2021-08-22 PROBLEM — U07.1 PNEUMONIA DUE TO COVID-19 VIRUS: Status: ACTIVE | Noted: 2021-08-22

## 2021-08-22 PROBLEM — J12.82 PNEUMONIA DUE TO COVID-19 VIRUS: Status: ACTIVE | Noted: 2021-08-22

## 2021-08-22 LAB
ALBUMIN SERPL-MCNC: 2.8 GM/DL (ref 3.4–5)
ALBUMIN SERPL-MCNC: 2.9 GM/DL (ref 3.4–5)
ALP BLD-CCNC: 155 IU/L (ref 40–129)
ALP BLD-CCNC: 157 IU/L (ref 40–129)
ALT SERPL-CCNC: 43 U/L (ref 10–40)
ALT SERPL-CCNC: 48 U/L (ref 10–40)
ANION GAP SERPL CALCULATED.3IONS-SCNC: 7 MMOL/L (ref 4–16)
ANION GAP SERPL CALCULATED.3IONS-SCNC: 9 MMOL/L (ref 4–16)
AST SERPL-CCNC: 84 IU/L (ref 15–37)
AST SERPL-CCNC: 95 IU/L (ref 15–37)
ATYPICAL LYMPHOCYTE ABSOLUTE COUNT: ABNORMAL
BANDED NEUTROPHILS ABSOLUTE COUNT: 0.02 K/CU MM
BANDED NEUTROPHILS RELATIVE PERCENT: 1 % (ref 5–11)
BASOPHILS ABSOLUTE: 0 K/CU MM
BASOPHILS RELATIVE PERCENT: 0.3 % (ref 0–1)
BILIRUB SERPL-MCNC: 0.3 MG/DL (ref 0–1)
BILIRUB SERPL-MCNC: 0.5 MG/DL (ref 0–1)
BILIRUBIN DIRECT: 0.2 MG/DL (ref 0–0.3)
BILIRUBIN, INDIRECT: 0.1 MG/DL (ref 0–0.7)
BUN BLDV-MCNC: 25 MG/DL (ref 6–23)
BUN BLDV-MCNC: 27 MG/DL (ref 6–23)
CALCIUM SERPL-MCNC: 8 MG/DL (ref 8.3–10.6)
CALCIUM SERPL-MCNC: 8.7 MG/DL (ref 8.3–10.6)
CHLORIDE BLD-SCNC: 104 MMOL/L (ref 99–110)
CHLORIDE BLD-SCNC: 104 MMOL/L (ref 99–110)
CO2: 26 MMOL/L (ref 21–32)
CO2: 31 MMOL/L (ref 21–32)
CREAT SERPL-MCNC: 0.7 MG/DL (ref 0.6–1.1)
CREAT SERPL-MCNC: 1.1 MG/DL (ref 0.6–1.1)
DIFFERENTIAL TYPE: ABNORMAL
DIFFERENTIAL TYPE: ABNORMAL
EKG ATRIAL RATE: 103 BPM
EKG DIAGNOSIS: NORMAL
EKG P AXIS: -4 DEGREES
EKG P-R INTERVAL: 160 MS
EKG Q-T INTERVAL: 362 MS
EKG QRS DURATION: 92 MS
EKG QTC CALCULATION (BAZETT): 474 MS
EKG R AXIS: -18 DEGREES
EKG T AXIS: 31 DEGREES
EKG VENTRICULAR RATE: 103 BPM
EOSINOPHILS ABSOLUTE: 0 K/CU MM
EOSINOPHILS RELATIVE PERCENT: 0.8 % (ref 0–3)
ESTIMATED AVERAGE GLUCOSE: 194 MG/DL
GFR AFRICAN AMERICAN: 59 ML/MIN/1.73M2
GFR AFRICAN AMERICAN: >60 ML/MIN/1.73M2
GFR NON-AFRICAN AMERICAN: 49 ML/MIN/1.73M2
GFR NON-AFRICAN AMERICAN: >60 ML/MIN/1.73M2
GLUCOSE BLD-MCNC: 115 MG/DL (ref 70–99)
GLUCOSE BLD-MCNC: 132 MG/DL (ref 70–99)
GLUCOSE BLD-MCNC: 134 MG/DL (ref 70–99)
GLUCOSE BLD-MCNC: 211 MG/DL (ref 70–99)
HBA1C MFR BLD: 8.4 % (ref 4.2–6.3)
HCT VFR BLD CALC: 39.7 % (ref 37–47)
HCT VFR BLD CALC: 42.6 % (ref 37–47)
HEMOGLOBIN: 12.9 GM/DL (ref 12.5–16)
HEMOGLOBIN: 13.5 GM/DL (ref 12.5–16)
IMMATURE NEUTROPHIL %: 0.8 % (ref 0–0.43)
LYMPHOCYTES ABSOLUTE: 0.4 K/CU MM
LYMPHOCYTES ABSOLUTE: 0.6 K/CU MM
LYMPHOCYTES RELATIVE PERCENT: 16.5 % (ref 24–44)
LYMPHOCYTES RELATIVE PERCENT: 22 % (ref 24–44)
MAGNESIUM: 1.4 MG/DL (ref 1.8–2.4)
MAGNESIUM: 1.8 MG/DL (ref 1.8–2.4)
MCH RBC QN AUTO: 31 PG (ref 27–31)
MCH RBC QN AUTO: 31.3 PG (ref 27–31)
MCHC RBC AUTO-ENTMCNC: 31.7 % (ref 32–36)
MCHC RBC AUTO-ENTMCNC: 32.5 % (ref 32–36)
MCV RBC AUTO: 95.4 FL (ref 78–100)
MCV RBC AUTO: 98.6 FL (ref 78–100)
MONOCYTES ABSOLUTE: 0 K/CU MM
MONOCYTES ABSOLUTE: 0.3 K/CU MM
MONOCYTES RELATIVE PERCENT: 2 % (ref 0–4)
MONOCYTES RELATIVE PERCENT: 8.7 % (ref 0–4)
PDW BLD-RTO: 14.1 % (ref 11.7–14.9)
PDW BLD-RTO: 14.3 % (ref 11.7–14.9)
PLATELET # BLD: 122 K/CU MM (ref 140–440)
PLATELET # BLD: 90 K/CU MM (ref 140–440)
PMV BLD AUTO: 9.6 FL (ref 7.5–11.1)
PMV BLD AUTO: 9.6 FL (ref 7.5–11.1)
POTASSIUM SERPL-SCNC: 3.6 MMOL/L (ref 3.5–5.1)
POTASSIUM SERPL-SCNC: 4.4 MMOL/L (ref 3.5–5.1)
PROCALCITONIN: 0.19
RBC # BLD: 4.16 M/CU MM (ref 4.2–5.4)
RBC # BLD: 4.32 M/CU MM (ref 4.2–5.4)
SARS-COV-2, NAAT: DETECTED
SEGMENTED NEUTROPHILS ABSOLUTE COUNT: 1.4 K/CU MM
SEGMENTED NEUTROPHILS ABSOLUTE COUNT: 2.7 K/CU MM
SEGMENTED NEUTROPHILS RELATIVE PERCENT: 72.9 % (ref 36–66)
SEGMENTED NEUTROPHILS RELATIVE PERCENT: 75 % (ref 36–66)
SODIUM BLD-SCNC: 139 MMOL/L (ref 135–145)
SODIUM BLD-SCNC: 142 MMOL/L (ref 135–145)
SOURCE: ABNORMAL
T4 FREE: 1.31 NG/DL (ref 0.9–1.8)
TOTAL IMMATURE NEUTOROPHIL: 0.03 K/CU MM
TOTAL PROTEIN: 5.8 GM/DL (ref 6.4–8.2)
TOTAL PROTEIN: 6.6 GM/DL (ref 6.4–8.2)
TROPONIN T: 0.01 NG/ML
TROPONIN T: <0.01 NG/ML
TSH HIGH SENSITIVITY: 1.29 UIU/ML (ref 0.27–4.2)
WBC # BLD: 1.8 K/CU MM (ref 4–10.5)
WBC # BLD: 3.7 K/CU MM (ref 4–10.5)

## 2021-08-22 PROCEDURE — 2700000000 HC OXYGEN THERAPY PER DAY

## 2021-08-22 PROCEDURE — 6360000002 HC RX W HCPCS: Performed by: HOSPITALIST

## 2021-08-22 PROCEDURE — 80053 COMPREHEN METABOLIC PANEL: CPT

## 2021-08-22 PROCEDURE — 6370000000 HC RX 637 (ALT 250 FOR IP): Performed by: HOSPITALIST

## 2021-08-22 PROCEDURE — 83735 ASSAY OF MAGNESIUM: CPT

## 2021-08-22 PROCEDURE — 94761 N-INVAS EAR/PLS OXIMETRY MLT: CPT

## 2021-08-22 PROCEDURE — 99222 1ST HOSP IP/OBS MODERATE 55: CPT | Performed by: INTERNAL MEDICINE

## 2021-08-22 PROCEDURE — 2580000003 HC RX 258: Performed by: EMERGENCY MEDICINE

## 2021-08-22 PROCEDURE — 87635 SARS-COV-2 COVID-19 AMP PRB: CPT

## 2021-08-22 PROCEDURE — 85025 COMPLETE CBC W/AUTO DIFF WBC: CPT

## 2021-08-22 PROCEDURE — 84484 ASSAY OF TROPONIN QUANT: CPT

## 2021-08-22 PROCEDURE — 84443 ASSAY THYROID STIM HORMONE: CPT

## 2021-08-22 PROCEDURE — 82962 GLUCOSE BLOOD TEST: CPT

## 2021-08-22 PROCEDURE — 85007 BL SMEAR W/DIFF WBC COUNT: CPT

## 2021-08-22 PROCEDURE — 71045 X-RAY EXAM CHEST 1 VIEW: CPT

## 2021-08-22 PROCEDURE — 87040 BLOOD CULTURE FOR BACTERIA: CPT

## 2021-08-22 PROCEDURE — 6360000002 HC RX W HCPCS: Performed by: EMERGENCY MEDICINE

## 2021-08-22 PROCEDURE — 84439 ASSAY OF FREE THYROXINE: CPT

## 2021-08-22 PROCEDURE — 93005 ELECTROCARDIOGRAM TRACING: CPT | Performed by: EMERGENCY MEDICINE

## 2021-08-22 PROCEDURE — 2580000003 HC RX 258: Performed by: HOSPITALIST

## 2021-08-22 PROCEDURE — 36415 COLL VENOUS BLD VENIPUNCTURE: CPT

## 2021-08-22 PROCEDURE — 82248 BILIRUBIN DIRECT: CPT

## 2021-08-22 PROCEDURE — XW033E5 INTRODUCTION OF REMDESIVIR ANTI-INFECTIVE INTO PERIPHERAL VEIN, PERCUTANEOUS APPROACH, NEW TECHNOLOGY GROUP 5: ICD-10-PCS | Performed by: HOSPITALIST

## 2021-08-22 PROCEDURE — 99223 1ST HOSP IP/OBS HIGH 75: CPT | Performed by: INTERNAL MEDICINE

## 2021-08-22 PROCEDURE — 2500000003 HC RX 250 WO HCPCS: Performed by: HOSPITALIST

## 2021-08-22 PROCEDURE — 85027 COMPLETE CBC AUTOMATED: CPT

## 2021-08-22 PROCEDURE — 84145 PROCALCITONIN (PCT): CPT

## 2021-08-22 PROCEDURE — 8E0ZXY6 ISOLATION: ICD-10-PCS | Performed by: HOSPITALIST

## 2021-08-22 PROCEDURE — 93010 ELECTROCARDIOGRAM REPORT: CPT | Performed by: INTERNAL MEDICINE

## 2021-08-22 PROCEDURE — 83036 HEMOGLOBIN GLYCOSYLATED A1C: CPT

## 2021-08-22 PROCEDURE — 2060000000 HC ICU INTERMEDIATE R&B

## 2021-08-22 RX ORDER — FAMOTIDINE 20 MG/1
20 TABLET, FILM COATED ORAL 2 TIMES DAILY
Status: DISCONTINUED | OUTPATIENT
Start: 2021-08-22 | End: 2021-08-28

## 2021-08-22 RX ORDER — NICOTINE POLACRILEX 4 MG
15 LOZENGE BUCCAL PRN
Status: DISCONTINUED | OUTPATIENT
Start: 2021-08-22 | End: 2021-08-29 | Stop reason: HOSPADM

## 2021-08-22 RX ORDER — DIPHENHYDRAMINE HYDROCHLORIDE 50 MG/ML
50 INJECTION INTRAMUSCULAR; INTRAVENOUS ONCE
Status: COMPLETED | OUTPATIENT
Start: 2021-08-22 | End: 2021-08-22

## 2021-08-22 RX ORDER — 0.9 % SODIUM CHLORIDE 0.9 %
30 INTRAVENOUS SOLUTION INTRAVENOUS PRN
Status: DISCONTINUED | OUTPATIENT
Start: 2021-08-22 | End: 2021-08-29 | Stop reason: HOSPADM

## 2021-08-22 RX ORDER — M-VIT,TX,IRON,MINS/CALC/FOLIC 27MG-0.4MG
1 TABLET ORAL DAILY
Status: DISCONTINUED | OUTPATIENT
Start: 2021-08-22 | End: 2021-08-29 | Stop reason: HOSPADM

## 2021-08-22 RX ORDER — LEVOTHYROXINE SODIUM 0.1 MG/1
100 TABLET ORAL DAILY
Status: DISCONTINUED | OUTPATIENT
Start: 2021-08-22 | End: 2021-08-29 | Stop reason: HOSPADM

## 2021-08-22 RX ORDER — SPIRONOLACTONE 25 MG/1
25 TABLET ORAL DAILY
Status: DISCONTINUED | OUTPATIENT
Start: 2021-08-22 | End: 2021-08-29 | Stop reason: HOSPADM

## 2021-08-22 RX ORDER — INSULIN GLARGINE 100 [IU]/ML
50 INJECTION, SOLUTION SUBCUTANEOUS DAILY
Status: DISCONTINUED | OUTPATIENT
Start: 2021-08-22 | End: 2021-08-22

## 2021-08-22 RX ORDER — ACETAMINOPHEN 325 MG/1
650 TABLET ORAL EVERY 6 HOURS PRN
Status: DISCONTINUED | OUTPATIENT
Start: 2021-08-22 | End: 2021-08-29 | Stop reason: HOSPADM

## 2021-08-22 RX ORDER — MAGNESIUM SULFATE IN WATER 40 MG/ML
2000 INJECTION, SOLUTION INTRAVENOUS ONCE
Status: COMPLETED | OUTPATIENT
Start: 2021-08-22 | End: 2021-08-22

## 2021-08-22 RX ORDER — ROSUVASTATIN CALCIUM 5 MG/1
5 TABLET, COATED ORAL DAILY
Status: DISCONTINUED | OUTPATIENT
Start: 2021-08-22 | End: 2021-08-29 | Stop reason: HOSPADM

## 2021-08-22 RX ORDER — SODIUM CHLORIDE 0.9 % (FLUSH) 0.9 %
5-40 SYRINGE (ML) INJECTION EVERY 12 HOURS SCHEDULED
Status: DISCONTINUED | OUTPATIENT
Start: 2021-08-22 | End: 2021-08-29 | Stop reason: HOSPADM

## 2021-08-22 RX ORDER — POTASSIUM CHLORIDE 750 MG/1
20 TABLET, FILM COATED, EXTENDED RELEASE ORAL 2 TIMES DAILY
Status: DISCONTINUED | OUTPATIENT
Start: 2021-08-22 | End: 2021-08-29

## 2021-08-22 RX ORDER — ONDANSETRON 2 MG/ML
4 INJECTION INTRAMUSCULAR; INTRAVENOUS EVERY 6 HOURS PRN
Status: DISCONTINUED | OUTPATIENT
Start: 2021-08-22 | End: 2021-08-29 | Stop reason: HOSPADM

## 2021-08-22 RX ORDER — INSULIN GLARGINE 100 [IU]/ML
10 INJECTION, SOLUTION SUBCUTANEOUS DAILY
Status: DISCONTINUED | OUTPATIENT
Start: 2021-08-23 | End: 2021-08-23

## 2021-08-22 RX ORDER — DEXTROSE MONOHYDRATE 25 G/50ML
12.5 INJECTION, SOLUTION INTRAVENOUS PRN
Status: DISCONTINUED | OUTPATIENT
Start: 2021-08-22 | End: 2021-08-29 | Stop reason: HOSPADM

## 2021-08-22 RX ORDER — DEXTROSE MONOHYDRATE 50 MG/ML
100 INJECTION, SOLUTION INTRAVENOUS PRN
Status: DISCONTINUED | OUTPATIENT
Start: 2021-08-22 | End: 2021-08-29 | Stop reason: HOSPADM

## 2021-08-22 RX ORDER — SODIUM CHLORIDE 9 MG/ML
25 INJECTION, SOLUTION INTRAVENOUS PRN
Status: DISCONTINUED | OUTPATIENT
Start: 2021-08-22 | End: 2021-08-29 | Stop reason: HOSPADM

## 2021-08-22 RX ORDER — DEXAMETHASONE SODIUM PHOSPHATE 10 MG/ML
10 INJECTION, SOLUTION INTRAMUSCULAR; INTRAVENOUS ONCE
Status: COMPLETED | OUTPATIENT
Start: 2021-08-22 | End: 2021-08-22

## 2021-08-22 RX ORDER — POLYETHYLENE GLYCOL 3350 17 G/17G
17 POWDER, FOR SOLUTION ORAL DAILY PRN
Status: DISCONTINUED | OUTPATIENT
Start: 2021-08-22 | End: 2021-08-29 | Stop reason: HOSPADM

## 2021-08-22 RX ORDER — SODIUM CHLORIDE 0.9 % (FLUSH) 0.9 %
5-40 SYRINGE (ML) INJECTION PRN
Status: DISCONTINUED | OUTPATIENT
Start: 2021-08-22 | End: 2021-08-29 | Stop reason: HOSPADM

## 2021-08-22 RX ORDER — GUAIFENESIN 600 MG/1
600 TABLET, EXTENDED RELEASE ORAL 2 TIMES DAILY
Status: DISCONTINUED | OUTPATIENT
Start: 2021-08-22 | End: 2021-08-29 | Stop reason: HOSPADM

## 2021-08-22 RX ORDER — ACETAMINOPHEN 650 MG/1
650 SUPPOSITORY RECTAL EVERY 6 HOURS PRN
Status: DISCONTINUED | OUTPATIENT
Start: 2021-08-22 | End: 2021-08-29 | Stop reason: HOSPADM

## 2021-08-22 RX ORDER — ONDANSETRON 4 MG/1
4 TABLET, ORALLY DISINTEGRATING ORAL EVERY 8 HOURS PRN
Status: DISCONTINUED | OUTPATIENT
Start: 2021-08-22 | End: 2021-08-29 | Stop reason: HOSPADM

## 2021-08-22 RX ORDER — ALBUTEROL SULFATE 90 UG/1
2 AEROSOL, METERED RESPIRATORY (INHALATION) EVERY 6 HOURS PRN
Status: DISCONTINUED | OUTPATIENT
Start: 2021-08-22 | End: 2021-08-29 | Stop reason: HOSPADM

## 2021-08-22 RX ORDER — DEXAMETHASONE 4 MG/1
6 TABLET ORAL DAILY
Status: DISCONTINUED | OUTPATIENT
Start: 2021-08-22 | End: 2021-08-28

## 2021-08-22 RX ORDER — TORSEMIDE 20 MG/1
20 TABLET ORAL DAILY
Status: DISCONTINUED | OUTPATIENT
Start: 2021-08-22 | End: 2021-08-29 | Stop reason: HOSPADM

## 2021-08-22 RX ADMIN — REMDESIVIR 200 MG: 100 INJECTION, POWDER, LYOPHILIZED, FOR SOLUTION INTRAVENOUS at 15:15

## 2021-08-22 RX ADMIN — FAMOTIDINE 20 MG: 20 TABLET ORAL at 21:55

## 2021-08-22 RX ADMIN — Medication 1 TABLET: at 14:45

## 2021-08-22 RX ADMIN — DEXAMETHASONE SODIUM PHOSPHATE 10 MG: 10 INJECTION, SOLUTION INTRAMUSCULAR; INTRAVENOUS at 02:12

## 2021-08-22 RX ADMIN — METOPROLOL TARTRATE 25 MG: 25 TABLET, FILM COATED ORAL at 14:45

## 2021-08-22 RX ADMIN — ENOXAPARIN SODIUM 30 MG: 30 INJECTION SUBCUTANEOUS at 21:55

## 2021-08-22 RX ADMIN — LEVOTHYROXINE SODIUM 100 MCG: 0.1 TABLET ORAL at 14:45

## 2021-08-22 RX ADMIN — GUAIFENESIN 600 MG: 600 TABLET, EXTENDED RELEASE ORAL at 21:55

## 2021-08-22 RX ADMIN — CEFTRIAXONE SODIUM 1000 MG: 1 INJECTION, POWDER, FOR SOLUTION INTRAMUSCULAR; INTRAVENOUS at 01:02

## 2021-08-22 RX ADMIN — MAGNESIUM SULFATE 2000 MG: 2 INJECTION INTRAVENOUS at 02:17

## 2021-08-22 RX ADMIN — POTASSIUM CHLORIDE 20 MEQ: 750 TABLET, FILM COATED, EXTENDED RELEASE ORAL at 21:55

## 2021-08-22 RX ADMIN — ROSUVASTATIN CALCIUM 5 MG: 5 TABLET, COATED ORAL at 14:45

## 2021-08-22 RX ADMIN — CEFTRIAXONE 1000 MG: 1 INJECTION, POWDER, FOR SOLUTION INTRAMUSCULAR; INTRAVENOUS at 21:54

## 2021-08-22 RX ADMIN — METOPROLOL TARTRATE 25 MG: 25 TABLET, FILM COATED ORAL at 21:55

## 2021-08-22 RX ADMIN — ONDANSETRON 4 MG: 2 INJECTION INTRAMUSCULAR; INTRAVENOUS at 00:37

## 2021-08-22 RX ADMIN — DIPHENHYDRAMINE HYDROCHLORIDE 50 MG: 50 INJECTION, SOLUTION INTRAMUSCULAR; INTRAVENOUS at 02:07

## 2021-08-22 RX ADMIN — SODIUM CHLORIDE: 9 INJECTION, SOLUTION INTRAVENOUS at 00:49

## 2021-08-22 RX ADMIN — SODIUM CHLORIDE, PRESERVATIVE FREE 10 ML: 5 INJECTION INTRAVENOUS at 21:56

## 2021-08-22 RX ADMIN — TORSEMIDE 20 MG: 20 TABLET ORAL at 14:45

## 2021-08-22 RX ADMIN — GUAIFENESIN 600 MG: 600 TABLET, EXTENDED RELEASE ORAL at 14:45

## 2021-08-22 RX ADMIN — POTASSIUM CHLORIDE 20 MEQ: 750 TABLET, FILM COATED, EXTENDED RELEASE ORAL at 14:45

## 2021-08-22 RX ADMIN — Medication 2000 UNITS: at 14:44

## 2021-08-22 RX ADMIN — DEXAMETHASONE 6 MG: 4 TABLET ORAL at 14:44

## 2021-08-22 RX ADMIN — LORAZEPAM 1 MG: 2 INJECTION INTRAMUSCULAR; INTRAVENOUS at 00:38

## 2021-08-22 RX ADMIN — FAMOTIDINE 20 MG: 20 TABLET ORAL at 14:45

## 2021-08-22 RX ADMIN — SODIUM CHLORIDE, PRESERVATIVE FREE 10 ML: 5 INJECTION INTRAVENOUS at 14:46

## 2021-08-22 RX ADMIN — AZITHROMYCIN DIHYDRATE 500 MG: 500 INJECTION, POWDER, LYOPHILIZED, FOR SOLUTION INTRAVENOUS at 01:10

## 2021-08-22 RX ADMIN — AZITHROMYCIN MONOHYDRATE 500 MG: 500 INJECTION, POWDER, LYOPHILIZED, FOR SOLUTION INTRAVENOUS at 21:54

## 2021-08-22 RX ADMIN — SODIUM CHLORIDE: 9 INJECTION, SOLUTION INTRAVENOUS at 00:38

## 2021-08-22 RX ADMIN — SODIUM CHLORIDE: 9 INJECTION, SOLUTION INTRAVENOUS at 08:59

## 2021-08-22 RX ADMIN — SPIRONOLACTONE 25 MG: 25 TABLET ORAL at 14:45

## 2021-08-22 RX ADMIN — MAGNESIUM SULFATE HEPTAHYDRATE 2000 MG: 40 INJECTION, SOLUTION INTRAVENOUS at 14:43

## 2021-08-22 ASSESSMENT — PAIN SCALES - GENERAL
PAINLEVEL_OUTOF10: 0
PAINLEVEL_OUTOF10: 0

## 2021-08-22 NOTE — ED NOTES
Resting quietly , eyes closed On monitor , call light w/in reach.      Gaston Smith RN  08/22/21 [id number]

## 2021-08-22 NOTE — ED NOTES
Report called to [facility] S[facility] to 2019 via Tess Mcclellan, [address] [facility]  08/22/21 1645
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
## 2021-08-22 NOTE — CONSULTS
CARDIOLOGY CONSULT NOTE   Reason for consultation:  Abnormal troponin    Referring physician:  Lazaro Resendez MD     Primary care physician: Siobhan Aggarwal      Dear  Dr. Lazaro Resendez MD   Thanks for the consult. Chief Complaints :S(OB     History of present illness:Jessy is a 66 y. o.year old who presents with stream fatigue shortness of breath was found to have Covid pneumonia got admitted for further evaluation. She was also noted to have abnormal troponin level hence cardiology was consulted she actually had a stress test 2 weeks ago showing no ischemia EKG shows l sinus tachycardia she denies any chest pain as such but feels that she has generalized aches and pains and feels extremely winded short of breath fatigued and tired she did not get vaccine    Past medical history:    has a past medical history of Abdominal wall skin ulcer, with fat layer exposed ([address] Abdominal wall skin ulcer, with fat layer exposed ([address] Abdominal wall skin ulcer, with necrosis of muscle ([address] Ankle fracture, Anxiety, Asthma, Chronic venous hypertension with ulcer and inflammation ([address] Diabetes mellitus ([address] H/O cardiovascular stress test, History of skin graft, Hyperlipidemia, Hypertension, Kidney stone, Thyroid disease, Ulcer of other part of lower limb, WD-Non-healing surgical wound of the abdomen, and WD-Postoperative dehiscence of internal wound, initial encounter. Past surgical history:   has a past surgical history that includes Cholecystectomy;  section; Hand tendon surgery; Carpal tunnel release; Varicose vein surgery; Lithotripsy; eye surgery (Bilateral, 2016); and Hysterectomy. Social History:   reports that she quit smoking about 26 years ago. She has never used smokeless tobacco. She reports that she does not drink alcohol and does not use drugs.   Family history:   no family history of CAD, STROKE of DM at early age    Allergies   Allergen Reactions    Ativan [Lorazepam] Anxiety and Other (See Comments)     Patient exhibits restlessness, confusion, and hallucinations      Erythromycin Nausea Only    Gabapentin Other (See Comments)     Dizziness      Lyrica [Pregabalin] Swelling     With rapid weight gain       magnesium sulfate 2000 mg in 50 mL IVPB premix, Once  albuterol sulfate  (90 Base) MCG/ACT inhaler 2 puff, Q6H PRN  guaiFENesin (MUCINEX) extended release tablet 600 mg, BID  levothyroxine (SYNTHROID) tablet 100 mcg, Daily  metoprolol tartrate (LOPRESSOR) tablet 25 mg, BID  therapeutic multivitamin-minerals 1 tablet, Daily  potassium chloride (KLOR-CON) extended release tablet 20 mEq, BID  rosuvastatin (CRESTOR) tablet 5 mg, Daily  spironolactone (ALDACTONE) tablet 25 mg, Daily  torsemide (DEMADEX) tablet 20 mg, Daily  sodium chloride flush 0.9 % injection 5-40 mL, 2 times per day  sodium chloride flush 0.9 % injection 5-40 mL, PRN  0.9 % sodium chloride infusion, PRN  enoxaparin (LOVENOX) injection 30 mg, BID  ondansetron (ZOFRAN-ODT) disintegrating tablet 4 mg, Q8H PRN   Or  ondansetron (ZOFRAN) injection 4 mg, Q6H PRN  polyethylene glycol (GLYCOLAX) packet 17 g, Daily PRN  acetaminophen (TYLENOL) tablet 650 mg, Q6H PRN   Or  acetaminophen (TYLENOL) suppository 650 mg, Q6H PRN  famotidine (PEPCID) tablet 20 mg, BID  dexamethasone (DECADRON) tablet 6 mg, Daily  vitamin D CAPS 2,000 Units, Daily  cefTRIAXone (ROCEPHIN) 1000 mg IVPB in 50 mL D5W minibag, Q24H  azithromycin (ZITHROMAX) 500 mg in dextrose 5 % 250 mL IVPB, Q24H  insulin lispro (HUMALOG) injection vial 0-6 Units, TID WC  glucose (GLUTOSE) 40 % oral gel 15 g, PRN  dextrose 50 % IV solution, PRN  glucagon (rDNA) injection 1 mg, PRN  dextrose 5 % solution, PRN  remdesivir 200 mg in sodium chloride 0.9 % 250 mL IVPB, Once   Followed by  [START ON 8/23/2021] remdesivir 100 mg in sodium chloride 0.9 % 250 mL IVPB, Q24H  0.9 % sodium chloride bolus, PRN  [START ON 8/23/2021] insulin glargine (LANTUS) injection vial 10 Units, Daily      Current Facility-Administered Medications   Medication Dose Route Frequency Provider Last Rate Last Admin    magnesium sulfate 2000 mg in 50 mL IVPB premix  2,000 mg Intravenous Once Cale Rangel MD 25 mL/hr at 08/22/21 1443 2,000 mg at 08/22/21 1443    albuterol sulfate  (90 Base) MCG/ACT inhaler 2 puff  2 puff Inhalation Q6H PRN Cale Rangel MD        guaiFENesin [facility]) extended release tablet 600 mg  600 mg Oral BID Cale Rangel MD   600 mg at 08/22/21 1445    levothyroxine (SYNTHROID) tablet 100 mcg  100 mcg Oral Daily Cale Rangel MD   100 mcg at 08/22/21 1445    metoprolol tartrate (LOPRESSOR) tablet 25 mg  25 mg Oral BID Cale Rangel MD   25 mg at 08/22/21 1445    therapeutic multivitamin-minerals 1 tablet  1 tablet Oral Daily Cale Rangel MD   1 tablet at 08/22/21 1445    potassium chloride (KLOR-CON) extended release tablet 20 mEq  20 mEq Oral BID Cale Rangel MD   20 mEq at 08/22/21 1445    rosuvastatin (CRESTOR) tablet 5 mg  5 mg Oral Daily Cale Rangel MD   5 mg at 08/22/21 1445    spironolactone (ALDACTONE) tablet 25 mg  25 mg Oral Daily Cale Rangel MD   25 mg at 08/22/21 1445    torsemide (DEMADEX) tablet 20 mg  20 mg Oral Daily Cale Rangel MD   20 mg at 08/22/21 1445    sodium chloride flush 0.9 % injection 5-40 mL  5-40 mL Intravenous 2 times per day Cale Rangel MD   10 mL at 08/22/21 1446    sodium chloride flush 0.9 % injection 5-40 mL  5-40 mL Intravenous PRN Cale Rangel MD        0.9 % sodium chloride infusion  25 mL Intravenous PRN Cale Rangel MD        enoxaparin (LOVENOX) injection 30 mg  30 mg Subcutaneous BID Cale Rangel MD        ondansetron (ZOFRAN-ODT) disintegrating tablet 4 mg  4 mg Oral Q8H PRN Cale Rangel MD        Or    ondansetron (ZOFRAN) injection 4 mg  4 mg Intravenous Q6H PRN Cale Rangel MD        polyethylene glycol (GLYCOLAX) packet 17 g  17 g Oral Daily PRN Maggie Watts MD        acetaminophen (TYLENOL) tablet 650 mg  650 mg Oral Q6H PRN Maggie Watts MD        Or    acetaminophen (TYLENOL) suppository 650 mg  650 mg Rectal Q6H PRN Maggie Watts MD        famotidine (PEPCID) tablet 20 mg  20 mg Oral BID Maggie Watts MD   20 mg at 08/22/21 1445    dexamethasone (DECADRON) tablet 6 mg  6 mg Oral Daily Maggie Watts MD   6 mg at 08/22/21 1444    vitamin D CAPS 2,000 Units  2,000 Units Oral Daily Maggie Watts MD   2,000 Units at 08/22/21 1444    cefTRIAXone (ROCEPHIN) 1000 mg IVPB in 50 mL D5W minibag  1,000 mg Intravenous Q24H Maggie Watts MD        azithromycin (ZITHROMAX) 500 mg in dextrose 5 % 250 mL IVPB  500 mg Intravenous Q24H Maggie Watts MD        insulin lispro (HUMALOG) injection vial 0-6 Units  0-6 Units Subcutaneous TID WC Maggie Watts MD        glucose (GLUTOSE) 40 % oral gel 15 g  15 g Oral PRN Maggie Watts MD        dextrose 50 % IV solution  12.5 g Intravenous PRN Maggie Watts MD        glucagon (rDNA) injection 1 mg  1 mg Intramuscular PRN Maggie Watts MD        dextrose 5 % solution  100 mL/hr Intravenous PRN Maggie Watts MD        remdesivir 200 mg in sodium chloride 0.9 % 250 mL IVPB  200 mg Intravenous Once Maggie Watts  mL/hr at 08/22/21 1515 200 mg at 08/22/21 1515    Followed by   Gail Oglesby ON 8/23/2021] remdesivir 100 mg in sodium chloride 0.9 % 250 mL IVPB  100 mg Intravenous Q24H Maggie Watts MD        0.9 % sodium chloride bolus  30 mL Intravenous PRMD Leonidas Lee ON 8/23/2021] insulin glargine (LANTUS) injection vial 10 Units  10 Units Subcutaneous Daily Maggie Watts MD         Review of Systems:   · Constitutional: No Fever or Weight Loss   · Eyes: No Decreased Vision  · ENT: No Headaches, Hearing Loss or Vertigo  · Cardiovascular: As per HPI  · Respiratory: As per HPI  · Gastrointestinal: No abdominal pain, appetite loss, blood in stools, constipation, diarrhea or heartburn  · Genitourinary: No dysuria, trouble voiding, or hematuria  · Musculoskeletal:  No gait disturbance, weakness or joint complaints  · Integumentary: No rash or pruritis  · Neurological: No TIA or stroke symptoms  · Psychiatric: No anxiety or depression  · Endocrine: No malaise, fatigue or temperature intolerance  · Hematologic/Lymphatic: No bleeding problems, blood clots or swollen lymph nodes  · Allergic/Immunologic: No nasal congestion or hives  All systems negative except as marked. Physical Examination:    Vitals:    08/22/21 1100 08/22/21 1206 08/22/21 1228 08/22/21 1400   BP: (!) 108/57   100/82   Pulse: 73   84   Resp: 20 20 22   Temp: 95.1 °F (35.1 °C)  95.2 °F (35.1 °C)    TempSrc: Rectal  Rectal    SpO2: 100% 100%         General Appearance:  No distress, conversant    Constitutional:  Well developed, Well nourished, No acute distress, Non-toxic appearance. HENT:  Normocephalic, Atraumatic, Bilateral external ears normal, Oropharynx moist, No oral exudates, Nose normal. Neck- Normal range of motion, No tenderness, Supple, No stridor,no apical-carotid delay  Lymphatics : no palpable lymph nodes  Eyes:  PERRL, EOMI, Conjunctiva normal, No discharge. Respiratory:  Normal breath sounds, No respiratory distress, No wheezing, No chest tenderness. ,no use of accessory muscles, crackles Absent   Cardiovascular: (PMI) apex non displaced,no lifts no thrills, ankle swelling Absent  , 1+, s1 and s2 audible,Murmur. Absent , JVD not noted    Abdomen /GI:  Bowel sounds normal, Soft, No tenderness, No masses, No gross visceromegaly   :  No costovertebral angle tenderness   Musculoskeletal:  No edema, no tenderness, no deformities.  Back- no tenderness  Integument:  Well hydrated, no rash   Lymphatic:  No lymphadenopathy noted   Neurologic:  Alert & oriented x 3, CN 2-12 normal, normal motor function, normal sensory function, no focal deficits noted           Medical decision making and Data review:    Lab Review   Recent Labs     08/22/21  1248   WBC 1.8*   HGB 13.5   HCT 42.6   PLT 90*      Recent Labs     08/22/21  1248      K 4.4      CO2 26   BUN 25*   CREATININE 0.7     Recent Labs     08/22/21  1248   AST 84*   ALT 43*   BILIDIR 0.2   BILITOT 0.3   ALKPHOS 155*     Recent Labs     08/22/21  0055   TROPONINT 0.013*       No results for input(s): PROBNP in the last 72 hours. Lab Results   Component Value Date    INR 1.22 10/11/2014    PROTIME 12.9 10/11/2014       EKG: (reviewed by myself)    ECHO:(reviewed by myself)    Chest Xray:(reviewed by myself)  CT ABDOMEN PELVIS W IV CONTRAST    Addendum Date: 8/14/2021    ADDENDUM: Recommendations for 1 cm nodule identified within the left lower lobe as described in findings section of the report. Guidelines for follow-up and management of pulmonary nodules found on abdomen CT: >8mm - immediate chest CT for further evaluation. Radiology 2017 http://pubs. rsna.org/doi/full/10.1148/radiol. 2221116737     Result Date: 8/14/2021  EXAMINATION: CT OF THE ABDOMEN AND PELVIS WITH CONTRAST 8/14/2021 4:28 pm TECHNIQUE: CT of the abdomen and pelvis was performed with the administration of intravenous contrast. Multiplanar reformatted images are provided for review. Dose modulation, iterative reconstruction, and/or weight based adjustment of the mA/kV was utilized to reduce the radiation dose to as low as reasonably achievable. COMPARISON: CT abdomen pelvis July 14, 2020; CT abdomen pelvis January 13, 2008 HISTORY: ORDERING SYSTEM PROVIDED HISTORY: abdominal pain TECHNOLOGIST PROVIDED HISTORY: IV contrast only. Thank you. Reason for exam:->abdominal pain Reason for exam:->IV contrast only. Thank you.  Reason for Exam: ABD PAIN Acuity: Acute Type of Exam: Initial Additional signs and symptoms: 100ml isovue 370 @ rt forearm @ 1640hrs, gfr>60, creat 0.8 2021, **PT TOOK IN A MORE SHALLOW BREATH FOR SCAN THAN SCOUTS. RESCANNED THROUGH AREA (DOME OF LIVER), * rlq pain x 4 days, hx of no gallbladder, , hysterectomy FINDINGS: Lower Chest: 1 cm nodular density of the left lower lobe. This is new when compared with prior CT from 2020. Recommend further evaluation with dedicated CT chest per current guidelines given size of this nodule. Linear atelectasis at the lingula and bilateral lung bases. Calcification of the mitral and aortic valves. Organs: The gallbladder is surgically absent. Mild intrahepatic and extrahepatic biliary dilatation of appears similar to previous exam.  The liver otherwise appears grossly unremarkable. The spleen demonstrates no acute findings. The spleen is enlarged. The pancreas and bilateral adrenal glands demonstrate no acute abnormality. The kidneys demonstrate symmetric enhancement. Nonspecific mild urothelial thickening and adjacent stranding overall appear similar when compared with CT from 2020. Perinephric stranding also appears grossly similar. Again, infectious process cannot be excluded. Recommend clinical correlation. The ureters are normal in caliber. No obstructive uropathy identified. GI/Bowel: Colonic diverticulosis redemonstrated. No definitive CT evidence of diverticulitis. Stranding along the right pericolic gutter is similar to previous exam and is favored to be related to the right perinephric stranding and is overall unchanged from prior exam.  The stomach is partially collapsed not well evaluated. The small bowel is normal in caliber. No bowel obstruction. Pelvis: The bladder demonstrates no acute findings. The uterus is surgically absent. Peritoneum/Retroperitoneum: The abdominal aorta is normal in caliber with moderate calcified and noncalcified atherosclerotic plaque throughout.  Shotty non pathologically enlarged retroperitoneal nodes similar to previous exam.  These are nonspecific and may be reactive. No free air or free fluid identified within the abdomen. Bones/Soft Tissues: Nonspecific enlarged right inguinal lymph node measuring up 2.1 cm short axis dimension redemonstrated. This is similar to prior exam.  Soft tissues demonstrate anasarca. Diastasis of the anterior abdominal wall. Multilevel degenerative changes of the spine. Mild-to-moderate degenerative changes of the bilateral hips and pubic symphysis. 1. Redemonstration of nonobstructing left nephrolithiasis as well as nonspecific urothelial thickening involving the renal pelvis bilaterally with adjacent bilateral perinephric stranding which overall appears similar to exams from 2020 and 2018. Findings may reflect chronic infectious or inflammatory process. No obstructive uropathy identified. 2. Nonspecific enlarged right inguinal lymph node measuring up to 2.1 cm in short axis dimension. This was present dating back to 2008. 3. Colonic diverticulosis without CT evidence of diverticulitis. 4. Anasarca. XR CHEST PORTABLE    Result Date: 8/22/2021  EXAMINATION: ONE XRAY VIEW OF THE CHEST 8/22/2021 12:03 am COMPARISON: December 16, 2020 HISTORY: ORDERING SYSTEM PROVIDED HISTORY: chest pain TECHNOLOGIST PROVIDED HISTORY: Reason for exam:->chest pain Reason for Exam: anxiety, sob Acuity: Acute Type of Exam: Initial Additional signs and symptoms: anxiety, sob Relevant Medical/Surgical History: anxiety, sob FINDINGS: There are consolidative changes in the right lung and mild infiltrates within the left upper lobe. No effusion is identified. There is elevation of the right hemidiaphragm. The heart size is normal.     Consolidative changes within the right lung and mild nodular infiltrates within the left upper lobe. Recommend CT of the chest for further evaluation.      NM MYOCARDIAL SPECT REST EXERCISE OR RX    Result Date: 8/10/2021  Cardiac Perfusion Imaging   Demographics   Patient Name      Wandy Matos Date of study        08/10/2021   Date of Birth     1950         Gender               Female   Age               66 year(s)         Race                    Patient Number    [id number]           Room Number   Visit Number      [id number]          Height               63 inches   Corporate ID      [id number]           Weight               230 pounds   Accession Number  [id number]   Referring         Monica Rosemarie Technologist      Regina Dorsey, [facility]  Physician         Vivien LUNA   Ordering          Domi Vega     Interpreting         [facility] MD      Cardiologist         Vivien LUNA   The procedure was explained in detail to the patient. Risks,  complications and alternative treatments were reviewed. Written consent  was obtained. Conclusions   Summary  Supervising physician Dr. Rodriguez Sat . Left ventricular perfusion is abnormal suggesting apical hypertrophy without  regional ischemia. Left ventricular function is normal with EF of 58% . PVCs and low QRS voltage on ECG noted. Recommendation  Recommend follow up office visit to discuss the results and further  recommendations. Signatures   ------------------------------------------------------------------  Electronically signed by Bart Cordon MD  (Interpreting cardiologist) on 08/10/2021 at 15:47  ------------------------------------------------------------------  Procedure Procedure Type:   Nuclear Stress Test:NM MYOCARDIAL SPECT REST EXERCISE OR RX  Indications: Dyspnea. Risk Factors   The patient risk factors include:treated hypercholesterolemia, treated  hypertension and diabetes mellitus. Stress Protocols   Resting ECG  Normal sinus rhythm 90 bpm with low QRS voltage in chest leads.    Resting HR:91 bpm             Resting BP:128/72 mmHg  Stress Protocol:Pharmacologic - Lexiscan  Peak HR:95 bpm                           HR response: Appropriate  Peak BP:118/60 mmHg BP response: Normal resting BP -  Predicted HR: 148 bpm                    appropriate response  % of predicted HR: 64                    HR/BP product:[id number]   Exercise duration: 01:00 min  Reason for termination:Reached  diagnostic endpoint   ECG Findings  Lexiscan stress ECG unchanged from baseline ECG. Arrhythmias  ventricular premature beats; Symptoms  Shortness of breath. Complications  Procedure complication was none. Procedure Medications   - Lexiscan I.V. bolus (over 15sec.) 0.4 mg admininstered @ 08/10/2021 11:05. Imaging Protocols   Rest                                 Stress   Isotope:Sestamibi 99mTc              Isotope: Sestamibi 99mTc  Isotope dose:31.3 mCi                Isotope dose:10.2 mCi  Administration route: I.V. Rt. hand  Administration route: I.V. Rt. hand  Injection Date:08/10/2021 14:30      Injection Date:08/10/2021 11:06  Scan Date:08/10/2021 15:00           Scan Date:08/10/2021 11:36   Technique:          Gated            Technique:          SPECT                      SPECT   Perfusion Interpretation   Decreased trace uptake throughout myocardium except apical segment without  significant redistribution. Normal Left ventricular size, wall motion and systolic function. Ejection  fraction is 58 %. Imaging Results Visualization: Good  Rest ejection  Ejection fraction:58 %  EDV :98 ml  ESV :41 ml  Stroke volume :57 ml  Medical History   Accession#:  [id number]  Admission Data Admission date: 08/10/2021 Admission Time: 10:45 Hospital Status: Outpatient. All labs, medications and tests reviewed by myself including data  from outside source , patient and available family . Continue all other medications of all above medical condition listed as is. Impression:  Active Problems:    Pneumonia due to COVID-19 virus  Resolved Problems:    * No resolved hospital problems. *      Assessment: 66 y. o.year old with PMH of  has a past medical history of Abdominal wall skin ulcer, with fat layer exposed ([address] Abdominal wall skin ulcer, with fat layer exposed ([address] Abdominal wall skin ulcer, with necrosis of muscle ([address] Ankle fracture, Anxiety, Asthma, Chronic venous hypertension with ulcer and inflammation ([address] Diabetes mellitus ([address] H/O cardiovascular stress test, History of skin graft, Hyperlipidemia, Hypertension, Kidney stone, Thyroid disease, Ulcer of other part of lower limb, WD-Non-healing surgical wound of the abdomen, and WD-Postoperative dehiscence of internal wound, initial encounter. Plan and Recommendations:    Elevated Troponin : Check serial troponin if they are not rising we will continue medical management for now.,  Elevated in setting of Covid acute infection, Doubt ACS most probably Demand ischemia related leak, No further work up at this time add aspirin 81 mg , can consider t echo to rule out wall motion abnormality  Supportive care for Covid pneumonia as per primary team  HTN: stable, continue present medications  DVT prophylaxis if no contraindication  6. Dyslipidemia: continue statins   Call with questions we will see as needed basis        Thank you  much for consult and giving us the opportunity in contributing in the care of this patient. Please feel free to call me for any questions.        Kesha Sherman MD, 8/22/2021 3:16 PM

## 2021-08-22 NOTE — PROGRESS NOTES
Pharmacy Consult for Remdesivir Initiation per Dr. Serrano Able    Day 1 of 5    Based on the above criteria, the patient is a candidate for remdesivir therapy. Give: Remdesivir 200 mg iv on day 1,   followed by 100 mg iv daily on days 2-5.      The following test will also be ordered:  BMP x5 days  CBC x5 days  LFT x5 days    RENAL LAB EVALUATION  EGFR:  Lab Results   Component Value Date    LABGLOM 49 08/22/2021     Lab Results   Component Value Date    GFRAA 59 08/22/2021     Recent Labs     08/22/21  0055   BUN 27*   CREATININE 1.1       LIVER FUNCTION LAB EVALUATION  Recent Labs     08/22/21  0055   AST 95*   ALT 48*   ALKPHOS 157*   BILITOT 0.5       Thank you for the consult,  -Jarred [facility]
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
## 2021-08-22 NOTE — ED NOTES
Patient became confused with increased anxiety and restlessness after 1mg of ativan was administered.        Rohith Andrew RN  08/22/21 8545
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
## 2021-08-22 NOTE — ED NOTES
Patient continues to be restless at times, pulling off BP cuff and cardiac monitor. Patient positioned for comfort each time. Will continue to monitor.       José Luis Luque RN  08/22/21 0997
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
## 2021-08-22 NOTE — ED NOTES
Pt. Awake and alert , denies any pain or discomfort at this time .  Call light w/in reach     Shama Collins RN  08/22/21 4111

## 2021-08-23 ENCOUNTER — APPOINTMENT (OUTPATIENT)
Dept: GENERAL RADIOLOGY | Age: 71
DRG: 871 | End: 2021-08-23
Attending: HOSPITALIST
Payer: COMMERCIAL

## 2021-08-23 ENCOUNTER — APPOINTMENT (OUTPATIENT)
Dept: CT IMAGING | Age: 71
DRG: 871 | End: 2021-08-23
Attending: HOSPITALIST
Payer: COMMERCIAL

## 2021-08-23 LAB
ALBUMIN SERPL-MCNC: 2.8 GM/DL (ref 3.4–5)
ALBUMIN SERPL-MCNC: 2.8 GM/DL (ref 3.4–5)
ALP BLD-CCNC: 142 IU/L (ref 40–129)
ALP BLD-CCNC: 142 IU/L (ref 40–129)
ALT SERPL-CCNC: 39 U/L (ref 10–40)
ALT SERPL-CCNC: 39 U/L (ref 10–40)
ANION GAP SERPL CALCULATED.3IONS-SCNC: 9 MMOL/L (ref 4–16)
AST SERPL-CCNC: 66 IU/L (ref 15–37)
AST SERPL-CCNC: 66 IU/L (ref 15–37)
BASOPHILS ABSOLUTE: 0 K/CU MM
BASOPHILS RELATIVE PERCENT: 0.2 % (ref 0–1)
BILIRUB SERPL-MCNC: 0.3 MG/DL (ref 0–1)
BILIRUB SERPL-MCNC: 0.3 MG/DL (ref 0–1)
BILIRUBIN DIRECT: 0.2 MG/DL (ref 0–0.3)
BILIRUBIN, INDIRECT: 0.1 MG/DL (ref 0–0.7)
BUN BLDV-MCNC: 32 MG/DL (ref 6–23)
CALCIUM SERPL-MCNC: 7.7 MG/DL (ref 8.3–10.6)
CHLORIDE BLD-SCNC: 104 MMOL/L (ref 99–110)
CO2: 27 MMOL/L (ref 21–32)
CREAT SERPL-MCNC: 0.9 MG/DL (ref 0.6–1.1)
DIFFERENTIAL TYPE: ABNORMAL
EOSINOPHILS ABSOLUTE: 0 K/CU MM
EOSINOPHILS RELATIVE PERCENT: 0 % (ref 0–3)
FERRITIN: 888 NG/ML (ref 15–150)
GFR AFRICAN AMERICAN: >60 ML/MIN/1.73M2
GFR NON-AFRICAN AMERICAN: >60 ML/MIN/1.73M2
GLUCOSE BLD-MCNC: 296 MG/DL (ref 70–99)
GLUCOSE BLD-MCNC: 336 MG/DL (ref 70–99)
GLUCOSE BLD-MCNC: 379 MG/DL (ref 70–99)
GLUCOSE BLD-MCNC: 408 MG/DL (ref 70–99)
GLUCOSE BLD-MCNC: 457 MG/DL (ref 70–99)
HCT VFR BLD CALC: 42 % (ref 37–47)
HEMOGLOBIN: 13.3 GM/DL (ref 12.5–16)
HIGH SENSITIVE C-REACTIVE PROTEIN: 36.8 MG/L
IMMATURE NEUTROPHIL %: 0.7 % (ref 0–0.43)
LACTATE DEHYDROGENASE: 295 IU/L (ref 120–246)
LEGIONELLA URINARY AG: NEGATIVE
LYMPHOCYTES ABSOLUTE: 0.4 K/CU MM
LYMPHOCYTES RELATIVE PERCENT: 9.3 % (ref 24–44)
MCH RBC QN AUTO: 31.2 PG (ref 27–31)
MCHC RBC AUTO-ENTMCNC: 31.7 % (ref 32–36)
MCV RBC AUTO: 98.6 FL (ref 78–100)
MONOCYTES ABSOLUTE: 0.3 K/CU MM
MONOCYTES RELATIVE PERCENT: 5.9 % (ref 0–4)
NUCLEATED RBC %: 0 %
PDW BLD-RTO: 14 % (ref 11.7–14.9)
PLATELET # BLD: 105 K/CU MM (ref 140–440)
PMV BLD AUTO: 10.5 FL (ref 7.5–11.1)
POTASSIUM SERPL-SCNC: 4.6 MMOL/L (ref 3.5–5.1)
RBC # BLD: 4.26 M/CU MM (ref 4.2–5.4)
REASON FOR REJECTION: NORMAL
REJECTED TEST: NORMAL
SEGMENTED NEUTROPHILS ABSOLUTE COUNT: 3.7 K/CU MM
SEGMENTED NEUTROPHILS RELATIVE PERCENT: 83.9 % (ref 36–66)
SODIUM BLD-SCNC: 140 MMOL/L (ref 135–145)
STREP PNEUMONIAE ANTIGEN: NORMAL
TOTAL IMMATURE NEUTOROPHIL: 0.03 K/CU MM
TOTAL NUCLEATED RBC: 0 K/CU MM
TOTAL PROTEIN: 5.7 GM/DL (ref 6.4–8.2)
TOTAL PROTEIN: 5.7 GM/DL (ref 6.4–8.2)
TROPONIN T: <0.01 NG/ML
TROPONIN T: <0.01 NG/ML
VITAMIN D 25-HYDROXY: 48.83 NG/ML
WBC # BLD: 4.4 K/CU MM (ref 4–10.5)

## 2021-08-23 PROCEDURE — 93308 TTE F-UP OR LMTD: CPT

## 2021-08-23 PROCEDURE — 6370000000 HC RX 637 (ALT 250 FOR IP): Performed by: HOSPITALIST

## 2021-08-23 PROCEDURE — 99232 SBSQ HOSP IP/OBS MODERATE 35: CPT | Performed by: INTERNAL MEDICINE

## 2021-08-23 PROCEDURE — 71260 CT THORAX DX C+: CPT

## 2021-08-23 PROCEDURE — 6360000002 HC RX W HCPCS: Performed by: HOSPITALIST

## 2021-08-23 PROCEDURE — 6360000004 HC RX CONTRAST MEDICATION: Performed by: INTERNAL MEDICINE

## 2021-08-23 PROCEDURE — 80048 BASIC METABOLIC PNL TOTAL CA: CPT

## 2021-08-23 PROCEDURE — 97535 SELF CARE MNGMENT TRAINING: CPT

## 2021-08-23 PROCEDURE — 80053 COMPREHEN METABOLIC PANEL: CPT

## 2021-08-23 PROCEDURE — 85025 COMPLETE CBC W/AUTO DIFF WBC: CPT

## 2021-08-23 PROCEDURE — 83615 LACTATE (LD) (LDH) ENZYME: CPT

## 2021-08-23 PROCEDURE — 99254 IP/OBS CNSLTJ NEW/EST MOD 60: CPT | Performed by: INTERNAL MEDICINE

## 2021-08-23 PROCEDURE — 87899 AGENT NOS ASSAY W/OPTIC: CPT

## 2021-08-23 PROCEDURE — 82306 VITAMIN D 25 HYDROXY: CPT

## 2021-08-23 PROCEDURE — 97166 OT EVAL MOD COMPLEX 45 MIN: CPT

## 2021-08-23 PROCEDURE — 86141 C-REACTIVE PROTEIN HS: CPT

## 2021-08-23 PROCEDURE — 82728 ASSAY OF FERRITIN: CPT

## 2021-08-23 PROCEDURE — 92610 EVALUATE SWALLOWING FUNCTION: CPT

## 2021-08-23 PROCEDURE — 97530 THERAPEUTIC ACTIVITIES: CPT

## 2021-08-23 PROCEDURE — 83605 ASSAY OF LACTIC ACID: CPT

## 2021-08-23 PROCEDURE — 82248 BILIRUBIN DIRECT: CPT

## 2021-08-23 PROCEDURE — 2580000003 HC RX 258: Performed by: HOSPITALIST

## 2021-08-23 PROCEDURE — 97116 GAIT TRAINING THERAPY: CPT

## 2021-08-23 PROCEDURE — 36415 COLL VENOUS BLD VENIPUNCTURE: CPT

## 2021-08-23 PROCEDURE — 82962 GLUCOSE BLOOD TEST: CPT

## 2021-08-23 PROCEDURE — 84484 ASSAY OF TROPONIN QUANT: CPT

## 2021-08-23 PROCEDURE — 2060000000 HC ICU INTERMEDIATE R&B

## 2021-08-23 PROCEDURE — 97162 PT EVAL MOD COMPLEX 30 MIN: CPT

## 2021-08-23 PROCEDURE — 71045 X-RAY EXAM CHEST 1 VIEW: CPT

## 2021-08-23 PROCEDURE — 85379 FIBRIN DEGRADATION QUANT: CPT

## 2021-08-23 PROCEDURE — 2500000003 HC RX 250 WO HCPCS: Performed by: HOSPITALIST

## 2021-08-23 PROCEDURE — 87449 NOS EACH ORGANISM AG IA: CPT

## 2021-08-23 RX ORDER — INSULIN GLARGINE 100 [IU]/ML
8 INJECTION, SOLUTION SUBCUTANEOUS 2 TIMES DAILY
Status: DISCONTINUED | OUTPATIENT
Start: 2021-08-23 | End: 2021-08-23

## 2021-08-23 RX ORDER — 0.9 % SODIUM CHLORIDE 0.9 %
10 VIAL (ML) INJECTION
Status: COMPLETED | OUTPATIENT
Start: 2021-08-23 | End: 2021-08-23

## 2021-08-23 RX ORDER — INSULIN GLARGINE 100 [IU]/ML
8 INJECTION, SOLUTION SUBCUTANEOUS 2 TIMES DAILY
Status: DISCONTINUED | OUTPATIENT
Start: 2021-08-23 | End: 2021-08-24

## 2021-08-23 RX ADMIN — SODIUM CHLORIDE, PRESERVATIVE FREE 10 ML: 5 INJECTION INTRAVENOUS at 09:22

## 2021-08-23 RX ADMIN — ROSUVASTATIN CALCIUM 5 MG: 5 TABLET, COATED ORAL at 09:22

## 2021-08-23 RX ADMIN — GUAIFENESIN 600 MG: 600 TABLET, EXTENDED RELEASE ORAL at 09:24

## 2021-08-23 RX ADMIN — INSULIN GLARGINE 8 UNITS: 100 INJECTION, SOLUTION SUBCUTANEOUS at 11:59

## 2021-08-23 RX ADMIN — SODIUM CHLORIDE, PRESERVATIVE FREE 10 ML: 5 INJECTION INTRAVENOUS at 23:13

## 2021-08-23 RX ADMIN — REMDESIVIR 100 MG: 100 INJECTION, POWDER, LYOPHILIZED, FOR SOLUTION INTRAVENOUS at 15:30

## 2021-08-23 RX ADMIN — LEVOTHYROXINE SODIUM 100 MCG: 0.1 TABLET ORAL at 09:23

## 2021-08-23 RX ADMIN — Medication 2000 UNITS: at 09:24

## 2021-08-23 RX ADMIN — GUAIFENESIN 600 MG: 600 TABLET, EXTENDED RELEASE ORAL at 21:46

## 2021-08-23 RX ADMIN — METOPROLOL TARTRATE 25 MG: 25 TABLET, FILM COATED ORAL at 21:46

## 2021-08-23 RX ADMIN — FAMOTIDINE 20 MG: 20 TABLET ORAL at 21:46

## 2021-08-23 RX ADMIN — POTASSIUM CHLORIDE 20 MEQ: 750 TABLET, FILM COATED, EXTENDED RELEASE ORAL at 09:22

## 2021-08-23 RX ADMIN — FAMOTIDINE 20 MG: 20 TABLET ORAL at 09:23

## 2021-08-23 RX ADMIN — INSULIN LISPRO 3 UNITS: 100 INJECTION, SOLUTION INTRAVENOUS; SUBCUTANEOUS at 09:21

## 2021-08-23 RX ADMIN — ONDANSETRON 4 MG: 4 TABLET, ORALLY DISINTEGRATING ORAL at 21:47

## 2021-08-23 RX ADMIN — DEXAMETHASONE 6 MG: 4 TABLET ORAL at 09:23

## 2021-08-23 RX ADMIN — INSULIN LISPRO 6 UNITS: 100 INJECTION, SOLUTION INTRAVENOUS; SUBCUTANEOUS at 11:59

## 2021-08-23 RX ADMIN — INSULIN LISPRO 5 UNITS: 100 INJECTION, SOLUTION INTRAVENOUS; SUBCUTANEOUS at 17:20

## 2021-08-23 RX ADMIN — ENOXAPARIN SODIUM 30 MG: 30 INJECTION SUBCUTANEOUS at 21:47

## 2021-08-23 RX ADMIN — SPIRONOLACTONE 25 MG: 25 TABLET ORAL at 09:24

## 2021-08-23 RX ADMIN — METOPROLOL TARTRATE 25 MG: 25 TABLET, FILM COATED ORAL at 09:24

## 2021-08-23 RX ADMIN — AZITHROMYCIN MONOHYDRATE 500 MG: 500 INJECTION, POWDER, LYOPHILIZED, FOR SOLUTION INTRAVENOUS at 21:46

## 2021-08-23 RX ADMIN — Medication 1 TABLET: at 09:23

## 2021-08-23 RX ADMIN — Medication 10 ML: at 15:17

## 2021-08-23 RX ADMIN — ENOXAPARIN SODIUM 30 MG: 30 INJECTION SUBCUTANEOUS at 09:24

## 2021-08-23 RX ADMIN — POTASSIUM CHLORIDE 20 MEQ: 750 TABLET, FILM COATED, EXTENDED RELEASE ORAL at 21:47

## 2021-08-23 RX ADMIN — ACETAMINOPHEN 650 MG: 325 TABLET ORAL at 17:19

## 2021-08-23 RX ADMIN — INSULIN GLARGINE 8 UNITS: 100 INJECTION, SOLUTION SUBCUTANEOUS at 21:47

## 2021-08-23 RX ADMIN — IOPAMIDOL 75 ML: 755 INJECTION, SOLUTION INTRAVENOUS at 15:17

## 2021-08-23 RX ADMIN — CEFTRIAXONE 1000 MG: 1 INJECTION, POWDER, FOR SOLUTION INTRAMUSCULAR; INTRAVENOUS at 22:06

## 2021-08-23 RX ADMIN — TORSEMIDE 20 MG: 20 TABLET ORAL at 09:21

## 2021-08-23 ASSESSMENT — PAIN SCALES - GENERAL
PAINLEVEL_OUTOF10: 0
PAINLEVEL_OUTOF10: 0
PAINLEVEL_OUTOF10: 3
PAINLEVEL_OUTOF10: 3

## 2021-08-23 NOTE — CARE COORDINATION
Attempted to contact patient by phone; rang erlinda x 2. Attempted to contact patient's son Mary Hou at [phone]; line rang erlinda x 2. Will reattempt. Dorian Oden RN     Chart reviewed. Patient lives at home with family. She has a cane at home (documented from prior CM visit) She has a PCP and insurance that assists with Rx when needed. No needs identified at this time. CM will remain available should needs arise.  Dorian Oden RN

## 2021-08-23 NOTE — CONSULTS
No  Additional Comments: Pt with h/o multiple falls in the home with injuries sustained to BLE, most recent last week with LUE hit against wall    Examination of body systems (includes body structures/functions, activity/participation limitations):  · Observation:  Pt found standing up in room, unattended by sink adjusting gown upon arrival. Pt had disconnected Sp02 and tele wires  · Vision:  Glasses, visual hallucinations while seated by door: \"There's water coming all down that wall\"  · Hearing:  [place]  · Cardiopulmonary:  /54 mmHg, Sp02 ranges 90-94 on room air  · Cognition: Pt with increased time for recall, intermittent confusion, x1 episode of visual hallucination, oriented to self and location, see OT/SLP note for further evaluation. Musculoskeletal  · ROM R/L:  WFL LLE, R knee AROM flexion to 70*. · Strength R/L:  Generally 4-/5 BLE, decreased in function and endurance. · Neuro:  Oculomotor screen pt with corrective saccades and difficulty tracking on L side, decreased visual acuity with quadrant testing to L-side mid and inferior field. · Gait pattern: Step-to on LLE, SPC in the RUE, decreased step length and marie, increased fatigue, incr R trunk lean and multi-directional sway. Mobility:  · Transfers: CGA  · Sitting balance:  SUP. · Standing balance:  CGA. · Gait: AMB in room x20ft CGA-Min     [facility] 6 Clicks Inpatient Mobility:  AM-PAC Inpatient Mobility Raw Score : 17    Treatment:  Standing balance: Pt demonstrates ability to perform fair static and dynamic standing balance without UE support x1 minute upon arrival, additional standing x5 minutes at sink for oral hygiene tasks, able to maintain upright without UE support during reaching tasks with notable A/P sway.     STS: Pt performs x1 return to sit EOB with SBA and v/c for control, fair control of descent, x1 STS from EOB with SPC in RUE and v/c for sequence CGA, x2 return to seated in recliner with v/c for UE placement, x1 STS from recliner with CGA , BUE support on arm rests CGA with increased time. Pt demonstrates adequate LE ROM and power to complete these transfers, balance and safety impairments require CGA. Gait:  AMB in room x20ft CGA-Min, SPC in RUE, chair follow d/t pt report of \"dizziness\" and notable instability. Pt demonstrates step-to on LLE, SPC in the RUE, decreased step length and marie, increased fatigue, incr R trunk lean and multi-directional sway. Pt with x1 min LOB which PT provides Min A for stability and v/c for standing rest break with good pt carryover. Pt increased difficulty with turning, increased sway. Gait trial ended d/t pt reports of dizziness and increased fatigue. EDU: PT discussed number of falls with pt, pt reports cause is the \"legs giving out\". PT discusses benefits of therapy for pt safety and discussed d/c options. Pt states she has \"just finished 4 mo of therapy\", pt willing for inpatient. Safety: patient left in recliner with alarm and tray infront, call light within reach, RN notified, gait belt used. Assessment:  Pt is a 65 y/o female admitted from ED 8/21 with c/o feeling very anxious and shaky with SOB. Pt with recent hospitalization 8/14 for RLQ pain which testing revealed no significant processes. Pt reports \"too sick\" as reason unable to see her PCP. Pt may be unreliable historian, also of note pt with x1 episode of visual hallucination, h/o multiple falls with injury. Per charting and pt report, pt has been living at home with Son and Fiance who assist pt with ADLs intermittently, pt able to AMB around the home with [facility]. At this time pt presenting with deficits in: functional balance, gait abnormality, decreased safety awareness, decreased functional endurance, decreased LE strength. At this time pt would benefit from continued skilled PT services via d/c to ARU as pt is able and willing to participate in intense therapy.    Complexity: Moderate  Prognosis: Good, no significant barriers to participation at this time. Plan Times per week: 4+/week, 1 week,   Discharge Recommendations: IP Rehab  Equipment: Defer to next LOC, pt may benefit from RW    Goals:  Short term goals  Time Frame for Short term goals: 1 week  Short term goal 1: Pt will AMB x35 ft with SPC, CGA-SBA without rest break, no LOB.   Short term goal 2: Pt will perform all bed mobility tasks with SBA  Short term goal 3: Pt  will participate in standing dynamic balance activity x3 minutes with CGA and intermittent UE support       Treatment plan:  Bed mobility, transfers, balance, gait, TA, TX,     Recommendations for NURSING mobility: AMB to/from BR with cane, RW and chair follow    Time:   Time in: 10:10  Time out: 10:40  Timed treatment minutes: 23  Total time: 30    Electronically signed by:    Paul Finney PT  8/23/2021, 2:54 PM

## 2021-08-23 NOTE — PROGRESS NOTES
AMB without supervision)  Transfer Assistance: Independent  Active : No  Additional Comments: Pt with h/o multiple falls in the home with injuries sustained to BLE, most recent last week with LUE hit against wall    Examination of body systems (includes body structures/functions, activity/participation limitations):  · Observation:  Supine in bed upon arrival, agreeable to therapy  · Vision:  Glasses  · Hearing:  [facility]  · Cardiopulmonary:  No 02 needs; 90-94% on room air      Body Systems and functions:  · ROM R/L:  WFL. · Strength R/L:  4/5,   · Sensation: WFL  · Tone: Normal  · Coordination: WFL  · Perception: WNL    Activities of Daily Living (ADLs):  · Feeding: Jah  · Grooming: CGA (washing face/hands and oral care in stand at sink)  · UB bathing: SBA  · LB bathing: Blaine   · UB dressing: SBA  · LB dressing: Blaine (Assistance to don sock on R foot due to it being \"bad knee\" as reported by pt, pt able to don sock on L foot in figure 4 position)  · Toileting: CGA    Cognitive and Psychosocial Functioning:  · Overall cognitive status: Difficult to re-direct pt from believing water was running down walls, AxO x 4, decreased problem solving  · Affect: Normal        Mobility:  · Supine to sit:  SBA  · Transfers: CGA from EOB up to stand SPC  · Sitting balance:  SBA. · Standing balance:  CGA w/ SPC.   · Functional Mobility: CGA w/ SPC ~20 feet before becoming dizzy, returned to reclining chair  · Toilet/Shower Transfers: DNT             AM-PAC Daily Activity Inpatient   How much help for putting on and taking off regular lower body clothing?: A Little  How much help for Bathing?: A Little  How much help for Toileting?: A Little  How much help for putting on and taking off regular upper body clothing?: A Little  How much help for taking care of personal grooming?: A Little  How much help for eating meals?: None  AM-PAC Inpatient Daily Activity Raw Score: 19  AM-PAC Inpatient ADL T-Scale Score : 40.22  ADL Inpatient CMS 0-100% Score: 42.8  ADL Inpatient CMS G-Code Modifier : CK    Treatment:  Self Care Training:   Cues were given for safety, sequence, UE/LE placement, visual cues, and balance. Activities performed today included grooming, LB dressing    Safety: patient left in chair with chair alarm, call light within reach, RN notified, gait belt used. Assessment:  Pt is a 65 yo female admitted from home for Pneumonia due to COVID-19 virus. Pt at baseline is Independent for ADLs needs assistance for high level IADLs and is Independent for functional transfers/mobility w/ cane. Pt currently presents w/ deficits in ADL and high level IADL independence, functional activity tolerance, dynamic sitting and standing balance and tolerance and functional transfers, BUE strength, cognition. Pt would benefit from continued acute care OT services w/ discharge to ARU  Complexity: Moderate  Prognosis: Good, no significant barriers to participation at this time.    Plan  Times per week: 2x+  Times per day: Daily  Current Treatment Recommendations: Strengthening, Endurance Training, Patient/Caregiver Education & Training, Cognitive Reorientation, Equipment Evaluation, Education, & procurement, Self-Care / ADL, Balance Training, Pain Management, Home Management Training, Cognitive/Perceptual Training, Functional Mobility Training, Safety Education & Training, Positioning     Equipment: defer    Goals:  Pt goal: go home  Time Frame for STGs: discharge  Goal 1: Pt will perform UE ADLs Independent  Goal 2: Pt will perform LE ADLs Independent  Goal 3: Pt will perform toileting Independent  Goal 4: Pt will perform functional transfer w/ AD Jah  Goal 5: Pt will perform functional mobility w/ AD Jah  Goal 6: Pt will perform therex/theract in order to increase functional activity tolerance and dynamic standing balance    Treatment plan:  Pt will perform functional task in stand reaching in all 3 planes in order to increase dynamic standing balance and functional activity tolerance    Recommendations for NURSING activity: Up to chair for all 3 meals and up to standard commode for all toileting needs    Time:   Time in: 1009  Time out: 1035  Timed treatment minutes: 11 minutes  Total time: 26 minutes    Electronically signed by:    Tomi REZA/L [id number]  1:45 PM,8/23/2021

## 2021-08-23 NOTE — PROGRESS NOTES
to COVID-19 virus on their problem list.  ONSET DATE: this admission    Recent Chest Xray/CT of Chest: see chart    Date of Eval: 8/23/2021  Evaluating Therapist: MICAH Isaacs    Current Diet level:  Current Diet : Regular  Current Liquid Diet : Thin      Primary Complaint  Patient Complaint: denies current complaint    Pain:  Pain Assessment  Pain Assessment: Respiratory Rate >10  Pain Level: 0    Reason for Referral  Payal Thomas was referred for a bedside swallow evaluation to assess the efficiency of her swallow function, identify signs and symptoms of aspiration and make recommendations regarding safe dietary consistencies, effective compensatory strategies, and safe eating environment. Impression  Dysphagia Diagnosis: Swallow function appears grossly intact  Dysphagia Outcome Severity Scale: Level 6: Within functional limits/Modified independence     Treatment Plan  Requires SLP Intervention: No  Duration/Frequency of Treatment: N/A          Recommended Diet and Intervention  Diet Solids Recommendation: Regular  Liquid Consistency Recommendation: Thin  Recommended Form of Meds: PO          Compensatory Swallowing Strategies  Compensatory Swallowing Strategies: Upright as possible for all oral intake    Treatment/Goals  Short-term Goals  Timeframe for Short-term Goals: N/A    General  Chart Reviewed: Yes  Behavior/Cognition: Alert; Cooperative  Respiratory Status: Room air  O2 Device: None (Room air)  Communication Observation: Functional  Follows Directions: Simple  Dentition: Edentulous  Patient Positioning: Upright in chair  Baseline Vocal Quality: Normal  Prior Dysphagia History: none known prior to admission  Consistencies Administered: Reg solid; Dysphagia Pureed (Dysphagia I); Thin - cup; Thin - straw           Vision/Hearing  Hearing  Hearing: Within functional limits    Oral Motor Deficits  Oral/Motor  Oral Motor:  Within functional limits    Oral Phase Dysfunction  Oral Phase  Oral Phase: [facility] Indicators of Pharyngeal Phase Dysfunction   Pharyngeal Phase  Pharyngeal Phase: WFL    Prognosis  Prognosis  Barriers/Prognosis Comment: N/A  Individuals consulted  Consulted and agree with results and recommendations: Patient;RN    Education  Patient Education: results, recommendations  Patient Education Response: Verbalizes understanding  Safety Devices in place: Yes  Type of devices:  All fall risk precautions in place       Therapy Time  SLP Individual Minutes  Time In: 6741  Time Out: Callum Bravo  Minutes: [address] [facility], [address] [address]  8/23/2021 12:37 PM

## 2021-08-23 NOTE — PROGRESS NOTES
[facility] HOSPITALIST PROGRESS NOTE      PCP: Corine Lefort, DO    Date of Admission: 8/22/2021    Subjective: feels better on room air     [address] summary patient is a 79-year-old female with history of diabetes mellitus, diastolic CHF and hypothyroidism who was admitted with shortness of breath 1 week duration. Positive for SARS-CoV-2, on 2 to 3 L of oxygen, chest x-ray showed right lung and left upper lobe consolidative changes. Patient was hypothermic on admission,  Ceftriaxone and azithromycin started, oral steroids, remdesivir IV, pulmonology consult  Warming blankets      Vitals signs:  Afebrile, heart rate 86-93 range, blood pressure 136/75 range, on 1 L of oxygen    Medications: Dexamethasone, famotidine, Lantus, sliding scale insulin, levothyroxine, metoprolol, IV remdesivir, Crestor, Aldactone, torsemide    Antibiotics: Azithromycin, ceftriaxone    Fluid status: 1650 cc overnight    Labs: Sodium 140, potassium 4.6, chloride 104, bicarb 27, BUN 32, creatinine 0.9  Blood glucose 236  Alkaline phosphatase 142, AST LT normal  Hemoglobin A1c 8.4, TSH 1.  2 9, T4 1.31  WBC 4.4, hemoglobin 13.3, platelets 098  Procalcitonin 0.187    Imaging: Chest x-ray    Consolidative changes within the right lung and mild nodular infiltrates   within the left upper lobe.        Assessment/Plan:     Sepsis-POA due to COVID PNA with acute organ dysfunction as evidenced by Acute Hypoxic Respiratory Failure:    Admitted with hypoxia,   Chest x-ray consistent with bilateral infiltrate   On 1 L of Oxygen, improving  CRP ordered, Procalcitonin 0.187, DC antibiotics  D dimer ordered      Continue Dexamethadone, and IV famotidine  Wean oxygen as tolerated   Duonebs PRN  contact and droplet precautions    Pulmonology consulted,   Continue remdesivir  No indication for Tocilizumab    Bipap PRN     Supportive care- anti tussive, PRN tylenol, Sleep aid    Self proning   Avoid: nebulized medication since risk of aerosol transfer    Consider enoxaparin 0.5 mg/kg every 12 hours in the setting of an elevated D-dimer between 1 and 3 and positive COVID-19. If D-dimer elevated, will consider CT PE    Hypothermia: Present on admission resolved  TSH and free T4 normal    Pancytopenia: Most likely secondary to Covid  WBC and platelet improving    Diabetes mellitus type 2: Hemoglobin A1c 8.9  Glucose 211-296 range most likely elevated secondary to steroids  Add Lantus 8 units twice daily  Continue sliding scale insulin    Chronic diastolic CHF   Input output record, daily weight, salt and fluid restriction  Continue torsemide and Aldactone  Cardiology consulted    Hyperlipidemia: Continue statin    Hypomagnesemia: Replace as needed    DVT prophlaxis:   lovenox     Last BM:   None since admission    Ambulation:   As tolerated    Disposition:   Home versus SNF    Diet: Diabetic diet    Physical Exam Performed:       /68   Pulse 86   Temp 97.5 °F (36.4 °C) (Oral)   Resp 17   Ht 5' 3\" (1.6 m)   Wt 214 lb 1.1 oz (97.1 kg)   SpO2 93%   BMI 37.92 kg/m²     Physical Exam  Constitutional:       General: She is not in acute distress. Appearance: Normal appearance. HENT:      Head: Normocephalic and atraumatic. Right Ear: External ear normal.      Left Ear: External ear normal.   Eyes:      Extraocular Movements: Extraocular movements intact. Pupils: Pupils are equal, round, and reactive to light. Cardiovascular:      Rate and Rhythm: Normal rate and regular rhythm. Heart sounds: No murmur heard. Pulmonary:      Effort: Pulmonary effort is normal. No respiratory distress. Breath sounds: Normal breath sounds. No wheezing. Abdominal:      General: Bowel sounds are normal. There is no distension. Palpations: Abdomen is soft. Tenderness: There is no abdominal tenderness. Musculoskeletal:         General: No swelling. Cervical back: Normal range of motion.    Skin:     General: Skin is warm. Neurological:      General: No focal deficit present. Mental Status: She is alert and oriented to person, place, and time. Cranial Nerves: No cranial nerve deficit. Psychiatric:         Mood and Affect: Mood normal.         Labs:   Recent Labs     08/22/21 0055 08/22/21 1248 08/23/21  0735   WBC 3.7* 1.8* 4.4   HGB 12.9 13.5 13.3   HCT 39.7 42.6 42.0   * 90* 105*     Recent Labs     08/22/21 0055 08/22/21  1248 08/23/21  0735    139 140   K 3.6 4.4 4.6    104 104   CO2 31 26 27   BUN 27* 25* 32*   CREATININE 1.1 0.7 0.9   CALCIUM 8.7 8.0* 7.7*     Recent Labs     08/22/21 0055 08/22/21  1248 08/23/21  0735   AST 95* 84* 66*  66*   ALT 48* 43* 39  39   BILIDIR  --  0.2 0.2   BILITOT 0.5 0.3 0.3  0.3   ALKPHOS 157* 155* 142*  142*     No results for input(s): INR in the last 72 hours. No results for input(s): Les Rajesh in the last 72 hours. Urinalysis:      Lab Results   Component Value Date    NITRU NEGATIVE 08/14/2021    [address] NEGATIVE 08/14/2021    BACTERIA MANY 08/14/2021    RBCUA NEGATIVE 08/14/2021    BLOODU NEGATIVE 08/14/2021    SPECGRAV 1.030 08/14/2021       Radiology:  XR CHEST PORTABLE   Preliminary Result   Persistent low lung volumes, with interval increase in patchy bilateral   airspace opacities. Trace bilateral pleural effusions are unchanged. As   previously, given the nodular nature of these airspace opacities, further   evaluation with CT of the chest is recommended.       RECOMMENDATION:   Further evaluation of nodular airspace opacities with CT of the chest.                   Cyndy Alvarez MD
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
## 2021-08-23 NOTE — PROGRESS NOTES
Pulmonary and Critical Care  Progress Note      VITALS:  /75   Pulse 86   Temp 97.5 °F (36.4 °C) (Oral)   Resp 25   Ht 5' 3\" (1.6 m)   Wt 214 lb 1.1 oz (97.1 kg)   SpO2 94%   BMI 37.92 kg/m²     Subjective:   CHIEF COMPLAINT :SOB     HPI:                The patient is sitting in the bed. She is in mild resp distress    Objective:   PHYSICAL EXAM:    LUNGS:Occasional basal crackles  Abd-soft, BS+,NT  Ext- no pedal edema  CVS-s1s2, no murmurs      DATA:    CBC:  Recent Labs     08/22/21 0055 08/22/21  1248 08/23/21  0735   WBC 3.7* 1.8* 4.4   RBC 4.16* 4.32 4.26   HGB 12.9 13.5 13.3   HCT 39.7 42.6 42.0   * 90* 105*   MCV 95.4 98.6 98.6   MCH 31.0 31.3* 31.2*   MCHC 32.5 31.7* 31.7*   RDW 14.3 14.1 14.0   SEGSPCT 72.9* 75.0* 83.9*   BANDSPCT  --  1*  --       BMP:  Recent Labs     08/22/21 0055 08/22/21  1248 08/23/21  0735    139 140   K 3.6 4.4 4.6    104 104   CO2 31 26 27   BUN 27* 25* 32*   CREATININE 1.1 0.7 0.9   CALCIUM 8.7 8.0* 7.7*   GLUCOSE 115* 132* 336*      ABG:  No results for input(s): PH, PO2ART, QDK0KSE, HCO3, BEART, O2SAT in the last 72 hours. BNP  Lab Results   Component Value Date    BNP 16 06/13/2012      D-Dimer:  Lab Results   Component Value Date    DDIMER 588 (H) 12/16/2020      Radiology:   Persistent low lung volumes, with interval increase in patchy bilateral   airspace opacities.  Trace bilateral pleural effusions are unchanged.  As   previously, given the nodular nature of these airspace opacities, further   evaluation with CT of the chest is recommended.      1.       Assessment/Plan     Patient Active Problem List    Diagnosis Date Noted    Ulcer of other part of lower limb 06/05/2015     Priority: High     Class: Chronic    Pneumonia due to COVID-19 virus 08/22/2021    SOB (shortness of breath) 08/03/2021    ASCVD 10 year risk is 19% 08/03/2021    Hyperlipidemia     Acute on chronic diastolic heart failure ([facility] [address] 12/19/2020    Type 2 diabetes

## 2021-08-24 LAB
ALBUMIN SERPL-MCNC: 3.2 GM/DL (ref 3.4–5)
ALP BLD-CCNC: 159 IU/L (ref 40–129)
ALT SERPL-CCNC: 48 U/L (ref 10–40)
ANION GAP SERPL CALCULATED.3IONS-SCNC: 11 MMOL/L (ref 4–16)
AST SERPL-CCNC: 73 IU/L (ref 15–37)
BASOPHILS ABSOLUTE: 0 K/CU MM
BASOPHILS RELATIVE PERCENT: 0.1 % (ref 0–1)
BILIRUB SERPL-MCNC: 0.4 MG/DL (ref 0–1)
BILIRUBIN DIRECT: 0.2 MG/DL (ref 0–0.3)
BILIRUBIN, INDIRECT: 0.2 MG/DL (ref 0–0.7)
BUN BLDV-MCNC: 36 MG/DL (ref 6–23)
CALCIUM SERPL-MCNC: 8.2 MG/DL (ref 8.3–10.6)
CHLORIDE BLD-SCNC: 105 MMOL/L (ref 99–110)
CO2: 29 MMOL/L (ref 21–32)
CREAT SERPL-MCNC: 1 MG/DL (ref 0.6–1.1)
D DIMER: 619 NG/ML(DDU)
DIFFERENTIAL TYPE: ABNORMAL
EOSINOPHILS ABSOLUTE: 0 K/CU MM
EOSINOPHILS RELATIVE PERCENT: 0 % (ref 0–3)
GFR AFRICAN AMERICAN: >60 ML/MIN/1.73M2
GFR NON-AFRICAN AMERICAN: 55 ML/MIN/1.73M2
GLUCOSE BLD-MCNC: 244 MG/DL (ref 70–99)
GLUCOSE BLD-MCNC: 248 MG/DL (ref 70–99)
GLUCOSE BLD-MCNC: 275 MG/DL (ref 70–99)
GLUCOSE BLD-MCNC: 294 MG/DL (ref 70–99)
GLUCOSE BLD-MCNC: 371 MG/DL (ref 70–99)
GLUCOSE BLD-MCNC: 402 MG/DL (ref 70–99)
HCT VFR BLD CALC: 45.1 % (ref 37–47)
HEMOGLOBIN: 14.2 GM/DL (ref 12.5–16)
HIGH SENSITIVE C-REACTIVE PROTEIN: 19.7 MG/L
IMMATURE NEUTROPHIL %: 0.6 % (ref 0–0.43)
LYMPHOCYTES ABSOLUTE: 0.6 K/CU MM
LYMPHOCYTES RELATIVE PERCENT: 5.9 % (ref 24–44)
MCH RBC QN AUTO: 31.6 PG (ref 27–31)
MCHC RBC AUTO-ENTMCNC: 31.5 % (ref 32–36)
MCV RBC AUTO: 100.4 FL (ref 78–100)
MONOCYTES ABSOLUTE: 0.4 K/CU MM
MONOCYTES RELATIVE PERCENT: 3.9 % (ref 0–4)
NUCLEATED RBC %: 0 %
PDW BLD-RTO: 14.1 % (ref 11.7–14.9)
PLATELET # BLD: 192 K/CU MM (ref 140–440)
PMV BLD AUTO: 10.2 FL (ref 7.5–11.1)
POTASSIUM SERPL-SCNC: 4.5 MMOL/L (ref 3.5–5.1)
PROCALCITONIN: 0.12
RBC # BLD: 4.49 M/CU MM (ref 4.2–5.4)
SEGMENTED NEUTROPHILS ABSOLUTE COUNT: 9.3 K/CU MM
SEGMENTED NEUTROPHILS RELATIVE PERCENT: 89.5 % (ref 36–66)
SODIUM BLD-SCNC: 145 MMOL/L (ref 135–145)
TOTAL IMMATURE NEUTOROPHIL: 0.06 K/CU MM
TOTAL NUCLEATED RBC: 0 K/CU MM
TOTAL PROTEIN: 6.6 GM/DL (ref 6.4–8.2)
WBC # BLD: 10.4 K/CU MM (ref 4–10.5)

## 2021-08-24 PROCEDURE — 82248 BILIRUBIN DIRECT: CPT

## 2021-08-24 PROCEDURE — 6370000000 HC RX 637 (ALT 250 FOR IP): Performed by: HOSPITALIST

## 2021-08-24 PROCEDURE — 2700000000 HC OXYGEN THERAPY PER DAY

## 2021-08-24 PROCEDURE — 6360000002 HC RX W HCPCS: Performed by: HOSPITALIST

## 2021-08-24 PROCEDURE — 86141 C-REACTIVE PROTEIN HS: CPT

## 2021-08-24 PROCEDURE — 84145 PROCALCITONIN (PCT): CPT

## 2021-08-24 PROCEDURE — 82962 GLUCOSE BLOOD TEST: CPT

## 2021-08-24 PROCEDURE — 99232 SBSQ HOSP IP/OBS MODERATE 35: CPT | Performed by: INTERNAL MEDICINE

## 2021-08-24 PROCEDURE — 94761 N-INVAS EAR/PLS OXIMETRY MLT: CPT

## 2021-08-24 PROCEDURE — 83036 HEMOGLOBIN GLYCOSYLATED A1C: CPT

## 2021-08-24 PROCEDURE — 80053 COMPREHEN METABOLIC PANEL: CPT

## 2021-08-24 PROCEDURE — 2500000003 HC RX 250 WO HCPCS: Performed by: HOSPITALIST

## 2021-08-24 PROCEDURE — 2580000003 HC RX 258: Performed by: HOSPITALIST

## 2021-08-24 PROCEDURE — 6370000000 HC RX 637 (ALT 250 FOR IP): Performed by: INTERNAL MEDICINE

## 2021-08-24 PROCEDURE — 85025 COMPLETE CBC W/AUTO DIFF WBC: CPT

## 2021-08-24 PROCEDURE — 85379 FIBRIN DEGRADATION QUANT: CPT

## 2021-08-24 PROCEDURE — 1200000000 HC SEMI PRIVATE

## 2021-08-24 PROCEDURE — 36415 COLL VENOUS BLD VENIPUNCTURE: CPT

## 2021-08-24 RX ORDER — INSULIN GLARGINE 100 [IU]/ML
30 INJECTION, SOLUTION SUBCUTANEOUS NIGHTLY
Status: DISCONTINUED | OUTPATIENT
Start: 2021-08-24 | End: 2021-08-25

## 2021-08-24 RX ADMIN — REMDESIVIR 100 MG: 100 INJECTION, POWDER, LYOPHILIZED, FOR SOLUTION INTRAVENOUS at 14:14

## 2021-08-24 RX ADMIN — ROSUVASTATIN CALCIUM 5 MG: 5 TABLET, COATED ORAL at 08:59

## 2021-08-24 RX ADMIN — SPIRONOLACTONE 25 MG: 25 TABLET ORAL at 08:59

## 2021-08-24 RX ADMIN — LEVOTHYROXINE SODIUM 100 MCG: 0.1 TABLET ORAL at 09:00

## 2021-08-24 RX ADMIN — ENOXAPARIN SODIUM 30 MG: 30 INJECTION SUBCUTANEOUS at 09:00

## 2021-08-24 RX ADMIN — METOPROLOL TARTRATE 25 MG: 25 TABLET, FILM COATED ORAL at 22:17

## 2021-08-24 RX ADMIN — Medication 2000 UNITS: at 08:59

## 2021-08-24 RX ADMIN — METOPROLOL TARTRATE 25 MG: 25 TABLET, FILM COATED ORAL at 08:59

## 2021-08-24 RX ADMIN — Medication 1 TABLET: at 08:59

## 2021-08-24 RX ADMIN — INSULIN GLARGINE 30 UNITS: 100 INJECTION, SOLUTION SUBCUTANEOUS at 22:46

## 2021-08-24 RX ADMIN — INSULIN LISPRO 3 UNITS: 100 INJECTION, SOLUTION INTRAVENOUS; SUBCUTANEOUS at 17:02

## 2021-08-24 RX ADMIN — CEFTRIAXONE 1000 MG: 1 INJECTION, POWDER, FOR SOLUTION INTRAMUSCULAR; INTRAVENOUS at 22:22

## 2021-08-24 RX ADMIN — INSULIN GLARGINE 8 UNITS: 100 INJECTION, SOLUTION SUBCUTANEOUS at 09:06

## 2021-08-24 RX ADMIN — POTASSIUM CHLORIDE 20 MEQ: 750 TABLET, FILM COATED, EXTENDED RELEASE ORAL at 22:17

## 2021-08-24 RX ADMIN — GUAIFENESIN 600 MG: 600 TABLET, EXTENDED RELEASE ORAL at 08:59

## 2021-08-24 RX ADMIN — ENOXAPARIN SODIUM 30 MG: 30 INJECTION SUBCUTANEOUS at 22:21

## 2021-08-24 RX ADMIN — INSULIN LISPRO 2 UNITS: 100 INJECTION, SOLUTION INTRAVENOUS; SUBCUTANEOUS at 08:58

## 2021-08-24 RX ADMIN — INSULIN LISPRO 6 UNITS: 100 INJECTION, SOLUTION INTRAVENOUS; SUBCUTANEOUS at 11:48

## 2021-08-24 RX ADMIN — SODIUM CHLORIDE, PRESERVATIVE FREE 10 ML: 5 INJECTION INTRAVENOUS at 11:49

## 2021-08-24 RX ADMIN — FAMOTIDINE 20 MG: 20 TABLET ORAL at 09:00

## 2021-08-24 RX ADMIN — FAMOTIDINE 20 MG: 20 TABLET ORAL at 22:20

## 2021-08-24 RX ADMIN — SODIUM CHLORIDE, PRESERVATIVE FREE 10 ML: 5 INJECTION INTRAVENOUS at 22:23

## 2021-08-24 RX ADMIN — TORSEMIDE 20 MG: 20 TABLET ORAL at 09:00

## 2021-08-24 RX ADMIN — POTASSIUM CHLORIDE 20 MEQ: 750 TABLET, FILM COATED, EXTENDED RELEASE ORAL at 09:00

## 2021-08-24 RX ADMIN — GUAIFENESIN 600 MG: 600 TABLET, EXTENDED RELEASE ORAL at 22:17

## 2021-08-24 RX ADMIN — DEXAMETHASONE 6 MG: 4 TABLET ORAL at 08:59

## 2021-08-24 RX ADMIN — AZITHROMYCIN MONOHYDRATE 500 MG: 500 INJECTION, POWDER, LYOPHILIZED, FOR SOLUTION INTRAVENOUS at 22:21

## 2021-08-24 ASSESSMENT — PAIN SCALES - GENERAL: PAINLEVEL_OUTOF10: 0

## 2021-08-24 NOTE — PROGRESS NOTES
Hospitalist Progress Note      Name:  Jules Lacy /Age/Sex: 1950  (66 y.o. female)   MRN & CSN:  [id number] & [id number] Admission Date/Time: 2021 10:09 AM   Location:  -A PCP: Ranjeet Parker, [address] [place] [address] Day: 3      Assessment and Plan:     Assessment/Plan:     Sepsis-POA due to COVID PNA with acute organ dysfunction as evidenced by Acute Hypoxic Respiratory Failure:     Admitted with hypoxia,   Chest x-ray consistent with bilateral infiltrate   On 1 L of Oxygen, improving  CRP ordered, Procalcitonin 0.187  Antibiotics stopped  D-dimer ordered        Continue dexamethasone and IV famotidine  Wean oxygen as tolerated   Duonebs PRN  contact and droplet precautions     Pulmonology consulted,   Continue remdesivir  No indication for Tocilizumab     Bipap PRN      Supportive care- anti tussive, PRN tylenol, Sleep aid     Self proning   Avoid: nebulized medication since risk of aerosol transfer     Consider enoxaparin 0.5 mg/kg every 12 hours in the setting of an elevated D-dimer between 1 and 3 and positive COVID-19.      If D-dimer elevated, will consider CT PE     Hypothermia: Present on admission resolved  TSH and free T4 normal     Pancytopenia: Most likely secondary to Covid  WBC and platelet improving     Diabetes mellitus type 2: Hemoglobin A1c 8.9  Glucose 211-296 range most likely elevated secondary to steroids  Lantus 8 units twice a day added, continue  Continue sliding scale insulin     Chronic diastolic CHF   Input output record, daily weight, salt and fluid restriction  Continue torsemide and spironolactone  Cardiology consulted     Hyperlipidemia: Continue statin     Hypomagnesemia: Replace as needed     DVT prophlaxis:   lovenox       Subjective:     Awaiting acceptance at [facility]    She stated she is hungry  Dinner had been delivered an was on her bedside stand  Demonstrated to her how to ask for help    Objective:   No intake or output data in the 24 hours ending 08/24/21 1754   Vitals:   Vitals:    08/24/21 1703   BP: 133/74   Pulse: 66   Resp: 22   Temp: 97.5 °F (36.4 °C)   SpO2: 97%         Labs:   CBC:   Lab Results   Component Value Date    WBC 10.4 08/24/2021    HGB 14.2 08/24/2021    HCT 45.1 08/24/2021    .4 08/24/2021     08/24/2021     BMP:   Lab Results   Component Value Date     08/24/2021    K 4.5 08/24/2021     08/24/2021    CO2 29 08/24/2021    PHOS 2.7 03/17/2015    BUN 36 08/24/2021    CREATININE 1.0 08/24/2021    CALCIUM 8.2 08/24/2021       Physical Exam:       Physical Exam  Constitutional:       Appearance: She is not ill-appearing. Pulmonary:      Effort: Pulmonary effort is normal.   Neurological:      Cranial Nerves: No cranial nerve deficit.          Medications:   Medications:    insulin glargine  8 Units Subcutaneous BID    guaiFENesin  600 mg Oral BID    levothyroxine  100 mcg Oral Daily    metoprolol tartrate  25 mg Oral BID    therapeutic multivitamin-minerals  1 tablet Oral Daily    potassium chloride  20 mEq Oral BID    rosuvastatin  5 mg Oral Daily    spironolactone  25 mg Oral Daily    torsemide  20 mg Oral Daily    sodium chloride flush  5-40 mL IntraVENous 2 times per day    enoxaparin  30 mg Subcutaneous BID    famotidine  20 mg Oral BID    dexamethasone  6 mg Oral Daily    vitamin D  2,000 Units Oral Daily    cefTRIAXone (ROCEPHIN) IV  1,000 mg IntraVENous Q24H    azithromycin  500 mg IntraVENous Q24H    insulin lispro  0-6 Units Subcutaneous TID WC    remdesivir IVPB  100 mg IntraVENous Q24H      Infusions:    sodium chloride      dextrose       PRN Meds: albuterol sulfate HFA, 2 puff, Q6H PRN  sodium chloride flush, 5-40 mL, PRN  sodium chloride, 25 mL, PRN  ondansetron, 4 mg, Q8H PRN   Or  ondansetron, 4 mg, Q6H PRN  polyethylene glycol, 17 g, Daily PRN  acetaminophen, 650 mg, Q6H PRN   Or  acetaminophen, 650 mg, Q6H PRN  glucose, 15 g, PRN  dextrose, 12.5 g, PRN  glucagon (rDNA), 1 mg, PRN  dextrose, 100 mL/hr, PRN  sodium chloride, 30 mL, PRN          Electronically signed by Otf Guido MD on 8/24/2021 at 5:54 PM

## 2021-08-24 NOTE — PROGRESS NOTES
Pt able to ambulated with standby assist. Pt able to maintain o2 90% and above while ambulating on room air.

## 2021-08-24 NOTE — CARE COORDINATION
Received referral for ARU. Will review patients clinicals and PT/OT notes. Thank you for the referral.     Will send information to Infectious prevention nurse to review and determine when patient is able to have isolation removed.

## 2021-08-24 NOTE — CARE COORDINATION
Chart reviewed. PT/OT ec ARU. Contacted ARU liaison [employer] [place] RN. She will contact IC staff to determine possible transfer date (if precert approved). Will follow.  Porfirio Hahn RN

## 2021-08-24 NOTE — PROGRESS NOTES
Pulmonary and Critical Care  Progress Note      VITALS:  /76   Pulse 89   Temp 97.7 °F (36.5 °C) (Oral)   Resp 23   Ht 5' 3\" (1.6 m)   Wt 229 lb 15 oz (104.3 kg)   SpO2 97%   BMI 40.73 kg/m²     Subjective:   CHIEF COMPLAINT :SOB     HPI:                The patient is sitting in the chair. She is not in acute resp distress    Objective:   PHYSICAL EXAM:    LUNGS:Occasional basal crackles  Abd-soft, BS+,NT  Ext- no pedal edema  CVS-s1s2, no murmurs       DATA:    CBC:  Recent Labs     08/22/21 0055 08/22/21  1248 08/23/21  0735   WBC 3.7* 1.8* 4.4   RBC 4.16* 4.32 4.26   HGB 12.9 13.5 13.3   HCT 39.7 42.6 42.0   * 90* 105*   MCV 95.4 98.6 98.6   MCH 31.0 31.3* 31.2*   MCHC 32.5 31.7* 31.7*   RDW 14.3 14.1 14.0   SEGSPCT 72.9* 75.0* 83.9*   BANDSPCT  --  1*  --       BMP:  Recent Labs     08/22/21 0055 08/22/21  1248 08/23/21  0735    139 140   K 3.6 4.4 4.6    104 104   CO2 31 26 27   BUN 27* 25* 32*   CREATININE 1.1 0.7 0.9   CALCIUM 8.7 8.0* 7.7*   GLUCOSE 115* 132* 336*      ABG:  No results for input(s): PH, PO2ART, WPY7ZPU, HCO3, BEART, O2SAT in the last 72 hours. BNP  Lab Results   Component Value Date    BNP 16 06/13/2012      D-Dimer:  Lab Results   Component Value Date    DDIMER 588 (H) 12/16/2020      1.  Radiology: None      Assessment/Plan     Patient Active Problem List    Diagnosis Date Noted    Ulcer of other part of lower limb 06/05/2015     Priority: High     Class: Chronic    Leukopenia     Pneumonia due to COVID-19 virus 08/22/2021    SOB (shortness of breath) 08/03/2021    ASCVD 10 year risk is 19% 08/03/2021    Hyperlipidemia     Acute on chronic diastolic heart failure ([address] 12/19/2020    Type 2 diabetes mellitus ([facility] [address] 12/16/2020    Mild persistent asthma with (acute) exacerbation 12/16/2020    WD-Non-healing surgical wound of the abdomen     WD-Postoperative dehiscence of internal wound, initial encounter     Endometrial carcinoma ([facility] [address]     Surgical wound, non healing     Status post total abdominal hysterectomy     Diabetes mellitus type 2, insulin dependent (HCC)     Moderate asthma without complication     Hypothyroidism     Status post endovenous radiofrequency ablation (RFA) of saphenous vein 10/30/2015    Varicose veins of bilateral lower extremities with other complications 63/31/5044     Overview Note:     Replacing Deprecated Diagnoses      Varicose veins of lower extremities with ulcer and inflammation ([facility] [address] 08/28/2015    Venous hypertension of lower extremity 07/07/2015    Asymptomatic varicose veins 06/24/2015    Chronic venous hypertension with ulcer and inflammation (HCC) 06/05/2015    Rotator cuff tendinitis 11/11/2014    AC (acromioclavicular) arthritis 11/11/2014   Acute hypoxic resp failure- improving  Bilateral Pneumonia sec to COVID-19- improving  Bilateral Pulmonary nodules  Diastolic dysfunction  Mild Pulmonary HTN  Obesity  [place]  Ex smoker       1. Abx  2. F/u C&S  3. Decadron  4. Remdesivir  5. Inhalers  6. ICS  7. OOB  8. BIPAP  9. Keep sats > 92%  10. Await placement  11. C.w present management  No follow-ups on file.     Electronically signed by Griselda Barber, MD on 8/24/2021 at 10:45 AM

## 2021-08-25 LAB
ALBUMIN SERPL-MCNC: 3.1 GM/DL (ref 3.4–5)
ALP BLD-CCNC: 144 IU/L (ref 40–129)
ALT SERPL-CCNC: 46 U/L (ref 10–40)
ANION GAP SERPL CALCULATED.3IONS-SCNC: 8 MMOL/L (ref 4–16)
AST SERPL-CCNC: 64 IU/L (ref 15–37)
BASOPHILS ABSOLUTE: 0 K/CU MM
BASOPHILS RELATIVE PERCENT: 0.2 % (ref 0–1)
BILIRUB SERPL-MCNC: 0.4 MG/DL (ref 0–1)
BILIRUBIN DIRECT: 0.2 MG/DL (ref 0–0.3)
BILIRUBIN, INDIRECT: 0.2 MG/DL (ref 0–0.7)
BUN BLDV-MCNC: 34 MG/DL (ref 6–23)
CALCIUM SERPL-MCNC: 8.4 MG/DL (ref 8.3–10.6)
CHLORIDE BLD-SCNC: 107 MMOL/L (ref 99–110)
CO2: 28 MMOL/L (ref 21–32)
CREAT SERPL-MCNC: 0.9 MG/DL (ref 0.6–1.1)
D DIMER: 687 NG/ML(DDU)
DIFFERENTIAL TYPE: ABNORMAL
EOSINOPHILS ABSOLUTE: 0 K/CU MM
EOSINOPHILS RELATIVE PERCENT: 0 % (ref 0–3)
ESTIMATED AVERAGE GLUCOSE: 203 MG/DL
GFR AFRICAN AMERICAN: >60 ML/MIN/1.73M2
GFR NON-AFRICAN AMERICAN: >60 ML/MIN/1.73M2
GLUCOSE BLD-MCNC: 100 MG/DL (ref 70–99)
GLUCOSE BLD-MCNC: 114 MG/DL (ref 70–99)
GLUCOSE BLD-MCNC: 195 MG/DL (ref 70–99)
GLUCOSE BLD-MCNC: 208 MG/DL (ref 70–99)
GLUCOSE BLD-MCNC: 230 MG/DL (ref 70–99)
GLUCOSE BLD-MCNC: 267 MG/DL (ref 70–99)
HBA1C MFR BLD: 8.7 % (ref 4.2–6.3)
HCT VFR BLD CALC: 46.1 % (ref 37–47)
HEMOGLOBIN: 14.3 GM/DL (ref 12.5–16)
HIGH SENSITIVE C-REACTIVE PROTEIN: 13.7 MG/L
IMMATURE NEUTROPHIL %: 0.5 % (ref 0–0.43)
LYMPHOCYTES ABSOLUTE: 0.7 K/CU MM
LYMPHOCYTES RELATIVE PERCENT: 7.7 % (ref 24–44)
MCH RBC QN AUTO: 31 PG (ref 27–31)
MCHC RBC AUTO-ENTMCNC: 31 % (ref 32–36)
MCV RBC AUTO: 100 FL (ref 78–100)
MONOCYTES ABSOLUTE: 0.8 K/CU MM
MONOCYTES RELATIVE PERCENT: 9 % (ref 0–4)
NUCLEATED RBC %: 0 %
PDW BLD-RTO: 14.1 % (ref 11.7–14.9)
PLATELET # BLD: 145 K/CU MM (ref 140–440)
PMV BLD AUTO: 10 FL (ref 7.5–11.1)
POTASSIUM SERPL-SCNC: 4.4 MMOL/L (ref 3.5–5.1)
RBC # BLD: 4.61 M/CU MM (ref 4.2–5.4)
SEGMENTED NEUTROPHILS ABSOLUTE COUNT: 7.2 K/CU MM
SEGMENTED NEUTROPHILS RELATIVE PERCENT: 82.6 % (ref 36–66)
SODIUM BLD-SCNC: 143 MMOL/L (ref 135–145)
T4 FREE: 1.8 NG/DL (ref 0.9–1.8)
TOTAL IMMATURE NEUTOROPHIL: 0.04 K/CU MM
TOTAL NUCLEATED RBC: 0 K/CU MM
TOTAL PROTEIN: 6.1 GM/DL (ref 6.4–8.2)
WBC # BLD: 8.7 K/CU MM (ref 4–10.5)

## 2021-08-25 PROCEDURE — 85025 COMPLETE CBC W/AUTO DIFF WBC: CPT

## 2021-08-25 PROCEDURE — 6360000002 HC RX W HCPCS: Performed by: HOSPITALIST

## 2021-08-25 PROCEDURE — 80053 COMPREHEN METABOLIC PANEL: CPT

## 2021-08-25 PROCEDURE — 6370000000 HC RX 637 (ALT 250 FOR IP): Performed by: HOSPITALIST

## 2021-08-25 PROCEDURE — 85379 FIBRIN DEGRADATION QUANT: CPT

## 2021-08-25 PROCEDURE — 82248 BILIRUBIN DIRECT: CPT

## 2021-08-25 PROCEDURE — 94761 N-INVAS EAR/PLS OXIMETRY MLT: CPT

## 2021-08-25 PROCEDURE — 6370000000 HC RX 637 (ALT 250 FOR IP): Performed by: INTERNAL MEDICINE

## 2021-08-25 PROCEDURE — 2500000003 HC RX 250 WO HCPCS: Performed by: HOSPITALIST

## 2021-08-25 PROCEDURE — 82962 GLUCOSE BLOOD TEST: CPT

## 2021-08-25 PROCEDURE — 1200000000 HC SEMI PRIVATE

## 2021-08-25 PROCEDURE — 99232 SBSQ HOSP IP/OBS MODERATE 35: CPT | Performed by: INTERNAL MEDICINE

## 2021-08-25 PROCEDURE — 84439 ASSAY OF FREE THYROXINE: CPT

## 2021-08-25 PROCEDURE — 36415 COLL VENOUS BLD VENIPUNCTURE: CPT

## 2021-08-25 PROCEDURE — 2580000003 HC RX 258: Performed by: HOSPITALIST

## 2021-08-25 PROCEDURE — 86141 C-REACTIVE PROTEIN HS: CPT

## 2021-08-25 RX ORDER — INSULIN GLARGINE 100 [IU]/ML
45 INJECTION, SOLUTION SUBCUTANEOUS NIGHTLY
Status: DISCONTINUED | OUTPATIENT
Start: 2021-08-25 | End: 2021-08-26

## 2021-08-25 RX ADMIN — GUAIFENESIN 600 MG: 600 TABLET, EXTENDED RELEASE ORAL at 08:23

## 2021-08-25 RX ADMIN — ENOXAPARIN SODIUM 30 MG: 30 INJECTION SUBCUTANEOUS at 08:24

## 2021-08-25 RX ADMIN — ONDANSETRON 4 MG: 2 INJECTION INTRAMUSCULAR; INTRAVENOUS at 21:52

## 2021-08-25 RX ADMIN — TORSEMIDE 20 MG: 20 TABLET ORAL at 08:24

## 2021-08-25 RX ADMIN — SPIRONOLACTONE 25 MG: 25 TABLET ORAL at 08:23

## 2021-08-25 RX ADMIN — GUAIFENESIN 600 MG: 600 TABLET, EXTENDED RELEASE ORAL at 21:52

## 2021-08-25 RX ADMIN — METOPROLOL TARTRATE 25 MG: 25 TABLET, FILM COATED ORAL at 21:51

## 2021-08-25 RX ADMIN — ENOXAPARIN SODIUM 30 MG: 30 INJECTION SUBCUTANEOUS at 21:54

## 2021-08-25 RX ADMIN — FAMOTIDINE 20 MG: 20 TABLET ORAL at 08:23

## 2021-08-25 RX ADMIN — ROSUVASTATIN CALCIUM 5 MG: 5 TABLET, COATED ORAL at 08:23

## 2021-08-25 RX ADMIN — INSULIN LISPRO 15 UNITS: 100 INJECTION, SOLUTION INTRAVENOUS; SUBCUTANEOUS at 12:50

## 2021-08-25 RX ADMIN — REMDESIVIR 100 MG: 100 INJECTION, POWDER, LYOPHILIZED, FOR SOLUTION INTRAVENOUS at 15:08

## 2021-08-25 RX ADMIN — Medication 2000 UNITS: at 08:23

## 2021-08-25 RX ADMIN — SODIUM CHLORIDE, PRESERVATIVE FREE 10 ML: 5 INJECTION INTRAVENOUS at 21:53

## 2021-08-25 RX ADMIN — LEVOTHYROXINE SODIUM 100 MCG: 0.1 TABLET ORAL at 08:23

## 2021-08-25 RX ADMIN — FAMOTIDINE 20 MG: 20 TABLET ORAL at 21:51

## 2021-08-25 RX ADMIN — INSULIN LISPRO 15 UNITS: 100 INJECTION, SOLUTION INTRAVENOUS; SUBCUTANEOUS at 08:27

## 2021-08-25 RX ADMIN — METOPROLOL TARTRATE 25 MG: 25 TABLET, FILM COATED ORAL at 08:23

## 2021-08-25 RX ADMIN — CEFTRIAXONE 1000 MG: 1 INJECTION, POWDER, FOR SOLUTION INTRAMUSCULAR; INTRAVENOUS at 22:14

## 2021-08-25 RX ADMIN — Medication 1 TABLET: at 08:23

## 2021-08-25 RX ADMIN — DEXAMETHASONE 6 MG: 4 TABLET ORAL at 08:23

## 2021-08-25 RX ADMIN — AZITHROMYCIN MONOHYDRATE 500 MG: 500 INJECTION, POWDER, LYOPHILIZED, FOR SOLUTION INTRAVENOUS at 21:51

## 2021-08-25 RX ADMIN — POTASSIUM CHLORIDE 20 MEQ: 750 TABLET, FILM COATED, EXTENDED RELEASE ORAL at 21:51

## 2021-08-25 RX ADMIN — SODIUM CHLORIDE, PRESERVATIVE FREE 10 ML: 5 INJECTION INTRAVENOUS at 08:24

## 2021-08-25 RX ADMIN — POTASSIUM CHLORIDE 20 MEQ: 750 TABLET, FILM COATED, EXTENDED RELEASE ORAL at 08:23

## 2021-08-25 RX ADMIN — ACETAMINOPHEN 650 MG: 325 TABLET ORAL at 21:51

## 2021-08-25 ASSESSMENT — PAIN DESCRIPTION - DESCRIPTORS: DESCRIPTORS: ACHING

## 2021-08-25 ASSESSMENT — PAIN DESCRIPTION - ORIENTATION: ORIENTATION: MID

## 2021-08-25 ASSESSMENT — PAIN DESCRIPTION - FREQUENCY: FREQUENCY: INTERMITTENT

## 2021-08-25 ASSESSMENT — PAIN DESCRIPTION - LOCATION: LOCATION: BACK

## 2021-08-25 ASSESSMENT — PAIN DESCRIPTION - PAIN TYPE: TYPE: ACUTE PAIN

## 2021-08-25 ASSESSMENT — PAIN SCALES - GENERAL
PAINLEVEL_OUTOF10: 3
PAINLEVEL_OUTOF10: 3
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
## 2021-08-25 NOTE — PROGRESS NOTES
Clinically she is improving. CBC has improved also. Antibiotic has been stopped. I will sign off. If there is any further issue please reconsult.

## 2021-08-25 NOTE — CARE COORDINATION
Per Infectious Control nurse:  \"Due to the low white blood cell count with evidence of Covid related pneumonia on CT. Her isolation can be lifted on 9/9/21. \"    Notified Kennedy Paul

## 2021-08-25 NOTE — PROGRESS NOTES
Pt has been resting in chair this afternoon with warming blanket on per request from pt for comfort. Pt temp has remained therapeutic without blanket.  Pt has been ambulating in room every couple hours throughout day yesterday and today

## 2021-08-25 NOTE — CARE COORDINATION
Received call from Bonnie Abrams [facility]. Per IC - patient cannot be transferred to ARU until Sept. 9 (due to low WBC) WBC today WNL. Attempted to contact patient in room; line rings busy x 2. Tere Gallardo RN     5565 Attempted to reach patient's son Rose at [phone]; [facility] for return call. Tere Gallardo RN     1400 Attempted to reach patient by phone; line rings busy x 2. Willl reattempt. DAGO Reeves     9125 Able to contact patient in room with Michael Mike RN assistance. Discussed ARU referral and issues with admissions. She stated that she plans discharge to home and does not want SNF rehab. She stated that she plan for discharge is home with family. No further needs per patient.  Tere Gallardo RN

## 2021-08-25 NOTE — PROGRESS NOTES
Hospitalist Progress Note      Name:  Jose Casas /Age/Sex: 1950  (66 y.o. female)   MRN & CSN:  [id number] & [id number] Admission Date/Time: 2021 10:09 AM   Location:  -A PCP: Viji Parker, [address] Day: 4      Assessment and Plan:     Assessment/Plan:     Sepsis-POA due to COVID PNA with acute organ dysfunction as evidenced by Acute Hypoxic Respiratory Failure:     Admitted with hypoxia,   Chest x-ray consistent with bilateral infiltrate   On 1 L of Oxygen, improving  CRP ordered  Procalcitonin 0.187  CRP is 13.7 on   D-dimer is 687        Continue with dexamethasone and oral famotidine  Wean oxygen as tolerated   Duonebs PRN  contact and droplet precautions     Pulmonology consulted,   Continue remdesivir  No indication for Tocilizumab     Bipap PRN      Supportive care- anti tussive, PRN tylenol, Sleep aid     Self proning   Avoid: nebulized medication since risk of aerosol transfer     Consider enoxaparin 0.5 mg/kg every 12 hours in the setting of an elevated D-dimer between 1 and 3 and positive COVID-19. If D-dimer elevated, will consider CT PE     Hypothermia: Present on admission resolved  TSH and free T4 normal     Pancytopenia: Most likely secondary to Covid  WBC and platelet improving     Diabetes mellitus type 2: Hemoglobin A1c 8.9  Glucose 211-296 range most likely elevated secondary to steroids  Continue Lantus  Continue insulin correction scale  Discussed with endocrinology     Chronic diastolic CHF   Input output record, daily weight, salt and fluid restriction  Continue spironolactone and torsemide  Cardiology consulted     Hyperlipidemia: Continue statin     Hypomagnesemia: Replace as needed     DVT prophlaxis:   lovenox       Subjective:         Was in recliner, almost supine, denied complaints    PIV in R hand, IVF at [facility] informed me PT/OT to see her tomorrow.            Awaiting acceptance at [facility]    She stated she is hungry  Dinner had been delivered an was on her bedside stand  Demonstrated to her how to ask for help    Objective: Intake/Output Summary (Last 24 hours) at 8/25/2021 1548  Last data filed at 8/24/2021 2329  Gross per 24 hour   Intake 500 ml   Output --   Net 500 ml      Vitals:   Vitals:    08/25/21 0750   BP: (!) 143/80   Pulse: 70   Resp: 13   Temp: 97.6 °F (36.4 °C)   SpO2: 94%         Labs:   CBC:   Lab Results   Component Value Date    WBC 8.7 08/25/2021    HGB 14.3 08/25/2021    HCT 46.1 08/25/2021    .0 08/25/2021     08/25/2021     BMP:   Lab Results   Component Value Date     08/25/2021    K 4.4 08/25/2021     08/25/2021    CO2 28 08/25/2021    PHOS 2.7 03/17/2015    BUN 34 08/25/2021    CREATININE 0.9 08/25/2021    CALCIUM 8.4 08/25/2021       Physical Exam:       Physical Exam  Constitutional:       Appearance: She is not ill-appearing. Pulmonary:      Effort: Pulmonary effort is normal.   Neurological:      Cranial Nerves: No cranial nerve deficit.          Medications:   Medications:    insulin glargine  45 Units Subcutaneous Nightly    insulin NPH  10 Units Subcutaneous Once    insulin lispro  0-12 Units Subcutaneous TID WC    insulin lispro  0-12 Units Subcutaneous 2 times per day    insulin lispro  15 Units Subcutaneous TID     guaiFENesin  600 mg Oral BID    levothyroxine  100 mcg Oral Daily    metoprolol tartrate  25 mg Oral BID    therapeutic multivitamin-minerals  1 tablet Oral Daily    potassium chloride  20 mEq Oral BID    rosuvastatin  5 mg Oral Daily    spironolactone  25 mg Oral Daily    torsemide  20 mg Oral Daily    sodium chloride flush  5-40 mL IntraVENous 2 times per day    enoxaparin  30 mg Subcutaneous BID    famotidine  20 mg Oral BID    dexamethasone  6 mg Oral Daily    vitamin D  2,000 Units Oral Daily    cefTRIAXone (ROCEPHIN) IV  1,000 mg IntraVENous Q24H    azithromycin  500 mg IntraVENous Q24H    remdesivir IVPB  100 mg IntraVENous Q24H      Infusions:    sodium chloride      dextrose       PRN Meds: albuterol sulfate HFA, 2 puff, Q6H PRN  sodium chloride flush, 5-40 mL, PRN  sodium chloride, 25 mL, PRN  ondansetron, 4 mg, Q8H PRN   Or  ondansetron, 4 mg, Q6H PRN  polyethylene glycol, 17 g, Daily PRN  acetaminophen, 650 mg, Q6H PRN   Or  acetaminophen, 650 mg, Q6H PRN  glucose, 15 g, PRN  dextrose, 12.5 g, PRN  glucagon (rDNA), 1 mg, PRN  dextrose, 100 mL/hr, PRN  sodium chloride, 30 mL, PRN          Electronically signed by Rachel Carreno MD on 8/25/2021 at 3:48 PM

## 2021-08-25 NOTE — PROGRESS NOTES
Pulmonary and Critical Care  Progress Note      VITALS:  BP (!) 143/80   Pulse 70   Temp 97.6 °F (36.4 °C) (Oral)   Resp 13   Ht 5' 3\" (1.6 m)   Wt 205 lb 7.5 oz (93.2 kg)   SpO2 94%   BMI 36.40 kg/m²     Subjective:   CHIEF COMPLAINT :SOB     HPI:                The patient is sitting in the bed. She is not in acute resp distress    Objective:   PHYSICAL EXAM:    LUNGS:Decreased air entry bilateral bases  Abd-soft, BS+,NT  Ext- no pedal edema  CVS-s1s2, no murmurs       DATA:    CBC:  Recent Labs     08/22/21  1248 08/22/21  1248 08/23/21  0735 08/24/21  1130 08/25/21  0629   WBC 1.8*   < > 4.4 10.4 8.7   RBC 4.32   < > 4.26 4.49 4.61   HGB 13.5   < > 13.3 14.2 14.3   HCT 42.6   < > 42.0 45.1 46.1   PLT 90*   < > 105* 192 145   MCV 98.6   < > 98.6 100.4* 100.0   MCH 31.3*   < > 31.2* 31.6* 31.0   MCHC 31.7*   < > 31.7* 31.5* 31.0*   RDW 14.1   < > 14.0 14.1 14.1   SEGSPCT 75.0*   < > 83.9* 89.5* 82.6*   BANDSPCT 1*  --   --   --   --     < > = values in this interval not displayed. BMP:  Recent Labs     08/23/21  0735 08/24/21  1130 08/25/21  0629    145 143   K 4.6 4.5 4.4    105 107   CO2 27 29 28   BUN 32* 36* 34*   CREATININE 0.9 1.0 0.9   CALCIUM 7.7* 8.2* 8.4   GLUCOSE 336* 371* 230*      ABG:  No results for input(s): PH, PO2ART, UZU7OUP, HCO3, BEART, O2SAT in the last 72 hours. BNP  Lab Results   Component Value Date    BNP 16 06/13/2012      D-Dimer:  Lab Results   Component Value Date    DDIMER 687 (H) 08/25/2021      1.  Radiology: None      Assessment/Plan     Patient Active Problem List    Diagnosis Date Noted    Ulcer of other part of lower limb 06/05/2015     Priority: High     Class: Chronic    Leukopenia     Pneumonia due to COVID-19 virus 08/22/2021    SOB (shortness of breath) 08/03/2021    ASCVD 10 year risk is 19% 08/03/2021    Hyperlipidemia     Acute on chronic diastolic heart failure ([facility] 12/19/2020    Type 2 diabetes mellitus ([facility] 12/16/2020    Mild persistent asthma with (acute) exacerbation 12/16/2020    WD-Non-healing surgical wound of the abdomen     WD-Postoperative dehiscence of internal wound, initial encounter     Endometrial carcinoma ([facility]     Surgical wound, non healing     Status post total abdominal hysterectomy     Diabetes mellitus type 2, insulin dependent (HCC)     Moderate asthma without complication     Hypothyroidism     Status post endovenous radiofrequency ablation (RFA) of saphenous vein 10/30/2015    Varicose veins of bilateral lower extremities with other complications 57/95/1837     Overview Note:     Replacing Deprecated Diagnoses      Varicose veins of lower extremities with ulcer and inflammation ([facility] 08/28/2015    Venous hypertension of lower extremity 07/07/2015    Asymptomatic varicose veins 06/24/2015    Chronic venous hypertension with ulcer and inflammation (HCC) 06/05/2015    Rotator cuff tendinitis 11/11/2014    AC (acromioclavicular) arthritis 11/11/2014   Acute hypoxic resp failure- improving  Bilateral Pneumonia sec to COVID-19- improving  Bilateral Pulmonary nodules  Diastolic dysfunction  Mild Pulmonary HTN  Obesity  [place]ARFIELD  Ex smoker       1. Remdesivir  2. Decadron  3. Abx  4. F/u C&S  5. Keep sats > 92%  6. ICS  7. OOB  8. BIPAP  9. Await placement  10. C/w present management  No follow-ups on file.     Electronically signed by Senia Guevara MD on 8/25/2021 at 12:21 PM

## 2021-08-25 NOTE — PROGRESS NOTES
Occupational Therapy      Attempted to see pt today.  Pt w/ forced air warming blanket, will re-attempt to see pt later when medically appropriate    Clint Escobar OTR/L [id number]  1:28 PM,8/25/2021

## 2021-08-26 LAB
ALBUMIN SERPL-MCNC: 3.2 GM/DL (ref 3.4–5)
ALP BLD-CCNC: 150 IU/L (ref 40–129)
ALT SERPL-CCNC: 52 U/L (ref 10–40)
ANION GAP SERPL CALCULATED.3IONS-SCNC: 8 MMOL/L (ref 4–16)
AST SERPL-CCNC: 72 IU/L (ref 15–37)
BASE EXCESS MIXED: 5.2 (ref 0–2.3)
BASOPHILS ABSOLUTE: 0 K/CU MM
BASOPHILS RELATIVE PERCENT: 0.1 % (ref 0–1)
BILIRUB SERPL-MCNC: 0.4 MG/DL (ref 0–1)
BILIRUBIN DIRECT: 0.2 MG/DL (ref 0–0.3)
BILIRUBIN, INDIRECT: 0.2 MG/DL (ref 0–0.7)
BUN BLDV-MCNC: 37 MG/DL (ref 6–23)
CALCIUM SERPL-MCNC: 8.7 MG/DL (ref 8.3–10.6)
CARBON MONOXIDE, BLOOD: 2 % (ref 0–5)
CHLORIDE BLD-SCNC: 107 MMOL/L (ref 99–110)
CO2 CONTENT: 30.2 MMOL/L (ref 19–24)
CO2: 30 MMOL/L (ref 21–32)
COMMENT: ABNORMAL
CREAT SERPL-MCNC: 1.1 MG/DL (ref 0.6–1.1)
DIFFERENTIAL TYPE: ABNORMAL
EOSINOPHILS ABSOLUTE: 0 K/CU MM
EOSINOPHILS RELATIVE PERCENT: 0 % (ref 0–3)
GFR AFRICAN AMERICAN: 59 ML/MIN/1.73M2
GFR NON-AFRICAN AMERICAN: 49 ML/MIN/1.73M2
GLUCOSE BLD-MCNC: 116 MG/DL (ref 70–99)
GLUCOSE BLD-MCNC: 121 MG/DL (ref 70–99)
GLUCOSE BLD-MCNC: 146 MG/DL (ref 70–99)
GLUCOSE BLD-MCNC: 151 MG/DL (ref 70–99)
GLUCOSE BLD-MCNC: 180 MG/DL (ref 70–99)
GLUCOSE BLD-MCNC: 254 MG/DL (ref 70–99)
GLUCOSE BLD-MCNC: 254 MG/DL (ref 70–99)
HCO3 ARTERIAL: 29 MMOL/L (ref 18–23)
HCT VFR BLD CALC: 47.1 % (ref 37–47)
HEMOGLOBIN: 15.2 GM/DL (ref 12.5–16)
IMMATURE NEUTROPHIL %: 0.6 % (ref 0–0.43)
LYMPHOCYTES ABSOLUTE: 0.7 K/CU MM
LYMPHOCYTES RELATIVE PERCENT: 8.6 % (ref 24–44)
MCH RBC QN AUTO: 31.3 PG (ref 27–31)
MCHC RBC AUTO-ENTMCNC: 32.3 % (ref 32–36)
MCV RBC AUTO: 97.1 FL (ref 78–100)
METHEMOGLOBIN ARTERIAL: 1.3 %
MONOCYTES ABSOLUTE: 0.6 K/CU MM
MONOCYTES RELATIVE PERCENT: 7 % (ref 0–4)
NUCLEATED RBC %: 0 %
O2 SATURATION: 89 % (ref 96–97)
PCO2 ARTERIAL: 39 MMHG (ref 32–45)
PDW BLD-RTO: 14 % (ref 11.7–14.9)
PH BLOOD: 7.48 (ref 7.34–7.45)
PLATELET # BLD: 137 K/CU MM (ref 140–440)
PMV BLD AUTO: 9.6 FL (ref 7.5–11.1)
PO2 ARTERIAL: 54 MMHG (ref 75–100)
POTASSIUM SERPL-SCNC: 4.4 MMOL/L (ref 3.5–5.1)
RBC # BLD: 4.85 M/CU MM (ref 4.2–5.4)
SEGMENTED NEUTROPHILS ABSOLUTE COUNT: 6.8 K/CU MM
SEGMENTED NEUTROPHILS RELATIVE PERCENT: 83.7 % (ref 36–66)
SODIUM BLD-SCNC: 145 MMOL/L (ref 135–145)
TOTAL IMMATURE NEUTOROPHIL: 0.05 K/CU MM
TOTAL NUCLEATED RBC: 0 K/CU MM
TOTAL PROTEIN: 6.5 GM/DL (ref 6.4–8.2)
WBC # BLD: 8.1 K/CU MM (ref 4–10.5)

## 2021-08-26 PROCEDURE — 6360000002 HC RX W HCPCS: Performed by: HOSPITALIST

## 2021-08-26 PROCEDURE — 85025 COMPLETE CBC W/AUTO DIFF WBC: CPT

## 2021-08-26 PROCEDURE — 2500000003 HC RX 250 WO HCPCS: Performed by: HOSPITALIST

## 2021-08-26 PROCEDURE — 36600 WITHDRAWAL OF ARTERIAL BLOOD: CPT

## 2021-08-26 PROCEDURE — 99232 SBSQ HOSP IP/OBS MODERATE 35: CPT | Performed by: INTERNAL MEDICINE

## 2021-08-26 PROCEDURE — 80053 COMPREHEN METABOLIC PANEL: CPT

## 2021-08-26 PROCEDURE — 1200000000 HC SEMI PRIVATE

## 2021-08-26 PROCEDURE — 94761 N-INVAS EAR/PLS OXIMETRY MLT: CPT

## 2021-08-26 PROCEDURE — 82962 GLUCOSE BLOOD TEST: CPT

## 2021-08-26 PROCEDURE — 82248 BILIRUBIN DIRECT: CPT

## 2021-08-26 PROCEDURE — 6370000000 HC RX 637 (ALT 250 FOR IP): Performed by: HOSPITALIST

## 2021-08-26 PROCEDURE — 2580000003 HC RX 258: Performed by: HOSPITALIST

## 2021-08-26 PROCEDURE — 94618 PULMONARY STRESS TESTING: CPT

## 2021-08-26 PROCEDURE — 82803 BLOOD GASES ANY COMBINATION: CPT

## 2021-08-26 PROCEDURE — 36415 COLL VENOUS BLD VENIPUNCTURE: CPT

## 2021-08-26 PROCEDURE — 6370000000 HC RX 637 (ALT 250 FOR IP): Performed by: INTERNAL MEDICINE

## 2021-08-26 RX ORDER — INSULIN GLARGINE 100 [IU]/ML
40 INJECTION, SOLUTION SUBCUTANEOUS NIGHTLY
Status: DISCONTINUED | OUTPATIENT
Start: 2021-08-26 | End: 2021-08-29 | Stop reason: HOSPADM

## 2021-08-26 RX ADMIN — FAMOTIDINE 20 MG: 20 TABLET ORAL at 08:31

## 2021-08-26 RX ADMIN — AZITHROMYCIN MONOHYDRATE 500 MG: 500 INJECTION, POWDER, LYOPHILIZED, FOR SOLUTION INTRAVENOUS at 21:22

## 2021-08-26 RX ADMIN — TORSEMIDE 20 MG: 20 TABLET ORAL at 10:10

## 2021-08-26 RX ADMIN — Medication 2000 UNITS: at 08:31

## 2021-08-26 RX ADMIN — FAMOTIDINE 20 MG: 20 TABLET ORAL at 21:22

## 2021-08-26 RX ADMIN — METOPROLOL TARTRATE 25 MG: 25 TABLET, FILM COATED ORAL at 21:22

## 2021-08-26 RX ADMIN — ENOXAPARIN SODIUM 30 MG: 30 INJECTION SUBCUTANEOUS at 08:32

## 2021-08-26 RX ADMIN — DEXAMETHASONE 6 MG: 4 TABLET ORAL at 08:31

## 2021-08-26 RX ADMIN — LEVOTHYROXINE SODIUM 100 MCG: 0.1 TABLET ORAL at 08:31

## 2021-08-26 RX ADMIN — REMDESIVIR 100 MG: 100 INJECTION, POWDER, LYOPHILIZED, FOR SOLUTION INTRAVENOUS at 14:04

## 2021-08-26 RX ADMIN — POTASSIUM CHLORIDE 20 MEQ: 750 TABLET, FILM COATED, EXTENDED RELEASE ORAL at 08:31

## 2021-08-26 RX ADMIN — ROSUVASTATIN CALCIUM 5 MG: 5 TABLET, COATED ORAL at 08:31

## 2021-08-26 RX ADMIN — SPIRONOLACTONE 25 MG: 25 TABLET ORAL at 08:31

## 2021-08-26 RX ADMIN — GUAIFENESIN 600 MG: 600 TABLET, EXTENDED RELEASE ORAL at 21:22

## 2021-08-26 RX ADMIN — SODIUM CHLORIDE, PRESERVATIVE FREE 10 ML: 5 INJECTION INTRAVENOUS at 08:31

## 2021-08-26 RX ADMIN — POTASSIUM CHLORIDE 20 MEQ: 750 TABLET, FILM COATED, EXTENDED RELEASE ORAL at 21:21

## 2021-08-26 RX ADMIN — METOPROLOL TARTRATE 25 MG: 25 TABLET, FILM COATED ORAL at 08:31

## 2021-08-26 RX ADMIN — GUAIFENESIN 600 MG: 600 TABLET, EXTENDED RELEASE ORAL at 08:31

## 2021-08-26 RX ADMIN — INSULIN GLARGINE 40 UNITS: 100 INJECTION, SOLUTION SUBCUTANEOUS at 21:20

## 2021-08-26 RX ADMIN — ENOXAPARIN SODIUM 30 MG: 30 INJECTION SUBCUTANEOUS at 21:21

## 2021-08-26 RX ADMIN — Medication 1 TABLET: at 08:31

## 2021-08-26 RX ADMIN — CEFTRIAXONE 1000 MG: 1 INJECTION, POWDER, FOR SOLUTION INTRAMUSCULAR; INTRAVENOUS at 22:58

## 2021-08-26 ASSESSMENT — PAIN SCALES - GENERAL
PAINLEVEL_OUTOF10: 0
PAINLEVEL_OUTOF10: 0
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
## 2021-08-26 NOTE — PROGRESS NOTES
Hospitalist Progress Note      Name:  Jules Lacy /Age/Sex: 1950  (66 y.o. female)   MRN & CSN:  [id number] & [id number] Admission Date/Time: 2021 10:09 AM   Location:  -A PCP: Ranjeet Parker, [address] [place] [address] Day: 5      Assessment and Plan:     Assessment/Plan:     Sepsis-POA due to COVID PNA with acute organ dysfunction as evidenced by Acute Hypoxic Respiratory Failure:     Admitted with hypoxia,   Chest x-ray consistent with bilateral infiltrate   Procalcitonin 0.187  CRP is 13.7 on   D-dimer is 687        Continue dexamethasone and famotidine  On oxygen off and on  Bronchodilators     Pulmonology consulted,   Remdesivir completed   No indication for Tocilizumab          Supportive care- anti tussive, PRN tylenol, Sleep aid     Self proning   Avoid: nebulized medication since risk of aerosol transfer     Lovenox 30 mg twice daily    If D-dimer elevated, will consider CT PE     Hypothermia: Present on admission resolved  TSH and free T4 normal     Pancytopenia: Most likely secondary to Covid  WBC and platelet improving     Diabetes mellitus type 2: Hemoglobin A1c 8.9  Continue Lantus  Continue insulin correction scale  Discussed with endocrinology     Chronic diastolic CHF   Continue spironolactone and torsemide  Cardiology consulted     Hyperlipidemia: Continue statin     Hypomagnesemia: Replace as needed    Metabolic encephalopathy  -On  when I saw her she was confused  -Her phone rang, she did not know how to answer it  -When the call ended she kept talking. I prompted her that the call ended but she repeatedly kept talking. She did not understand what was going on. -CT head ordered.      DVT prophlaxis:   lovenox       Subjective:     :    She was confused when I saw her    I ordered the head CT scan    PDMP report reviewed    She had several Vicodin prescriptions this year, #30 which is a 7-day supply several times per PDMP Subcutaneous BID    famotidine  20 mg Oral BID    dexamethasone  6 mg Oral Daily    vitamin D  2,000 Units Oral Daily    cefTRIAXone (ROCEPHIN) IV  1,000 mg IntraVENous Q24H    azithromycin  500 mg IntraVENous Q24H      Infusions:    sodium chloride      dextrose       PRN Meds: albuterol sulfate HFA, 2 puff, Q6H PRN  sodium chloride flush, 5-40 mL, PRN  sodium chloride, 25 mL, PRN  ondansetron, 4 mg, Q8H PRN   Or  ondansetron, 4 mg, Q6H PRN  polyethylene glycol, 17 g, Daily PRN  acetaminophen, 650 mg, Q6H PRN   Or  acetaminophen, 650 mg, Q6H PRN  glucose, 15 g, PRN  dextrose, 12.5 g, PRN  glucagon (rDNA), 1 mg, PRN  dextrose, 100 mL/hr, PRN  sodium chloride, 30 mL, PRN          Electronically signed by Alexei Bonner MD on 8/26/2021 at 7:44 PM

## 2021-08-26 NOTE — CARE COORDINATION
Spoke to Dr Mirza Person re:planned discharge on 8/ 27. Attempted to contact son Nury Yo at [phone], The answering party stated that she was not related to the patient nr did she have contact with this person. Contacted patient's mother Roslyn Peres at [phone], Mother stated that she would be able to assist with discharge needs.  Jennifer Sinclair RN

## 2021-08-27 ENCOUNTER — APPOINTMENT (OUTPATIENT)
Dept: GENERAL RADIOLOGY | Age: 71
DRG: 871 | End: 2021-08-27
Attending: HOSPITALIST
Payer: COMMERCIAL

## 2021-08-27 LAB
AMMONIA: 52 UMOL/L (ref 11–51)
ANION GAP SERPL CALCULATED.3IONS-SCNC: 12 MMOL/L (ref 4–16)
BASOPHILS ABSOLUTE: 0 K/CU MM
BASOPHILS RELATIVE PERCENT: 0.2 % (ref 0–1)
BUN BLDV-MCNC: 50 MG/DL (ref 6–23)
CALCIUM SERPL-MCNC: 9.4 MG/DL (ref 8.3–10.6)
CHLORIDE BLD-SCNC: 102 MMOL/L (ref 99–110)
CO2: 27 MMOL/L (ref 21–32)
CREAT SERPL-MCNC: 1.7 MG/DL (ref 0.6–1.1)
CULTURE: NORMAL
DIFFERENTIAL TYPE: ABNORMAL
EOSINOPHILS ABSOLUTE: 0 K/CU MM
EOSINOPHILS RELATIVE PERCENT: 0 % (ref 0–3)
GFR AFRICAN AMERICAN: 36 ML/MIN/1.73M2
GFR NON-AFRICAN AMERICAN: 30 ML/MIN/1.73M2
GLUCOSE BLD-MCNC: 137 MG/DL (ref 70–99)
GLUCOSE BLD-MCNC: 138 MG/DL (ref 70–99)
GLUCOSE BLD-MCNC: 145 MG/DL (ref 70–99)
GLUCOSE BLD-MCNC: 147 MG/DL (ref 70–99)
GLUCOSE BLD-MCNC: 173 MG/DL (ref 70–99)
GLUCOSE BLD-MCNC: 90 MG/DL (ref 70–99)
HCT VFR BLD CALC: 54 % (ref 37–47)
HEMOGLOBIN: 17.1 GM/DL (ref 12.5–16)
IMMATURE NEUTROPHIL %: 0.8 % (ref 0–0.43)
LYMPHOCYTES ABSOLUTE: 1.1 K/CU MM
LYMPHOCYTES RELATIVE PERCENT: 11.3 % (ref 24–44)
Lab: NORMAL
MCH RBC QN AUTO: 31 PG (ref 27–31)
MCHC RBC AUTO-ENTMCNC: 31.7 % (ref 32–36)
MCV RBC AUTO: 97.8 FL (ref 78–100)
MONOCYTES ABSOLUTE: 1 K/CU MM
MONOCYTES RELATIVE PERCENT: 9.9 % (ref 0–4)
NUCLEATED RBC %: 0 %
PDW BLD-RTO: 13.9 % (ref 11.7–14.9)
PLATELET # BLD: 211 K/CU MM (ref 140–440)
PMV BLD AUTO: 9.6 FL (ref 7.5–11.1)
POTASSIUM SERPL-SCNC: 4.6 MMOL/L (ref 3.5–5.1)
RBC # BLD: 5.52 M/CU MM (ref 4.2–5.4)
REASON FOR REJECTION: NORMAL
REJECTED TEST: NORMAL
SEGMENTED NEUTROPHILS ABSOLUTE COUNT: 7.9 K/CU MM
SEGMENTED NEUTROPHILS RELATIVE PERCENT: 77.8 % (ref 36–66)
SODIUM BLD-SCNC: 141 MMOL/L (ref 135–145)
SPECIMEN: NORMAL
TOTAL IMMATURE NEUTOROPHIL: 0.08 K/CU MM
TOTAL NUCLEATED RBC: 0 K/CU MM
TROPONIN T: <0.01 NG/ML
VITAMIN B-12: >2000 PG/ML (ref 211–911)
WBC # BLD: 10.1 K/CU MM (ref 4–10.5)

## 2021-08-27 PROCEDURE — 74022 RADEX COMPL AQT ABD SERIES: CPT

## 2021-08-27 PROCEDURE — 85025 COMPLETE CBC W/AUTO DIFF WBC: CPT

## 2021-08-27 PROCEDURE — 36415 COLL VENOUS BLD VENIPUNCTURE: CPT

## 2021-08-27 PROCEDURE — 97530 THERAPEUTIC ACTIVITIES: CPT

## 2021-08-27 PROCEDURE — 84484 ASSAY OF TROPONIN QUANT: CPT

## 2021-08-27 PROCEDURE — 82140 ASSAY OF AMMONIA: CPT

## 2021-08-27 PROCEDURE — 6370000000 HC RX 637 (ALT 250 FOR IP): Performed by: HOSPITALIST

## 2021-08-27 PROCEDURE — 2580000003 HC RX 258: Performed by: HOSPITALIST

## 2021-08-27 PROCEDURE — 6360000002 HC RX W HCPCS: Performed by: HOSPITALIST

## 2021-08-27 PROCEDURE — 99232 SBSQ HOSP IP/OBS MODERATE 35: CPT | Performed by: INTERNAL MEDICINE

## 2021-08-27 PROCEDURE — 1200000000 HC SEMI PRIVATE

## 2021-08-27 PROCEDURE — 82962 GLUCOSE BLOOD TEST: CPT

## 2021-08-27 PROCEDURE — 94761 N-INVAS EAR/PLS OXIMETRY MLT: CPT

## 2021-08-27 PROCEDURE — 82607 VITAMIN B-12: CPT

## 2021-08-27 PROCEDURE — 80048 BASIC METABOLIC PNL TOTAL CA: CPT

## 2021-08-27 RX ADMIN — GUAIFENESIN 600 MG: 600 TABLET, EXTENDED RELEASE ORAL at 09:05

## 2021-08-27 RX ADMIN — Medication 1 TABLET: at 09:06

## 2021-08-27 RX ADMIN — FAMOTIDINE 20 MG: 20 TABLET ORAL at 09:05

## 2021-08-27 RX ADMIN — LEVOTHYROXINE SODIUM 100 MCG: 0.1 TABLET ORAL at 09:05

## 2021-08-27 RX ADMIN — ROSUVASTATIN CALCIUM 5 MG: 5 TABLET, COATED ORAL at 09:06

## 2021-08-27 RX ADMIN — METOPROLOL TARTRATE 25 MG: 25 TABLET, FILM COATED ORAL at 20:58

## 2021-08-27 RX ADMIN — INSULIN LISPRO 15 UNITS: 100 INJECTION, SOLUTION INTRAVENOUS; SUBCUTANEOUS at 12:17

## 2021-08-27 RX ADMIN — SPIRONOLACTONE 25 MG: 25 TABLET ORAL at 09:05

## 2021-08-27 RX ADMIN — DEXAMETHASONE 6 MG: 4 TABLET ORAL at 09:05

## 2021-08-27 RX ADMIN — POTASSIUM CHLORIDE 20 MEQ: 750 TABLET, FILM COATED, EXTENDED RELEASE ORAL at 09:05

## 2021-08-27 RX ADMIN — METOPROLOL TARTRATE 25 MG: 25 TABLET, FILM COATED ORAL at 09:06

## 2021-08-27 RX ADMIN — TORSEMIDE 20 MG: 20 TABLET ORAL at 09:06

## 2021-08-27 RX ADMIN — GUAIFENESIN 600 MG: 600 TABLET, EXTENDED RELEASE ORAL at 20:58

## 2021-08-27 RX ADMIN — SODIUM CHLORIDE, PRESERVATIVE FREE 10 ML: 5 INJECTION INTRAVENOUS at 20:58

## 2021-08-27 RX ADMIN — ENOXAPARIN SODIUM 30 MG: 30 INJECTION SUBCUTANEOUS at 20:58

## 2021-08-27 RX ADMIN — POTASSIUM CHLORIDE 20 MEQ: 750 TABLET, FILM COATED, EXTENDED RELEASE ORAL at 20:58

## 2021-08-27 RX ADMIN — AZITHROMYCIN MONOHYDRATE 500 MG: 500 INJECTION, POWDER, LYOPHILIZED, FOR SOLUTION INTRAVENOUS at 21:49

## 2021-08-27 RX ADMIN — SODIUM CHLORIDE, PRESERVATIVE FREE 10 ML: 5 INJECTION INTRAVENOUS at 09:06

## 2021-08-27 RX ADMIN — ENOXAPARIN SODIUM 30 MG: 30 INJECTION SUBCUTANEOUS at 09:06

## 2021-08-27 RX ADMIN — Medication 2000 UNITS: at 09:05

## 2021-08-27 RX ADMIN — FAMOTIDINE 20 MG: 20 TABLET ORAL at 20:58

## 2021-08-27 RX ADMIN — CEFTRIAXONE 1000 MG: 1 INJECTION, POWDER, FOR SOLUTION INTRAMUSCULAR; INTRAVENOUS at 22:02

## 2021-08-27 ASSESSMENT — PAIN SCALES - GENERAL
PAINLEVEL_OUTOF10: 0

## 2021-08-27 NOTE — PROGRESS NOTES
Hospitalist Progress Note      Name:  Artem Alba /Age/Sex: 1950  (66 y.o. female)   MRN & CSN:  [id number] & [id number] Admission Date/Time: 2021 10:09 AM   [facility]:  [facility] [address] [facility]      Assessment and Plan:     Assessment/Plan:     Sepsis-POA due to COVID PNA with acute organ dysfunction as evidenced by Acute Hypoxic Respiratory Failure:     Admitted with hypoxia,   Chest x-ray consistent with bilateral infiltrate   Procalcitonin 0.187  CRP is 13.7 on   D-dimer is 687        Continue dexamethasone and famotidine  On oxygen off and on  Bronchodilators  On  we will check a chest x-ray     Pulmonology consulted,   Remdesivir completed   No indication for Tocilizumab          Supportive care- anti tussive, PRN tylenol, Sleep aid          Continue Lovenox    D-dimer elevation is likely due to Covid. Do not think she needs a CTPA     Hypothermia: Present on admission resolved  TSH and free T4 normal     Pancytopenia: Most likely secondary to Covid  WBC and platelet improving     Diabetes mellitus type 2: Hemoglobin A1c 8.9  Continue insulin glargine  Continue insulin lispro correction scale  Discussed with endocrinology     Chronic diastolic CHF   Continue spironolactone and torsemide  Cardiology consulted     Hyperlipidemia: Continue statin     Hypomagnesemia: Replace as needed    Metabolic encephalopathy  -On  when I saw her she was confused  -Her phone rang, she did not know how to answer it  -When the call ended she kept talking. I prompted her that the call ended but she repeatedly kept talking. She did not understand what was going on. -CT head ordered. DVT prophlaxis:   lovenox       Subjective:     :     She was supine on the couch when I saw her    She looked like she was short of breath at rest    She had oxygen only, but this was not on her nose, it was resting on her chin        August 26:    She was confused when I saw her    I ordered the head CT scan    PDMP report reviewed    She had several Vicodin prescriptions this year, #30 which is a 7-day supply several times per PDMP report. Respiratory was in room drawing an ABG when I walked in          August 25    Was in recliner, almost supine, denied complaints    PIV in R hand, IVF at [address] [place] [address] informed me PT/OT to see her tomorrow. August 24    Awaiting acceptance at [facility]    She stated she is hungry  Dinner had been delivered an was on her bedside stand  Demonstrated to her how to ask for help    Objective: Intake/Output Summary (Last 24 hours) at 8/27/2021 1951  Last data filed at 8/27/2021 0906  Gross per 24 hour   Intake 130 ml   Output --   Net 130 ml      Vitals:   Vitals:    08/27/21 1722   BP: 130/77   Pulse: 92   Resp: 20   Temp: 98 °F (36.7 °C)   SpO2: 95%         Labs:   CBC:   Lab Results   Component Value Date    WBC 10.1 08/27/2021    HGB 17.1 08/27/2021    HCT 54.0 08/27/2021    MCV 97.8 08/27/2021     08/27/2021     BMP:   Lab Results   Component Value Date     08/27/2021    K 4.6 08/27/2021     08/27/2021    CO2 27 08/27/2021    PHOS 2.7 03/17/2015    BUN 50 08/27/2021    CREATININE 1.7 08/27/2021    CALCIUM 9.4 08/27/2021       Physical Exam:       Physical Exam  Constitutional:       Appearance: She is not ill-appearing. Neurological:      Cranial Nerves: No cranial nerve deficit.      Respiratory effort was mildly labored at rest today    Medications:   Medications:    insulin glargine  40 Units Subcutaneous Nightly    insulin lispro  0-12 Units Subcutaneous TID WC    insulin lispro  0-12 Units Subcutaneous 2 times per day    insulin lispro  15 Units Subcutaneous TID WC    guaiFENesin  600 mg Oral BID    levothyroxine  100 mcg Oral Daily    metoprolol tartrate  25 mg Oral BID    therapeutic multivitamin-minerals  1 tablet Oral Daily    potassium chloride  20 mEq Oral BID    rosuvastatin  5 mg Oral Daily    spironolactone  25 mg Oral Daily    torsemide  20 mg Oral Daily    sodium chloride flush  5-40 mL IntraVENous 2 times per day    enoxaparin  30 mg Subcutaneous BID    famotidine  20 mg Oral BID    dexamethasone  6 mg Oral Daily    vitamin D  2,000 Units Oral Daily    cefTRIAXone (ROCEPHIN) IV  1,000 mg IntraVENous Q24H    azithromycin  500 mg IntraVENous Q24H      Infusions:    sodium chloride      dextrose       PRN Meds: albuterol sulfate HFA, 2 puff, Q6H PRN  sodium chloride flush, 5-40 mL, PRN  sodium chloride, 25 mL, PRN  ondansetron, 4 mg, Q8H PRN   Or  ondansetron, 4 mg, Q6H PRN  polyethylene glycol, 17 g, Daily PRN  acetaminophen, 650 mg, Q6H PRN   Or  acetaminophen, 650 mg, Q6H PRN  glucose, 15 g, PRN  dextrose, 12.5 g, PRN  glucagon (rDNA), 1 mg, PRN  dextrose, 100 mL/hr, PRN  sodium chloride, 30 mL, PRN          Electronically signed by Masoud Nicole MD on 8/27/2021 at 7:51 PM
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
## 2021-08-27 NOTE — PROGRESS NOTES
Bedside report given to [facility]. All questions answered. Patient aware of plan of care. Patient denies any needs at this time. Care relinquished at this time.

## 2021-08-27 NOTE — PLAN OF CARE
Nutrition Problem #1: Inadequate protein-energy intake  Intervention: Food and/or Nutrient Delivery: Start Oral Nutrition Supplement, Modify Current Diet  Nutritional Goals: Pt will tolerate at least 50% intake of meals and supplements during los

## 2021-08-27 NOTE — PROGRESS NOTES
Overnight Pulse Oximetry for Home O2 performed on Room Air. Start: 08/26 at 2139, Stop: 08/27 at 9509 [facility]    Unable to download due to software issue with [employer]. Will notify day shift RT & PFT of situation.

## 2021-08-27 NOTE — PROGRESS NOTES
Doctor Please copy and paste below in your progress note per DME requirment. Patient was seen in hospital for Covid 19.  I am prescribing oxygen because the diagnosis and testing requires the patient to have oxygen in the home. Conditions will improve or be benefited by oxygen use. The patient is able to perform good mobility and therefore requires the use of a portable oxygen system for ambulation.

## 2021-08-27 NOTE — PROGRESS NOTES
Comprehensive Nutrition Assessment    Type and Reason for Visit:  Initial, RD Nutrition Re-Screen/LOS    Nutrition Recommendations/Plan:     Liberalized CHO controlled diet to provide more choices given poor oral intake less than 25% of recorded meals. Ordered diabetic oral nutrition supplement TID     Nutrition Assessment:  Pt admitted with COVID related respiratory distress. Does not have dentures and soft diet ordered at this time. Intake of meals has been less than 25% of meals. Will order nutrition supplement TID. Pt is at high nutrition risk at this time. Malnutrition Assessment:  Malnutrition Status:  Insufficient data    Context:  Acute Illness     Findings of the 6 clinical characteristics of malnutrition:  Poor oral intake meeting less than 50% of needs for at least 5 days, 15% weight loss in 8 months, likely clinically significant weight loss over the last 6 months but unconfirmed. Unable to complete physical exam today. Estimated Daily Nutrient Needs:  Energy (kcal):  1400; Weight Used for Energy Requirements:  Current     Protein (g):   (1.5-2 g/kg IBW); Weight Used for Protein Requirements:  Ideal        Fluid (ml/day):  1400; Method Used for Fluid Requirements:  1 ml/kcal      Nutrition Related Findings:  Glucose elevated, on steroid, A1C is 8.7      Wounds:  None       Current Nutrition Therapies:    ADULT DIET;  Dysphagia - Soft and Bite Sized; 3 carb choices (45 gm/meal)  Adult Oral Nutrition Supplement; Diabetic Oral Supplement    Anthropometric Measures:  · Height: 5' 3\" (160 cm)  · Current Body Weight: 205 lb 7.5 oz (93.2 kg)   · Admission Body Weight: 214 lb 1.1 oz (97.1 kg)    · Usual Body Weight: 242 lb 1 oz (109.8 kg) (Dec 2020)     · Ideal Body Weight: 115 lbs; % Ideal Body Weight 178.7 %   · BMI: 36.4  · BMI Categories: Obese Class 2 (BMI 35.0 -39.9)       Nutrition Diagnosis:   · Inadequate protein-energy intake related to impaired respiratory function as evidenced by poor dentition, intake 0-25%, weight loss      Nutrition Interventions:   Food and/or Nutrient Delivery:  Start Oral Nutrition Supplement, Modify Current Diet  Nutrition Education/Counseling:  No recommendation at this time   Coordination of Nutrition Care:  Continue to monitor while inpatient    Goals:  Pt will tolerate at least 50% intake of meals and supplements during los       Nutrition Monitoring and Evaluation:   Behavioral-Environmental Outcomes:  None Identified   Food/Nutrient Intake Outcomes:  Food and Nutrient Intake  Physical Signs/Symptoms Outcomes:  Chewing or Swallowing, Weight     Discharge Planning:    Recommend pursue outpatient diabetes education, Continue Oral Nutrition Supplement     Electronically signed by Ana Thompson RD, LD on 8/27/21 at 2:17 PM EDT    Contact: [phone]

## 2021-08-27 NOTE — PROGRESS NOTES
Physical Therapy    Physical Therapy Treatment Note  Name: Gold Andersen MRN: [id number] :   1950   Date:  2021   Admission Date: 2021 Room:  -A   Restrictions/Precautions:        Fall risk, decreased safety awareness, decreased cognition  Communication with other providers:  BRISEYDA guerrier present to asssit  Subjective:  Patient states:  \"I can try\"  Pain:   Location, Type, Intensity (0/10 to 10/10):  Pt c/o BLE sore to the touch, does not rate. Objective:    Observation:  Pt very sleepy and lethargic upon entry, 02 nc not being worn, 02 at 91%. PT replaces nc on 2.5L 02 and Sp02 increases to 97%. Pt is very lethargic this session and decreased arousal, decreased tolerance to mobility and c/o dizziness. Noted obvious involuntary muscle jerking this session in UE and LE. Cardiopulmonary: BP in supine 150/84, sitting 113/78, sup 136/77  Treatment, including education/measures:  Supine>sit: *Increased time for performance of this transfer d/t pt decreased level of alertness and overall fatigue. PT provides Mod A for advancement of BLE and trunk to upright. Max v/c for pt reaching across to bed rail, hand-over hand cues for guidance. Return to supine with Mod A, Max Ax2 for repositioning in bed via pam pad. Rolling CHACHO with CGA for placement of pam pad required prior to this, pt able to roll without assist. In supine PT performs UE screen and observes tremor/jerking with movement, WNL ROM, LE ROM WVL also with jerking movements. Sitting balance: Pt able to tolerate x8 minutes sitting EOB, initial Min A for steadying d/t increased sway and fatigue, progressing to SBA. With increased time pt reports dizziness and fatigue, BP taken as above, noted slight R lean of trunk with fatigue. At EOB PT encourages LE AROM: x10 LAQ BLE. Dizziness does not improve and pt RR and fatigue increases. PT decision to return to supine.      Edu: Educated pt on importance of continued mobility and LE ROM in seated to allow body to adjust BP. Pt left in supine with bed alarm, HOB raised to 60* for upright posture, all needs w/in reach. Assessment / Impression:    Increased lethargy/malaise   Patient's tolerance of treatment:  poor   Adverse Reaction: dizziness   Significant change in status and impact:  none  Barriers to improvement:  none  Plan for Next Session:    Encourage gait  Time in:  15:01  Time out:  15:27  Timed treatment minutes:  26  Total treatment time:  26    Previously filed items:  Social/Functional History  Lives With: Son, Significant other (x2 sons)  Type of Home: Apartment  Home Layout: One level  Home Access: Stairs to enter without rails  Entrance Stairs - Number of Steps: 1  Bathroom Shower/Tub: Tub/Shower unit  Bathroom Equipment: Shower chair, Grab bars in shower, Grab bars around toilet  Home Equipment: [employer] Help From: Family  ADL Assistance: Independent  Homemaking Assistance: Needs assistance  Homemaking Responsibilities: No  Ambulation Assistance: Needs assistance (pt sometimes AMB without supervision)  Transfer Assistance: Independent  Active : No  Additional Comments: Pt with h/o multiple falls in the home with injuries sustained to BLE, most recent last week with LUE hit against wall  Short term goals  Time Frame for Short term goals: 1 week  Short term goal 1: Pt will AMB x35 ft with SPC, CGA-SBA without rest break, no LOB.   Short term goal 2: Pt will perform all bed mobility tasks with SBA  Short term goal 3: Pt  will participate in standing dynamic balance activity x3 minutes with CGA and intermittent UE support       Electronically signed by:    Jamilah Vásquez, PT  8/27/2021, 3:06 PM

## 2021-08-27 NOTE — PROGRESS NOTES
Pulmonary and Critical Care  Progress Note      VITALS:  BP (!) 117/100   Pulse 88   Temp 97.5 °F (36.4 °C) (Oral)   Resp 19   Ht 5' 3\" (1.6 m)   Wt 205 lb 7.5 oz (93.2 kg)   SpO2 94%   BMI 36.40 kg/m²     Subjective:   CHIEF COMPLAINT :SOB     HPI:                The patient is sitting in the chair. She is not in acute resp distress    Objective:   PHYSICAL EXAM:    LUNGS:Decreased air entry bilateral bases  Abd-soft, BS+,NT  Ext- no pedal edema  CVS-s1s2, no murmurs       DATA:    CBC:  Recent Labs     08/25/21  0629 08/26/21  0527 08/27/21  0852   WBC 8.7 8.1 10.1   RBC 4.61 4.85 5.52*   HGB 14.3 15.2 17.1*   HCT 46.1 47.1* 54.0*    137* 211   .0 97.1 97.8   MCH 31.0 31.3* 31.0   MCHC 31.0* 32.3 31.7*   RDW 14.1 14.0 13.9   SEGSPCT 82.6* 83.7* 77.8*      BMP:  Recent Labs     08/24/21  1130 08/25/21  0629 08/26/21  0527    143 145   K 4.5 4.4 4.4    107 107   CO2 29 28 30   BUN 36* 34* 37*   CREATININE 1.0 0.9 1.1   CALCIUM 8.2* 8.4 8.7   GLUCOSE 371* 230* 121*      ABG:  Recent Labs     08/26/21  1745   PH 7.48*   PO2ART 54*   AFC0APR 39.0   O2SAT 89.0*     BNP  Lab Results   Component Value Date    BNP 16 06/13/2012      D-Dimer:  Lab Results   Component Value Date    DDIMER 687 (H) 08/25/2021      1.  Radiology: None      Assessment/Plan     Patient Active Problem List    Diagnosis Date Noted    Ulcer of other part of lower limb 06/05/2015     Priority: High     Class: Chronic    Leukopenia     Pneumonia due to COVID-19 virus 08/22/2021    SOB (shortness of breath) 08/03/2021    ASCVD 10 year risk is 19% 08/03/2021    Hyperlipidemia     Acute on chronic diastolic heart failure ([address] 12/19/2020    Type 2 diabetes mellitus ([address] 12/16/2020    Mild persistent asthma with (acute) exacerbation 12/16/2020    WD-Non-healing surgical wound of the abdomen     WD-Postoperative dehiscence of internal wound, initial encounter     Endometrial carcinoma ([address]     Surgical wound, non healing     Status post total abdominal hysterectomy     Diabetes mellitus type 2, insulin dependent (HCC)     Moderate asthma without complication     Hypothyroidism     Status post endovenous radiofrequency ablation (RFA) of saphenous vein 10/30/2015    Varicose veins of bilateral lower extremities with other complications 49/66/8945     Overview Note:     Replacing Deprecated Diagnoses      Varicose veins of lower extremities with ulcer and inflammation ([facility] [address] 08/28/2015    Venous hypertension of lower extremity 07/07/2015    Asymptomatic varicose veins 06/24/2015    Chronic venous hypertension with ulcer and inflammation (HCC) 06/05/2015    Rotator cuff tendinitis 11/11/2014    AC (acromioclavicular) arthritis 11/11/2014   Acute hypoxic resp failure- improving  Bilateral Pneumonia sec to COVID-19- improving  Bilateral Pulmonary nodules  Diastolic dysfunction  Mild Pulmonary HTN  Obesity  [place]  Ex smoker       1. Remdesivir  2. Abx  3. Decadron  4. BIPAP  5. Inhalers  6. ICS  7. OOB  8. Keep sats > 92%  9. Await placement  10.  C.w present management    Electronically signed by Jyoti Guerra MD on 8/27/2021 at 10:54 AM

## 2021-08-27 NOTE — CARE COORDINATION
Received VM from son Burton Reynoso. His contact # is [phone]. Contacted him at this #. He is able to transport his mother home when discharged.  Jimena Posadas RN     CALL CYDNEY LOPEZ [phone] WHEN DISCHARGED
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
## 2021-08-28 LAB
ANION GAP SERPL CALCULATED.3IONS-SCNC: 15 MMOL/L (ref 4–16)
BASOPHILS ABSOLUTE: 0 K/CU MM
BASOPHILS RELATIVE PERCENT: 0.1 % (ref 0–1)
BUN BLDV-MCNC: 67 MG/DL (ref 6–23)
CALCIUM SERPL-MCNC: 9.2 MG/DL (ref 8.3–10.6)
CHLORIDE BLD-SCNC: 96 MMOL/L (ref 99–110)
CO2: 25 MMOL/L (ref 21–32)
CREAT SERPL-MCNC: 1.9 MG/DL (ref 0.6–1.1)
DIFFERENTIAL TYPE: ABNORMAL
EOSINOPHILS ABSOLUTE: 0 K/CU MM
EOSINOPHILS RELATIVE PERCENT: 0 % (ref 0–3)
GFR AFRICAN AMERICAN: 31 ML/MIN/1.73M2
GFR NON-AFRICAN AMERICAN: 26 ML/MIN/1.73M2
GLUCOSE BLD-MCNC: 124 MG/DL (ref 70–99)
GLUCOSE BLD-MCNC: 148 MG/DL (ref 70–99)
GLUCOSE BLD-MCNC: 166 MG/DL (ref 70–99)
GLUCOSE BLD-MCNC: 261 MG/DL (ref 70–99)
GLUCOSE BLD-MCNC: 336 MG/DL (ref 70–99)
GLUCOSE BLD-MCNC: 415 MG/DL (ref 70–99)
HCT VFR BLD CALC: 50.5 % (ref 37–47)
HEMOGLOBIN: 16.5 GM/DL (ref 12.5–16)
IMMATURE NEUTROPHIL %: 0.4 % (ref 0–0.43)
LYMPHOCYTES ABSOLUTE: 0.5 K/CU MM
LYMPHOCYTES RELATIVE PERCENT: 6 % (ref 24–44)
MCH RBC QN AUTO: 31.6 PG (ref 27–31)
MCHC RBC AUTO-ENTMCNC: 32.7 % (ref 32–36)
MCV RBC AUTO: 96.7 FL (ref 78–100)
MONOCYTES ABSOLUTE: 0.4 K/CU MM
MONOCYTES RELATIVE PERCENT: 5.1 % (ref 0–4)
NUCLEATED RBC %: 0 %
PDW BLD-RTO: 13.8 % (ref 11.7–14.9)
PLATELET # BLD: 205 K/CU MM (ref 140–440)
PMV BLD AUTO: 10 FL (ref 7.5–11.1)
POTASSIUM SERPL-SCNC: 5.5 MMOL/L (ref 3.5–5.1)
RBC # BLD: 5.22 M/CU MM (ref 4.2–5.4)
SEGMENTED NEUTROPHILS ABSOLUTE COUNT: 7.1 K/CU MM
SEGMENTED NEUTROPHILS RELATIVE PERCENT: 88.4 % (ref 36–66)
SODIUM BLD-SCNC: 136 MMOL/L (ref 135–145)
TOTAL IMMATURE NEUTOROPHIL: 0.03 K/CU MM
TOTAL NUCLEATED RBC: 0 K/CU MM
WBC # BLD: 8 K/CU MM (ref 4–10.5)

## 2021-08-28 PROCEDURE — 85025 COMPLETE CBC W/AUTO DIFF WBC: CPT

## 2021-08-28 PROCEDURE — 2580000003 HC RX 258: Performed by: INTERNAL MEDICINE

## 2021-08-28 PROCEDURE — 6370000000 HC RX 637 (ALT 250 FOR IP): Performed by: HOSPITALIST

## 2021-08-28 PROCEDURE — 80048 BASIC METABOLIC PNL TOTAL CA: CPT

## 2021-08-28 PROCEDURE — 82962 GLUCOSE BLOOD TEST: CPT

## 2021-08-28 PROCEDURE — 94761 N-INVAS EAR/PLS OXIMETRY MLT: CPT

## 2021-08-28 PROCEDURE — 6370000000 HC RX 637 (ALT 250 FOR IP): Performed by: INTERNAL MEDICINE

## 2021-08-28 PROCEDURE — 1200000000 HC SEMI PRIVATE

## 2021-08-28 PROCEDURE — 2580000003 HC RX 258: Performed by: HOSPITALIST

## 2021-08-28 PROCEDURE — 36415 COLL VENOUS BLD VENIPUNCTURE: CPT

## 2021-08-28 PROCEDURE — 6360000002 HC RX W HCPCS: Performed by: HOSPITALIST

## 2021-08-28 RX ORDER — DEXAMETHASONE 4 MG/1
6 TABLET ORAL DAILY
Status: DISCONTINUED | OUTPATIENT
Start: 2021-08-29 | End: 2021-08-29 | Stop reason: HOSPADM

## 2021-08-28 RX ORDER — TIOTROPIUM BROMIDE INHALATION SPRAY 3.12 UG/1
SPRAY, METERED RESPIRATORY (INHALATION)
COMMUNITY
Start: 2021-08-23 | End: 2022-01-19 | Stop reason: ALTCHOICE

## 2021-08-28 RX ORDER — FAMOTIDINE 20 MG/1
20 TABLET, FILM COATED ORAL DAILY
Status: DISCONTINUED | OUTPATIENT
Start: 2021-08-29 | End: 2021-08-29 | Stop reason: HOSPADM

## 2021-08-28 RX ORDER — FLUTICASONE FUROATE AND VILANTEROL 200; 25 UG/1; UG/1
1 POWDER RESPIRATORY (INHALATION) EVERY MORNING
COMMUNITY
End: 2021-08-30

## 2021-08-28 RX ORDER — ROPINIROLE 1 MG/1
TABLET, FILM COATED ORAL
COMMUNITY
Start: 2021-06-11

## 2021-08-28 RX ORDER — SODIUM CHLORIDE 9 MG/ML
INJECTION, SOLUTION INTRAVENOUS CONTINUOUS
Status: DISCONTINUED | OUTPATIENT
Start: 2021-08-28 | End: 2021-08-29 | Stop reason: HOSPADM

## 2021-08-28 RX ORDER — MULTIVIT-MIN/IRON/FOLIC ACID/K 18-600-40
CAPSULE ORAL
COMMUNITY

## 2021-08-28 RX ORDER — SODIUM POLYSTYRENE SULFONATE 15 G/60ML
15 SUSPENSION ORAL; RECTAL ONCE
Status: COMPLETED | OUTPATIENT
Start: 2021-08-28 | End: 2021-08-29

## 2021-08-28 RX ORDER — MAGNESIUM 64 MG (MAGNESIUM CHLORIDE) TABLET,DELAYED RELEASE
COMMUNITY
Start: 2021-08-06

## 2021-08-28 RX ORDER — ALBUTEROL SULFATE 2.5 MG/3ML
SOLUTION RESPIRATORY (INHALATION)
COMMUNITY
Start: 2021-07-03

## 2021-08-28 RX ADMIN — SODIUM CHLORIDE, PRESERVATIVE FREE 10 ML: 5 INJECTION INTRAVENOUS at 09:54

## 2021-08-28 RX ADMIN — INSULIN GLARGINE 40 UNITS: 100 INJECTION, SOLUTION SUBCUTANEOUS at 20:10

## 2021-08-28 RX ADMIN — Medication 2000 UNITS: at 09:53

## 2021-08-28 RX ADMIN — GUAIFENESIN 600 MG: 600 TABLET, EXTENDED RELEASE ORAL at 09:54

## 2021-08-28 RX ADMIN — LEVOTHYROXINE SODIUM 100 MCG: 0.1 TABLET ORAL at 09:53

## 2021-08-28 RX ADMIN — GUAIFENESIN 600 MG: 600 TABLET, EXTENDED RELEASE ORAL at 20:09

## 2021-08-28 RX ADMIN — POTASSIUM CHLORIDE 20 MEQ: 750 TABLET, FILM COATED, EXTENDED RELEASE ORAL at 20:09

## 2021-08-28 RX ADMIN — METOPROLOL TARTRATE 25 MG: 25 TABLET, FILM COATED ORAL at 09:53

## 2021-08-28 RX ADMIN — ENOXAPARIN SODIUM 30 MG: 30 INJECTION SUBCUTANEOUS at 09:54

## 2021-08-28 RX ADMIN — ENOXAPARIN SODIUM 30 MG: 30 INJECTION SUBCUTANEOUS at 20:09

## 2021-08-28 RX ADMIN — ROSUVASTATIN CALCIUM 5 MG: 5 TABLET, COATED ORAL at 09:53

## 2021-08-28 RX ADMIN — TORSEMIDE 20 MG: 20 TABLET ORAL at 09:59

## 2021-08-28 RX ADMIN — SPIRONOLACTONE 25 MG: 25 TABLET ORAL at 09:53

## 2021-08-28 RX ADMIN — SODIUM CHLORIDE: 9 INJECTION, SOLUTION INTRAVENOUS at 18:00

## 2021-08-28 RX ADMIN — Medication 1 TABLET: at 09:54

## 2021-08-28 RX ADMIN — FAMOTIDINE 20 MG: 20 TABLET ORAL at 09:53

## 2021-08-28 RX ADMIN — SODIUM CHLORIDE, PRESERVATIVE FREE 10 ML: 5 INJECTION INTRAVENOUS at 20:09

## 2021-08-28 RX ADMIN — METOPROLOL TARTRATE 25 MG: 25 TABLET, FILM COATED ORAL at 20:09

## 2021-08-28 RX ADMIN — DEXAMETHASONE 6 MG: 4 TABLET ORAL at 09:55

## 2021-08-28 RX ADMIN — POTASSIUM CHLORIDE 20 MEQ: 750 TABLET, FILM COATED, EXTENDED RELEASE ORAL at 09:54

## 2021-08-28 RX ADMIN — INSULIN LISPRO 15 UNITS: 100 INJECTION, SOLUTION INTRAVENOUS; SUBCUTANEOUS at 17:05

## 2021-08-28 ASSESSMENT — PAIN SCALES - GENERAL
PAINLEVEL_OUTOF10: 0
PAINLEVEL_OUTOF10: 0

## 2021-08-28 NOTE — PROGRESS NOTES
PATIENT PREVIOUS HOME O2 EVAL IS . PATIENT ONLY QUALIFIED AT NIGHT TIME, BUT HAS A DX OF [place] SO THEREFORE, WILL NOT GET HOME O2 AT NIGHT.      A NEW ORDER WILL NEED TO BE PLACED BEFORE DISCHARGE IF PATIENT NEEDS TO BE CHECKED FOR HOME O2 FOR THE FIRST TWO STEPS

## 2021-08-28 NOTE — PROGRESS NOTES
Hospitalist Progress Note      Name:  Heather Amanda /Age/Sex: 1950  (66 y.o. female)   MRN & CSN:  [id number] & [id number] Admission Date/Time: 2021 10:09 AM   Location:  -A PCP: Channing Vitalehan[address] Day: 7      Assessment and Plan:     Assessment/Plan:     Sepsis-POA due to COVID PNA with acute organ dysfunction as evidenced by Acute Hypoxic Respiratory Failure:     Admitted with hypoxia,   Chest x-ray consistent with bilateral infiltrate   Procalcitonin 0.187  CRP is 13.7 on   D-dimer is 687        Continue dexamethasone and famotidine  On oxygen off and on  Bronchodilators     Pulmonology consulted,   Remdesivir completed   No indication for Tocilizumab          Supportive care- anti tussive, PRN tylenol, Sleep aid     Continue enoxaparin 30 mg twice daily    D-dimer elevation is likely due to Covid. Do not think she needs a CTPA     Hypothermia: Present on admission resolved  TSH and free T4 normal     Pancytopenia: Most likely secondary to Covid  WBC and platelet improving     Diabetes mellitus type 2: Hemoglobin A1c 8.9  Continue insulin glargine and insulin lispro correction scale  Discussed with endocrinology     Chronic diastolic CHF   Continue spironolactone and torsemide  Cardiology consulted     Hyperlipidemia:  Continue statin     Hypomagnesemia: Replace as needed    Metabolic encephalopathy  -On  confusion is improving  -However, when I asked her who the president is today, she said Obama.  -Discussed with her son. Acute kidney injury  -Hold her loop diuretic and spironolactone on   -On  we will start IV fluids     DVT prophlaxis:   lovenox       Subjective:     :  -Time spent counseling and coordinating care today was 35 minutes. This was done face-to-face.   -Greater than 50% of the time was done during this  -Spoke to her son Carmen Pruitt to her nurse Meghan Atwood  -Reviewed her medication dispense Medications:    insulin glargine  40 Units Subcutaneous Nightly    insulin lispro  0-12 Units Subcutaneous TID     insulin lispro  0-12 Units Subcutaneous 2 times per day    insulin lispro  15 Units Subcutaneous TID     guaiFENesin  600 mg Oral BID    levothyroxine  100 mcg Oral Daily    metoprolol tartrate  25 mg Oral BID    therapeutic multivitamin-minerals  1 tablet Oral Daily    potassium chloride  20 mEq Oral BID    rosuvastatin  5 mg Oral Daily    spironolactone  25 mg Oral Daily    torsemide  20 mg Oral Daily    sodium chloride flush  5-40 mL IntraVENous 2 times per day    enoxaparin  30 mg Subcutaneous BID    famotidine  20 mg Oral BID    dexamethasone  6 mg Oral Daily    vitamin D  2,000 Units Oral Daily    cefTRIAXone (ROCEPHIN) IV  1,000 mg IntraVENous Q24H    azithromycin  500 mg IntraVENous Q24H      Infusions:    sodium chloride      dextrose       PRN Meds: albuterol sulfate HFA, 2 puff, Q6H PRN  sodium chloride flush, 5-40 mL, PRN  sodium chloride, 25 mL, PRN  ondansetron, 4 mg, Q8H PRN   Or  ondansetron, 4 mg, Q6H PRN  polyethylene glycol, 17 g, Daily PRN  acetaminophen, 650 mg, Q6H PRN   Or  acetaminophen, 650 mg, Q6H PRN  glucose, 15 g, PRN  dextrose, 12.5 g, PRN  glucagon (rDNA), 1 mg, PRN  dextrose, 100 mL/hr, PRN  sodium chloride, 30 mL, PRN          Electronically signed by Shana Franco MD on 8/28/2021 at 9:21 AM
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
## 2021-08-29 VITALS
WEIGHT: 205.47 LBS | SYSTOLIC BLOOD PRESSURE: 118 MMHG | HEART RATE: 57 BPM | BODY MASS INDEX: 36.41 KG/M2 | RESPIRATION RATE: 18 BRPM | HEIGHT: 63 IN | OXYGEN SATURATION: 94 % | DIASTOLIC BLOOD PRESSURE: 66 MMHG | TEMPERATURE: 97.6 F

## 2021-08-29 LAB
ANION GAP SERPL CALCULATED.3IONS-SCNC: 10 MMOL/L (ref 4–16)
BASOPHILS ABSOLUTE: 0 K/CU MM
BASOPHILS RELATIVE PERCENT: 0.1 % (ref 0–1)
BUN BLDV-MCNC: 63 MG/DL (ref 6–23)
CALCIUM SERPL-MCNC: 8.9 MG/DL (ref 8.3–10.6)
CHLORIDE BLD-SCNC: 98 MMOL/L (ref 99–110)
CO2: 30 MMOL/L (ref 21–32)
CREAT SERPL-MCNC: 1.9 MG/DL (ref 0.6–1.1)
DIFFERENTIAL TYPE: ABNORMAL
EOSINOPHILS ABSOLUTE: 0 K/CU MM
EOSINOPHILS RELATIVE PERCENT: 0 % (ref 0–3)
GFR AFRICAN AMERICAN: 31 ML/MIN/1.73M2
GFR NON-AFRICAN AMERICAN: 26 ML/MIN/1.73M2
GLUCOSE BLD-MCNC: 119 MG/DL (ref 70–99)
GLUCOSE BLD-MCNC: 125 MG/DL (ref 70–99)
GLUCOSE BLD-MCNC: 221 MG/DL (ref 70–99)
GLUCOSE BLD-MCNC: 255 MG/DL (ref 70–99)
GLUCOSE BLD-MCNC: 262 MG/DL (ref 70–99)
HCT VFR BLD CALC: 51.6 % (ref 37–47)
HEMOGLOBIN: 16.5 GM/DL (ref 12.5–16)
IMMATURE NEUTROPHIL %: 0.7 % (ref 0–0.43)
LYMPHOCYTES ABSOLUTE: 0.7 K/CU MM
LYMPHOCYTES RELATIVE PERCENT: 6.5 % (ref 24–44)
MCH RBC QN AUTO: 31.3 PG (ref 27–31)
MCHC RBC AUTO-ENTMCNC: 32 % (ref 32–36)
MCV RBC AUTO: 97.7 FL (ref 78–100)
MONOCYTES ABSOLUTE: 1 K/CU MM
MONOCYTES RELATIVE PERCENT: 8.7 % (ref 0–4)
NUCLEATED RBC %: 0 %
PDW BLD-RTO: 14 % (ref 11.7–14.9)
PLATELET # BLD: 207 K/CU MM (ref 140–440)
PMV BLD AUTO: 9.8 FL (ref 7.5–11.1)
POTASSIUM SERPL-SCNC: 4.9 MMOL/L (ref 3.5–5.1)
RBC # BLD: 5.28 M/CU MM (ref 4.2–5.4)
SEGMENTED NEUTROPHILS ABSOLUTE COUNT: 9.3 K/CU MM
SEGMENTED NEUTROPHILS RELATIVE PERCENT: 84 % (ref 36–66)
SODIUM BLD-SCNC: 138 MMOL/L (ref 135–145)
TOTAL IMMATURE NEUTOROPHIL: 0.08 K/CU MM
TOTAL NUCLEATED RBC: 0 K/CU MM
WBC # BLD: 11.1 K/CU MM (ref 4–10.5)

## 2021-08-29 PROCEDURE — 85025 COMPLETE CBC W/AUTO DIFF WBC: CPT

## 2021-08-29 PROCEDURE — 6370000000 HC RX 637 (ALT 250 FOR IP): Performed by: NURSE PRACTITIONER

## 2021-08-29 PROCEDURE — 6360000002 HC RX W HCPCS: Performed by: HOSPITALIST

## 2021-08-29 PROCEDURE — 2580000003 HC RX 258: Performed by: HOSPITALIST

## 2021-08-29 PROCEDURE — 82962 GLUCOSE BLOOD TEST: CPT

## 2021-08-29 PROCEDURE — 80048 BASIC METABOLIC PNL TOTAL CA: CPT

## 2021-08-29 PROCEDURE — 6360000002 HC RX W HCPCS: Performed by: INTERNAL MEDICINE

## 2021-08-29 PROCEDURE — 6370000000 HC RX 637 (ALT 250 FOR IP): Performed by: HOSPITALIST

## 2021-08-29 PROCEDURE — 94761 N-INVAS EAR/PLS OXIMETRY MLT: CPT

## 2021-08-29 PROCEDURE — 6370000000 HC RX 637 (ALT 250 FOR IP): Performed by: INTERNAL MEDICINE

## 2021-08-29 RX ORDER — METOPROLOL SUCCINATE 25 MG/1
25 TABLET, EXTENDED RELEASE ORAL DAILY
Qty: 30 TABLET | Refills: 3 | Status: SHIPPED | OUTPATIENT
Start: 2021-08-29

## 2021-08-29 RX ORDER — POTASSIUM CHLORIDE 750 MG/1
20 TABLET, FILM COATED, EXTENDED RELEASE ORAL 2 TIMES DAILY
Qty: 60 TABLET | Refills: 3
Start: 2021-08-29

## 2021-08-29 RX ORDER — DEXAMETHASONE 6 MG/1
6 TABLET ORAL ONCE
Qty: 1 TABLET | Refills: 0 | Status: SHIPPED | OUTPATIENT
Start: 2021-08-30 | End: 2021-08-29 | Stop reason: HOSPADM

## 2021-08-29 RX ORDER — TORSEMIDE 20 MG/1
20 TABLET ORAL DAILY
Qty: 30 TABLET | Refills: 3
Start: 2021-08-29

## 2021-08-29 RX ORDER — SPIRONOLACTONE 25 MG/1
25 TABLET ORAL DAILY
Qty: 30 TABLET | Refills: 3
Start: 2021-08-29

## 2021-08-29 RX ADMIN — FAMOTIDINE 20 MG: 20 TABLET, FILM COATED ORAL at 09:08

## 2021-08-29 RX ADMIN — Medication 2000 UNITS: at 09:07

## 2021-08-29 RX ADMIN — DEXAMETHASONE 6 MG: 4 TABLET ORAL at 09:07

## 2021-08-29 RX ADMIN — INSULIN LISPRO 15 UNITS: 100 INJECTION, SOLUTION INTRAVENOUS; SUBCUTANEOUS at 17:07

## 2021-08-29 RX ADMIN — ENOXAPARIN SODIUM 30 MG: 30 INJECTION SUBCUTANEOUS at 09:08

## 2021-08-29 RX ADMIN — SODIUM POLYSTYRENE SULFONATE 15 G: 15 SUSPENSION ORAL; RECTAL at 03:13

## 2021-08-29 RX ADMIN — LEVOTHYROXINE SODIUM 100 MCG: 0.1 TABLET ORAL at 09:07

## 2021-08-29 RX ADMIN — METOPROLOL TARTRATE 25 MG: 25 TABLET, FILM COATED ORAL at 09:07

## 2021-08-29 RX ADMIN — Medication 1 TABLET: at 09:07

## 2021-08-29 RX ADMIN — GUAIFENESIN 600 MG: 600 TABLET, EXTENDED RELEASE ORAL at 09:07

## 2021-08-29 RX ADMIN — POTASSIUM CHLORIDE 20 MEQ: 750 TABLET, FILM COATED, EXTENDED RELEASE ORAL at 09:07

## 2021-08-29 RX ADMIN — INSULIN LISPRO 15 UNITS: 100 INJECTION, SOLUTION INTRAVENOUS; SUBCUTANEOUS at 11:41

## 2021-08-29 RX ADMIN — ROSUVASTATIN CALCIUM 5 MG: 5 TABLET, COATED ORAL at 09:07

## 2021-08-29 RX ADMIN — SODIUM CHLORIDE, PRESERVATIVE FREE 10 ML: 5 INJECTION INTRAVENOUS at 09:08

## 2021-08-29 ASSESSMENT — PAIN SCALES - GENERAL: PAINLEVEL_OUTOF10: 0

## 2021-08-29 NOTE — PROGRESS NOTES
Hospitalist    Patient walked with nurse Mary Dunaway. Did OK, but would benefit from a walker. Vitals:    08/29/21 0752   BP: 118/66   Pulse: 57   Resp: 18   Temp: 97.6 °F (36.4 °C)   SpO2: 94%       Per nurse doing better today    Was drowsy yesterday    Resp effort nonlabored at rest when I saw her yesterday    Covid with debility - needs walker. Ric Olson was evaluated today and a DME order was entered for a wheeled walker because she requires this to successfully complete daily living tasks of toileting, personal cares and ambulating. A wheeled walker is necessary due to the patient's unsteady gait, upper body weakness, and inability to  an ambulation device; and she can ambulate only by pushing a walker instead of a lesser assistive device such as a cane, crutch, or standard walker. The need for this equipment was discussed with the patient and she understands and is in agreement.

## 2021-08-29 NOTE — PROGRESS NOTES
Discharge instructions reviewed with pt. All questions answered. Reviewed discharge instructions over the phone with pt's son, who will pick pt up once ready.

## 2021-08-29 NOTE — PROGRESS NOTES
Pt ambulated in hallway with cane & assist of 1 person, on room air, approx 200ft. SpO2 level did not drop below 92%. Pt said she did not feel SOB. Lab/[facility]lebot notified me that they were not able to draw pt's labs. I spoke with IV team. They will come draw pt's labs when finished with current order. [facility] notified.

## 2021-08-29 NOTE — CARE COORDINATION
CM contacted by Dr. Dugan File stating that the Pt would like a walker. Meryl Valverde was evaluated today and a DME order was entered for a wheeled walker because she requires this to successfully complete daily living tasks of toileting, personal cares and ambulating. A wheeled walker is necessary due to the patient's unsteady gait, upper body weakness, and inability to  an ambulation device; and she can ambulate only by pushing a walker instead of a lesser assistive device such as a cane, crutch, or standard walker. The need for this equipment was discussed with the patient and she understands and is in agreement. Pt would also like home care, no preference of company. Referral made to [facility]. Pt information and HC order faxed to [phone].

## 2021-08-29 NOTE — CONSULTS
RENAL DOSE ADJUSTMENT MADE PER P/T PROTOCOL    PREVIOUS ORDER:  Enoxaparin 30mg subq bid    Estimated Creatinine Clearance: 29 mL/min (A) (based on SCr of 1.9 mg/dL (H)). Recent Labs     08/27/21  0852 08/27/21  1330 08/28/21  1919   BUN  --  50* 67*   CREATININE   < > 1.7* 1.9*   PLT   < >  --  205    < > = values in this interval not displayed. COVID ADMISSION - BMI >30    NEW RENALLY ADJUSTED ORDER:  ENOXAPARIN 40MG SUBQ DAILY      Dom Mckeon.  Katelynn Feliz, [address] [facility]  8/29/2021 11:08 AM
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
## 2021-08-30 ENCOUNTER — CARE COORDINATION (OUTPATIENT)
Dept: CASE MANAGEMENT | Age: 71
End: 2021-08-30

## 2021-08-31 NOTE — CARE COORDINATION
[facility] 45 Transitions Initial Follow Up Call    Call within 2 business days of discharge: Yes    Patient: Doris Sotelo Patient : 1950   MRN: [id number]  Reason for Admission: Covid19, Sepsis, Ac Resp Failure  Discharge Date: 21 RARS: Readmission Risk Score: 28    Last Discharge [facility]       Complaint Diagnosis Description Type Department Provider    21   Admission (Discharged) [address] Elian David MD      Non-face-to-face services provided:  Obtained and reviewed discharge summary and/or continuity of care documents    Care Transitions 24 Hour Call    Schedule Follow Up Appointment with PCP: Declined  Do you have any ongoing symptoms?: No  Do you have a copy of your discharge instructions?: Yes  Do you have all of your prescriptions and are they filled?: No  Have you been contacted by a [address]?: No  Have you scheduled your follow up appointment?: No (Comment: see note)  Were you discharged with any Home Care or Post Acute Services: No  Do you feel like you have everything you need to keep you well at home?: Yes  Care Transitions Interventions  No Identified Needs   Home Care Waiver: Declined        Transportation Support: Declined      DME Assistance: Declined     Senior Services: Declined         Future Appointments   Date Time Provider Tess Wilkins   2022  4:00 PM Balta Valentine  [facility] to be reviewed by the provider   Additional needs identified to be addressed with provider: no         Method of communication with provider : none    Was this a readmission? No  Patient stated reason for admission: SOB  Patients top risk factors for readmission: lack of knowledge about disease and medical condition-Covid19, DM, Asthma    Spoke with Patient for Care Transition discharge call, verified Patient name and  as identifiers.  Introduced self and explained CT program.    Phone Assessment: Reports doing well since home other than tired. Denies sob, cough, fever, chills or other Covid19 related sx. Advised rest, pacing activity, adequate hydration/nutrition. Education Provided:   Reviewed discharge instructions, medical action plan and Covid19 red flags such as increased shortness of breath, increasing fever and signs of decompensation. Discussed increased risk of blood clots associated w/ Covid19, sx and preventative measures. Patient verbalizes understanding. Discussed exposure protocols and quarantine with CDC Guidelines What to do if you are sick with coronavirus disease 2019.  Patient reports adhering to quarantine guidelines. Has family/friends who are able to drop off any needed supplies or food. Advised to contact HD/PCP re: any additional Covid19 questions; given contact infor for [facility]. AVS/Meds: Confirmed copy of AVS received, reviewed with Patient. Family to p/u new rx from pharmacy today. Med education provided, questions answered, verbalizes understanding. Stressed importance of med compliance. Medication review completed with Patient . Advised obtaining 90 day supply of routine, prn meds. Advised contacting pharmacy/md for refill needs. Resource Need Assessment:   Living Arrangements: lives w/ SO and son   ADLS:independent, does not drive   DME:Med Neb and cane   Transportation: family  HHC:none, denies need   Community Assistance:none, denies need   Referrals Made per CTN with Patient/Family Approval: [phone] Follow Up Appointments: Discussed importance of 7 day hospital follow up appointment for continuity of care. Declined need for CTN assistance for appt scheduling citing she is active w/ [employer] cooridnator who is scheduling appt. Advanced Care Planning / HCDM:  Healthcare Decision Maker:    Primary Decision Maker: Amaris Lin [facility] - [phone]  Reports Advanced Directives up to date, reflecting current wishes . Encouraged to place on file w/ PCP, [facility].     Advised to contact

## 2021-09-06 NOTE — DISCHARGE SUMMARY
Discharge Summary    Name:  Keith Kinsey /Age/Sex: 1950  (66 y.o. female)   MRN & CSN:  [id number] & [id number] Admission Date/Time: 2021 10:09 AM   Attending:  No att. providers found Discharging Physician: Radha Foster MD     HPI:     Per H&P:  Chief Complaint: Feeling weak shaky with shortness of breath 1 week duration  Keith Kinsey is a 66 y.o.  female with history of insulin-dependent diabetes mellitus history of diastolic congestive heart failure due to [place] emergency department with a feeling very shaky anxious with shortness of breath and dry cough started 1 week ago. Patient was seen in the same emergency department 1 week prior for the right lower quadrant abdominal pain and the work-up at that time was insignificant only mild elevation of liver enzymes. Patient denies nausea or vomiting she states she started feeling shortness of breath about week ago but denies chest pain specifically having pain in the right lower quadrant, also the patient reported subjective fever and chills. Patient tested positive for COVID-19, chest x-ray showed consolidative changes within the right lung and mild nodular infiltrates within the left upper lobe, patient currently on 2 to 3 L nasal cannula oxygen. Patient also developed hypothermia with a temperature 95 rectally, started on warm blanket. Hospital Course:     Fausto Ward is a pleasant 74-year-old who presented to the ED in [place] with shortness of breath. She tested positive for COVID-19 and was transferred to [facility] and was admitted. She briefly required 3 L oxygen. However, she is not requiring room air now. She is weak and requires a walker. Eventually in the hospital she became dehydrated because she was not eating well and she had an acute kidney injury. She has now been hydrated, and was released back home after a 7-day inpatient stay. An order for home health care was placed.   Also, upon discharge I made sure that both she and her family understand that they should hold her to diuretics for about 1 week and closely follow-up with her PCP. Problem list    COVID-19 pneumonia  -She had associated sepsis  -She also had associated acute respiratory failure with hypoxia   -Dexamethasone: 9 doses were given while here, including the dose given in the ED. Since her blood sugar was up to 415 yesterday I decided not to give her the 10th dose upon discharge  -Remdesivir: 5 days completed on 8/26  -There was no indication for Tocilizumab  -Antibacterial antibiotics: She had 7 days each of ceftriaxone 1000 mg daily and azithromycin 500 mg IV daily    Acute kidney injury  -Creatinine was 1.1 upon arrival and 0.7 the following day. Creatinine increased to 1.9 yesterday and today.  -She was hydrated beginning yesterday and has now perked up. She is stable for discharge. Would hold both her home medications torsemide and spironolactone upon discharge, and follow-up closely with PCP. An order for home health care was placed and I requested a BMP in 1 week and in 2 weeks. The  faxed a referral to home health care. Other problems    Metabolic encephalopathy  -This has now improved. Per my discussion with her son sometimes at home when she is talking and she forgets what she is talking about. She might have some mild cognitive impairment per my discussion with her son.     Hypothermia  -The day of arrival her temperature bottomed out at 95.1  -This problem was related to her infection    Leukopenia and thrombocytopenia, transient  -White count was 1.8 the day after admission and is now normal  -Platelet count was 90 the day after admission and is now normal  -These problems are likely related to the Covid infection    Asthma  -Stable, she is on albuterol MDI and Breo Ellipta at home  -She is also on Spiriva    Chronic diastolic heart failure  -Cardiology consult appreciated  -10-year risk of ASCVD is 19% per Dr. Orr Seat 1 month ago  -Loop diuretic: Torsemide 20 mg daily is to be held till about September 6  -Mineralocorticoid antagonist: Spironolactone 25 mg daily is to be held until about September 6  -Beta-blocker: Metoprolol tartrate was changed to metoprolol succinate    History of lower extremity chronic venous hypertension  -This diagnosis is noted 6 years ago by Dr. Anaid Sher. At that time she had associated ulcer disease and inflammation. She had endovenous radiofrequency ablation of the saphenous vein per problem on her list from 6 years ago    Hypomagnesemia  -Magnesium was 1.4 upon arrival and improved to 1.8 the following day    Diabetes mellitus type 2 diagnosed in 1996  -Hemoglobin A1c this admission is check whether it is 8.7  -Continue Tresiba 64 units daily  -Continue insulin aspart    Hyperlipidemia  -Continue rosuvastatin    Body mass index is 36.4 kg/m². -Per Dr. Vazquez Must consult note she has sleep apnea  -Former smoker, quit in 1995    History of endometrial cancer several years ago  -She had surgery and radiation at the [facility]    Hypothyroidism  -Levothyroxine    Social history  -She lives with her son and her significant other in [place]. She stated that she may get . Her PCP is at the [facility] clinic in [place]. The patient expressed appropriate understanding of and agreement with the discharge recommendations, medications, and plan. Consults this admission:  IP CONSULT TO PHARMACY  IP CONSULT TO PULMONOLOGY  IP CONSULT TO CARDIOLOGY  IP CONSULT TO ONCOLOGY  IP CONSULT TO ENDOCRINOLOGY  IP CONSULT TO HOME CARE NEEDS    Discharge Instruction:   Follow up appointments: She was seen by cardiology, pulmonary, endocrinology, and oncology. She can follow-up with them. Additionally, if needed nephrology consult can be considered.   Primary care physician:  within 2 weeks    Diet:  diabetic diet   Activity: activity as tolerated  Disposition: Discharged to:   Home health care was requested  Condition on discharge: Stable    Discharge Medications:    Nila Ped   Home Medication Instructions FRO:[id number]    Printed on:09/06/21 1034   Medication Information                      albuterol (PROVENTIL) (2.5 MG/3ML) 0.083% nebulizer solution  2.5 MG (3 ML) INHALATION 4 TIMES A DAY AS NEEDED FOR SHORTNESS OF BREATH OR WHEEZING             albuterol sulfate  (90 Base) MCG/ACT inhaler  INHALE 2 PUFFS EVERY 4 TO 6 HOURS AS NEEDED FOR SHORTNESS OF BREATH OR WHEEZING             BREO ELLIPTA 200-25 MCG/INH AEPB inhaler  INHALE 1 PUFF BY MOUTH EVERY 24 HOURS             Cholecalciferol (VITAMIN D) 50 MCG (2000 UT) CAPS capsule  Take by mouth             guaiFENesin (MUCINEX) 600 MG extended release tablet  Take 1 tablet by mouth 2 times daily             insulin aspart (NOVOLOG) 100 UNIT/ML injection pen  Inject into the skin 4 times daily Patient uses a sliding scale for the amount of insulin             Insulin Degludec (TRESIBA FLEXTOUCH) 100 UNIT/ML SOPN  Inject 64 Units into the skin daily              levothyroxine (SYNTHROID) 100 MCG tablet  Take 100 mcg by mouth daily. MAG64 64 MG TBEC extended release tablet  TAKE 1 TABLET BY MOUTH EVERYDAY AT BEDTIME             metoprolol succinate (TOPROL XL) 25 MG extended release tablet  Take 1 tablet by mouth daily             Multiple Vitamins-Minerals (THERAPEUTIC MULTIVITAMIN-MINERALS) tablet  Take 1 tablet by mouth daily             potassium chloride (KLOR-CON) 10 MEQ extended release tablet  Take 2 tablets by mouth 2 times daily Patient takes 2 10meq tablets once a day.   -8/29/2021 upon discharge from the hospital today after a stay for Covid, do not resume this medication until September 6, 2021, and do so only if OK with your family doctor             rOPINIRole (REQUIP) 1 MG tablet  TAKE 1 TABLET BY MOUTH 1 3 HOUR BEFORE BEDTIME             rosuvastatin (CRESTOR) 5 MG tablet  Take 5 mg by mouth daily             SPIRIVA RESPIMAT 2.5 MCG/ACT AERS inhaler               spironolactone (ALDACTONE) 25 MG tablet  Take 1 tablet by mouth daily -8/29/2021 upon discharge from the hospital today after a stay for Covid, do not resume this medication until September 6, 2021, and do so only if OK with your family doctor             torsemide (DEMADEX) 20 MG tablet  Take 1 tablet by mouth daily -8/29/2021 upon discharge from the hospital today after a stay for Covid, do not resume this medication until September 6, 2021, and do so only if OK with your family doctor             VITAMIN D PO  Take by mouth                 Objective Findings at Discharge:   /66   Pulse 57   Temp 97.6 °F (36.4 °C) (Oral)   Resp 18   Ht 5' 3\" (1.6 m)   Wt 205 lb 7.5 oz (93.2 kg)   SpO2 94%   BMI 36.40 kg/m²            PHYSICAL EXAM   GEN Awake female, sitting upright in bed in no apparent distress. Appears given age. LABS:    CBC:   Lab Results   Component Value Date    WBC 11.1 08/29/2021    HGB 16.5 08/29/2021    HCT 51.6 08/29/2021    MCV 97.7 08/29/2021     08/29/2021     BMP:   Lab Results   Component Value Date     08/29/2021    K 4.9 08/29/2021    CL 98 08/29/2021    CO2 30 08/29/2021    PHOS 2.7 03/17/2015    BUN 63 08/29/2021    CREATININE 1.9 08/29/2021    CALCIUM 8.9 08/29/2021     IMAGING:    XR ACUTE ABD SERIES CHEST 1 VW [[id number]] Collected: 08/28/21 0124      Order Status: Completed Updated: 08/28/21 0756     Narrative:       EXAMINATION:   TWO XRAY VIEWS OF THE ABDOMEN AND SINGLE  XRAY VIEW OF THE CHEST     8/27/2021 11:03 pm     COMPARISON:   None. HISTORY:   ORDERING SYSTEM PROVIDED HISTORY: Shortness of breath   TECHNOLOGIST PROVIDED HISTORY:   Reason for exam:->Shortness of breath   Reason for Exam: Shortness of breath   Acuity: Unknown   Type of Exam: Initial   Additional signs and symptoms: unknown   Relevant Medical/Surgical History: asthma, kidney stones     FINDINGS:   The cardiomediastinal silhouette is stable.   Bilateral periphery located   pulmonary infiltrates are seen without significant change. There is no large   pleural effusion or pneumothorax. There is no free air or bowel obstruction. Impression:       1. Bilateral peripherally located multifocal infiltrates are present without   significant change. 2. No gross free air or bowel obstruction. CT HEAD WO CONTRAST [[id number]]      Order Status: No result      CT CHEST W CONTRAST [[id number]] Collected: 08/23/21 1901     Order Status: Completed Updated: 08/23/21 1906     Narrative:       EXAMINATION:   CT OF THE CHEST WITH CONTRAST 8/23/2021 3:05 pm     TECHNIQUE:   CT of the chest was performed with the administration of intravenous   contrast. Multiplanar reformatted images are provided for review. Dose   modulation, iterative reconstruction, and/or weight based adjustment of the   mA/kV was utilized to reduce the radiation dose to as low as reasonably   achievable. COMPARISON:   12/16/2020     HISTORY:   ORDERING SYSTEM PROVIDED HISTORY: Bilateral Pulmonary nodules   TECHNOLOGIST PROVIDED HISTORY:   Reason for exam:->Bilateral Pulmonary nodules   Reason for Exam: Bilateral Pulmonary nodules   Acuity: Acute   Type of Exam: Initial   Additional signs and symptoms: none   Relevant Medical/Surgical History: 75ml isovue 370/ gfr>60     FINDINGS:   Mediastinum: The heart is normal in size. Mitral annular calcifications are   present. There is no pericardial effusion. The thoracic aorta is normal in   caliber. No pathologically enlarged mediastinal or hilar lymph node is seen. Lungs/pleura: There are patchy and confluent consolidative and ground-glass   pulmonary opacities in both a bronchovascular and subpleural peripheral   distribution throughout both lungs. No pneumothorax or pleural effusion is   seen. Airways are unremarkable. Upper Abdomen: No acute abnormality in the visualized upper abdomen.      Soft Tissues/Bones: No acute osseous or soft

## 2021-12-01 ENCOUNTER — HOSPITAL ENCOUNTER (OUTPATIENT)
Dept: WOUND CARE | Age: 71
Discharge: HOME | End: 2021-12-01
Payer: COMMERCIAL

## 2021-12-01 VITALS
DIASTOLIC BLOOD PRESSURE: 81 MMHG | SYSTOLIC BLOOD PRESSURE: 150 MMHG | TEMPERATURE: 97.1 F | RESPIRATION RATE: 16 BRPM | HEART RATE: 86 BPM

## 2021-12-01 PROCEDURE — 99213 OFFICE O/P EST LOW 20 MIN: CPT

## 2021-12-01 PROCEDURE — 99203 OFFICE O/P NEW LOW 30 MIN: CPT | Performed by: INTERNAL MEDICINE

## 2021-12-01 PROCEDURE — 11042 DBRDMT SUBQ TIS 1ST 20SQCM/<: CPT | Performed by: INTERNAL MEDICINE

## 2021-12-01 PROCEDURE — 11042 DBRDMT SUBQ TIS 1ST 20SQCM/<: CPT

## 2021-12-01 RX ORDER — LIDOCAINE 40 MG/G
CREAM TOPICAL ONCE
Status: CANCELLED | OUTPATIENT
Start: 2021-12-01 | End: 2021-12-01

## 2021-12-01 RX ORDER — LIDOCAINE 50 MG/G
OINTMENT TOPICAL ONCE
Status: CANCELLED | OUTPATIENT
Start: 2021-12-01 | End: 2021-12-01

## 2021-12-01 RX ORDER — GINSENG 100 MG
CAPSULE ORAL ONCE
Status: CANCELLED | OUTPATIENT
Start: 2021-12-01 | End: 2021-12-01

## 2021-12-01 RX ORDER — BACITRACIN ZINC AND POLYMYXIN B SULFATE 500; 1000 [USP'U]/G; [USP'U]/G
OINTMENT TOPICAL ONCE
Status: CANCELLED | OUTPATIENT
Start: 2021-12-01 | End: 2021-12-01

## 2021-12-01 RX ORDER — CLOBETASOL PROPIONATE 0.5 MG/G
OINTMENT TOPICAL ONCE
Status: CANCELLED | OUTPATIENT
Start: 2021-12-01 | End: 2021-12-01

## 2021-12-01 RX ORDER — GENTAMICIN SULFATE 1 MG/G
OINTMENT TOPICAL ONCE
Status: CANCELLED | OUTPATIENT
Start: 2021-12-01 | End: 2021-12-01

## 2021-12-01 RX ORDER — BACITRACIN, NEOMYCIN, POLYMYXIN B 400; 3.5; 5 [USP'U]/G; MG/G; [USP'U]/G
OINTMENT TOPICAL ONCE
Status: CANCELLED | OUTPATIENT
Start: 2021-12-01 | End: 2021-12-01

## 2021-12-01 RX ORDER — LIDOCAINE HYDROCHLORIDE 20 MG/ML
JELLY TOPICAL ONCE
Status: CANCELLED | OUTPATIENT
Start: 2021-12-01 | End: 2021-12-01

## 2021-12-01 RX ORDER — LIDOCAINE HYDROCHLORIDE 40 MG/ML
SOLUTION TOPICAL ONCE
Status: CANCELLED | OUTPATIENT
Start: 2021-12-01 | End: 2021-12-01

## 2021-12-01 RX ORDER — BETAMETHASONE DIPROPIONATE 0.05 %
OINTMENT (GRAM) TOPICAL ONCE
Status: CANCELLED | OUTPATIENT
Start: 2021-12-01 | End: 2021-12-01
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
## 2021-12-08 ENCOUNTER — HOSPITAL ENCOUNTER (OUTPATIENT)
Dept: WOUND CARE | Age: 71
Discharge: HOME | End: 2021-12-08
Payer: COMMERCIAL

## 2021-12-08 VITALS
HEART RATE: 92 BPM | RESPIRATION RATE: 16 BRPM | SYSTOLIC BLOOD PRESSURE: 137 MMHG | TEMPERATURE: 96.7 F | DIASTOLIC BLOOD PRESSURE: 70 MMHG

## 2021-12-08 DIAGNOSIS — L97.922 ULCER OF LEFT LOWER EXTREMITY WITH FAT LAYER EXPOSED (HCC): Primary | ICD-10-CM

## 2021-12-08 PROCEDURE — 11042 DBRDMT SUBQ TIS 1ST 20SQCM/<: CPT | Performed by: INTERNAL MEDICINE

## 2021-12-08 PROCEDURE — 11042 DBRDMT SUBQ TIS 1ST 20SQCM/<: CPT

## 2021-12-08 RX ORDER — LIDOCAINE HYDROCHLORIDE 20 MG/ML
JELLY TOPICAL ONCE
Status: CANCELLED | OUTPATIENT
Start: 2021-12-08 | End: 2021-12-08

## 2021-12-08 RX ORDER — BACITRACIN, NEOMYCIN, POLYMYXIN B 400; 3.5; 5 [USP'U]/G; MG/G; [USP'U]/G
OINTMENT TOPICAL ONCE
Status: CANCELLED | OUTPATIENT
Start: 2021-12-08 | End: 2021-12-08

## 2021-12-08 RX ORDER — GENTAMICIN SULFATE 1 MG/G
OINTMENT TOPICAL ONCE
Status: CANCELLED | OUTPATIENT
Start: 2021-12-08 | End: 2021-12-08

## 2021-12-08 RX ORDER — LIDOCAINE 50 MG/G
OINTMENT TOPICAL ONCE
Status: CANCELLED | OUTPATIENT
Start: 2021-12-08 | End: 2021-12-08

## 2021-12-08 RX ORDER — BACITRACIN ZINC AND POLYMYXIN B SULFATE 500; 1000 [USP'U]/G; [USP'U]/G
OINTMENT TOPICAL ONCE
Status: CANCELLED | OUTPATIENT
Start: 2021-12-08 | End: 2021-12-08

## 2021-12-08 RX ORDER — BETAMETHASONE DIPROPIONATE 0.05 %
OINTMENT (GRAM) TOPICAL ONCE
Status: CANCELLED | OUTPATIENT
Start: 2021-12-08 | End: 2021-12-08

## 2021-12-08 RX ORDER — GINSENG 100 MG
CAPSULE ORAL ONCE
Status: CANCELLED | OUTPATIENT
Start: 2021-12-08 | End: 2021-12-08

## 2021-12-08 RX ORDER — CLOBETASOL PROPIONATE 0.5 MG/G
OINTMENT TOPICAL ONCE
Status: CANCELLED | OUTPATIENT
Start: 2021-12-08 | End: 2021-12-08

## 2021-12-08 RX ORDER — LIDOCAINE HYDROCHLORIDE 40 MG/ML
SOLUTION TOPICAL ONCE
Status: CANCELLED | OUTPATIENT
Start: 2021-12-08 | End: 2021-12-08

## 2021-12-08 RX ORDER — LIDOCAINE 40 MG/G
CREAM TOPICAL ONCE
Status: CANCELLED | OUTPATIENT
Start: 2021-12-08 | End: 2021-12-08

## 2021-12-08 RX ORDER — LIDOCAINE HYDROCHLORIDE 40 MG/ML
SOLUTION TOPICAL ONCE
Status: DISCONTINUED | OUTPATIENT
Start: 2021-12-08 | End: 2021-12-09 | Stop reason: HOSPADM
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
## 2021-12-15 ENCOUNTER — HOSPITAL ENCOUNTER (OUTPATIENT)
Dept: WOUND CARE | Age: 71
Discharge: HOME | End: 2021-12-15
Payer: COMMERCIAL

## 2021-12-15 VITALS
SYSTOLIC BLOOD PRESSURE: 114 MMHG | RESPIRATION RATE: 16 BRPM | TEMPERATURE: 96.7 F | HEART RATE: 93 BPM | DIASTOLIC BLOOD PRESSURE: 84 MMHG

## 2021-12-15 DIAGNOSIS — L97.922 ULCER OF LEFT LOWER EXTREMITY WITH FAT LAYER EXPOSED (HCC): Primary | ICD-10-CM

## 2021-12-15 PROCEDURE — 11042 DBRDMT SUBQ TIS 1ST 20SQCM/<: CPT | Performed by: NURSE PRACTITIONER

## 2021-12-15 PROCEDURE — 11042 DBRDMT SUBQ TIS 1ST 20SQCM/<: CPT
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
## 2021-12-22 ENCOUNTER — TELEPHONE (OUTPATIENT)
Dept: WOUND CARE | Age: 71
End: 2021-12-22

## 2021-12-22 NOTE — TELEPHONE ENCOUNTER
Patient appointment cancelled.     Electronically signed by Carolann Harris RN on 12/22/2021 at 1:38 PM

## 2021-12-29 ENCOUNTER — HOSPITAL ENCOUNTER (OUTPATIENT)
Dept: WOUND CARE | Age: 71
Discharge: HOME | End: 2021-12-29
Payer: COMMERCIAL

## 2021-12-29 VITALS
SYSTOLIC BLOOD PRESSURE: 146 MMHG | HEART RATE: 91 BPM | DIASTOLIC BLOOD PRESSURE: 73 MMHG | TEMPERATURE: 97.9 F | RESPIRATION RATE: 18 BRPM

## 2021-12-29 DIAGNOSIS — T81.89XA NON-HEALING SURGICAL WOUND, INITIAL ENCOUNTER: ICD-10-CM

## 2021-12-29 DIAGNOSIS — T81.32XA POSTOPERATIVE DEHISCENCE OF INTERNAL WOUND, INITIAL ENCOUNTER: Primary | ICD-10-CM

## 2021-12-29 PROCEDURE — 11042 DBRDMT SUBQ TIS 1ST 20SQCM/<: CPT | Performed by: NURSE PRACTITIONER

## 2021-12-29 PROCEDURE — 11042 DBRDMT SUBQ TIS 1ST 20SQCM/<: CPT

## 2021-12-29 RX ORDER — PREDNISONE 20 MG/1
20 TABLET ORAL DAILY
COMMUNITY
End: 2022-01-19 | Stop reason: ALTCHOICE

## 2021-12-29 RX ORDER — DOXYCYCLINE 25 MG/5ML
100 POWDER, FOR SUSPENSION ORAL 2 TIMES DAILY
COMMUNITY
End: 2022-01-19 | Stop reason: ALTCHOICE

## 2021-12-29 RX ORDER — LIDOCAINE 50 MG/G
OINTMENT TOPICAL ONCE
OUTPATIENT
Start: 2021-12-29 | End: 2021-12-29

## 2021-12-29 RX ORDER — LIDOCAINE 40 MG/G
CREAM TOPICAL ONCE
OUTPATIENT
Start: 2021-12-29 | End: 2021-12-29

## 2021-12-29 RX ORDER — BENZONATATE 100 MG/1
100 CAPSULE ORAL 3 TIMES DAILY PRN
Status: ON HOLD | COMMUNITY
End: 2022-09-21 | Stop reason: HOSPADM

## 2021-12-29 RX ORDER — LIDOCAINE HYDROCHLORIDE 20 MG/ML
JELLY TOPICAL ONCE
OUTPATIENT
Start: 2021-12-29 | End: 2021-12-29

## 2021-12-29 RX ORDER — BETAMETHASONE DIPROPIONATE 0.05 %
OINTMENT (GRAM) TOPICAL ONCE
OUTPATIENT
Start: 2021-12-29 | End: 2021-12-29

## 2021-12-29 RX ORDER — GINSENG 100 MG
CAPSULE ORAL ONCE
OUTPATIENT
Start: 2021-12-29 | End: 2021-12-29

## 2021-12-29 RX ORDER — CLOBETASOL PROPIONATE 0.5 MG/G
OINTMENT TOPICAL ONCE
OUTPATIENT
Start: 2021-12-29 | End: 2021-12-29

## 2021-12-29 RX ORDER — LIDOCAINE HYDROCHLORIDE 40 MG/ML
SOLUTION TOPICAL ONCE
Status: CANCELLED | OUTPATIENT
Start: 2021-12-29 | End: 2021-12-29

## 2021-12-29 RX ORDER — BACITRACIN, NEOMYCIN, POLYMYXIN B 400; 3.5; 5 [USP'U]/G; MG/G; [USP'U]/G
OINTMENT TOPICAL ONCE
OUTPATIENT
Start: 2021-12-29 | End: 2021-12-29

## 2021-12-29 RX ORDER — GENTAMICIN SULFATE 1 MG/G
OINTMENT TOPICAL ONCE
OUTPATIENT
Start: 2021-12-29 | End: 2021-12-29

## 2021-12-29 RX ORDER — BACITRACIN ZINC AND POLYMYXIN B SULFATE 500; 1000 [USP'U]/G; [USP'U]/G
OINTMENT TOPICAL ONCE
OUTPATIENT
Start: 2021-12-29 | End: 2021-12-29
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
## 2022-01-05 ENCOUNTER — HOSPITAL ENCOUNTER (OUTPATIENT)
Dept: WOUND CARE | Age: 72
Discharge: HOME | End: 2022-01-05
Payer: COMMERCIAL

## 2022-01-05 VITALS
TEMPERATURE: 97.4 F | DIASTOLIC BLOOD PRESSURE: 72 MMHG | HEART RATE: 96 BPM | SYSTOLIC BLOOD PRESSURE: 135 MMHG | RESPIRATION RATE: 16 BRPM

## 2022-01-05 DIAGNOSIS — L97.922 ULCER OF LEFT LOWER EXTREMITY WITH FAT LAYER EXPOSED (HCC): Primary | ICD-10-CM

## 2022-01-05 PROCEDURE — 99212 OFFICE O/P EST SF 10 MIN: CPT

## 2022-01-05 PROCEDURE — 99213 OFFICE O/P EST LOW 20 MIN: CPT | Performed by: INTERNAL MEDICINE

## 2022-01-05 RX ORDER — BACITRACIN ZINC AND POLYMYXIN B SULFATE 500; 1000 [USP'U]/G; [USP'U]/G
OINTMENT TOPICAL ONCE
OUTPATIENT
Start: 2022-01-05 | End: 2022-01-05

## 2022-01-05 RX ORDER — GENTAMICIN SULFATE 1 MG/G
OINTMENT TOPICAL ONCE
OUTPATIENT
Start: 2022-01-05 | End: 2022-01-05

## 2022-01-05 RX ORDER — LIDOCAINE HYDROCHLORIDE 40 MG/ML
SOLUTION TOPICAL ONCE
Status: DISCONTINUED | OUTPATIENT
Start: 2022-01-05 | End: 2022-01-06 | Stop reason: HOSPADM

## 2022-01-05 RX ORDER — LIDOCAINE HYDROCHLORIDE 20 MG/ML
JELLY TOPICAL ONCE
OUTPATIENT
Start: 2022-01-05 | End: 2022-01-05

## 2022-01-05 RX ORDER — LIDOCAINE 50 MG/G
OINTMENT TOPICAL ONCE
OUTPATIENT
Start: 2022-01-05 | End: 2022-01-05

## 2022-01-05 RX ORDER — LIDOCAINE 40 MG/G
CREAM TOPICAL ONCE
OUTPATIENT
Start: 2022-01-05 | End: 2022-01-05

## 2022-01-05 RX ORDER — GINSENG 100 MG
CAPSULE ORAL ONCE
OUTPATIENT
Start: 2022-01-05 | End: 2022-01-05

## 2022-01-05 RX ORDER — CLOBETASOL PROPIONATE 0.5 MG/G
OINTMENT TOPICAL ONCE
OUTPATIENT
Start: 2022-01-05 | End: 2022-01-05

## 2022-01-05 RX ORDER — BACITRACIN, NEOMYCIN, POLYMYXIN B 400; 3.5; 5 [USP'U]/G; MG/G; [USP'U]/G
OINTMENT TOPICAL ONCE
OUTPATIENT
Start: 2022-01-05 | End: 2022-01-05

## 2022-01-05 RX ORDER — BETAMETHASONE DIPROPIONATE 0.05 %
OINTMENT (GRAM) TOPICAL ONCE
OUTPATIENT
Start: 2022-01-05 | End: 2022-01-05

## 2022-01-05 RX ORDER — LIDOCAINE HYDROCHLORIDE 40 MG/ML
SOLUTION TOPICAL ONCE
OUTPATIENT
Start: 2022-01-05 | End: 2022-01-05
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
## 2022-01-17 ENCOUNTER — HOSPITAL ENCOUNTER (OUTPATIENT)
Dept: GENERAL RADIOLOGY | Age: 72
Discharge: HOME | End: 2022-01-17
Payer: COMMERCIAL

## 2022-01-17 ENCOUNTER — HOSPITAL ENCOUNTER (OUTPATIENT)
Age: 72
Discharge: HOME | End: 2022-01-17
Payer: COMMERCIAL

## 2022-01-17 DIAGNOSIS — R06.02 SHORTNESS OF BREATH: ICD-10-CM

## 2022-01-17 LAB
ALBUMIN SERPL-MCNC: 3.2 GM/DL (ref 3.4–5)
ALP BLD-CCNC: 123 IU/L (ref 40–129)
ALT SERPL-CCNC: 31 U/L (ref 10–40)
ANION GAP SERPL CALCULATED.3IONS-SCNC: 9 MMOL/L (ref 4–16)
AST SERPL-CCNC: 48 IU/L (ref 15–37)
BILIRUB SERPL-MCNC: 0.6 MG/DL (ref 0–1)
BUN BLDV-MCNC: 24 MG/DL (ref 6–23)
CALCIUM SERPL-MCNC: 9.6 MG/DL (ref 8.3–10.6)
CHLORIDE BLD-SCNC: 100 MMOL/L (ref 99–110)
CO2: 30 MMOL/L (ref 21–32)
CREAT SERPL-MCNC: 1 MG/DL (ref 0.6–1.1)
ESTIMATED AVERAGE GLUCOSE: 183 MG/DL
FOLATE: >20 NG/ML (ref 3.1–17.5)
GFR AFRICAN AMERICAN: >60 ML/MIN/1.73M2
GFR NON-AFRICAN AMERICAN: 55 ML/MIN/1.73M2
GLUCOSE BLD-MCNC: 119 MG/DL (ref 70–99)
HBA1C MFR BLD: 8 % (ref 4.2–6.3)
HCT VFR BLD CALC: 42.8 % (ref 37–47)
HEMOGLOBIN: 13.6 GM/DL (ref 12.5–16)
MCH RBC QN AUTO: 31.5 PG (ref 27–31)
MCHC RBC AUTO-ENTMCNC: 31.8 % (ref 32–36)
MCV RBC AUTO: 99.1 FL (ref 78–100)
PDW BLD-RTO: 13.7 % (ref 11.7–14.9)
PLATELET # BLD: 177 K/CU MM (ref 140–440)
PMV BLD AUTO: 9.3 FL (ref 7.5–11.1)
POTASSIUM SERPL-SCNC: 4.3 MMOL/L (ref 3.5–5.1)
RBC # BLD: 4.32 M/CU MM (ref 4.2–5.4)
SODIUM BLD-SCNC: 139 MMOL/L (ref 135–145)
TOTAL PROTEIN: 7.2 GM/DL (ref 6.4–8.2)
TSH HIGH SENSITIVITY: 3.73 UIU/ML (ref 0.27–4.2)
VITAMIN B-12: 1730 PG/ML (ref 211–911)
VITAMIN D 25-HYDROXY: 46.25 NG/ML
WBC # BLD: 7 K/CU MM (ref 4–10.5)

## 2022-01-17 PROCEDURE — 84443 ASSAY THYROID STIM HORMONE: CPT

## 2022-01-17 PROCEDURE — 82746 ASSAY OF FOLIC ACID SERUM: CPT

## 2022-01-17 PROCEDURE — 83036 HEMOGLOBIN GLYCOSYLATED A1C: CPT

## 2022-01-17 PROCEDURE — 71046 X-RAY EXAM CHEST 2 VIEWS: CPT

## 2022-01-17 PROCEDURE — 36415 COLL VENOUS BLD VENIPUNCTURE: CPT

## 2022-01-17 PROCEDURE — 80053 COMPREHEN METABOLIC PANEL: CPT

## 2022-01-17 PROCEDURE — 82607 VITAMIN B-12: CPT

## 2022-01-17 PROCEDURE — 85027 COMPLETE CBC AUTOMATED: CPT

## 2022-01-17 PROCEDURE — 82306 VITAMIN D 25 HYDROXY: CPT

## 2022-02-07 ENCOUNTER — HOSPITAL ENCOUNTER (OUTPATIENT)
Age: 72
Discharge: HOME | End: 2022-02-07
Payer: COMMERCIAL

## 2022-02-07 PROCEDURE — U0003 INFECTIOUS AGENT DETECTION BY NUCLEIC ACID (DNA OR RNA); SEVERE ACUTE RESPIRATORY SYNDROME CORONAVIRUS 2 (SARS-COV-2) (CORONAVIRUS DISEASE [COVID-19]), AMPLIFIED PROBE TECHNIQUE, MAKING USE OF HIGH THROUGHPUT TECHNOLOGIES AS DESCRIBED BY CMS-2020-01-R: HCPCS

## 2022-02-07 PROCEDURE — U0005 INFEC AGEN DETEC AMPLI PROBE: HCPCS

## 2022-02-08 LAB
SARS-COV-2: NOT DETECTED
SOURCE: NORMAL

## 2022-02-14 ENCOUNTER — HOSPITAL ENCOUNTER (OUTPATIENT)
Dept: CARDIAC REHAB | Age: 72
Discharge: HOME | End: 2022-02-14
Payer: COMMERCIAL

## 2022-02-14 PROCEDURE — 94060 EVALUATION OF WHEEZING: CPT

## 2022-02-14 PROCEDURE — 94727 GAS DIL/WSHOT DETER LNG VOL: CPT

## 2022-02-14 PROCEDURE — 94729 DIFFUSING CAPACITY: CPT

## 2022-02-14 PROCEDURE — 94640 AIRWAY INHALATION TREATMENT: CPT

## 2022-02-17 ENCOUNTER — HOSPITAL ENCOUNTER (OUTPATIENT)
Age: 72
Discharge: HOME | End: 2022-02-17
Payer: COMMERCIAL

## 2022-02-17 PROCEDURE — 93005 ELECTROCARDIOGRAM TRACING: CPT | Performed by: FAMILY MEDICINE

## 2022-02-18 LAB
EKG ATRIAL RATE: 88 BPM
EKG DIAGNOSIS: NORMAL
EKG P AXIS: 26 DEGREES
EKG P-R INTERVAL: 146 MS
EKG Q-T INTERVAL: 380 MS
EKG QRS DURATION: 88 MS
EKG QTC CALCULATION (BAZETT): 459 MS
EKG R AXIS: -15 DEGREES
EKG T AXIS: 16 DEGREES
EKG VENTRICULAR RATE: 88 BPM

## 2022-02-18 PROCEDURE — 93010 ELECTROCARDIOGRAM REPORT: CPT | Performed by: INTERNAL MEDICINE

## 2022-02-21 ENCOUNTER — OFFICE VISIT (OUTPATIENT)
Dept: CARDIOLOGY CLINIC | Age: 72
End: 2022-02-21
Payer: COMMERCIAL

## 2022-02-21 VITALS
SYSTOLIC BLOOD PRESSURE: 128 MMHG | HEIGHT: 64 IN | HEART RATE: 72 BPM | BODY MASS INDEX: 40.12 KG/M2 | DIASTOLIC BLOOD PRESSURE: 70 MMHG | WEIGHT: 235 LBS

## 2022-02-21 DIAGNOSIS — E11.9 DIABETES MELLITUS TYPE 2, INSULIN DEPENDENT (HCC): ICD-10-CM

## 2022-02-21 DIAGNOSIS — E78.00 PURE HYPERCHOLESTEROLEMIA: ICD-10-CM

## 2022-02-21 DIAGNOSIS — Z79.4 DIABETES MELLITUS TYPE 2, INSULIN DEPENDENT (HCC): ICD-10-CM

## 2022-02-21 DIAGNOSIS — I87.303 VENOUS HYPERTENSION OF BOTH LOWER EXTREMITIES: ICD-10-CM

## 2022-02-21 DIAGNOSIS — I25.10 ASCVD (ARTERIOSCLEROTIC CARDIOVASCULAR DISEASE): Primary | ICD-10-CM

## 2022-02-21 PROCEDURE — 3052F HG A1C>EQUAL 8.0%<EQUAL 9.0%: CPT | Performed by: INTERNAL MEDICINE

## 2022-02-21 PROCEDURE — 99214 OFFICE O/P EST MOD 30 MIN: CPT | Performed by: INTERNAL MEDICINE

## 2022-02-21 RX ORDER — ASPIRIN 325 MG
325 TABLET ORAL DAILY
COMMUNITY
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
## 2022-02-21 NOTE — ASSESSMENT & PLAN NOTE
Counseled for elevating feet whenever resting and try to use thigh-high compression stockings and regular walking.

## 2022-09-17 ENCOUNTER — APPOINTMENT (OUTPATIENT)
Dept: GENERAL RADIOLOGY | Age: 72
DRG: 871 | End: 2022-09-17
Payer: COMMERCIAL

## 2022-09-17 ENCOUNTER — HOSPITAL ENCOUNTER (INPATIENT)
Age: 72
LOS: 3 days | Discharge: HOME | DRG: 871 | End: 2022-09-21
Attending: EMERGENCY MEDICINE | Admitting: STUDENT IN AN ORGANIZED HEALTH CARE EDUCATION/TRAINING PROGRAM
Payer: COMMERCIAL

## 2022-09-17 DIAGNOSIS — A41.9 SEPTICEMIA (HCC): ICD-10-CM

## 2022-09-17 DIAGNOSIS — N39.0 URINARY TRACT INFECTION WITH HEMATURIA, SITE UNSPECIFIED: Primary | ICD-10-CM

## 2022-09-17 DIAGNOSIS — R31.9 URINARY TRACT INFECTION WITH HEMATURIA, SITE UNSPECIFIED: Primary | ICD-10-CM

## 2022-09-17 PROCEDURE — 87635 SARS-COV-2 COVID-19 AMP PRB: CPT

## 2022-09-17 PROCEDURE — 6370000000 HC RX 637 (ALT 250 FOR IP): Performed by: EMERGENCY MEDICINE

## 2022-09-17 PROCEDURE — 87040 BLOOD CULTURE FOR BACTERIA: CPT

## 2022-09-17 PROCEDURE — 71045 X-RAY EXAM CHEST 1 VIEW: CPT

## 2022-09-17 RX ORDER — 0.9 % SODIUM CHLORIDE 0.9 %
500 INTRAVENOUS SOLUTION INTRAVENOUS ONCE
Status: COMPLETED | OUTPATIENT
Start: 2022-09-17 | End: 2022-09-18

## 2022-09-17 RX ORDER — ACETAMINOPHEN 325 MG/1
650 TABLET ORAL ONCE
Status: COMPLETED | OUTPATIENT
Start: 2022-09-17 | End: 2022-09-17

## 2022-09-17 RX ADMIN — ACETAMINOPHEN 650 MG: 325 TABLET ORAL at 23:22

## 2022-09-17 ASSESSMENT — PAIN DESCRIPTION - ORIENTATION
ORIENTATION: ANTERIOR
ORIENTATION: ANTERIOR

## 2022-09-17 ASSESSMENT — PAIN DESCRIPTION - LOCATION
LOCATION: HEAD
LOCATION: HEAD

## 2022-09-17 ASSESSMENT — ENCOUNTER SYMPTOMS
DIARRHEA: 1
EYE DISCHARGE: 0
SORE THROAT: 0
EYE PAIN: 0
RHINORRHEA: 0
SHORTNESS OF BREATH: 0
ABDOMINAL PAIN: 0
VOMITING: 0
COUGH: 0
BACK PAIN: 0

## 2022-09-17 ASSESSMENT — PAIN SCALES - GENERAL
PAINLEVEL_OUTOF10: 5
PAINLEVEL_OUTOF10: 5

## 2022-09-17 ASSESSMENT — PAIN DESCRIPTION - DESCRIPTORS
DESCRIPTORS: ACHING
DESCRIPTORS: ACHING

## 2022-09-17 ASSESSMENT — PAIN DESCRIPTION - FREQUENCY: FREQUENCY: CONTINUOUS

## 2022-09-17 ASSESSMENT — PAIN DESCRIPTION - ONSET: ONSET: SUDDEN

## 2022-09-17 ASSESSMENT — PAIN - FUNCTIONAL ASSESSMENT: PAIN_FUNCTIONAL_ASSESSMENT: 0-10

## 2022-09-17 ASSESSMENT — PAIN DESCRIPTION - PAIN TYPE: TYPE: ACUTE PAIN

## 2022-09-17 NOTE — LETTER
JAMAR MONTALVO [facility] UNIT  [address] [facility]  Phone: [phone]             September 21, 2022    Patient: Elvira Cortez   YOB: 1950   Date of Visit: 9/17/2022       To Whom It May Concern:    Arleth Rodriges was seen and treated in our facility  beginning 9/17/2022 until 9/21/22. She may return to work on 9/26/22.       Sincerely,       Fermin Burt RN         Signature:__________________________________

## 2022-09-18 ENCOUNTER — APPOINTMENT (OUTPATIENT)
Dept: CT IMAGING | Age: 72
DRG: 871 | End: 2022-09-18
Payer: COMMERCIAL

## 2022-09-18 PROBLEM — A41.9 SEPSIS (HCC): Status: ACTIVE | Noted: 2022-09-18

## 2022-09-18 LAB
ALBUMIN SERPL-MCNC: 3 GM/DL (ref 3.4–5)
ALBUMIN SERPL-MCNC: 3.2 GM/DL (ref 3.4–5)
ALP BLD-CCNC: 144 IU/L (ref 40–129)
ALP BLD-CCNC: 156 IU/L (ref 40–129)
ALT SERPL-CCNC: 32 U/L (ref 10–40)
ALT SERPL-CCNC: 35 U/L (ref 10–40)
ANION GAP SERPL CALCULATED.3IONS-SCNC: 6 MMOL/L (ref 4–16)
ANION GAP SERPL CALCULATED.3IONS-SCNC: 7 MMOL/L (ref 4–16)
AST SERPL-CCNC: 54 IU/L (ref 15–37)
AST SERPL-CCNC: 56 IU/L (ref 15–37)
BACTERIA: ABNORMAL /HPF
BASOPHILS ABSOLUTE: 0.1 K/CU MM
BASOPHILS ABSOLUTE: 0.1 K/CU MM
BASOPHILS RELATIVE PERCENT: 0.5 % (ref 0–1)
BASOPHILS RELATIVE PERCENT: 0.6 % (ref 0–1)
BILIRUB SERPL-MCNC: 1.1 MG/DL (ref 0–1)
BILIRUB SERPL-MCNC: 1.2 MG/DL (ref 0–1)
BILIRUBIN URINE: NEGATIVE MG/DL
BLOOD, URINE: ABNORMAL
BUN BLDV-MCNC: 19 MG/DL (ref 6–23)
BUN BLDV-MCNC: 20 MG/DL (ref 6–23)
CALCIUM SERPL-MCNC: 9.1 MG/DL (ref 8.3–10.6)
CALCIUM SERPL-MCNC: 9.3 MG/DL (ref 8.3–10.6)
CAST TYPE: ABNORMAL /HPF
CHLORIDE BLD-SCNC: 103 MMOL/L (ref 99–110)
CHLORIDE BLD-SCNC: 105 MMOL/L (ref 99–110)
CLARITY: ABNORMAL
CO2: 28 MMOL/L (ref 21–32)
CO2: 29 MMOL/L (ref 21–32)
COLOR: YELLOW
CREAT SERPL-MCNC: 0.9 MG/DL (ref 0.6–1.1)
CREAT SERPL-MCNC: 0.9 MG/DL (ref 0.6–1.1)
CRYSTAL TYPE: NEGATIVE /HPF
DIFFERENTIAL TYPE: ABNORMAL
DIFFERENTIAL TYPE: ABNORMAL
EOSINOPHILS ABSOLUTE: 0.1 K/CU MM
EOSINOPHILS ABSOLUTE: 0.1 K/CU MM
EOSINOPHILS RELATIVE PERCENT: 0.8 % (ref 0–3)
EOSINOPHILS RELATIVE PERCENT: 0.9 % (ref 0–3)
EPITHELIAL CELLS, UA: 1 /HPF
GFR AFRICAN AMERICAN: >60 ML/MIN/1.73M2
GFR AFRICAN AMERICAN: >60 ML/MIN/1.73M2
GFR NON-AFRICAN AMERICAN: >60 ML/MIN/1.73M2
GFR NON-AFRICAN AMERICAN: >60 ML/MIN/1.73M2
GLUCOSE BLD-MCNC: 140 MG/DL (ref 70–99)
GLUCOSE BLD-MCNC: 151 MG/DL (ref 70–99)
GLUCOSE BLD-MCNC: 158 MG/DL (ref 70–99)
GLUCOSE BLD-MCNC: 174 MG/DL (ref 70–99)
GLUCOSE BLD-MCNC: 253 MG/DL (ref 70–99)
GLUCOSE BLD-MCNC: 272 MG/DL (ref 70–99)
GLUCOSE, URINE: >=1000 MG/DL
HCT VFR BLD CALC: 40.2 % (ref 37–47)
HCT VFR BLD CALC: 42.6 % (ref 37–47)
HEMOGLOBIN: 13.1 GM/DL (ref 12.5–16)
HEMOGLOBIN: 13.8 GM/DL (ref 12.5–16)
IMMATURE NEUTROPHIL %: 0.4 % (ref 0–0.43)
IMMATURE NEUTROPHIL %: 0.7 % (ref 0–0.43)
KETONES, URINE: NEGATIVE MG/DL
LACTIC ACID, SEPSIS: 1.1 MMOL/L (ref 0.5–1.9)
LACTIC ACID, SEPSIS: 1.3 MMOL/L (ref 0.5–1.9)
LEUKOCYTE ESTERASE, URINE: ABNORMAL
LIPASE: 24 IU/L (ref 13–60)
LYMPHOCYTES ABSOLUTE: 0.6 K/CU MM
LYMPHOCYTES ABSOLUTE: 0.7 K/CU MM
LYMPHOCYTES RELATIVE PERCENT: 5 % (ref 24–44)
LYMPHOCYTES RELATIVE PERCENT: 5.9 % (ref 24–44)
MCH RBC QN AUTO: 32 PG (ref 27–31)
MCH RBC QN AUTO: 32.3 PG (ref 27–31)
MCHC RBC AUTO-ENTMCNC: 32.4 % (ref 32–36)
MCHC RBC AUTO-ENTMCNC: 32.6 % (ref 32–36)
MCV RBC AUTO: 98.8 FL (ref 78–100)
MCV RBC AUTO: 99 FL (ref 78–100)
MONOCYTES ABSOLUTE: 0.6 K/CU MM
MONOCYTES ABSOLUTE: 0.6 K/CU MM
MONOCYTES RELATIVE PERCENT: 4.1 % (ref 0–4)
MONOCYTES RELATIVE PERCENT: 5.1 % (ref 0–4)
NITRITE URINE, QUANTITATIVE: POSITIVE
PDW BLD-RTO: 14.7 % (ref 11.7–14.9)
PDW BLD-RTO: 14.7 % (ref 11.7–14.9)
PH, URINE: 6 (ref 5–8)
PLATELET # BLD: 133 K/CU MM (ref 140–440)
PLATELET # BLD: 154 K/CU MM (ref 140–440)
PMV BLD AUTO: 9.4 FL (ref 7.5–11.1)
PMV BLD AUTO: 9.4 FL (ref 7.5–11.1)
POTASSIUM SERPL-SCNC: 4.4 MMOL/L (ref 3.5–5.1)
POTASSIUM SERPL-SCNC: 5.3 MMOL/L (ref 3.5–5.1)
PRO-BNP: 413.5 PG/ML
PROCALCITONIN: 0.69
PROTEIN UA: 30 MG/DL
RBC # BLD: 4.06 M/CU MM (ref 4.2–5.4)
RBC # BLD: 4.31 M/CU MM (ref 4.2–5.4)
RBC URINE: ABNORMAL /HPF (ref 0–6)
SARS-COV-2, NAAT: NOT DETECTED
SEGMENTED NEUTROPHILS ABSOLUTE COUNT: 11.9 K/CU MM
SEGMENTED NEUTROPHILS ABSOLUTE COUNT: 9.4 K/CU MM
SEGMENTED NEUTROPHILS RELATIVE PERCENT: 87.2 % (ref 36–66)
SEGMENTED NEUTROPHILS RELATIVE PERCENT: 88.8 % (ref 36–66)
SODIUM BLD-SCNC: 138 MMOL/L (ref 135–145)
SODIUM BLD-SCNC: 140 MMOL/L (ref 135–145)
SOURCE: NORMAL
SPECIFIC GRAVITY UA: 1.02 (ref 1–1.03)
TOTAL IMMATURE NEUTOROPHIL: 0.04 K/CU MM
TOTAL IMMATURE NEUTOROPHIL: 0.09 K/CU MM
TOTAL PROTEIN: 6.2 GM/DL (ref 6.4–8.2)
TOTAL PROTEIN: 6.3 GM/DL (ref 6.4–8.2)
UROBILINOGEN, URINE: 1 MG/DL (ref 0.2–1)
WBC # BLD: 10.7 K/CU MM (ref 4–10.5)
WBC # BLD: 13.4 K/CU MM (ref 4–10.5)
WBC UA: ABNORMAL /HPF (ref 0–5)

## 2022-09-18 PROCEDURE — 96361 HYDRATE IV INFUSION ADD-ON: CPT

## 2022-09-18 PROCEDURE — 83690 ASSAY OF LIPASE: CPT

## 2022-09-18 PROCEDURE — 80053 COMPREHEN METABOLIC PANEL: CPT

## 2022-09-18 PROCEDURE — 2580000003 HC RX 258: Performed by: STUDENT IN AN ORGANIZED HEALTH CARE EDUCATION/TRAINING PROGRAM

## 2022-09-18 PROCEDURE — 87040 BLOOD CULTURE FOR BACTERIA: CPT

## 2022-09-18 PROCEDURE — 94640 AIRWAY INHALATION TREATMENT: CPT

## 2022-09-18 PROCEDURE — 82962 GLUCOSE BLOOD TEST: CPT

## 2022-09-18 PROCEDURE — 84145 PROCALCITONIN (PCT): CPT

## 2022-09-18 PROCEDURE — 6360000002 HC RX W HCPCS: Performed by: STUDENT IN AN ORGANIZED HEALTH CARE EDUCATION/TRAINING PROGRAM

## 2022-09-18 PROCEDURE — 81001 URINALYSIS AUTO W/SCOPE: CPT

## 2022-09-18 PROCEDURE — 6360000002 HC RX W HCPCS: Performed by: EMERGENCY MEDICINE

## 2022-09-18 PROCEDURE — 87186 SC STD MICRODIL/AGAR DIL: CPT

## 2022-09-18 PROCEDURE — 85025 COMPLETE CBC W/AUTO DIFF WBC: CPT

## 2022-09-18 PROCEDURE — 99285 EMERGENCY DEPT VISIT HI MDM: CPT

## 2022-09-18 PROCEDURE — 6370000000 HC RX 637 (ALT 250 FOR IP): Performed by: STUDENT IN AN ORGANIZED HEALTH CARE EDUCATION/TRAINING PROGRAM

## 2022-09-18 PROCEDURE — 1200000000 HC SEMI PRIVATE

## 2022-09-18 PROCEDURE — 96365 THER/PROPH/DIAG IV INF INIT: CPT

## 2022-09-18 PROCEDURE — 87086 URINE CULTURE/COLONY COUNT: CPT

## 2022-09-18 PROCEDURE — 83036 HEMOGLOBIN GLYCOSYLATED A1C: CPT

## 2022-09-18 PROCEDURE — 87077 CULTURE AEROBIC IDENTIFY: CPT

## 2022-09-18 PROCEDURE — 74176 CT ABD & PELVIS W/O CONTRAST: CPT

## 2022-09-18 PROCEDURE — 83880 ASSAY OF NATRIURETIC PEPTIDE: CPT

## 2022-09-18 PROCEDURE — 71250 CT THORAX DX C-: CPT

## 2022-09-18 PROCEDURE — 2580000003 HC RX 258: Performed by: EMERGENCY MEDICINE

## 2022-09-18 PROCEDURE — 83605 ASSAY OF LACTIC ACID: CPT

## 2022-09-18 PROCEDURE — 94761 N-INVAS EAR/PLS OXIMETRY MLT: CPT

## 2022-09-18 RX ORDER — ROPINIROLE 1 MG/1
1 TABLET, FILM COATED ORAL NIGHTLY
Status: DISCONTINUED | OUTPATIENT
Start: 2022-09-18 | End: 2022-09-21 | Stop reason: HOSPADM

## 2022-09-18 RX ORDER — LEVOTHYROXINE SODIUM 0.1 MG/1
100 TABLET ORAL DAILY
Status: DISCONTINUED | OUTPATIENT
Start: 2022-09-18 | End: 2022-09-21 | Stop reason: HOSPADM

## 2022-09-18 RX ORDER — ENOXAPARIN SODIUM 100 MG/ML
40 INJECTION SUBCUTANEOUS DAILY
Status: DISCONTINUED | OUTPATIENT
Start: 2022-09-18 | End: 2022-09-18

## 2022-09-18 RX ORDER — METOPROLOL SUCCINATE 25 MG/1
25 TABLET, EXTENDED RELEASE ORAL DAILY
Status: DISCONTINUED | OUTPATIENT
Start: 2022-09-18 | End: 2022-09-21 | Stop reason: HOSPADM

## 2022-09-18 RX ORDER — IBUPROFEN 600 MG/1
600 TABLET ORAL ONCE
Status: COMPLETED | OUTPATIENT
Start: 2022-09-18 | End: 2022-09-18

## 2022-09-18 RX ORDER — ACETAMINOPHEN 650 MG/1
650 SUPPOSITORY RECTAL EVERY 6 HOURS PRN
Status: DISCONTINUED | OUTPATIENT
Start: 2022-09-18 | End: 2022-09-21 | Stop reason: HOSPADM

## 2022-09-18 RX ORDER — ALBUTEROL SULFATE 90 UG/1
2 AEROSOL, METERED RESPIRATORY (INHALATION) EVERY 4 HOURS PRN
Status: DISCONTINUED | OUTPATIENT
Start: 2022-09-18 | End: 2022-09-21 | Stop reason: HOSPADM

## 2022-09-18 RX ORDER — SODIUM CHLORIDE 0.9 % (FLUSH) 0.9 %
5-40 SYRINGE (ML) INJECTION EVERY 12 HOURS SCHEDULED
Status: DISCONTINUED | OUTPATIENT
Start: 2022-09-18 | End: 2022-09-21 | Stop reason: HOSPADM

## 2022-09-18 RX ORDER — ENOXAPARIN SODIUM 100 MG/ML
30 INJECTION SUBCUTANEOUS 2 TIMES DAILY
Status: DISCONTINUED | OUTPATIENT
Start: 2022-09-18 | End: 2022-09-21 | Stop reason: HOSPADM

## 2022-09-18 RX ORDER — ACETAMINOPHEN 325 MG/1
650 TABLET ORAL EVERY 4 HOURS PRN
Status: DISCONTINUED | OUTPATIENT
Start: 2022-09-18 | End: 2022-09-18

## 2022-09-18 RX ORDER — INSULIN LISPRO 100 [IU]/ML
0-4 INJECTION, SOLUTION INTRAVENOUS; SUBCUTANEOUS
Status: DISCONTINUED | OUTPATIENT
Start: 2022-09-18 | End: 2022-09-21 | Stop reason: HOSPADM

## 2022-09-18 RX ORDER — ONDANSETRON 4 MG/1
4 TABLET, ORALLY DISINTEGRATING ORAL EVERY 8 HOURS PRN
Status: DISCONTINUED | OUTPATIENT
Start: 2022-09-18 | End: 2022-09-21 | Stop reason: HOSPADM

## 2022-09-18 RX ORDER — BENZONATATE 100 MG/1
100 CAPSULE ORAL 3 TIMES DAILY PRN
Status: DISCONTINUED | OUTPATIENT
Start: 2022-09-18 | End: 2022-09-21 | Stop reason: HOSPADM

## 2022-09-18 RX ORDER — POLYETHYLENE GLYCOL 3350 17 G/17G
17 POWDER, FOR SOLUTION ORAL DAILY PRN
Status: DISCONTINUED | OUTPATIENT
Start: 2022-09-18 | End: 2022-09-21 | Stop reason: HOSPADM

## 2022-09-18 RX ORDER — AZITHROMYCIN 250 MG/1
500 TABLET, FILM COATED ORAL DAILY
Status: DISCONTINUED | OUTPATIENT
Start: 2022-09-19 | End: 2022-09-19

## 2022-09-18 RX ORDER — ONDANSETRON 2 MG/ML
4 INJECTION INTRAMUSCULAR; INTRAVENOUS EVERY 6 HOURS PRN
Status: DISCONTINUED | OUTPATIENT
Start: 2022-09-18 | End: 2022-09-21 | Stop reason: HOSPADM

## 2022-09-18 RX ORDER — INSULIN LISPRO 100 [IU]/ML
0-4 INJECTION, SOLUTION INTRAVENOUS; SUBCUTANEOUS NIGHTLY
Status: DISCONTINUED | OUTPATIENT
Start: 2022-09-18 | End: 2022-09-21 | Stop reason: HOSPADM

## 2022-09-18 RX ORDER — ALBUTEROL SULFATE 2.5 MG/3ML
2.5 SOLUTION RESPIRATORY (INHALATION) EVERY 4 HOURS PRN
Status: DISCONTINUED | OUTPATIENT
Start: 2022-09-18 | End: 2022-09-21 | Stop reason: HOSPADM

## 2022-09-18 RX ORDER — TORSEMIDE 20 MG/1
20 TABLET ORAL DAILY
Status: DISCONTINUED | OUTPATIENT
Start: 2022-09-18 | End: 2022-09-21 | Stop reason: HOSPADM

## 2022-09-18 RX ORDER — DEXTROSE MONOHYDRATE 100 MG/ML
INJECTION, SOLUTION INTRAVENOUS CONTINUOUS PRN
Status: DISCONTINUED | OUTPATIENT
Start: 2022-09-18 | End: 2022-09-21 | Stop reason: HOSPADM

## 2022-09-18 RX ORDER — SODIUM CHLORIDE 9 MG/ML
INJECTION, SOLUTION INTRAVENOUS PRN
Status: DISCONTINUED | OUTPATIENT
Start: 2022-09-18 | End: 2022-09-21 | Stop reason: HOSPADM

## 2022-09-18 RX ORDER — SODIUM CHLORIDE 0.9 % (FLUSH) 0.9 %
5-40 SYRINGE (ML) INJECTION PRN
Status: DISCONTINUED | OUTPATIENT
Start: 2022-09-18 | End: 2022-09-21 | Stop reason: HOSPADM

## 2022-09-18 RX ORDER — ACETAMINOPHEN 325 MG/1
650 TABLET ORAL EVERY 6 HOURS PRN
Status: DISCONTINUED | OUTPATIENT
Start: 2022-09-18 | End: 2022-09-21 | Stop reason: HOSPADM

## 2022-09-18 RX ADMIN — MOMETASONE FUROATE AND FORMOTEROL FUMARATE DIHYDRATE 2 PUFF: 200; 5 AEROSOL RESPIRATORY (INHALATION) at 20:06

## 2022-09-18 RX ADMIN — SODIUM CHLORIDE, PRESERVATIVE FREE 10 ML: 5 INJECTION INTRAVENOUS at 20:15

## 2022-09-18 RX ADMIN — SODIUM CHLORIDE 25 ML: 9 INJECTION, SOLUTION INTRAVENOUS at 20:12

## 2022-09-18 RX ADMIN — ENOXAPARIN SODIUM 30 MG: 100 INJECTION SUBCUTANEOUS at 08:58

## 2022-09-18 RX ADMIN — CEFTRIAXONE SODIUM 1000 MG: 1 INJECTION, POWDER, FOR SOLUTION INTRAMUSCULAR; INTRAVENOUS at 02:17

## 2022-09-18 RX ADMIN — LEVOTHYROXINE SODIUM 100 MCG: 0.1 TABLET ORAL at 05:05

## 2022-09-18 RX ADMIN — SODIUM CHLORIDE 20 ML: 9 INJECTION, SOLUTION INTRAVENOUS at 13:33

## 2022-09-18 RX ADMIN — ROPINIROLE HYDROCHLORIDE 1 MG: 1 TABLET, FILM COATED ORAL at 20:15

## 2022-09-18 RX ADMIN — METOPROLOL SUCCINATE 25 MG: 25 TABLET, EXTENDED RELEASE ORAL at 08:58

## 2022-09-18 RX ADMIN — ACETAMINOPHEN 650 MG: 325 TABLET ORAL at 22:46

## 2022-09-18 RX ADMIN — IBUPROFEN 600 MG: 600 TABLET ORAL at 04:28

## 2022-09-18 RX ADMIN — ACETAMINOPHEN 650 MG: 325 TABLET ORAL at 15:54

## 2022-09-18 RX ADMIN — PIPERACILLIN AND TAZOBACTAM 3375 MG: 3; .375 INJECTION, POWDER, FOR SOLUTION INTRAVENOUS at 20:15

## 2022-09-18 RX ADMIN — ALBUTEROL SULFATE 2 PUFF: 108 INHALANT RESPIRATORY (INHALATION) at 07:42

## 2022-09-18 RX ADMIN — INSULIN LISPRO 2 UNITS: 100 INJECTION, SOLUTION INTRAVENOUS; SUBCUTANEOUS at 17:15

## 2022-09-18 RX ADMIN — SODIUM CHLORIDE 500 ML: 9 INJECTION, SOLUTION INTRAVENOUS at 00:19

## 2022-09-18 RX ADMIN — TORSEMIDE 20 MG: 20 TABLET ORAL at 08:58

## 2022-09-18 RX ADMIN — ENOXAPARIN SODIUM 30 MG: 100 INJECTION SUBCUTANEOUS at 20:15

## 2022-09-18 RX ADMIN — PIPERACILLIN AND TAZOBACTAM 3375 MG: 3; .375 INJECTION, POWDER, FOR SOLUTION INTRAVENOUS at 05:05

## 2022-09-18 RX ADMIN — SODIUM CHLORIDE 100 ML: 9 INJECTION, SOLUTION INTRAVENOUS at 05:04

## 2022-09-18 RX ADMIN — MOMETASONE FUROATE AND FORMOTEROL FUMARATE DIHYDRATE 2 PUFF: 200; 5 AEROSOL RESPIRATORY (INHALATION) at 07:42

## 2022-09-18 RX ADMIN — PIPERACILLIN AND TAZOBACTAM 3375 MG: 3; .375 INJECTION, POWDER, FOR SOLUTION INTRAVENOUS at 13:34

## 2022-09-18 RX ADMIN — AZITHROMYCIN MONOHYDRATE 500 MG: 500 INJECTION, POWDER, LYOPHILIZED, FOR SOLUTION INTRAVENOUS at 02:54

## 2022-09-18 RX ADMIN — INSULIN GLARGINE 38 UNITS: 100 INJECTION, SOLUTION SUBCUTANEOUS at 08:58

## 2022-09-18 RX ADMIN — TIOTROPIUM BROMIDE INHALATION SPRAY 2 PUFF: 3.12 SPRAY, METERED RESPIRATORY (INHALATION) at 07:42

## 2022-09-18 ASSESSMENT — PAIN - FUNCTIONAL ASSESSMENT
PAIN_FUNCTIONAL_ASSESSMENT: ACTIVITIES ARE NOT PREVENTED
PAIN_FUNCTIONAL_ASSESSMENT: ACTIVITIES ARE NOT PREVENTED

## 2022-09-18 ASSESSMENT — LIFESTYLE VARIABLES: HOW OFTEN DO YOU HAVE A DRINK CONTAINING ALCOHOL: NEVER

## 2022-09-18 ASSESSMENT — PAIN SCALES - GENERAL
PAINLEVEL_OUTOF10: 0
PAINLEVEL_OUTOF10: 3
PAINLEVEL_OUTOF10: 3
PAINLEVEL_OUTOF10: 0
PAINLEVEL_OUTOF10: 0

## 2022-09-18 ASSESSMENT — PAIN DESCRIPTION - DESCRIPTORS
DESCRIPTORS: ACHING
DESCRIPTORS: ACHING

## 2022-09-18 ASSESSMENT — PAIN DESCRIPTION - ORIENTATION
ORIENTATION: MID
ORIENTATION: ANTERIOR

## 2022-09-18 ASSESSMENT — PAIN DESCRIPTION - LOCATION
LOCATION: HEAD
LOCATION: HEAD

## 2022-09-18 NOTE — PLAN OF CARE
Problem: Pain  Goal: Verbalizes/displays adequate comfort level or baseline comfort level  9/18/2022 0950 by Rere Ferrell RN  Outcome: Progressing  9/18/2022 0402 by Maureen Valenzuela RN  Outcome: Progressing     Problem: Discharge Planning  Goal: Discharge to home or other facility with appropriate resources  9/18/2022 0950 by Rere Ferrell RN  Outcome: Progressing  9/18/2022 0402 by Maureen Valenzuela RN  Outcome: Progressing  Flowsheets (Taken 9/18/2022 0343 by Emily Quijano LPN)  Discharge to home or other facility with appropriate resources: Identify barriers to discharge with patient and caregiver     Problem: Safety - Adult  Goal: Free from fall injury  9/18/2022 0950 by Rere Ferrell RN  Outcome: Progressing  9/18/2022 0402 by Maureen Valenzuela RN  Outcome: Progressing     Problem: Skin/Tissue Integrity - Adult  Goal: Incisions, wounds, or drain sites healing without S/S of infection  9/18/2022 0950 by Rere Ferrell RN  Outcome: Progressing  Flowsheets (Taken 9/18/2022 0407 by Maureen Valenzuela RN)  Incisions, Wounds, or Drain Sites Healing Without Sign and Symptoms of Infection:   ADMISSION and DAILY: Assess and document risk factors for pressure ulcer development   TWICE DAILY: Assess and document skin integrity  9/18/2022 0402 by Maureen Valenzuela RN  Outcome: Progressing  Goal: Oral mucous membranes remain intact  9/18/2022 0950 by Rere Ferrell RN  Outcome: Progressing  9/18/2022 0402 by Maureen Valenzuela RN  Outcome: Progressing     Problem: Infection - Adult  Goal: Absence of infection at discharge  9/18/2022 0950 by Rere Ferrell RN  Outcome: Progressing  Flowsheets (Taken 9/18/2022 0407 by Maureen Valenzuela RN)  Absence of infection at discharge:   Assess and monitor for signs and symptoms of infection   Monitor lab/diagnostic results  9/18/2022 0402 by Maureen Valenzuela RN  Outcome: Progressing  Goal: Absence of infection during hospitalization  9/18/2022 0950 by Rere Ferrell RN  Outcome:

## 2022-09-18 NOTE — ED NOTES
Dr Jose Walls in to discuss test results and plan to get her admitted.      Zac Rodriguez RN  09/18/22 [id number]

## 2022-09-18 NOTE — PROGRESS NOTES
4 Eyes Skin Assessment     NAME:  Eldon Gar OF BIRTH:  1950  MEDICAL RECORD NUMBER:  [id number]    The patient is being assess for  Admission    I agree that 2 RN's have performed a thorough Head to Toe Skin Assessment on the patient. ALL assessment sites listed below have been assessed. Areas assessed by both nurses:    Head, Face, Ears, Shoulders, Back, Chest, Arms, Elbows, Hands, Sacrum. Buttock, Coccyx, Ischium, and Legs. Feet and Heels        Does the Patient have a Wound?  No noted wound(s)       Naeem Prevention initiated:  Yes   Wound Care Orders initiated:  No    Pressure Injury (Stage 3,4, Unstageable, DTI, NWPT, and Complex wounds) if present place referral/consult order under [de-identified] No    New and Established Ostomies if present place consult order under : No      Nurse 1 eSignature: Electronically signed by Hallie Lacy LPN on 5/49/71 at 5:10 AM EDT    **SHARE this note so that the co-signing nurse is able to place an eSignature**    Nurse 2 eSignature: Electronically signed by Manuel Rivera RN on 9/18/22 at 4:06 AM EDT
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
## 2022-09-18 NOTE — ED NOTES
Assisted up to [facility] to get urine sample.      Ranjan Burns, RN  09/18/22 [id number]

## 2022-09-18 NOTE — PROGRESS NOTES
LOVENOX PROPHYLAXIS EVALUATION    Wt Readings from Last 3 Encounters:   09/18/22 239 lb 14.4 oz (108.8 kg)   06/22/22 235 lb (106.6 kg)   02/21/22 235 lb (106.6 kg)       Estimated Creatinine Clearance: 66 mL/min (based on SCr of 0.9 mg/dL).   Recent Labs     09/18/22  0015   BUN 20   CREATININE 0.9      HGB 13.8   HCT 42.6       Weight Range (kg): 101-150.9 kg    CRCL = 30 or greater     50.9 kg   and below     .9  kg   101-150.9 kg   151-174.9  kg   175 kg  or greater     30mg subq  daily     40mg subq daily    (or 30mg subq BID orthopedic)     30mg subq  BID   40mg subq  BID   60mg subq BID       Per P/T protocol for appropriate subq anticoagulation by weight and CRCL change to:  Enoxaparin 30mg subq BID    oJe Ferrer RP  4:10 AM  09/18/22
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
## 2022-09-18 NOTE — PLAN OF CARE
Problem: Pain  Goal: Verbalizes/displays adequate comfort level or baseline comfort level  Outcome: Progressing     Problem: Discharge Planning  Goal: Discharge to home or other facility with appropriate resources  Outcome: Progressing  Flowsheets (Taken 9/18/2022 0343 by Teddy Brooks LPN)  Discharge to home or other facility with appropriate resources: Identify barriers to discharge with patient and caregiver     Problem: Safety - Adult  Goal: Free from fall injury  Outcome: Progressing     Problem: Skin/Tissue Integrity - Adult  Goal: Incisions, wounds, or drain sites healing without S/S of infection  Outcome: Progressing  Goal: Oral mucous membranes remain intact  Outcome: Progressing     Problem: Infection - Adult  Goal: Absence of infection at discharge  Outcome: Progressing  Goal: Absence of infection during hospitalization  Outcome: Progressing  Goal: Absence of fever/infection during anticipated neutropenic period  Outcome: Progressing     Problem: Metabolic/Fluid and Electrolytes - Adult  Goal: Electrolytes maintained within normal limits  Outcome: Progressing  Goal: Hemodynamic stability and optimal renal function maintained  Outcome: Progressing  Goal: Glucose maintained within prescribed range  Outcome: Progressing     Problem: Genitourinary - Adult  Goal: Absence of urinary retention  Outcome: Progressing

## 2022-09-19 ENCOUNTER — APPOINTMENT (OUTPATIENT)
Dept: ULTRASOUND IMAGING | Age: 72
DRG: 871 | End: 2022-09-19
Payer: COMMERCIAL

## 2022-09-19 ENCOUNTER — APPOINTMENT (OUTPATIENT)
Dept: GENERAL RADIOLOGY | Age: 72
DRG: 871 | End: 2022-09-19
Payer: COMMERCIAL

## 2022-09-19 LAB
ANION GAP SERPL CALCULATED.3IONS-SCNC: 7 MMOL/L (ref 4–16)
BUN BLDV-MCNC: 18 MG/DL (ref 6–23)
CALCIUM SERPL-MCNC: 8.5 MG/DL (ref 8.3–10.6)
CHLORIDE BLD-SCNC: 108 MMOL/L (ref 99–110)
CO2: 26 MMOL/L (ref 21–32)
CREAT SERPL-MCNC: 0.8 MG/DL (ref 0.6–1.1)
ESTIMATED AVERAGE GLUCOSE: 163 MG/DL
GFR AFRICAN AMERICAN: >60 ML/MIN/1.73M2
GFR NON-AFRICAN AMERICAN: >60 ML/MIN/1.73M2
GLUCOSE BLD-MCNC: 143 MG/DL (ref 70–99)
GLUCOSE BLD-MCNC: 176 MG/DL (ref 70–99)
GLUCOSE BLD-MCNC: 200 MG/DL (ref 70–99)
GLUCOSE BLD-MCNC: 227 MG/DL (ref 70–99)
GLUCOSE BLD-MCNC: 267 MG/DL (ref 70–99)
HBA1C MFR BLD: 7.3 % (ref 4.2–6.3)
HCT VFR BLD CALC: 40.3 % (ref 37–47)
HEMOGLOBIN: 12.8 GM/DL (ref 12.5–16)
LV EF: 53 %
LVEF MODALITY: NORMAL
MCH RBC QN AUTO: 31.9 PG (ref 27–31)
MCHC RBC AUTO-ENTMCNC: 31.8 % (ref 32–36)
MCV RBC AUTO: 100.5 FL (ref 78–100)
PDW BLD-RTO: 15.2 % (ref 11.7–14.9)
PLATELET # BLD: 102 K/CU MM (ref 140–440)
PMV BLD AUTO: 9.8 FL (ref 7.5–11.1)
POTASSIUM SERPL-SCNC: 4.7 MMOL/L (ref 3.5–5.1)
PRO-BNP: 432 PG/ML
RBC # BLD: 4.01 M/CU MM (ref 4.2–5.4)
SODIUM BLD-SCNC: 141 MMOL/L (ref 135–145)
WBC # BLD: 5.2 K/CU MM (ref 4–10.5)

## 2022-09-19 PROCEDURE — 80048 BASIC METABOLIC PNL TOTAL CA: CPT

## 2022-09-19 PROCEDURE — 94640 AIRWAY INHALATION TREATMENT: CPT

## 2022-09-19 PROCEDURE — 6370000000 HC RX 637 (ALT 250 FOR IP): Performed by: STUDENT IN AN ORGANIZED HEALTH CARE EDUCATION/TRAINING PROGRAM

## 2022-09-19 PROCEDURE — 1200000000 HC SEMI PRIVATE

## 2022-09-19 PROCEDURE — 94150 VITAL CAPACITY TEST: CPT

## 2022-09-19 PROCEDURE — 6360000002 HC RX W HCPCS: Performed by: STUDENT IN AN ORGANIZED HEALTH CARE EDUCATION/TRAINING PROGRAM

## 2022-09-19 PROCEDURE — 85027 COMPLETE CBC AUTOMATED: CPT

## 2022-09-19 PROCEDURE — 6360000002 HC RX W HCPCS: Performed by: NURSE PRACTITIONER

## 2022-09-19 PROCEDURE — 83880 ASSAY OF NATRIURETIC PEPTIDE: CPT

## 2022-09-19 PROCEDURE — 82962 GLUCOSE BLOOD TEST: CPT

## 2022-09-19 PROCEDURE — 6370000000 HC RX 637 (ALT 250 FOR IP): Performed by: NURSE PRACTITIONER

## 2022-09-19 PROCEDURE — 76705 ECHO EXAM OF ABDOMEN: CPT

## 2022-09-19 PROCEDURE — 94761 N-INVAS EAR/PLS OXIMETRY MLT: CPT

## 2022-09-19 PROCEDURE — 2580000003 HC RX 258: Performed by: STUDENT IN AN ORGANIZED HEALTH CARE EDUCATION/TRAINING PROGRAM

## 2022-09-19 PROCEDURE — 93306 TTE W/DOPPLER COMPLETE: CPT

## 2022-09-19 PROCEDURE — 71046 X-RAY EXAM CHEST 2 VIEWS: CPT

## 2022-09-19 PROCEDURE — 2580000003 HC RX 258: Performed by: NURSE PRACTITIONER

## 2022-09-19 RX ORDER — LOPERAMIDE HYDROCHLORIDE 2 MG/1
2 CAPSULE ORAL 3 TIMES DAILY PRN
Status: DISCONTINUED | OUTPATIENT
Start: 2022-09-19 | End: 2022-09-21 | Stop reason: HOSPADM

## 2022-09-19 RX ORDER — FUROSEMIDE 10 MG/ML
40 INJECTION INTRAMUSCULAR; INTRAVENOUS ONCE
Status: COMPLETED | OUTPATIENT
Start: 2022-09-19 | End: 2022-09-19

## 2022-09-19 RX ORDER — DOXYCYCLINE HYCLATE 100 MG
100 TABLET ORAL EVERY 12 HOURS SCHEDULED
Status: DISCONTINUED | OUTPATIENT
Start: 2022-09-19 | End: 2022-09-19

## 2022-09-19 RX ADMIN — MOMETASONE FUROATE AND FORMOTEROL FUMARATE DIHYDRATE 2 PUFF: 200; 5 AEROSOL RESPIRATORY (INHALATION) at 07:30

## 2022-09-19 RX ADMIN — ALBUTEROL SULFATE 2.5 MG: 2.5 SOLUTION RESPIRATORY (INHALATION) at 01:44

## 2022-09-19 RX ADMIN — TORSEMIDE 20 MG: 20 TABLET ORAL at 08:04

## 2022-09-19 RX ADMIN — ROPINIROLE HYDROCHLORIDE 1 MG: 1 TABLET, FILM COATED ORAL at 21:07

## 2022-09-19 RX ADMIN — DOXYCYCLINE HYCLATE 100 MG: 100 TABLET, COATED ORAL at 08:27

## 2022-09-19 RX ADMIN — MOMETASONE FUROATE AND FORMOTEROL FUMARATE DIHYDRATE 2 PUFF: 200; 5 AEROSOL RESPIRATORY (INHALATION) at 19:54

## 2022-09-19 RX ADMIN — INSULIN GLARGINE 38 UNITS: 100 INJECTION, SOLUTION SUBCUTANEOUS at 08:29

## 2022-09-19 RX ADMIN — ENOXAPARIN SODIUM 30 MG: 100 INJECTION SUBCUTANEOUS at 21:07

## 2022-09-19 RX ADMIN — PIPERACILLIN AND TAZOBACTAM 3375 MG: 3; .375 INJECTION, POWDER, FOR SOLUTION INTRAVENOUS at 05:38

## 2022-09-19 RX ADMIN — CEFTRIAXONE SODIUM 1000 MG: 1 INJECTION, POWDER, FOR SOLUTION INTRAMUSCULAR; INTRAVENOUS at 14:50

## 2022-09-19 RX ADMIN — SODIUM CHLORIDE, PRESERVATIVE FREE 10 ML: 5 INJECTION INTRAVENOUS at 21:07

## 2022-09-19 RX ADMIN — LEVOTHYROXINE SODIUM 100 MCG: 0.1 TABLET ORAL at 05:37

## 2022-09-19 RX ADMIN — FUROSEMIDE 40 MG: 10 INJECTION, SOLUTION INTRAMUSCULAR; INTRAVENOUS at 12:21

## 2022-09-19 RX ADMIN — INSULIN LISPRO 1 UNITS: 100 INJECTION, SOLUTION INTRAVENOUS; SUBCUTANEOUS at 11:59

## 2022-09-19 RX ADMIN — ACETAMINOPHEN 650 MG: 325 TABLET ORAL at 15:26

## 2022-09-19 RX ADMIN — SODIUM CHLORIDE 25 ML/HR: 9 INJECTION, SOLUTION INTRAVENOUS at 05:37

## 2022-09-19 RX ADMIN — AZITHROMYCIN MONOHYDRATE 500 MG: 500 INJECTION, POWDER, LYOPHILIZED, FOR SOLUTION INTRAVENOUS at 13:34

## 2022-09-19 RX ADMIN — TIOTROPIUM BROMIDE INHALATION SPRAY 2 PUFF: 3.12 SPRAY, METERED RESPIRATORY (INHALATION) at 07:30

## 2022-09-19 RX ADMIN — METOPROLOL SUCCINATE 25 MG: 25 TABLET, EXTENDED RELEASE ORAL at 08:04

## 2022-09-19 RX ADMIN — ENOXAPARIN SODIUM 30 MG: 100 INJECTION SUBCUTANEOUS at 08:03

## 2022-09-19 RX ADMIN — LOPERAMIDE HYDROCHLORIDE 2 MG: 2 CAPSULE ORAL at 23:50

## 2022-09-19 RX ADMIN — INSULIN LISPRO 1 UNITS: 100 INJECTION, SOLUTION INTRAVENOUS; SUBCUTANEOUS at 17:27

## 2022-09-19 ASSESSMENT — PAIN SCALES - GENERAL
PAINLEVEL_OUTOF10: 0
PAINLEVEL_OUTOF10: 0
PAINLEVEL_OUTOF10: 3
PAINLEVEL_OUTOF10: 0
PAINLEVEL_OUTOF10: 0

## 2022-09-19 ASSESSMENT — PAIN DESCRIPTION - ORIENTATION: ORIENTATION: ANTERIOR

## 2022-09-19 ASSESSMENT — PAIN DESCRIPTION - LOCATION: LOCATION: HEAD

## 2022-09-19 ASSESSMENT — PAIN DESCRIPTION - DESCRIPTORS: DESCRIPTORS: ACHING

## 2022-09-19 ASSESSMENT — PAIN - FUNCTIONAL ASSESSMENT: PAIN_FUNCTIONAL_ASSESSMENT: ACTIVITIES ARE NOT PREVENTED

## 2022-09-19 NOTE — PROGRESS NOTES
V2.0  [facility] Hospitalist Progress Note      Name:  Lei Saldivar /Age/Sex: 1950  (67 y.o. female)   MRN & CSN:  [id number] & [id number] Encounter Date/Time: 2022 8:07 AM EDT    Location:   PCP: Vahid Parker DO       Hospital Day: 3    Assessment and Plan:   Lei Saldivar is a 67 y.o. female with pmh of insulin-dependent diabetes mellitus, asthma, chronic pedal edema, diverticulosis, anxiety, hyperlipidemia, morbid obesity, hypertension, hypothyroidism who presents with suspected suspected Sepsis ([facility] [address]    1. SIRS/sepsis present on admission now resolved  2. Cellulitis mild bilateral lower extremities versus chronic stasis dermatitis is on ceftriaxone  3. Possible pneumonia versus atelectasis, procalcitonin unremarkable, blood cultures show no growth to date. On Rocephin and Zithromax  4. UTI, urine cultures pending, await results. On ceftriaxone   5. Diverticulitis, clinically there are no symptoms or signs of diverticulitis. No abdominal pain. 6.  Elevated LFTs, ultrasound was completed and is unremarkable  7. Insulin-dependent diabetes mellitus, continue current Lantus dose with sliding scale. 9.  Hypertension continue metoprolol torsemide. 10.  Suspect diastolic CHF as on stress testing left ventricular perfusion was abnormal suggesting atypical hypertrophy without regional ischemia. Left ventricular function is normal with a EF of 58%. Patient is on Demadex and spironolactone and K-Dur at home. At this time holding spironolactone and K-Dur secondary to hyperkalemia. 11.  Hyperlipidemia continue statin  12. Hypothyroidism, continue Synthroid      Diet ADULT DIET;  Regular; 4 carb choices (60 gm/meal)   DVT Prophylaxis [x] Lovenox, []  Heparin, [] SCDs, [] Ambulation,  [] Eliquis, [] Xarelto  [] Coumadin   Code Status Full Code   Disposition From: Home  Expected Disposition: Home  Estimated Date of Discharge: 24 to 48 hours  Patient requires continued admission due to    Surrogate Decision Maker/ POA Yosef Anaya     Subjective:     Chief Complaint: Diarrhea, Chills, and Fatigue       Patient seen and examined today she is doing well, she is out of bed sitting in the chair I did remind her she needs to elevate her lower extremities when sitting in the chair. She has no complaints at this time. Review of Systems:    10 point review of systems complete is negative unless stated above  Objective: Intake/Output Summary (Last 24 hours) at 9/19/2022 0807  Last data filed at 9/18/2022 2220  Gross per 24 hour   Intake 370.24 ml   Output --   Net 370.24 ml        Vitals:   Vitals:    09/19/22 0804   BP: (!) 109/55   Pulse: 89   Resp:    Temp:    SpO2:        Physical Exam:     General: NAD  Eyes: EOMI  ENT: neck supple  Cardiovascular: Regular rate. Respiratory: Clear to auscultation bilaterally throughout all lung fields  Gastrointestinal: Soft, non tender, nondistended bowel sounds present in all 4 quadrants  Genitourinary: no suprapubic tenderness  Musculoskeletal: Bilateral lower extremity swelling mild erythema bilateral lower extremities mild warmth no tenderness most likely venous stasis dermatitis  Skin: warm, dry  Neuro: Alert. Psych: Mood appropriate.      Medications:   Medications:    doxycycline hyclate  100 mg Oral 2 times per day    levothyroxine  100 mcg Oral Daily    metoprolol succinate  25 mg Oral Daily    rOPINIRole  1 mg Oral Nightly    sodium chloride flush  5-40 mL IntraVENous 2 times per day    insulin lispro  0-4 Units SubCUTAneous TID WC    insulin lispro  0-4 Units SubCUTAneous Nightly    torsemide  20 mg Oral Daily    insulin glargine  38 Units SubCUTAneous Daily    mometasone-formoterol  2 puff Inhalation BID    And    tiotropium  2 puff Inhalation Daily    enoxaparin  30 mg SubCUTAneous BID      Infusions:    sodium chloride 25 mL/hr (09/19/22 0537)    dextrose       PRN Meds: albuterol, 2.5 mg, Q4H PRN  albuterol sulfate HFA, 2 puff, Q4H PRN  benzonatate, 100 mg, TID PRN  sodium chloride flush, 5-40 mL, PRN  sodium chloride, , PRN  ondansetron, 4 mg, Q8H PRN   Or  ondansetron, 4 mg, Q6H PRN  polyethylene glycol, 17 g, Daily PRN  acetaminophen, 650 mg, Q6H PRN   Or  acetaminophen, 650 mg, Q6H PRN  glucose, 4 tablet, PRN  dextrose bolus, 125 mL, PRN   Or  dextrose bolus, 250 mL, PRN  glucagon (rDNA), 1 mg, PRN  dextrose, , Continuous PRN        Labs      Recent Results (from the past 24 hour(s))   POCT Glucose    Collection Time: 09/18/22  8:08 AM   Result Value Ref Range    POC Glucose 151 (H) 70 - 99 MG/DL   POCT Glucose    Collection Time: 09/18/22 11:44 AM   Result Value Ref Range    POC Glucose 140 (H) 70 - 99 MG/DL   POCT Glucose    Collection Time: 09/18/22  4:58 PM   Result Value Ref Range    POC Glucose 253 (H) 70 - 99 MG/DL   POCT Glucose    Collection Time: 09/18/22  7:47 PM   Result Value Ref Range    POC Glucose 272 (H) 70 - 99 MG/DL   CBC    Collection Time: 09/19/22  6:00 AM   Result Value Ref Range    WBC 5.2 4.0 - 10.5 K/CU MM    RBC 4.01 (L) 4.2 - 5.4 M/CU MM    Hemoglobin 12.8 12.5 - 16.0 GM/DL    Hematocrit 40.3 37 - 47 %    .5 (H) 78 - 100 FL    MCH 31.9 (H) 27 - 31 PG    MCHC 31.8 (L) 32.0 - 36.0 %    RDW 15.2 (H) 11.7 - 14.9 %    Platelets 581 (L) 589 - 440 K/CU MM    MPV 9.8 7.5 - 11.1 FL   Basic Metabolic Panel w/ Reflex to MG    Collection Time: 09/19/22  6:00 AM   Result Value Ref Range    Sodium 141 135 - 145 MMOL/L    Potassium 4.7 3.5 - 5.1 MMOL/L    Chloride 108 99 - 110 mMol/L    CO2 26 21 - 32 MMOL/L    Anion Gap 7 4 - 16    BUN 18 6 - 23 MG/DL    Creatinine 0.8 0.6 - 1.1 MG/DL    Glucose 176 (H) 70 - 99 MG/DL    Calcium 8.5 8.3 - 10.6 MG/DL    GFR Non-African American >60 >60 mL/min/1.73m2    GFR African American >60 >60 mL/min/1.73m2   Brain Natriuretic Peptide    Collection Time: 09/19/22  6:00 AM   Result Value Ref Range    Pro-.0 (H) <300 PG/ML        Imaging/Diagnostics Last 24 Hours   CT ABDOMEN PELVIS WO CONTRAST Additional Contrast? None    Result Date: 9/18/2022  EXAMINATION: CT OF THE ABDOMEN AND PELVIS WITHOUT CONTRAST; CT OF THE CHEST WITHOUT CONTRAST 9/18/2022 1:19 am; 9/18/2022 1:20 am TECHNIQUE: CT of the abdomen and pelvis was performed without the administration of intravenous contrast. Multiplanar reformatted images are provided for review. Automated exposure control, iterative reconstruction, and/or weight based adjustment of the mA/kV was utilized to reduce the radiation dose to as low as reasonably achievable.; CT of the chest was performed without the administration of intravenous contrast. Multiplanar reformatted images are provided for review. Automated exposure control, iterative reconstruction, and/or weight based adjustment of the mA/kV was utilized to reduce the radiation dose to as low as reasonably achievable.  COMPARISON: None HISTORY: ORDERING SYSTEM PROVIDED HISTORY: diarrhea, fever, concern for intraabdomianl process concerning for infection TECHNOLOGIST PROVIDED HISTORY: Reason for exam:->diarrhea, fever, concern for intraabdomianl process concerning for infection Additional Contrast?->None Decision Support Exception - unselect if not a suspected or confirmed emergency medical condition->Emergency Medical Condition (MA) Reason for Exam: diarrhea, fever, concern for intraabdomianl process concerning for infection, pt unable to hold arms above head, pt had difficulty following breathing instructions Additional signs and symptoms: diarrhea, fever, concern for intraabdomianl process concerning for infection, pt unable to hold arms above head, pt had difficulty following breathing instructions; ORDERING SYSTEM PROVIDED HISTORY: fever, tachycardia, concern for infectious process TECHNOLOGIST PROVIDED HISTORY: Reason for exam:->fever, tachycardia, concern for infectious process Decision Support Exception - unselect if not a suspected or confirmed emergency medical condition->Emergency Medical Condition (MA) Reason for Exam: fever, tachycardia, concern for infectious process, pt unable to hold arms above head, pt had difficulty following breathing instructions Additional signs and symptoms: fever, tachycardia, concern for infectious process, pt unable to hold arms above head, pt had difficulty following breathing instructions FINDINGS: Chest: Mediastinum: No thoracic aortic aneurysm is identified. No cardiomegaly. No pulmonary arterial enlargement. No focal esophageal thickening is identified. No bulky mediastinal lymphadenopathy. No significant pericardial effusion. Mitral annular calcifications are identified. Lungs/pleura: No pleural effusion. No pneumothorax. Subsegmental atelectatic changes. Respiratory motion artifact. Central airways are patent. Soft Tissues/Bones: No axillary or supraclavicular lymphadenopathy is identified. Atherosclerosis is noted. Degenerative changes are seen within the shoulders and spine. No acute osseous fracture is identified. Multilevel degenerative disc disease. Posterior vertebral body alignment appears intact. No sternal fracture is seen. Abdomen/Pelvis: Organs: No focal hypodense mass identified in the liver or spleen. No adrenal mass. No pancreatic mass or peripancreatic inflammatory process on this noncontrast study. Gallbladder is not seen and may be surgically absent. Left-sided nonobstructive urinary tract calculi. No ureteral calculi are identified. Bilateral perinephric stranding is seen. GI/Bowel: No significant focal colonic wall thickening is identified. There is mild stranding seen adjacent to regions of the colon, including the sigmoid colon. This appear similar to the previous examination however. Pelvis: Bladder is unremarkable. No pelvic mass. Peritoneum/Retroperitoneum: No aortic aneurysm. Atherosclerosis. No retroperitoneal or mesenteric lymphadenopathy is identified. Bones/Soft Tissues: No acute osseous fracture. Pelvis appears intact. Degenerative changes noted within the spine and sacroiliac joints. Mild soft tissue edema seen overlying the right hip. Varicosities noted in the upper thighs bilaterally. 1. Mild patchy subsegmental airspace disease seen in the lungs bilaterally which may represent atelectasis or pneumonia. No reactive effusion. 2. Colonic diverticulosis, with minimal stranding seen adjacent to the sigmoid colon, though not significantly changed from the previous examination. This may be incidental, though difficult to exclude acute sigmoid diverticulitis. 3. Nonobstructive left-sided renal calculi. 4. No other acute inflammatory process seen in the chest, abdomen or pelvis. Other incidental findings as above. CT CHEST WO CONTRAST    Result Date: 9/18/2022  EXAMINATION: CT OF THE ABDOMEN AND PELVIS WITHOUT CONTRAST; CT OF THE CHEST WITHOUT CONTRAST 9/18/2022 1:19 am; 9/18/2022 1:20 am TECHNIQUE: CT of the abdomen and pelvis was performed without the administration of intravenous contrast. Multiplanar reformatted images are provided for review. Automated exposure control, iterative reconstruction, and/or weight based adjustment of the mA/kV was utilized to reduce the radiation dose to as low as reasonably achievable.; CT of the chest was performed without the administration of intravenous contrast. Multiplanar reformatted images are provided for review. Automated exposure control, iterative reconstruction, and/or weight based adjustment of the mA/kV was utilized to reduce the radiation dose to as low as reasonably achievable.  COMPARISON: None HISTORY: ORDERING SYSTEM PROVIDED HISTORY: diarrhea, fever, concern for intraabdomianl process concerning for infection TECHNOLOGIST PROVIDED HISTORY: Reason for exam:->diarrhea, fever, concern for intraabdomianl process concerning for infection Additional Contrast?->None Decision Support Exception - unselect if not a suspected or confirmed emergency medical condition->Emergency Medical Condition (MA) Reason for Exam: diarrhea, fever, concern for intraabdomianl process concerning for infection, pt unable to hold arms above head, pt had difficulty following breathing instructions Additional signs and symptoms: diarrhea, fever, concern for intraabdomianl process concerning for infection, pt unable to hold arms above head, pt had difficulty following breathing instructions; ORDERING SYSTEM PROVIDED HISTORY: fever, tachycardia, concern for infectious process TECHNOLOGIST PROVIDED HISTORY: Reason for exam:->fever, tachycardia, concern for infectious process Decision Support Exception - unselect if not a suspected or confirmed emergency medical condition->Emergency Medical Condition (MA) Reason for Exam: fever, tachycardia, concern for infectious process, pt unable to hold arms above head, pt had difficulty following breathing instructions Additional signs and symptoms: fever, tachycardia, concern for infectious process, pt unable to hold arms above head, pt had difficulty following breathing instructions FINDINGS: Chest: Mediastinum: No thoracic aortic aneurysm is identified. No cardiomegaly. No pulmonary arterial enlargement. No focal esophageal thickening is identified. No bulky mediastinal lymphadenopathy. No significant pericardial effusion. Mitral annular calcifications are identified. Lungs/pleura: No pleural effusion. No pneumothorax. Subsegmental atelectatic changes. Respiratory motion artifact. Central airways are patent. Soft Tissues/Bones: No axillary or supraclavicular lymphadenopathy is identified. Atherosclerosis is noted. Degenerative changes are seen within the shoulders and spine. No acute osseous fracture is identified. Multilevel degenerative disc disease. Posterior vertebral body alignment appears intact. No sternal fracture is seen. Abdomen/Pelvis: Organs: No focal hypodense mass identified in the liver or spleen. No adrenal mass.   No pancreatic mass or peripancreatic inflammatory process on this noncontrast study. Gallbladder is not seen and may be surgically absent. Left-sided nonobstructive urinary tract calculi. No ureteral calculi are identified. Bilateral perinephric stranding is seen. GI/Bowel: No significant focal colonic wall thickening is identified. There is mild stranding seen adjacent to regions of the colon, including the sigmoid colon. This appear similar to the previous examination however. Pelvis: Bladder is unremarkable. No pelvic mass. Peritoneum/Retroperitoneum: No aortic aneurysm. Atherosclerosis. No retroperitoneal or mesenteric lymphadenopathy is identified. Bones/Soft Tissues: No acute osseous fracture. Pelvis appears intact. Degenerative changes noted within the spine and sacroiliac joints. Mild soft tissue edema seen overlying the right hip. Varicosities noted in the upper thighs bilaterally. 1. Mild patchy subsegmental airspace disease seen in the lungs bilaterally which may represent atelectasis or pneumonia. No reactive effusion. 2. Colonic diverticulosis, with minimal stranding seen adjacent to the sigmoid colon, though not significantly changed from the previous examination. This may be incidental, though difficult to exclude acute sigmoid diverticulitis. 3. Nonobstructive left-sided renal calculi. 4. No other acute inflammatory process seen in the chest, abdomen or pelvis. Other incidental findings as above. XR CHEST PORTABLE    Result Date: 9/17/2022  EXAMINATION: ONE XRAY VIEW OF THE CHEST 9/17/2022 11:11 pm COMPARISON: 01/17/2022.  HISTORY: ORDERING SYSTEM PROVIDED HISTORY: fever, tachycardia, concern for infectious process TECHNOLOGIST PROVIDED HISTORY: Reason for exam:->fever, tachycardia, concern for infectious process Reason for Exam: fever, tachycardia, concern for infectious process Additional signs and symptoms: fever, tachycardia, concern for infectious process FINDINGS: A very elevated right hemidiaphragm is present and appears worse than the prior study. There is summation density of the heart and breast tissue over the left lower chest.  Portions of the left hemidiaphragm are ill-defined. Cardiac silhouette appears prominent and does have increased pulmonary vascular congestion. No pneumothorax is identified. Degenerative changes along the spine and shoulders appear stable. No lines or tubes in the chest.     Elevated right hemidiaphragm and has summation density from the large habitus. Does appear to have enlargement of the cardiac silhouette and pulmonary vasculature concerning for mild CHF or volume overload. Ill-defined portion of the left hemidiaphragm could relate to a small amount of pleural effusion with basilar atelectasis or infiltrate.        Electronically signed by BRYN Espinoza NP on 9/19/2022 at 8:07 AM

## 2022-09-19 NOTE — PROGRESS NOTES
In access was out, tried and successfully inserted by nursing supervisor, called in pharcy on duty if the azithromycin and ceftriaxone are both compatible to deliver to patient and he agrees to infused both since their is no contraindication and both are compatible.

## 2022-09-19 NOTE — CARE COORDINATION
CM met with the patient to initiate discharge planning. Patient lives at home with her fiance and her son, has insurance with Rx coverage & PCP, stated that she is independent with ADL's, and is still driving. Patient stated that she uses a walker and cane at home (predominantly the cane) and does not require home oxygen but does use an inhaler. Patient stated that she plans to return home upon discharge and is unable to identify any needs at this time. CM available if needs arise.

## 2022-09-19 NOTE — PROGRESS NOTES
This RN has reviewed and agrees with Monroe Castillo LPN's data collection and has collaborated with this LPN regarding the patient's care plan.

## 2022-09-19 NOTE — PROGRESS NOTES
LOVENOX PROPHYLAXIS EVALUATION    Wt Readings from Last 3 Encounters:   09/18/22 239 lb 14.4 oz (108.8 kg)   06/22/22 235 lb (106.6 kg)   02/21/22 235 lb (106.6 kg)       Estimated Creatinine Clearance: 66 mL/min (based on SCr of 0.9 mg/dL). Recent Labs     09/18/22  0015   BUN 20   CREATININE 0.9      HGB 13.8   HCT 42.6       Weight Range (kg): 101-150.9 kg    CRCL = 30 or greater     50.9 kg   and below     .9  kg   101-150.9 kg   151-174.9  kg   175 kg  or greater     30mg subq  daily     40mg subq daily    (or 30mg subq BID orthopedic)     30mg subq  BID   40mg subq  BID   60mg subq BID       Per P/T protocol for appropriate subq anticoagulation by weight and CRCL change to:  Enoxaparin 30mg subq BID    Jaden Carrillo [facility]  4:10 AM  09/18/22    Reviewed on 09/19/22 from previous entry of Lovenox 40 mg that was initially ordered and then discontinued,  and changed order to Lovenox 30 mg Subq BID per above protocol.   Simon Jung [facility]  09/19/22 10:24 AM

## 2022-09-19 NOTE — PLAN OF CARE
Problem: Pain  Goal: Verbalizes/displays adequate comfort level or baseline comfort level  Outcome: Progressing     Problem: Discharge Planning  Goal: Discharge to home or other facility with appropriate resources  Outcome: Progressing     Problem: Safety - Adult  Goal: Free from fall injury  Outcome: Progressing  Flowsheets (Taken 9/19/2022 0720)  Free From Fall Injury: Instruct family/caregiver on patient safety     Problem: Skin/Tissue Integrity - Adult  Goal: Incisions, wounds, or drain sites healing without S/S of infection  Outcome: Progressing  Flowsheets (Taken 9/19/2022 0721)  Incisions, Wounds, or Drain Sites Healing Without Sign and Symptoms of Infection: TWICE DAILY: Assess and document skin integrity  Goal: Oral mucous membranes remain intact  Outcome: Progressing  Flowsheets (Taken 9/19/2022 0721)  Oral Mucous Membranes Remain Intact:   Assess oral mucosa and hygiene practices   Implement oral medicated treatments as ordered   Implement preventative oral hygiene regimen     Problem: Infection - Adult  Goal: Absence of infection at discharge  Outcome: Progressing  Goal: Absence of infection during hospitalization  Outcome: Progressing  Goal: Absence of fever/infection during anticipated neutropenic period  Outcome: Progressing     Problem: Metabolic/Fluid and Electrolytes - Adult  Goal: Electrolytes maintained within normal limits  Outcome: Progressing  Goal: Hemodynamic stability and optimal renal function maintained  Outcome: Progressing  Goal: Glucose maintained within prescribed range  Outcome: Progressing     Problem: Genitourinary - Adult  Goal: Absence of urinary retention  Outcome: Progressing     Problem: Chronic Conditions and Co-morbidities  Goal: Patient's chronic conditions and co-morbidity symptoms are monitored and maintained or improved  Outcome: Progressing     Problem: ABCDS Injury Assessment  Goal: Absence of physical injury  Outcome: Progressing

## 2022-09-20 LAB
ALBUMIN SERPL-MCNC: 2.5 GM/DL (ref 3.4–5)
ALP BLD-CCNC: 133 IU/L (ref 40–129)
ALT SERPL-CCNC: 36 U/L (ref 10–40)
ANION GAP SERPL CALCULATED.3IONS-SCNC: 7 MMOL/L (ref 4–16)
AST SERPL-CCNC: 53 IU/L (ref 15–37)
BILIRUB SERPL-MCNC: 0.6 MG/DL (ref 0–1)
BILIRUBIN DIRECT: 0.3 MG/DL (ref 0–0.3)
BILIRUBIN, INDIRECT: 0.3 MG/DL (ref 0–0.7)
BUN BLDV-MCNC: 16 MG/DL (ref 6–23)
CALCIUM SERPL-MCNC: 8.6 MG/DL (ref 8.3–10.6)
CHLORIDE BLD-SCNC: 104 MMOL/L (ref 99–110)
CO2: 29 MMOL/L (ref 21–32)
CREAT SERPL-MCNC: 0.8 MG/DL (ref 0.6–1.1)
CULTURE: ABNORMAL
CULTURE: ABNORMAL
GFR AFRICAN AMERICAN: >60 ML/MIN/1.73M2
GFR NON-AFRICAN AMERICAN: >60 ML/MIN/1.73M2
GLUCOSE BLD-MCNC: 159 MG/DL (ref 70–99)
GLUCOSE BLD-MCNC: 211 MG/DL (ref 70–99)
GLUCOSE BLD-MCNC: 217 MG/DL (ref 70–99)
GLUCOSE BLD-MCNC: 283 MG/DL (ref 70–99)
GLUCOSE BLD-MCNC: 315 MG/DL (ref 70–99)
HCT VFR BLD CALC: 39.9 % (ref 37–47)
HEMOGLOBIN: 12.7 GM/DL (ref 12.5–16)
Lab: ABNORMAL
MCH RBC QN AUTO: 31.9 PG (ref 27–31)
MCHC RBC AUTO-ENTMCNC: 31.8 % (ref 32–36)
MCV RBC AUTO: 100.3 FL (ref 78–100)
PDW BLD-RTO: 14.9 % (ref 11.7–14.9)
PLATELET # BLD: 117 K/CU MM (ref 140–440)
PMV BLD AUTO: 9.6 FL (ref 7.5–11.1)
POTASSIUM SERPL-SCNC: 3.7 MMOL/L (ref 3.5–5.1)
PROCALCITONIN: 0.4
RBC # BLD: 3.98 M/CU MM (ref 4.2–5.4)
SODIUM BLD-SCNC: 140 MMOL/L (ref 135–145)
SPECIMEN: ABNORMAL
TOTAL PROTEIN: 5.9 GM/DL (ref 6.4–8.2)
WBC # BLD: 5.7 K/CU MM (ref 4–10.5)

## 2022-09-20 PROCEDURE — 6360000002 HC RX W HCPCS: Performed by: NURSE PRACTITIONER

## 2022-09-20 PROCEDURE — 84145 PROCALCITONIN (PCT): CPT

## 2022-09-20 PROCEDURE — 2580000003 HC RX 258: Performed by: NURSE PRACTITIONER

## 2022-09-20 PROCEDURE — 94761 N-INVAS EAR/PLS OXIMETRY MLT: CPT

## 2022-09-20 PROCEDURE — 6370000000 HC RX 637 (ALT 250 FOR IP): Performed by: STUDENT IN AN ORGANIZED HEALTH CARE EDUCATION/TRAINING PROGRAM

## 2022-09-20 PROCEDURE — 82248 BILIRUBIN DIRECT: CPT

## 2022-09-20 PROCEDURE — 87899 AGENT NOS ASSAY W/OPTIC: CPT

## 2022-09-20 PROCEDURE — 80053 COMPREHEN METABOLIC PANEL: CPT

## 2022-09-20 PROCEDURE — 1200000000 HC SEMI PRIVATE

## 2022-09-20 PROCEDURE — 82962 GLUCOSE BLOOD TEST: CPT

## 2022-09-20 PROCEDURE — 94640 AIRWAY INHALATION TREATMENT: CPT

## 2022-09-20 PROCEDURE — 85027 COMPLETE CBC AUTOMATED: CPT

## 2022-09-20 PROCEDURE — 87449 NOS EACH ORGANISM AG IA: CPT

## 2022-09-20 PROCEDURE — 2580000003 HC RX 258: Performed by: STUDENT IN AN ORGANIZED HEALTH CARE EDUCATION/TRAINING PROGRAM

## 2022-09-20 PROCEDURE — 6360000002 HC RX W HCPCS: Performed by: STUDENT IN AN ORGANIZED HEALTH CARE EDUCATION/TRAINING PROGRAM

## 2022-09-20 RX ADMIN — MOMETASONE FUROATE AND FORMOTEROL FUMARATE DIHYDRATE 2 PUFF: 200; 5 AEROSOL RESPIRATORY (INHALATION) at 19:36

## 2022-09-20 RX ADMIN — ROPINIROLE HYDROCHLORIDE 1 MG: 1 TABLET, FILM COATED ORAL at 20:16

## 2022-09-20 RX ADMIN — ALBUTEROL SULFATE 2 PUFF: 108 INHALANT RESPIRATORY (INHALATION) at 07:48

## 2022-09-20 RX ADMIN — TORSEMIDE 20 MG: 20 TABLET ORAL at 08:21

## 2022-09-20 RX ADMIN — ACETAMINOPHEN 650 MG: 325 TABLET ORAL at 09:42

## 2022-09-20 RX ADMIN — CEFTRIAXONE SODIUM 1000 MG: 1 INJECTION, POWDER, FOR SOLUTION INTRAMUSCULAR; INTRAVENOUS at 14:53

## 2022-09-20 RX ADMIN — METOPROLOL SUCCINATE 25 MG: 25 TABLET, EXTENDED RELEASE ORAL at 08:20

## 2022-09-20 RX ADMIN — SODIUM CHLORIDE, PRESERVATIVE FREE 10 ML: 5 INJECTION INTRAVENOUS at 20:19

## 2022-09-20 RX ADMIN — INSULIN LISPRO 1 UNITS: 100 INJECTION, SOLUTION INTRAVENOUS; SUBCUTANEOUS at 12:01

## 2022-09-20 RX ADMIN — INSULIN LISPRO 3 UNITS: 100 INJECTION, SOLUTION INTRAVENOUS; SUBCUTANEOUS at 17:19

## 2022-09-20 RX ADMIN — TIOTROPIUM BROMIDE INHALATION SPRAY 2 PUFF: 3.12 SPRAY, METERED RESPIRATORY (INHALATION) at 07:47

## 2022-09-20 RX ADMIN — AZITHROMYCIN MONOHYDRATE 500 MG: 500 INJECTION, POWDER, LYOPHILIZED, FOR SOLUTION INTRAVENOUS at 13:14

## 2022-09-20 RX ADMIN — MOMETASONE FUROATE AND FORMOTEROL FUMARATE DIHYDRATE 2 PUFF: 200; 5 AEROSOL RESPIRATORY (INHALATION) at 07:47

## 2022-09-20 RX ADMIN — ENOXAPARIN SODIUM 30 MG: 100 INJECTION SUBCUTANEOUS at 20:17

## 2022-09-20 RX ADMIN — SODIUM CHLORIDE, PRESERVATIVE FREE 10 ML: 5 INJECTION INTRAVENOUS at 08:19

## 2022-09-20 RX ADMIN — ENOXAPARIN SODIUM 30 MG: 100 INJECTION SUBCUTANEOUS at 08:20

## 2022-09-20 RX ADMIN — LEVOTHYROXINE SODIUM 100 MCG: 0.1 TABLET ORAL at 05:42

## 2022-09-20 ASSESSMENT — PAIN SCALES - GENERAL
PAINLEVEL_OUTOF10: 0
PAINLEVEL_OUTOF10: 8

## 2022-09-20 ASSESSMENT — PAIN DESCRIPTION - LOCATION
LOCATION: HEAD
LOCATION: HEAD

## 2022-09-20 ASSESSMENT — PAIN DESCRIPTION - ORIENTATION: ORIENTATION: OTHER (COMMENT)

## 2022-09-20 ASSESSMENT — PAIN DESCRIPTION - DESCRIPTORS
DESCRIPTORS: ACHING
DESCRIPTORS: ACHING

## 2022-09-20 NOTE — PLAN OF CARE
Problem: Pain  Goal: Verbalizes/displays adequate comfort level or baseline comfort level  9/20/2022 0925 by Doretha Nava RN  Outcome: Progressing  9/19/2022 2059 by Liane Vásquez RN  Outcome: Progressing     Problem: Discharge Planning  Goal: Discharge to home or other facility with appropriate resources  9/20/2022 0925 by Doretha Nava RN  Outcome: Progressing  9/19/2022 2059 by Liane Vásquez RN  Outcome: Progressing     Problem: Safety - Adult  Goal: Free from fall injury  9/20/2022 0925 by Doretha Nava RN  Outcome: Progressing  Flowsheets (Taken 9/20/2022 0728)  Free From Fall Injury: Instruct family/caregiver on patient safety  9/19/2022 2059 by Liane Vásquez RN  Outcome: Progressing     Problem: Skin/Tissue Integrity - Adult  Goal: Incisions, wounds, or drain sites healing without S/S of infection  9/20/2022 0925 by Doretha Nava RN  Outcome: Progressing  Flowsheets (Taken 9/20/2022 0728)  Incisions, Wounds, or Drain Sites Healing Without Sign and Symptoms of Infection: TWICE DAILY: Assess and document skin integrity  9/19/2022 2059 by Liane Vásquez RN  Outcome: Progressing  Goal: Oral mucous membranes remain intact  9/20/2022 0925 by Doretha Nava RN  Outcome: Progressing  Flowsheets (Taken 9/20/2022 0728)  Oral Mucous Membranes Remain Intact:   Implement preventative oral hygiene regimen   Implement oral medicated treatments as ordered  9/19/2022 2059 by Liane Vásquez RN  Outcome: Progressing     Problem: Infection - Adult  Goal: Absence of infection at discharge  9/20/2022 0925 by Doretha Nava RN  Outcome: Progressing  9/19/2022 2059 by Liane Vásquez RN  Outcome: Progressing  Goal: Absence of infection during hospitalization  9/20/2022 0925 by Doretha Nava RN  Outcome: Progressing  9/19/2022 2059 by Liane Vásquez RN  Outcome: Progressing  Goal: Absence of fever/infection during anticipated neutropenic period  9/20/2022 1264 by Leroy Doherty RN  Outcome: Progressing  9/19/2022 2059 by Jesus Jaeger RN  Outcome: Progressing     Problem: Metabolic/Fluid and Electrolytes - Adult  Goal: Electrolytes maintained within normal limits  9/20/2022 0925 by Leroy Doherty RN  Outcome: Progressing  9/19/2022 2059 by Jesus Jaeger RN  Outcome: Progressing  Goal: Hemodynamic stability and optimal renal function maintained  9/20/2022 0925 by Leroy Doherty RN  Outcome: Progressing  9/19/2022 2059 by Jesus Jaeger RN  Outcome: Progressing  Goal: Glucose maintained within prescribed range  9/20/2022 0925 by Leroy Doherty RN  Outcome: Progressing  9/19/2022 2059 by Jesus Jaeger RN  Outcome: Progressing     Problem: Genitourinary - Adult  Goal: Absence of urinary retention  9/20/2022 0925 by Leroy Doherty RN  Outcome: Progressing  9/19/2022 2059 by Jesus Jaeger RN  Outcome: Progressing     Problem: Chronic Conditions and Co-morbidities  Goal: Patient's chronic conditions and co-morbidity symptoms are monitored and maintained or improved  9/20/2022 0925 by Leroy Doherty RN  Outcome: Progressing  9/19/2022 2059 by Jesus Jaeger RN  Outcome: Progressing     Problem: ABCDS Injury Assessment  Goal: Absence of physical injury  9/20/2022 0925 by Leroy Doherty RN  Outcome: Progressing  Flowsheets (Taken 9/20/2022 2913)  Absence of Physical Injury: Implement safety measures based on patient assessment  9/19/2022 2059 by Jesus Jaeger RN  Outcome: Progressing

## 2022-09-20 NOTE — PROGRESS NOTES
V2.0  [facility] – [place], [place] Hospitalist Progress Note      Name:  Vangie Lake /Age/Sex: 1950  (67 y.o. female)   MRN & CSN:  [id number] & [id number] Encounter Date/Time: 2022 8:07 AM EDT    Location:   PCP: Staci Parker DO       Hospital Day: 4    Assessment and Plan:   Vangie Lake is a 67 y.o. female with pmh of insulin-dependent diabetes mellitus, asthma, chronic pedal edema, diverticulosis, anxiety, hyperlipidemia, morbid obesity, hypertension, hypothyroidism who presents with suspected suspected Sepsis ([facility] [address]    1. Sepsis secondary to UTI/possible pneumonia   -T-max on admission 100.4, tachycardic, WBC 10.7  -Chest x-ray with pulmonary vascular congestion and atelectasis, possible mild CHF or pneumonia  -Chest CT with patchy subsegmental airspace disease, possible pneumonia  -Procalcitonin on admission 0.691  -Has been on azithromycin and Rocephin since admission  -Blood cultures no growth to date  -Urine culture with E. Coli; reviewed sensitivities  -We will check urine antigens, if negative for Legionella we will discontinue azithromycin  -Transition to p.o. antibiotics within next 24 hours  2. Cellulitis mild bilateral lower extremities versus chronic stasis dermatitis   -Continue ceftriaxone  3. Diverticulitis,   -Abdomen nonacute  -CT abdomen with minimal stranding, this is chronic in nature  -Tolerating p.o. diet  4. Elevated LFTs,   -Right upper quadrant ultrasound with no acute findings  5. Insulin-dependent diabetes mellitus  -Blood glucose has consistently been in the 200s, will increase Lantus   -Continue sliding scale. 6.  Hypertension   -Continue metoprolol and torsemide. 7.  Diastolic CHF   -Stress testing with left ventricular perfusion abnormality suggesting atypical hypertrophy without regional ischemia. Left ventricular function is normal with a EF of 58%.     -Patient is on Demadex and spironolactone and K-Dur at home.    -At this time holding spironolactone and K-Dur secondary to hyperkalemia.  -Continue Demadex  8. Hyperlipidemia   -Continue statin  9. Hypothyroidism  -Continue Synthroid      Diet ADULT DIET; Regular; 4 carb choices (60 gm/meal)   DVT Prophylaxis [x] Lovenox, []  Heparin, [] SCDs, [] Ambulation,  [] Eliquis, [] Xarelto  [] Coumadin   Code Status Full Code   Disposition From: Home  Expected Disposition: Home  Estimated Date of Discharge: 24 to 48 hours  Patient requires continued admission due to pending lab results   Surrogate Decision Maker/ ELVIA Pryor     Subjective:     Chief Complaint: Diarrhea, Chills, and Fatigue     Patient seen and examined this morning sitting up in chair eating her breakfast.  She currently denies any complaints for me. She does appear to be minimally short of breath but when I asked her about this she denies feeling short of breath and states it is getting better. I did discuss with her the plan for the next 24 hours and likely discharge home tomorrow with p.o. antibiotics she is in agreement with this plan    Review of Systems:    10 point review of systems complete is negative unless stated above  Objective: Intake/Output Summary (Last 24 hours) at 9/20/2022 0925  Last data filed at 9/20/2022 0838  Gross per 24 hour   Intake 490 ml   Output 2200 ml   Net -1710 ml          Vitals:   Vitals:    09/20/22 0819   BP: 105/65   Pulse: 93   Resp:    Temp:    SpO2:        Physical Exam:     General: NAD  Eyes: EOMI  ENT: neck supple  Cardiovascular: Regular rate. Respiratory: Clear to auscultation bilaterally throughout all lung fields  Gastrointestinal: Obese, soft, non tender, nondistended bowel sounds present in all 4 quadrants  Genitourinary: no suprapubic tenderness  Musculoskeletal: Bilateral lower extremity swelling mild erythema bilateral lower extremities mild warmth no tenderness most likely venous stasis dermatitis  Skin: warm, dry  Neuro: Alert. Psych: Mood appropriate.      Medications:   Medications: insulin glargine  40 Units SubCUTAneous Daily    cefTRIAXone (ROCEPHIN) IV  1,000 mg IntraVENous Q24H    azithromycin  500 mg IntraVENous Q24H    levothyroxine  100 mcg Oral Daily    metoprolol succinate  25 mg Oral Daily    rOPINIRole  1 mg Oral Nightly    sodium chloride flush  5-40 mL IntraVENous 2 times per day    insulin lispro  0-4 Units SubCUTAneous TID WC    insulin lispro  0-4 Units SubCUTAneous Nightly    torsemide  20 mg Oral Daily    mometasone-formoterol  2 puff Inhalation BID    And    tiotropium  2 puff Inhalation Daily    enoxaparin  30 mg SubCUTAneous BID      Infusions:    sodium chloride 25 mL/hr (09/19/22 0537)    dextrose       PRN Meds: loperamide, 2 mg, TID PRN  albuterol, 2.5 mg, Q4H PRN  albuterol sulfate HFA, 2 puff, Q4H PRN  benzonatate, 100 mg, TID PRN  sodium chloride flush, 5-40 mL, PRN  sodium chloride, , PRN  ondansetron, 4 mg, Q8H PRN   Or  ondansetron, 4 mg, Q6H PRN  polyethylene glycol, 17 g, Daily PRN  acetaminophen, 650 mg, Q6H PRN   Or  acetaminophen, 650 mg, Q6H PRN  glucose, 4 tablet, PRN  dextrose bolus, 125 mL, PRN   Or  dextrose bolus, 250 mL, PRN  glucagon (rDNA), 1 mg, PRN  dextrose, , Continuous PRN      Labs      Recent Results (from the past 24 hour(s))   POCT Glucose    Collection Time: 09/19/22 11:27 AM   Result Value Ref Range    POC Glucose 200 (H) 70 - 99 MG/DL   POCT Glucose    Collection Time: 09/19/22  5:03 PM   Result Value Ref Range    POC Glucose 227 (H) 70 - 99 MG/DL   POCT Glucose    Collection Time: 09/19/22  8:19 PM   Result Value Ref Range    POC Glucose 267 (H) 70 - 99 MG/DL   CBC    Collection Time: 09/20/22  6:00 AM   Result Value Ref Range    WBC 5.7 4.0 - 10.5 K/CU MM    RBC 3.98 (L) 4.2 - 5.4 M/CU MM    Hemoglobin 12.7 12.5 - 16.0 GM/DL    Hematocrit 39.9 37 - 47 %    .3 (H) 78 - 100 FL    MCH 31.9 (H) 27 - 31 PG    MCHC 31.8 (L) 32.0 - 36.0 %    RDW 14.9 11.7 - 14.9 %    Platelets 889 (L) 143 - 440 K/CU MM    MPV 9.6 7.5 - 11.1 FL   Basic Metabolic Panel w/ Reflex to MG    Collection Time: 09/20/22  6:00 AM   Result Value Ref Range    Sodium 140 135 - 145 MMOL/L    Potassium 3.7 3.5 - 5.1 MMOL/L    Chloride 104 99 - 110 mMol/L    CO2 29 21 - 32 MMOL/L    Anion Gap 7 4 - 16    BUN 16 6 - 23 MG/DL    Creatinine 0.8 0.6 - 1.1 MG/DL    Glucose 211 (H) 70 - 99 MG/DL    Calcium 8.6 8.3 - 10.6 MG/DL    GFR Non-African American >60 >60 mL/min/1.73m2    GFR African American >60 >60 mL/min/1.73m2   Hepatic Function Panel    Collection Time: 09/20/22  6:00 AM   Result Value Ref Range    Albumin 2.5 (L) 3.4 - 5.0 GM/DL    Total Bilirubin 0.6 0.0 - 1.0 MG/DL    Bilirubin, Direct 0.3 0.0 - 0.3 MG/DL    Bilirubin, Indirect 0.3 0 - 0.7 MG/DL    Alkaline Phosphatase 133 (H) 40 - 129 IU/L    AST 53 (H) 15 - 37 IU/L    ALT 36 10 - 40 U/L    Total Protein 5.9 (L) 6.4 - 8.2 GM/DL   POCT Glucose    Collection Time: 09/20/22  8:17 AM   Result Value Ref Range    POC Glucose 159 (H) 70 - 99 MG/DL        Imaging/Diagnostics Last 24 Hours   CT ABDOMEN PELVIS WO CONTRAST Additional Contrast? None    Result Date: 9/18/2022  EXAMINATION: CT OF THE ABDOMEN AND PELVIS WITHOUT CONTRAST; CT OF THE CHEST WITHOUT CONTRAST 9/18/2022 1:19 am; 9/18/2022 1:20 am TECHNIQUE: CT of the abdomen and pelvis was performed without the administration of intravenous contrast. Multiplanar reformatted images are provided for review. Automated exposure control, iterative reconstruction, and/or weight based adjustment of the mA/kV was utilized to reduce the radiation dose to as low as reasonably achievable.; CT of the chest was performed without the administration of intravenous contrast. Multiplanar reformatted images are provided for review. Automated exposure control, iterative reconstruction, and/or weight based adjustment of the mA/kV was utilized to reduce the radiation dose to as low as reasonably achievable.  COMPARISON: None HISTORY: ORDERING SYSTEM PROVIDED HISTORY: diarrhea, fever, concern for intraabdomianl process concerning for infection TECHNOLOGIST PROVIDED HISTORY: Reason for exam:->diarrhea, fever, concern for intraabdomianl process concerning for infection Additional Contrast?->None Decision Support Exception - unselect if not a suspected or confirmed emergency medical condition->Emergency Medical Condition (MA) Reason for Exam: diarrhea, fever, concern for intraabdomianl process concerning for infection, pt unable to hold arms above head, pt had difficulty following breathing instructions Additional signs and symptoms: diarrhea, fever, concern for intraabdomianl process concerning for infection, pt unable to hold arms above head, pt had difficulty following breathing instructions; ORDERING SYSTEM PROVIDED HISTORY: fever, tachycardia, concern for infectious process TECHNOLOGIST PROVIDED HISTORY: Reason for exam:->fever, tachycardia, concern for infectious process Decision Support Exception - unselect if not a suspected or confirmed emergency medical condition->Emergency Medical Condition (MA) Reason for Exam: fever, tachycardia, concern for infectious process, pt unable to hold arms above head, pt had difficulty following breathing instructions Additional signs and symptoms: fever, tachycardia, concern for infectious process, pt unable to hold arms above head, pt had difficulty following breathing instructions FINDINGS: Chest: Mediastinum: No thoracic aortic aneurysm is identified. No cardiomegaly. No pulmonary arterial enlargement. No focal esophageal thickening is identified. No bulky mediastinal lymphadenopathy. No significant pericardial effusion. Mitral annular calcifications are identified. Lungs/pleura: No pleural effusion. No pneumothorax. Subsegmental atelectatic changes. Respiratory motion artifact. Central airways are patent. Soft Tissues/Bones: No axillary or supraclavicular lymphadenopathy is identified. Atherosclerosis is noted.   Degenerative changes are seen within the shoulders and spine. No acute osseous fracture is identified. Multilevel degenerative disc disease. Posterior vertebral body alignment appears intact. No sternal fracture is seen. Abdomen/Pelvis: Organs: No focal hypodense mass identified in the liver or spleen. No adrenal mass. No pancreatic mass or peripancreatic inflammatory process on this noncontrast study. Gallbladder is not seen and may be surgically absent. Left-sided nonobstructive urinary tract calculi. No ureteral calculi are identified. Bilateral perinephric stranding is seen. GI/Bowel: No significant focal colonic wall thickening is identified. There is mild stranding seen adjacent to regions of the colon, including the sigmoid colon. This appear similar to the previous examination however. Pelvis: Bladder is unremarkable. No pelvic mass. Peritoneum/Retroperitoneum: No aortic aneurysm. Atherosclerosis. No retroperitoneal or mesenteric lymphadenopathy is identified. Bones/Soft Tissues: No acute osseous fracture. Pelvis appears intact. Degenerative changes noted within the spine and sacroiliac joints. Mild soft tissue edema seen overlying the right hip. Varicosities noted in the upper thighs bilaterally. 1. Mild patchy subsegmental airspace disease seen in the lungs bilaterally which may represent atelectasis or pneumonia. No reactive effusion. 2. Colonic diverticulosis, with minimal stranding seen adjacent to the sigmoid colon, though not significantly changed from the previous examination. This may be incidental, though difficult to exclude acute sigmoid diverticulitis. 3. Nonobstructive left-sided renal calculi. 4. No other acute inflammatory process seen in the chest, abdomen or pelvis. Other incidental findings as above.      CT CHEST WO CONTRAST    Result Date: 9/18/2022  EXAMINATION: CT OF THE ABDOMEN AND PELVIS WITHOUT CONTRAST; CT OF THE CHEST WITHOUT CONTRAST 9/18/2022 1:19 am; 9/18/2022 1:20 am TECHNIQUE: CT of the abdomen and pelvis was performed without the administration of intravenous contrast. Multiplanar reformatted images are provided for review. Automated exposure control, iterative reconstruction, and/or weight based adjustment of the mA/kV was utilized to reduce the radiation dose to as low as reasonably achievable.; CT of the chest was performed without the administration of intravenous contrast. Multiplanar reformatted images are provided for review. Automated exposure control, iterative reconstruction, and/or weight based adjustment of the mA/kV was utilized to reduce the radiation dose to as low as reasonably achievable.  COMPARISON: None HISTORY: ORDERING SYSTEM PROVIDED HISTORY: diarrhea, fever, concern for intraabdomianl process concerning for infection TECHNOLOGIST PROVIDED HISTORY: Reason for exam:->diarrhea, fever, concern for intraabdomianl process concerning for infection Additional Contrast?->None Decision Support Exception - unselect if not a suspected or confirmed emergency medical condition->Emergency Medical Condition (MA) Reason for Exam: diarrhea, fever, concern for intraabdomianl process concerning for infection, pt unable to hold arms above head, pt had difficulty following breathing instructions Additional signs and symptoms: diarrhea, fever, concern for intraabdomianl process concerning for infection, pt unable to hold arms above head, pt had difficulty following breathing instructions; ORDERING SYSTEM PROVIDED HISTORY: fever, tachycardia, concern for infectious process TECHNOLOGIST PROVIDED HISTORY: Reason for exam:->fever, tachycardia, concern for infectious process Decision Support Exception - unselect if not a suspected or confirmed emergency medical condition->Emergency Medical Condition (MA) Reason for Exam: fever, tachycardia, concern for infectious process, pt unable to hold arms above head, pt had difficulty following breathing instructions Additional signs and symptoms: fever, tachycardia, concern for infectious process, pt unable to hold arms above head, pt had difficulty following breathing instructions FINDINGS: Chest: Mediastinum: No thoracic aortic aneurysm is identified. No cardiomegaly. No pulmonary arterial enlargement. No focal esophageal thickening is identified. No bulky mediastinal lymphadenopathy. No significant pericardial effusion. Mitral annular calcifications are identified. Lungs/pleura: No pleural effusion. No pneumothorax. Subsegmental atelectatic changes. Respiratory motion artifact. Central airways are patent. Soft Tissues/Bones: No axillary or supraclavicular lymphadenopathy is identified. Atherosclerosis is noted. Degenerative changes are seen within the shoulders and spine. No acute osseous fracture is identified. Multilevel degenerative disc disease. Posterior vertebral body alignment appears intact. No sternal fracture is seen. Abdomen/Pelvis: Organs: No focal hypodense mass identified in the liver or spleen. No adrenal mass. No pancreatic mass or peripancreatic inflammatory process on this noncontrast study. Gallbladder is not seen and may be surgically absent. Left-sided nonobstructive urinary tract calculi. No ureteral calculi are identified. Bilateral perinephric stranding is seen. GI/Bowel: No significant focal colonic wall thickening is identified. There is mild stranding seen adjacent to regions of the colon, including the sigmoid colon. This appear similar to the previous examination however. Pelvis: Bladder is unremarkable. No pelvic mass. Peritoneum/Retroperitoneum: No aortic aneurysm. Atherosclerosis. No retroperitoneal or mesenteric lymphadenopathy is identified. Bones/Soft Tissues: No acute osseous fracture. Pelvis appears intact. Degenerative changes noted within the spine and sacroiliac joints. Mild soft tissue edema seen overlying the right hip. Varicosities noted in the upper thighs bilaterally.      1. Mild patchy subsegmental airspace disease seen in the lungs bilaterally which may represent atelectasis or pneumonia. No reactive effusion. 2. Colonic diverticulosis, with minimal stranding seen adjacent to the sigmoid colon, though not significantly changed from the previous examination. This may be incidental, though difficult to exclude acute sigmoid diverticulitis. 3. Nonobstructive left-sided renal calculi. 4. No other acute inflammatory process seen in the chest, abdomen or pelvis. Other incidental findings as above. XR CHEST PORTABLE    Result Date: 9/17/2022  EXAMINATION: ONE XRAY VIEW OF THE CHEST 9/17/2022 11:11 pm COMPARISON: 01/17/2022. HISTORY: ORDERING SYSTEM PROVIDED HISTORY: fever, tachycardia, concern for infectious process TECHNOLOGIST PROVIDED HISTORY: Reason for exam:->fever, tachycardia, concern for infectious process Reason for Exam: fever, tachycardia, concern for infectious process Additional signs and symptoms: fever, tachycardia, concern for infectious process FINDINGS: A very elevated right hemidiaphragm is present and appears worse than the prior study. There is summation density of the heart and breast tissue over the left lower chest.  Portions of the left hemidiaphragm are ill-defined. Cardiac silhouette appears prominent and does have increased pulmonary vascular congestion. No pneumothorax is identified. Degenerative changes along the spine and shoulders appear stable. No lines or tubes in the chest.     Elevated right hemidiaphragm and has summation density from the large habitus. Does appear to have enlargement of the cardiac silhouette and pulmonary vasculature concerning for mild CHF or volume overload. Ill-defined portion of the left hemidiaphragm could relate to a small amount of pleural effusion with basilar atelectasis or infiltrate.        Electronically signed by BRYN Pulliam CNP on 9/20/2022 at 9:25 AM

## 2022-09-21 VITALS
HEART RATE: 88 BPM | HEIGHT: 63 IN | RESPIRATION RATE: 19 BRPM | WEIGHT: 239.2 LBS | BODY MASS INDEX: 42.38 KG/M2 | TEMPERATURE: 96.9 F | DIASTOLIC BLOOD PRESSURE: 55 MMHG | OXYGEN SATURATION: 97 % | SYSTOLIC BLOOD PRESSURE: 114 MMHG

## 2022-09-21 LAB
ANION GAP SERPL CALCULATED.3IONS-SCNC: 10 MMOL/L (ref 4–16)
BUN BLDV-MCNC: 17 MG/DL (ref 6–23)
CALCIUM SERPL-MCNC: 9 MG/DL (ref 8.3–10.6)
CHLORIDE BLD-SCNC: 102 MMOL/L (ref 99–110)
CO2: 26 MMOL/L (ref 21–32)
CREAT SERPL-MCNC: 0.6 MG/DL (ref 0.6–1.1)
GFR AFRICAN AMERICAN: >60 ML/MIN/1.73M2
GFR NON-AFRICAN AMERICAN: >60 ML/MIN/1.73M2
GLUCOSE BLD-MCNC: 150 MG/DL (ref 70–99)
GLUCOSE BLD-MCNC: 191 MG/DL (ref 70–99)
GLUCOSE BLD-MCNC: 210 MG/DL (ref 70–99)
HCT VFR BLD CALC: 40.7 % (ref 37–47)
HEMOGLOBIN: 12.9 GM/DL (ref 12.5–16)
LEGIONELLA URINARY AG: NEGATIVE
MCH RBC QN AUTO: 31.8 PG (ref 27–31)
MCHC RBC AUTO-ENTMCNC: 31.7 % (ref 32–36)
MCV RBC AUTO: 100.2 FL (ref 78–100)
PDW BLD-RTO: 14.7 % (ref 11.7–14.9)
PLATELET # BLD: 108 K/CU MM (ref 140–440)
PMV BLD AUTO: 10.8 FL (ref 7.5–11.1)
POTASSIUM SERPL-SCNC: 4.3 MMOL/L (ref 3.5–5.1)
RBC # BLD: 4.06 M/CU MM (ref 4.2–5.4)
SODIUM BLD-SCNC: 138 MMOL/L (ref 135–145)
STREP PNEUMONIAE ANTIGEN: NORMAL
WBC # BLD: 5.2 K/CU MM (ref 4–10.5)

## 2022-09-21 PROCEDURE — 2580000003 HC RX 258: Performed by: STUDENT IN AN ORGANIZED HEALTH CARE EDUCATION/TRAINING PROGRAM

## 2022-09-21 PROCEDURE — 6370000000 HC RX 637 (ALT 250 FOR IP): Performed by: STUDENT IN AN ORGANIZED HEALTH CARE EDUCATION/TRAINING PROGRAM

## 2022-09-21 PROCEDURE — 6360000002 HC RX W HCPCS: Performed by: STUDENT IN AN ORGANIZED HEALTH CARE EDUCATION/TRAINING PROGRAM

## 2022-09-21 PROCEDURE — 85027 COMPLETE CBC AUTOMATED: CPT

## 2022-09-21 PROCEDURE — 94761 N-INVAS EAR/PLS OXIMETRY MLT: CPT

## 2022-09-21 PROCEDURE — 94640 AIRWAY INHALATION TREATMENT: CPT

## 2022-09-21 PROCEDURE — 80048 BASIC METABOLIC PNL TOTAL CA: CPT

## 2022-09-21 PROCEDURE — 6370000000 HC RX 637 (ALT 250 FOR IP): Performed by: NURSE PRACTITIONER

## 2022-09-21 PROCEDURE — 82962 GLUCOSE BLOOD TEST: CPT

## 2022-09-21 RX ORDER — CEFDINIR 300 MG/1
300 CAPSULE ORAL EVERY 12 HOURS SCHEDULED
Qty: 14 CAPSULE | Refills: 0 | Status: SHIPPED | OUTPATIENT
Start: 2022-09-21 | End: 2022-09-28

## 2022-09-21 RX ORDER — CEFDINIR 300 MG/1
300 CAPSULE ORAL EVERY 12 HOURS SCHEDULED
Status: DISCONTINUED | OUTPATIENT
Start: 2022-09-21 | End: 2022-09-21 | Stop reason: HOSPADM

## 2022-09-21 RX ADMIN — METOPROLOL SUCCINATE 25 MG: 25 TABLET, EXTENDED RELEASE ORAL at 08:14

## 2022-09-21 RX ADMIN — SODIUM CHLORIDE, PRESERVATIVE FREE 10 ML: 5 INJECTION INTRAVENOUS at 08:18

## 2022-09-21 RX ADMIN — CEFDINIR 300 MG: 300 CAPSULE ORAL at 11:09

## 2022-09-21 RX ADMIN — TORSEMIDE 20 MG: 20 TABLET ORAL at 08:15

## 2022-09-21 RX ADMIN — ENOXAPARIN SODIUM 30 MG: 100 INJECTION SUBCUTANEOUS at 08:15

## 2022-09-21 RX ADMIN — LEVOTHYROXINE SODIUM 100 MCG: 0.1 TABLET ORAL at 04:00

## 2022-09-21 RX ADMIN — MOMETASONE FUROATE AND FORMOTEROL FUMARATE DIHYDRATE 2 PUFF: 200; 5 AEROSOL RESPIRATORY (INHALATION) at 07:24

## 2022-09-21 RX ADMIN — ACETAMINOPHEN 650 MG: 325 TABLET ORAL at 03:40

## 2022-09-21 RX ADMIN — TIOTROPIUM BROMIDE INHALATION SPRAY 2 PUFF: 3.12 SPRAY, METERED RESPIRATORY (INHALATION) at 07:23

## 2022-09-21 ASSESSMENT — PAIN SCALES - GENERAL
PAINLEVEL_OUTOF10: 0
PAINLEVEL_OUTOF10: 3
PAINLEVEL_OUTOF10: 0

## 2022-09-21 ASSESSMENT — PAIN DESCRIPTION - DESCRIPTORS: DESCRIPTORS: ACHING

## 2022-09-21 ASSESSMENT — PAIN DESCRIPTION - LOCATION: LOCATION: HEAD

## 2022-09-21 ASSESSMENT — PAIN DESCRIPTION - FREQUENCY: FREQUENCY: INTERMITTENT

## 2022-09-21 ASSESSMENT — PAIN DESCRIPTION - ORIENTATION: ORIENTATION: MID

## 2022-09-21 ASSESSMENT — PAIN DESCRIPTION - PAIN TYPE: TYPE: ACUTE PAIN

## 2022-09-21 ASSESSMENT — PAIN - FUNCTIONAL ASSESSMENT: PAIN_FUNCTIONAL_ASSESSMENT: ACTIVITIES ARE NOT PREVENTED

## 2022-09-21 ASSESSMENT — PAIN DESCRIPTION - ONSET: ONSET: ON-GOING

## 2022-09-21 NOTE — PROGRESS NOTES
Pt discharged to home at 1330. Discharge instructions explained to pt by [facility], peripheral IV removed. Work release/return note given to pt.
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
## 2022-09-21 NOTE — PLAN OF CARE
Problem: Pain  Goal: Verbalizes/displays adequate comfort level or baseline comfort level  9/21/2022 1141 by Wilbert Nascimento RN  Outcome: Completed  9/21/2022 1119 by Wilbert Nascimento RN  Outcome: Adequate for Discharge  9/21/2022 1119 by Wilbert Nascimento RN  Outcome: Adequate for Discharge  9/21/2022 0729 by Wilbert Nascimento RN  Outcome: Progressing  9/21/2022 0729 by Wilbert Nascimento RN  Outcome: Progressing     Problem: Discharge Planning  Goal: Discharge to home or other facility with appropriate resources  9/21/2022 1141 by Wilbert Nascimento RN  Outcome: Completed  9/21/2022 1119 by Wilbert Nascimento RN  Outcome: Adequate for Discharge  9/21/2022 1119 by Wilbert Nascimento RN  Outcome: Adequate for Discharge  9/21/2022 0729 by Wilbert Nascimento RN  Outcome: Progressing  9/21/2022 0729 by Wilbert Nascimento RN  Outcome: Progressing     Problem: Safety - Adult  Goal: Free from fall injury  9/21/2022 1141 by Wilbert Nascimento RN  Outcome: Completed  9/21/2022 1119 by Wilbert Nascimento RN  Outcome: Adequate for Discharge  9/21/2022 1119 by Wilbert Nascimento RN  Outcome: Adequate for Discharge  9/21/2022 0729 by Wilbert Nascimento RN  Outcome: Progressing  9/21/2022 0729 by Wilbert Nascimento RN  Outcome: Progressing  Flowsheets (Taken 9/21/2022 0722)  Free From Fall Injury: Instruct family/caregiver on patient safety     Problem: Skin/Tissue Integrity - Adult  Goal: Incisions, wounds, or drain sites healing without S/S of infection  9/21/2022 1141 by Wilbert Nascimento RN  Outcome: Completed  9/21/2022 1119 by Wilbert Nascimento RN  Outcome: Adequate for Discharge  9/21/2022 1119 by Wilbert Nascimento RN  Outcome: Adequate for Discharge  9/21/2022 0729 by Wilbert Nascimento RN  Outcome: Progressing  9/21/2022 0729 by Simmie Azam, RN  Outcome: Progressing  Flowsheets (Taken 9/21/2022 3774)  Incisions, Wounds, or Drain Sites Healing Without Sign and Symptoms of Infection: TWICE DAILY: Assess and document skin integrity  Goal: Oral mucous membranes remain intact  9/21/2022 1141 by Tyra Milner RN  Outcome: Completed  9/21/2022 1119 by Tyra Milner RN  Outcome: Adequate for Discharge  9/21/2022 1119 by Tyra Milner RN  Outcome: Adequate for Discharge  9/21/2022 0729 by Tyra Milner RN  Outcome: Progressing  9/21/2022 0729 by Tyra Milner RN  Outcome: Progressing  Flowsheets (Taken 9/21/2022 0723)  Oral Mucous Membranes Remain Intact: Implement preventative oral hygiene regimen     Problem: Infection - Adult  Goal: Absence of infection at discharge  9/21/2022 1141 by Tyra Milner RN  Outcome: Completed  9/21/2022 1119 by Tyra Milner RN  Outcome: Adequate for Discharge  9/21/2022 1119 by Tyra Milner RN  Outcome: Adequate for Discharge  9/21/2022 0729 by Tyra Milner RN  Outcome: Progressing  9/21/2022 0729 by Tyra Milner RN  Outcome: Progressing  Goal: Absence of infection during hospitalization  9/21/2022 1141 by Tyra Milner RN  Outcome: Completed  9/21/2022 1119 by Tyra Milner RN  Outcome: Adequate for Discharge  9/21/2022 1119 by Tyra Milner RN  Outcome: Adequate for Discharge  9/21/2022 0729 by Tyra Milner RN  Outcome: Progressing  9/21/2022 0729 by Tyra Milner RN  Outcome: Progressing  Goal: Absence of fever/infection during anticipated neutropenic period  9/21/2022 1141 by Tyra Milner RN  Outcome: Completed  9/21/2022 1119 by Tyra Milner RN  Outcome: Adequate for Discharge  9/21/2022 1119 by Tyra Milner RN  Outcome: Adequate for Discharge  9/21/2022 0729 by Tyra Milner RN  Outcome: Progressing  9/21/2022 0729 by Tyra Milner RN  Outcome: Progressing     Problem: Metabolic/Fluid and Electrolytes - Adult  Goal: Electrolytes maintained within normal limits  9/21/2022 1141 by Tyra Milner RN  Outcome: Completed  9/21/2022 1119 by Tyra Milner RN  Outcome: Adequate for Discharge  9/21/2022 1119 by Tyra Point, RN  Outcome: Adequate for Discharge  9/21/2022 0729 by Tyra Milner, RN  Outcome: Adequate for Discharge  9/21/2022 6097 by John Templeton RN  Outcome: Progressing  9/21/2022 0729 by John Templeton RN  Outcome: Progressing  Flowsheets (Taken 9/21/2022 9707)  Absence of Physical Injury: Implement safety measures based on patient assessment

## 2022-09-21 NOTE — PLAN OF CARE
Problem: Pain  Goal: Verbalizes/displays adequate comfort level or baseline comfort level  9/21/2022 0729 by Leroy Doherty RN  Outcome: Progressing  9/21/2022 0729 by Leroy Doherty RN  Outcome: Progressing  9/20/2022 2024 by Jesus Jaeger RN  Outcome: Progressing     Problem: Discharge Planning  Goal: Discharge to home or other facility with appropriate resources  9/21/2022 0729 by Leroy Doherty RN  Outcome: Progressing  9/21/2022 0729 by Leroy Doherty RN  Outcome: Progressing  9/20/2022 2024 by Jesus Jaeger RN  Outcome: Progressing     Problem: Safety - Adult  Goal: Free from fall injury  9/21/2022 0729 by Leroy Doherty RN  Outcome: Progressing  9/21/2022 0729 by Leroy Doherty RN  Outcome: Progressing  Flowsheets (Taken 9/21/2022 0722)  Free From Fall Injury: Instruct family/caregiver on patient safety  9/20/2022 2024 by Jesus Jaeger RN  Outcome: Progressing     Problem: Skin/Tissue Integrity - Adult  Goal: Incisions, wounds, or drain sites healing without S/S of infection  9/21/2022 0729 by Leroy Doherty RN  Outcome: Progressing  9/21/2022 0729 by Leroy Doherty RN  Outcome: Progressing  Flowsheets (Taken 9/21/2022 0723)  Incisions, Wounds, or Drain Sites Healing Without Sign and Symptoms of Infection: TWICE DAILY: Assess and document skin integrity  9/20/2022 2024 by Jesus Jaeger RN  Outcome: Progressing  Goal: Oral mucous membranes remain intact  9/21/2022 0729 by Leroy Doherty RN  Outcome: Progressing  9/21/2022 0729 by Leroy Doherty RN  Outcome: Progressing  Flowsheets (Taken 9/21/2022 0723)  Oral Mucous Membranes Remain Intact: Implement preventative oral hygiene regimen  9/20/2022 2024 by Jesus Jaeger RN  Outcome: Progressing     Problem: Infection - Adult  Goal: Absence of infection at discharge  9/21/2022 0729 by Leroy Doherty RN  Outcome: Progressing  9/21/2022 0729 by Leroy Doherty RN  Outcome: Progressing  9/20/2022 2024 by Mey Valadez RN  Outcome: Progressing  Goal: Absence of infection during hospitalization  9/21/2022 0729 by Karly Giraldo RN  Outcome: Progressing  9/21/2022 0729 by Karly Giraldo RN  Outcome: Progressing  9/20/2022 2024 by Mey Valadez RN  Outcome: Progressing  Goal: Absence of fever/infection during anticipated neutropenic period  9/21/2022 0729 by Karly Giraldo RN  Outcome: Progressing  9/21/2022 0729 by Karly Giraldo RN  Outcome: Progressing  9/20/2022 2024 by Mey Valadez RN  Outcome: Progressing     Problem: Metabolic/Fluid and Electrolytes - Adult  Goal: Electrolytes maintained within normal limits  9/21/2022 0729 by Karly Giraldo RN  Outcome: Progressing  9/21/2022 0729 by Karly Giraldo RN  Outcome: Progressing  9/20/2022 2024 by Mey Valadez RN  Outcome: Progressing  Goal: Hemodynamic stability and optimal renal function maintained  9/21/2022 0729 by Karly Giraldo RN  Outcome: Progressing  9/21/2022 0729 by Karly Giraldo RN  Outcome: Progressing  9/20/2022 2024 by Mey Valadez RN  Outcome: Progressing  Goal: Glucose maintained within prescribed range  9/21/2022 0729 by Karly Giraldo RN  Outcome: Progressing  9/21/2022 0729 by Karly Giraldo RN  Outcome: Progressing  9/20/2022 2024 by Mey Valadez RN  Outcome: Progressing     Problem: Genitourinary - Adult  Goal: Absence of urinary retention  9/21/2022 0729 by Karly Giraldo RN  Outcome: Progressing  9/21/2022 0729 by Karly Giraldo RN  Outcome: Progressing  9/20/2022 2024 by Mey Valadez RN  Outcome: Progressing     Problem: Chronic Conditions and Co-morbidities  Goal: Patient's chronic conditions and co-morbidity symptoms are monitored and maintained or improved  9/21/2022 0729 by Karly Giraldo RN  Outcome: Progressing  9/21/2022 0729 by Karly Giraldo RN  Outcome: Progressing  9/20/2022 2024 by Mey Valadez RN  Outcome: Progressing     Problem: ABCDS Injury Assessment  Goal: Absence of physical injury  9/21/2022 0729 by Alexandria Tavarez RN  Outcome: Progressing  9/21/2022 0729 by Alexandria Tavarez RN  Outcome: Progressing  Flowsheets (Taken 9/21/2022 0999)  Absence of Physical Injury: Implement safety measures based on patient assessment  9/20/2022 2024 by Tal Mcfarlane RN  Outcome: Progressing

## 2022-09-21 NOTE — PLAN OF CARE
Problem: Pain  Goal: Verbalizes/displays adequate comfort level or baseline comfort level  9/21/2022 1119 by Gregory Gallardo RN  Outcome: Adequate for Discharge  9/21/2022 0729 by Gregory Gallardo RN  Outcome: Progressing  9/21/2022 0729 by Gregory Gallardo RN  Outcome: Progressing     Problem: Discharge Planning  Goal: Discharge to home or other facility with appropriate resources  9/21/2022 1119 by Gregory Gallardo RN  Outcome: Adequate for Discharge  9/21/2022 0729 by Gregory Gallardo RN  Outcome: Progressing  9/21/2022 0729 by Gregory Gallardo RN  Outcome: Progressing     Problem: Safety - Adult  Goal: Free from fall injury  9/21/2022 1119 by Gregory Gallardo RN  Outcome: Adequate for Discharge  9/21/2022 0729 by Gregory Gallardo RN  Outcome: Progressing  9/21/2022 0729 by Gregory Gallardo RN  Outcome: Progressing  Flowsheets (Taken 9/21/2022 0722)  Free From Fall Injury: Instruct family/caregiver on patient safety     Problem: Skin/Tissue Integrity - Adult  Goal: Incisions, wounds, or drain sites healing without S/S of infection  9/21/2022 1119 by Gregory Gallardo RN  Outcome: Adequate for Discharge  9/21/2022 0729 by Gregory Gallardo RN  Outcome: Progressing  9/21/2022 0729 by Gregory Gallardo RN  Outcome: Progressing  Flowsheets (Taken 9/21/2022 0723)  Incisions, Wounds, or Drain Sites Healing Without Sign and Symptoms of Infection: TWICE DAILY: Assess and document skin integrity  Goal: Oral mucous membranes remain intact  9/21/2022 1119 by Gregory Gallardo RN  Outcome: Adequate for Discharge  9/21/2022 0729 by Gregory Gallardo RN  Outcome: Progressing  9/21/2022 0729 by Gregory Gallardo RN  Outcome: Progressing  Flowsheets (Taken 9/21/2022 0723)  Oral Mucous Membranes Remain Intact: Implement preventative oral hygiene regimen     Problem: Infection - Adult  Goal: Absence of infection at discharge  9/21/2022 1119 by Gregory Gallardo RN  Outcome: Adequate for Discharge  9/21/2022 0729 by Gregory Gallardo RN  Outcome: Progressing  9/21/2022 0729 by Bobbi Barillas RN  Outcome: Progressing  Goal: Absence of infection during hospitalization  9/21/2022 1119 by Bobbi Barillas RN  Outcome: Adequate for Discharge  9/21/2022 0729 by Bobbi Barillas RN  Outcome: Progressing  9/21/2022 0729 by Bobbi Barillas RN  Outcome: Progressing  Goal: Absence of fever/infection during anticipated neutropenic period  9/21/2022 1119 by Bobbi Barillas RN  Outcome: Adequate for Discharge  9/21/2022 0729 by Bobbi Barillas RN  Outcome: Progressing  9/21/2022 0729 by Bobbi Barillas RN  Outcome: Progressing     Problem: Metabolic/Fluid and Electrolytes - Adult  Goal: Electrolytes maintained within normal limits  9/21/2022 1119 by Bobbi Barillas RN  Outcome: Adequate for Discharge  9/21/2022 0729 by Bobbi Barillas RN  Outcome: Progressing  9/21/2022 0729 by Bobbi Barillas RN  Outcome: Progressing  Goal: Hemodynamic stability and optimal renal function maintained  9/21/2022 1119 by Bobbi Barillas RN  Outcome: Adequate for Discharge  9/21/2022 0729 by Bobbi Barillas RN  Outcome: Progressing  9/21/2022 0729 by Bobbi Barillas RN  Outcome: Progressing  Goal: Glucose maintained within prescribed range  9/21/2022 1119 by Bobbi Barillas RN  Outcome: Adequate for Discharge  9/21/2022 0729 by Bobbi Barillas RN  Outcome: Progressing  9/21/2022 0729 by Bobbi Barillas RN  Outcome: Progressing     Problem: Genitourinary - Adult  Goal: Absence of urinary retention  9/21/2022 1119 by Bobbi Barillas RN  Outcome: Adequate for Discharge  9/21/2022 0729 by Bobbi Barillas RN  Outcome: Progressing  9/21/2022 0729 by Bobbi Barillas RN  Outcome: Progressing     Problem: Chronic Conditions and Co-morbidities  Goal: Patient's chronic conditions and co-morbidity symptoms are monitored and maintained or improved  9/21/2022 1119 by Bobbi Barillas RN  Outcome: Adequate for Discharge  9/21/2022 0729 by Hedwig Chessman, RN  Outcome: Progressing  9/21/2022 0729 by Billy Noble BRISEYDA Coleman  Outcome: Progressing     Problem: ABCDS Injury Assessment  Goal: Absence of physical injury  9/21/2022 1119 by Hang Preston RN  Outcome: Adequate for Discharge  9/21/2022 0729 by Hang Preston RN  Outcome: Progressing  9/21/2022 0729 by Hang Preston RN  Outcome: Progressing  Flowsheets (Taken 9/21/2022 1071)  Absence of Physical Injury: Implement safety measures based on patient assessment

## 2022-09-21 NOTE — PLAN OF CARE
Problem: Pain  Goal: Verbalizes/displays adequate comfort level or baseline comfort level  9/21/2022 1119 by Gregory Gallardo RN  Outcome: Adequate for Discharge  9/21/2022 1119 by Gregory Gallardo RN  Outcome: Adequate for Discharge  9/21/2022 0729 by Gregory Gallardo RN  Outcome: Progressing  9/21/2022 0729 by Gregory Gallardo RN  Outcome: Progressing     Problem: Discharge Planning  Goal: Discharge to home or other facility with appropriate resources  9/21/2022 1119 by Gregory Gallardo RN  Outcome: Adequate for Discharge  9/21/2022 1119 by Gregory Gallardo RN  Outcome: Adequate for Discharge  9/21/2022 0729 by Gregory Gallardo RN  Outcome: Progressing  9/21/2022 0729 by Gregory Gallardo RN  Outcome: Progressing     Problem: Safety - Adult  Goal: Free from fall injury  9/21/2022 1119 by Gregory Gallardo RN  Outcome: Adequate for Discharge  9/21/2022 1119 by Gregory Gallardo RN  Outcome: Adequate for Discharge  9/21/2022 0729 by Gregory Gallardo RN  Outcome: Progressing  9/21/2022 0729 by Gregory Gallardo RN  Outcome: Progressing  Flowsheets (Taken 9/21/2022 0722)  Free From Fall Injury: Instruct family/caregiver on patient safety     Problem: Skin/Tissue Integrity - Adult  Goal: Incisions, wounds, or drain sites healing without S/S of infection  9/21/2022 1119 by Gregory Gallardo RN  Outcome: Adequate for Discharge  9/21/2022 1119 by Gregory Gallardo RN  Outcome: Adequate for Discharge  9/21/2022 0729 by Gregory Gallardo RN  Outcome: Progressing  9/21/2022 0729 by Gregory Gallardo RN  Outcome: Progressing  Flowsheets (Taken 9/21/2022 0723)  Incisions, Wounds, or Drain Sites Healing Without Sign and Symptoms of Infection: TWICE DAILY: Assess and document skin integrity  Goal: Oral mucous membranes remain intact  9/21/2022 1119 by Gregory Gallardo RN  Outcome: Adequate for Discharge  9/21/2022 1119 by Gregory Gallardo RN  Outcome: Adequate for Discharge  9/21/2022 0729 by Gregory Gallardo RN  Outcome: Progressing  9/21/2022 0729 by El James RN  Outcome: Progressing  Flowsheets (Taken 9/21/2022 3605)  Oral Mucous Membranes Remain Intact: Implement preventative oral hygiene regimen     Problem: Infection - Adult  Goal: Absence of infection at discharge  9/21/2022 1119 by El James RN  Outcome: Adequate for Discharge  9/21/2022 1119 by El James RN  Outcome: Adequate for Discharge  9/21/2022 0729 by El James RN  Outcome: Progressing  9/21/2022 0729 by El James RN  Outcome: Progressing  Goal: Absence of infection during hospitalization  9/21/2022 1119 by El James RN  Outcome: Adequate for Discharge  9/21/2022 1119 by El James RN  Outcome: Adequate for Discharge  9/21/2022 0729 by El James RN  Outcome: Progressing  9/21/2022 0729 by El James RN  Outcome: Progressing  Goal: Absence of fever/infection during anticipated neutropenic period  9/21/2022 1119 by El James RN  Outcome: Adequate for Discharge  9/21/2022 1119 by El James RN  Outcome: Adequate for Discharge  9/21/2022 0729 by El James RN  Outcome: Progressing  9/21/2022 0729 by El James RN  Outcome: Progressing     Problem: Metabolic/Fluid and Electrolytes - Adult  Goal: Electrolytes maintained within normal limits  9/21/2022 1119 by El James RN  Outcome: Adequate for Discharge  9/21/2022 1119 by El James RN  Outcome: Adequate for Discharge  9/21/2022 0729 by El James RN  Outcome: Progressing  9/21/2022 0729 by El James RN  Outcome: Progressing  Goal: Hemodynamic stability and optimal renal function maintained  9/21/2022 1119 by El James RN  Outcome: Adequate for Discharge  9/21/2022 1119 by El James RN  Outcome: Adequate for Discharge  9/21/2022 0729 by El James RN  Outcome: Progressing  9/21/2022 0729 by El James RN  Outcome: Progressing  Goal: Glucose maintained within prescribed range  9/21/2022 1119 by El James RN  Outcome: Adequate for Discharge  9/21/2022 1119 by Alexandria Tavarez RN  Outcome: Adequate for Discharge  9/21/2022 0729 by Alexandria Tavarez RN  Outcome: Progressing  9/21/2022 0729 by Alexandria Tavarez RN  Outcome: Progressing     Problem: Genitourinary - Adult  Goal: Absence of urinary retention  9/21/2022 1119 by Alexandria Tavarez RN  Outcome: Adequate for Discharge  9/21/2022 1119 by Alexandria Tavarez RN  Outcome: Adequate for Discharge  9/21/2022 0729 by Alexandria Tavarez RN  Outcome: Progressing  9/21/2022 0729 by Alexandria Tavarez RN  Outcome: Progressing     Problem: Chronic Conditions and Co-morbidities  Goal: Patient's chronic conditions and co-morbidity symptoms are monitored and maintained or improved  9/21/2022 1119 by Alexandria Tavarez RN  Outcome: Adequate for Discharge  9/21/2022 1119 by Alexandria Tavarez RN  Outcome: Adequate for Discharge  9/21/2022 0729 by Alexandria Tavarez RN  Outcome: Progressing  9/21/2022 0729 by Alexandria Tavarez RN  Outcome: Progressing     Problem: ABCDS Injury Assessment  Goal: Absence of physical injury  9/21/2022 1119 by Alexandria Tavarez RN  Outcome: Adequate for Discharge  9/21/2022 1119 by Alexandria Tavarez RN  Outcome: Adequate for Discharge  9/21/2022 0729 by Alexandria Tavarez RN  Outcome: Progressing  9/21/2022 0729 by Alexandria Tavarez RN  Outcome: Progressing  Flowsheets (Taken 9/21/2022 9618)  Absence of Physical Injury: Implement safety measures based on patient assessment

## 2022-09-23 LAB
CULTURE: NORMAL
CULTURE: NORMAL
Lab: NORMAL
Lab: NORMAL
SPECIMEN: NORMAL
SPECIMEN: NORMAL

## 2023-01-01 ENCOUNTER — APPOINTMENT (OUTPATIENT)
Dept: CT IMAGING | Age: 73
DRG: 377 | End: 2023-01-01
Attending: INTERNAL MEDICINE
Payer: COMMERCIAL

## 2023-01-01 ENCOUNTER — HOSPITAL ENCOUNTER (INPATIENT)
Age: 73
LOS: 1 days | DRG: 951 | End: 2023-07-14
Attending: FAMILY MEDICINE | Admitting: FAMILY MEDICINE
Payer: COMMERCIAL

## 2023-01-01 ENCOUNTER — APPOINTMENT (OUTPATIENT)
Dept: ULTRASOUND IMAGING | Age: 73
DRG: 377 | End: 2023-01-01
Attending: INTERNAL MEDICINE
Payer: COMMERCIAL

## 2023-01-01 ENCOUNTER — APPOINTMENT (OUTPATIENT)
Dept: GENERAL RADIOLOGY | Age: 73
DRG: 377 | End: 2023-01-01
Attending: INTERNAL MEDICINE
Payer: COMMERCIAL

## 2023-01-01 ENCOUNTER — HOSPITAL ENCOUNTER (INPATIENT)
Age: 73
LOS: 12 days | Discharge: HOSPICE | DRG: 377 | End: 2023-07-13
Attending: INTERNAL MEDICINE | Admitting: INTERNAL MEDICINE
Payer: COMMERCIAL

## 2023-01-01 ENCOUNTER — APPOINTMENT (OUTPATIENT)
Dept: INTERVENTIONAL RADIOLOGY/VASCULAR | Age: 73
DRG: 377 | End: 2023-01-01
Attending: INTERNAL MEDICINE
Payer: COMMERCIAL

## 2023-01-01 VITALS
TEMPERATURE: 97.5 F | RESPIRATION RATE: 18 BRPM | OXYGEN SATURATION: 88 % | DIASTOLIC BLOOD PRESSURE: 34 MMHG | BODY MASS INDEX: 42.46 KG/M2 | WEIGHT: 239.7 LBS | HEART RATE: 60 BPM | SYSTOLIC BLOOD PRESSURE: 87 MMHG

## 2023-01-01 VITALS
HEART RATE: 70 BPM | OXYGEN SATURATION: 92 % | RESPIRATION RATE: 17 BRPM | TEMPERATURE: 97.1 F | HEIGHT: 63 IN | BODY MASS INDEX: 48.28 KG/M2 | SYSTOLIC BLOOD PRESSURE: 91 MMHG | WEIGHT: 272.5 LBS | DIASTOLIC BLOOD PRESSURE: 42 MMHG

## 2023-01-01 DIAGNOSIS — K92.2 GASTROINTESTINAL HEMORRHAGE, UNSPECIFIED GASTROINTESTINAL HEMORRHAGE TYPE: ICD-10-CM

## 2023-01-01 DIAGNOSIS — D64.9 ANEMIA, UNSPECIFIED TYPE: ICD-10-CM

## 2023-01-01 LAB
ABO/RH: NORMAL
ALBUMIN SERPL ELPH-MCNC: 2.4 GM/DL (ref 3.2–5.6)
ALBUMIN SERPL-MCNC: 2.5 GM/DL (ref 3.4–5)
ALBUMIN SERPL-MCNC: 2.7 GM/DL (ref 3.4–5)
ALBUMIN SERPL-MCNC: 2.8 GM/DL (ref 3.4–5)
ALBUMIN SERPL-MCNC: 2.8 GM/DL (ref 3.4–5)
ALBUMIN SERPL-MCNC: 3.2 GM/DL (ref 3.4–5)
ALBUMIN SERPL-MCNC: 3.3 GM/DL (ref 3.4–5)
ALBUMIN SERPL-MCNC: 3.4 GM/DL (ref 3.4–5)
ALBUMIN SERPL-MCNC: 3.6 GM/DL (ref 3.4–5)
ALBUMIN SERPL-MCNC: 3.8 GM/DL (ref 3.4–5)
ALP BLD-CCNC: 125 IU/L (ref 40–128)
ALP BLD-CCNC: 130 IU/L (ref 40–129)
ALP BLD-CCNC: 134 IU/L (ref 40–128)
ALP BLD-CCNC: 134 IU/L (ref 40–128)
ALP BLD-CCNC: 136 IU/L (ref 40–128)
ALP BLD-CCNC: 143 IU/L (ref 40–128)
ALP BLD-CCNC: 143 IU/L (ref 40–128)
ALP BLD-CCNC: 158 IU/L (ref 40–128)
ALP BLD-CCNC: 159 IU/L (ref 40–128)
ALPHA-1-GLOBULIN: 0.3 GM/DL (ref 0.1–0.4)
ALPHA-2-GLOBULIN: 0.6 GM/DL (ref 0.4–1.2)
ALT SERPL-CCNC: 25 U/L (ref 10–40)
ALT SERPL-CCNC: 26 U/L (ref 10–40)
ALT SERPL-CCNC: 28 U/L (ref 10–40)
ALT SERPL-CCNC: 29 U/L (ref 10–40)
ALT SERPL-CCNC: 30 U/L (ref 10–40)
ALT SERPL-CCNC: 31 U/L (ref 10–40)
ALT SERPL-CCNC: 32 U/L (ref 10–40)
ALT SERPL-CCNC: 33 U/L (ref 10–40)
ALT SERPL-CCNC: 34 U/L (ref 10–40)
AMMONIA: 120 UMOL/L (ref 11–51)
AMMONIA: 124 UMOL/L (ref 11–51)
AMMONIA: 142 UMOL/L (ref 11–51)
AMMONIA: 157 UMOL/L (ref 11–51)
AMMONIA: 48 UMOL/L (ref 11–51)
AMMONIA: 52 UMOL/L (ref 11–51)
AMYLASE: 25 U/L (ref 25–115)
ANION GAP SERPL CALCULATED.3IONS-SCNC: 10 MMOL/L (ref 4–16)
ANION GAP SERPL CALCULATED.3IONS-SCNC: 11 MMOL/L (ref 4–16)
ANION GAP SERPL CALCULATED.3IONS-SCNC: 11 MMOL/L (ref 4–16)
ANION GAP SERPL CALCULATED.3IONS-SCNC: 12 MMOL/L (ref 4–16)
ANION GAP SERPL CALCULATED.3IONS-SCNC: 12 MMOL/L (ref 4–16)
ANION GAP SERPL CALCULATED.3IONS-SCNC: 13 MMOL/L (ref 4–16)
ANION GAP SERPL CALCULATED.3IONS-SCNC: 13 MMOL/L (ref 4–16)
ANION GAP SERPL CALCULATED.3IONS-SCNC: 7 MMOL/L (ref 4–16)
ANION GAP SERPL CALCULATED.3IONS-SCNC: 8 MMOL/L (ref 4–16)
ANION GAP SERPL CALCULATED.3IONS-SCNC: 9 MMOL/L (ref 4–16)
ANION GAP SERPL CALCULATED.3IONS-SCNC: 9 MMOL/L (ref 4–16)
ANTIBODY SCREEN: NEGATIVE
ANTINUCLEAR ANTIBODY, HEP-2, IGG: NORMAL
APTT: 28.8 SECONDS (ref 25.1–37.1)
APTT: 29.2 SECONDS (ref 25.1–37.1)
APTT: 47.9 SECONDS (ref 25.1–37.1)
APTT: NORMAL SECONDS (ref 25.1–37.1)
AST SERPL-CCNC: 107 IU/L (ref 15–37)
AST SERPL-CCNC: 117 IU/L (ref 15–37)
AST SERPL-CCNC: 47 IU/L (ref 15–37)
AST SERPL-CCNC: 52 IU/L (ref 15–37)
AST SERPL-CCNC: 59 IU/L (ref 15–37)
AST SERPL-CCNC: 62 IU/L (ref 15–37)
AST SERPL-CCNC: 65 IU/L (ref 15–37)
AST SERPL-CCNC: 73 IU/L (ref 15–37)
AST SERPL-CCNC: 92 IU/L (ref 15–37)
BACTERIA: ABNORMAL /HPF
BASE EXCESS MIXED: 2.8 (ref 0–2.3)
BASE EXCESS MIXED: 3.4 (ref 0–2.3)
BASE EXCESS MIXED: 4.9 (ref 0–2.3)
BASE EXCESS: 1 (ref 0–2.4)
BASE EXCESS: 2 (ref 0–2.4)
BASE EXCESS: 3 (ref 0–2.4)
BASOPHILS ABSOLUTE: 0.1 K/CU MM
BASOPHILS RELATIVE PERCENT: 0.4 % (ref 0–1)
BASOPHILS RELATIVE PERCENT: 0.5 % (ref 0–1)
BASOPHILS RELATIVE PERCENT: 0.9 % (ref 0–1)
BASOPHILS RELATIVE PERCENT: 0.9 % (ref 0–1)
BASOPHILS RELATIVE PERCENT: 1 % (ref 0–1)
BASOPHILS RELATIVE PERCENT: 1.2 % (ref 0–1)
BASOPHILS RELATIVE PERCENT: 1.2 % (ref 0–1)
BASOPHILS RELATIVE PERCENT: 1.6 % (ref 0–1)
BETA GLOBULIN: 1 GM/DL (ref 0.5–1.3)
BILIRUB SERPL-MCNC: 0.7 MG/DL (ref 0–1)
BILIRUB SERPL-MCNC: 0.8 MG/DL (ref 0–1)
BILIRUB SERPL-MCNC: 0.9 MG/DL (ref 0–1)
BILIRUB SERPL-MCNC: 0.9 MG/DL (ref 0–1)
BILIRUB SERPL-MCNC: 1.1 MG/DL (ref 0–1)
BILIRUBIN URINE: NEGATIVE MG/DL
BLOOD, URINE: NEGATIVE
BUN SERPL-MCNC: 49 MG/DL (ref 6–23)
BUN SERPL-MCNC: 50 MG/DL (ref 6–23)
BUN SERPL-MCNC: 53 MG/DL (ref 6–23)
BUN SERPL-MCNC: 54 MG/DL (ref 6–23)
BUN SERPL-MCNC: 55 MG/DL (ref 6–23)
BUN SERPL-MCNC: 56 MG/DL (ref 6–23)
BUN SERPL-MCNC: 57 MG/DL (ref 6–23)
BUN SERPL-MCNC: 64 MG/DL (ref 6–23)
BUN SERPL-MCNC: 65 MG/DL (ref 6–23)
BUN SERPL-MCNC: 66 MG/DL (ref 6–23)
BUN SERPL-MCNC: 66 MG/DL (ref 6–23)
BUN SERPL-MCNC: 69 MG/DL (ref 6–23)
BUN SERPL-MCNC: 71 MG/DL (ref 6–23)
CALCIUM SERPL-MCNC: 8.2 MG/DL (ref 8.3–10.6)
CALCIUM SERPL-MCNC: 8.3 MG/DL (ref 8.3–10.6)
CALCIUM SERPL-MCNC: 8.4 MG/DL (ref 8.3–10.6)
CALCIUM SERPL-MCNC: 8.5 MG/DL (ref 8.3–10.6)
CALCIUM SERPL-MCNC: 8.6 MG/DL (ref 8.3–10.6)
CALCIUM SERPL-MCNC: 8.7 MG/DL (ref 8.3–10.6)
CALCIUM SERPL-MCNC: 8.9 MG/DL (ref 8.3–10.6)
CARBON MONOXIDE, BLOOD: 2.3 % (ref 0–5)
CARBON MONOXIDE, BLOOD: 2.6 % (ref 0–5)
CARBON MONOXIDE, BLOOD: 2.6 % (ref 0–5)
CARBON MONOXIDE, BLOOD: 2.8 % (ref 0–5)
CARBON MONOXIDE, BLOOD: 2.9 % (ref 0–5)
CARBON MONOXIDE, BLOOD: 3 % (ref 0–5)
CARBON MONOXIDE, BLOOD: 3.3 % (ref 0–5)
CHLORIDE BLD-SCNC: 102 MMOL/L (ref 99–110)
CHLORIDE BLD-SCNC: 104 MMOL/L (ref 99–110)
CHLORIDE BLD-SCNC: 105 MMOL/L (ref 99–110)
CHLORIDE BLD-SCNC: 106 MMOL/L (ref 99–110)
CHLORIDE BLD-SCNC: 107 MMOL/L (ref 99–110)
CHLORIDE BLD-SCNC: 108 MMOL/L (ref 99–110)
CHLORIDE BLD-SCNC: 109 MMOL/L (ref 99–110)
CHLORIDE BLD-SCNC: 110 MMOL/L (ref 99–110)
CHLORIDE BLD-SCNC: 111 MMOL/L (ref 99–110)
CHLORIDE URINE RANDOM: 35 MMOL/L (ref 43–210)
CLARITY: CLEAR
CO2 CONTENT: 26 MMOL/L (ref 19–24)
CO2 CONTENT: 26.8 MMOL/L (ref 19–24)
CO2 CONTENT: 27.2 MMOL/L (ref 19–24)
CO2 CONTENT: 28 MMOL/L (ref 19–24)
CO2 CONTENT: 31.8 MMOL/L (ref 19–24)
CO2 CONTENT: 32.1 MMOL/L (ref 19–24)
CO2 CONTENT: 35 MMOL/L (ref 19–24)
CO2: 18 MMOL/L (ref 21–32)
CO2: 20 MMOL/L (ref 21–32)
CO2: 23 MMOL/L (ref 21–32)
CO2: 24 MMOL/L (ref 21–32)
CO2: 25 MMOL/L (ref 21–32)
CO2: 26 MMOL/L (ref 21–32)
CO2: 27 MMOL/L (ref 21–32)
CO2: 27 MMOL/L (ref 21–32)
CO2: 28 MMOL/L (ref 21–32)
CO2: 31 MMOL/L (ref 21–32)
COLOR: YELLOW
COMMENT: ABNORMAL
CREAT SERPL-MCNC: 1.1 MG/DL (ref 0.6–1.1)
CREAT SERPL-MCNC: 1.2 MG/DL (ref 0.6–1.1)
CREAT SERPL-MCNC: 1.2 MG/DL (ref 0.6–1.1)
CREAT SERPL-MCNC: 1.4 MG/DL (ref 0.6–1.1)
CREAT SERPL-MCNC: 1.4 MG/DL (ref 0.6–1.1)
CREAT SERPL-MCNC: 1.5 MG/DL (ref 0.6–1.1)
CREAT SERPL-MCNC: 1.9 MG/DL (ref 0.6–1.1)
CREAT SERPL-MCNC: 2.3 MG/DL (ref 0.6–1.1)
CREAT SERPL-MCNC: 3.1 MG/DL (ref 0.6–1.1)
CREAT SERPL-MCNC: 3.4 MG/DL (ref 0.6–1.1)
CREAT UR-MCNC: 98.4 MG/DL (ref 28–217)
CREATININE URINE: 98.1 MG/DL (ref 28–217)
CRP SERPL HS-MCNC: 98.2 MG/L
CULTURE: NORMAL
CULTURE: NORMAL
DIFFERENTIAL TYPE: ABNORMAL
EKG ATRIAL RATE: 86 BPM
EKG ATRIAL RATE: 94 BPM
EKG DIAGNOSIS: NORMAL
EKG P AXIS: -4 DEGREES
EKG P AXIS: 29 DEGREES
EKG P-R INTERVAL: 116 MS
EKG P-R INTERVAL: 126 MS
EKG Q-T INTERVAL: 334 MS
EKG Q-T INTERVAL: 384 MS
EKG Q-T INTERVAL: 406 MS
EKG QRS DURATION: 100 MS
EKG QRS DURATION: 88 MS
EKG QRS DURATION: 96 MS
EKG QTC CALCULATION (BAZETT): 480 MS
EKG QTC CALCULATION (BAZETT): 485 MS
EKG QTC CALCULATION (BAZETT): 498 MS
EKG R AXIS: -12 DEGREES
EKG R AXIS: -2 DEGREES
EKG R AXIS: -6 DEGREES
EKG T AXIS: 146 DEGREES
EKG T AXIS: 19 DEGREES
EKG T AXIS: 78 DEGREES
EKG VENTRICULAR RATE: 134 BPM
EKG VENTRICULAR RATE: 86 BPM
EKG VENTRICULAR RATE: 94 BPM
EOSINOPHILS ABSOLUTE: 0.2 K/CU MM
EOSINOPHILS ABSOLUTE: 0.3 K/CU MM
EOSINOPHILS ABSOLUTE: 0.4 K/CU MM
EOSINOPHILS ABSOLUTE: 0.5 K/CU MM
EOSINOPHILS ABSOLUTE: 0.5 K/CU MM
EOSINOPHILS RELATIVE PERCENT: 1.3 % (ref 0–3)
EOSINOPHILS RELATIVE PERCENT: 1.6 % (ref 0–3)
EOSINOPHILS RELATIVE PERCENT: 2.3 % (ref 0–3)
EOSINOPHILS RELATIVE PERCENT: 2.4 % (ref 0–3)
EOSINOPHILS RELATIVE PERCENT: 2.8 % (ref 0–3)
EOSINOPHILS RELATIVE PERCENT: 3.4 % (ref 0–3)
EOSINOPHILS RELATIVE PERCENT: 4 % (ref 0–3)
EOSINOPHILS RELATIVE PERCENT: 5.3 % (ref 0–3)
EOSINOPHILS RELATIVE PERCENT: 5.6 % (ref 0–3)
EOSINOPHILS RELATIVE PERCENT: 6.5 % (ref 0–3)
FERRITIN: 58 NG/ML (ref 15–150)
GAMMA GLOBULIN: 1.4 GM/DL (ref 0.5–1.6)
GFR SERPL CREATININE-BSD FRML MDRD: 14 ML/MIN/1.73M2
GFR SERPL CREATININE-BSD FRML MDRD: 15 ML/MIN/1.73M2
GFR SERPL CREATININE-BSD FRML MDRD: 22 ML/MIN/1.73M2
GFR SERPL CREATININE-BSD FRML MDRD: 27 ML/MIN/1.73M2
GFR SERPL CREATININE-BSD FRML MDRD: 36 ML/MIN/1.73M2
GFR SERPL CREATININE-BSD FRML MDRD: 39 ML/MIN/1.73M2
GFR SERPL CREATININE-BSD FRML MDRD: 39 ML/MIN/1.73M2
GFR SERPL CREATININE-BSD FRML MDRD: 48 ML/MIN/1.73M2
GFR SERPL CREATININE-BSD FRML MDRD: 48 ML/MIN/1.73M2
GFR SERPL CREATININE-BSD FRML MDRD: 53 ML/MIN/1.73M2
GLUCOSE BLD-MCNC: 104 MG/DL (ref 70–99)
GLUCOSE BLD-MCNC: 115 MG/DL (ref 70–99)
GLUCOSE BLD-MCNC: 116 MG/DL (ref 70–99)
GLUCOSE BLD-MCNC: 117 MG/DL (ref 70–99)
GLUCOSE BLD-MCNC: 117 MG/DL (ref 70–99)
GLUCOSE BLD-MCNC: 120 MG/DL (ref 70–99)
GLUCOSE BLD-MCNC: 126 MG/DL (ref 70–99)
GLUCOSE BLD-MCNC: 126 MG/DL (ref 70–99)
GLUCOSE BLD-MCNC: 128 MG/DL (ref 70–99)
GLUCOSE BLD-MCNC: 130 MG/DL (ref 70–99)
GLUCOSE BLD-MCNC: 133 MG/DL (ref 70–99)
GLUCOSE BLD-MCNC: 136 MG/DL (ref 70–99)
GLUCOSE BLD-MCNC: 137 MG/DL (ref 70–99)
GLUCOSE BLD-MCNC: 137 MG/DL (ref 70–99)
GLUCOSE BLD-MCNC: 138 MG/DL (ref 70–99)
GLUCOSE BLD-MCNC: 139 MG/DL (ref 70–99)
GLUCOSE BLD-MCNC: 140 MG/DL (ref 70–99)
GLUCOSE BLD-MCNC: 142 MG/DL (ref 70–99)
GLUCOSE BLD-MCNC: 146 MG/DL (ref 70–99)
GLUCOSE BLD-MCNC: 151 MG/DL (ref 70–99)
GLUCOSE BLD-MCNC: 152 MG/DL (ref 70–99)
GLUCOSE BLD-MCNC: 154 MG/DL (ref 70–99)
GLUCOSE BLD-MCNC: 155 MG/DL (ref 70–99)
GLUCOSE BLD-MCNC: 157 MG/DL (ref 70–99)
GLUCOSE BLD-MCNC: 158 MG/DL (ref 70–99)
GLUCOSE BLD-MCNC: 160 MG/DL (ref 70–99)
GLUCOSE BLD-MCNC: 160 MG/DL (ref 70–99)
GLUCOSE BLD-MCNC: 161 MG/DL (ref 70–99)
GLUCOSE BLD-MCNC: 168 MG/DL (ref 70–99)
GLUCOSE BLD-MCNC: 175 MG/DL (ref 70–99)
GLUCOSE BLD-MCNC: 175 MG/DL (ref 70–99)
GLUCOSE BLD-MCNC: 177 MG/DL (ref 70–99)
GLUCOSE BLD-MCNC: 178 MG/DL (ref 70–99)
GLUCOSE BLD-MCNC: 178 MG/DL (ref 70–99)
GLUCOSE BLD-MCNC: 184 MG/DL (ref 70–99)
GLUCOSE BLD-MCNC: 185 MG/DL (ref 70–99)
GLUCOSE BLD-MCNC: 187 MG/DL (ref 70–99)
GLUCOSE BLD-MCNC: 204 MG/DL (ref 70–99)
GLUCOSE BLD-MCNC: 207 MG/DL (ref 70–99)
GLUCOSE BLD-MCNC: 227 MG/DL (ref 70–99)
GLUCOSE BLD-MCNC: 247 MG/DL (ref 70–99)
GLUCOSE BLD-MCNC: 259 MG/DL (ref 70–99)
GLUCOSE BLD-MCNC: 264 MG/DL (ref 70–99)
GLUCOSE BLD-MCNC: 296 MG/DL (ref 70–99)
GLUCOSE SERPL-MCNC: 105 MG/DL (ref 70–99)
GLUCOSE SERPL-MCNC: 122 MG/DL (ref 70–99)
GLUCOSE SERPL-MCNC: 124 MG/DL (ref 70–99)
GLUCOSE SERPL-MCNC: 125 MG/DL (ref 70–99)
GLUCOSE SERPL-MCNC: 128 MG/DL (ref 70–99)
GLUCOSE SERPL-MCNC: 128 MG/DL (ref 70–99)
GLUCOSE SERPL-MCNC: 135 MG/DL (ref 70–99)
GLUCOSE SERPL-MCNC: 144 MG/DL (ref 70–99)
GLUCOSE SERPL-MCNC: 151 MG/DL (ref 70–99)
GLUCOSE SERPL-MCNC: 164 MG/DL (ref 70–99)
GLUCOSE SERPL-MCNC: 176 MG/DL (ref 70–99)
GLUCOSE SERPL-MCNC: 179 MG/DL (ref 70–99)
GLUCOSE SERPL-MCNC: 187 MG/DL (ref 70–99)
GLUCOSE, URINE: NEGATIVE MG/DL
GRANULAR CASTS: 7 /LPF
HAV AB SER QL IA: NEGATIVE
HAV IGM SERPL QL IA: NON REACTIVE
HBV CORE AB SERPL QL IA: NEGATIVE
HBV CORE IGM SERPL QL IA: NON REACTIVE
HBV SURFACE AB SERPL IA-ACNC: <3.5 M[IU]/ML
HBV SURFACE AG SERPL QL IA: NON REACTIVE
HCO3 ARTERIAL: 24.4 MMOL/L (ref 18–23)
HCO3 ARTERIAL: 25.3 MMOL/L (ref 18–23)
HCO3 ARTERIAL: 25.6 MMOL/L (ref 18–23)
HCO3 ARTERIAL: 26.1 MMOL/L (ref 18–23)
HCO3 ARTERIAL: 30.1 MMOL/L (ref 18–23)
HCO3 ARTERIAL: 30.4 MMOL/L (ref 18–23)
HCO3 ARTERIAL: 33 MMOL/L (ref 18–23)
HCO3 VENOUS: 25.3 MMOL/L (ref 19–25)
HCO3 VENOUS: 26 MMOL/L (ref 19–25)
HCT VFR BLD CALC: 28.5 % (ref 37–47)
HCT VFR BLD CALC: 29.8 % (ref 37–47)
HCT VFR BLD CALC: 29.9 % (ref 37–47)
HCT VFR BLD CALC: 30 % (ref 37–47)
HCT VFR BLD CALC: 30.1 % (ref 37–47)
HCT VFR BLD CALC: 30.6 % (ref 37–47)
HCT VFR BLD CALC: 31 % (ref 37–47)
HCT VFR BLD CALC: 31.3 % (ref 37–47)
HCT VFR BLD CALC: 32.2 % (ref 37–47)
HCT VFR BLD CALC: 32.5 % (ref 37–47)
HCT VFR BLD CALC: 33.2 % (ref 37–47)
HCT VFR BLD CALC: 33.3 % (ref 37–47)
HCT VFR BLD CALC: 34.6 % (ref 37–47)
HCT VFR BLD CALC: 34.8 % (ref 37–47)
HCV AB SERPL QL IA: NON REACTIVE
HEMOGLOBIN: 10.1 GM/DL (ref 12.5–16)
HEMOGLOBIN: 10.1 GM/DL (ref 12.5–16)
HEMOGLOBIN: 8.4 GM/DL (ref 12.5–16)
HEMOGLOBIN: 8.5 GM/DL (ref 12.5–16)
HEMOGLOBIN: 8.7 GM/DL (ref 12.5–16)
HEMOGLOBIN: 9 GM/DL (ref 12.5–16)
HEMOGLOBIN: 9 GM/DL (ref 12.5–16)
HEMOGLOBIN: 9.1 GM/DL (ref 12.5–16)
HEMOGLOBIN: 9.1 GM/DL (ref 12.5–16)
HEMOGLOBIN: 9.3 GM/DL (ref 12.5–16)
HEMOGLOBIN: 9.7 GM/DL (ref 12.5–16)
HEMOGLOBIN: 9.8 GM/DL (ref 12.5–16)
HYALINE CASTS: >20 /LPF
IMMATURE NEUTROPHIL %: 0.3 % (ref 0–0.43)
IMMATURE NEUTROPHIL %: 0.3 % (ref 0–0.43)
IMMATURE NEUTROPHIL %: 0.4 % (ref 0–0.43)
IMMATURE NEUTROPHIL %: 0.7 % (ref 0–0.43)
IMMATURE NEUTROPHIL %: 1 % (ref 0–0.43)
IMMATURE NEUTROPHIL %: 1.6 % (ref 0–0.43)
IMMATURE NEUTROPHIL %: 1.6 % (ref 0–0.43)
INR BLD: 1.2 INDEX
INR BLD: 1.2 INDEX
INR BLD: 3.2 INDEX
INR BLD: NORMAL INDEX
INTERPRETATION, OPI7M: NORMAL
IRON: 37 UG/DL (ref 37–145)
KETONES, URINE: NEGATIVE MG/DL
L PNEUMO AG UR QL IA: NEGATIVE
LACTATE: 1.4 MMOL/L (ref 0.5–1.9)
LEUKOCYTE ESTERASE, URINE: ABNORMAL
LIPASE: 52 IU/L (ref 13–60)
LIPASE: 66 IU/L (ref 13–60)
LV EF: 58 %
LVEF MODALITY: NORMAL
LYMPHOCYTES ABSOLUTE: 0.6 K/CU MM
LYMPHOCYTES ABSOLUTE: 0.7 K/CU MM
LYMPHOCYTES ABSOLUTE: 0.8 K/CU MM
LYMPHOCYTES ABSOLUTE: 0.8 K/CU MM
LYMPHOCYTES ABSOLUTE: 0.9 K/CU MM
LYMPHOCYTES ABSOLUTE: 1 K/CU MM
LYMPHOCYTES RELATIVE PERCENT: 12 % (ref 24–44)
LYMPHOCYTES RELATIVE PERCENT: 4.7 % (ref 24–44)
LYMPHOCYTES RELATIVE PERCENT: 4.7 % (ref 24–44)
LYMPHOCYTES RELATIVE PERCENT: 6.6 % (ref 24–44)
LYMPHOCYTES RELATIVE PERCENT: 7.6 % (ref 24–44)
LYMPHOCYTES RELATIVE PERCENT: 7.8 % (ref 24–44)
LYMPHOCYTES RELATIVE PERCENT: 8.5 % (ref 24–44)
LYMPHOCYTES RELATIVE PERCENT: 8.9 % (ref 24–44)
LYMPHOCYTES RELATIVE PERCENT: 9.1 % (ref 24–44)
LYMPHOCYTES RELATIVE PERCENT: 9.8 % (ref 24–44)
Lab: NORMAL
Lab: NORMAL
MAGNESIUM: 2.1 MG/DL (ref 1.8–2.4)
MAGNESIUM: 2.1 MG/DL (ref 1.8–2.4)
MAGNESIUM: 2.2 MG/DL (ref 1.8–2.4)
MAGNESIUM: 2.3 MG/DL (ref 1.8–2.4)
MCH RBC QN AUTO: 30.4 PG (ref 27–31)
MCH RBC QN AUTO: 30.6 PG (ref 27–31)
MCH RBC QN AUTO: 30.7 PG (ref 27–31)
MCH RBC QN AUTO: 30.7 PG (ref 27–31)
MCH RBC QN AUTO: 30.9 PG (ref 27–31)
MCH RBC QN AUTO: 31 PG (ref 27–31)
MCH RBC QN AUTO: 31.1 PG (ref 27–31)
MCH RBC QN AUTO: 31.2 PG (ref 27–31)
MCH RBC QN AUTO: 31.3 PG (ref 27–31)
MCH RBC QN AUTO: 31.3 PG (ref 27–31)
MCHC RBC AUTO-ENTMCNC: 28 % (ref 32–36)
MCHC RBC AUTO-ENTMCNC: 28 % (ref 32–36)
MCHC RBC AUTO-ENTMCNC: 28.1 % (ref 32–36)
MCHC RBC AUTO-ENTMCNC: 28.9 % (ref 32–36)
MCHC RBC AUTO-ENTMCNC: 29 % (ref 32–36)
MCHC RBC AUTO-ENTMCNC: 29 % (ref 32–36)
MCHC RBC AUTO-ENTMCNC: 29.1 % (ref 32–36)
MCHC RBC AUTO-ENTMCNC: 29.2 % (ref 32–36)
MCHC RBC AUTO-ENTMCNC: 29.2 % (ref 32–36)
MCHC RBC AUTO-ENTMCNC: 29.4 % (ref 32–36)
MCHC RBC AUTO-ENTMCNC: 29.7 % (ref 32–36)
MCHC RBC AUTO-ENTMCNC: 29.8 % (ref 32–36)
MCV RBC AUTO: 103.6 FL (ref 78–100)
MCV RBC AUTO: 104.7 FL (ref 78–100)
MCV RBC AUTO: 105.2 FL (ref 78–100)
MCV RBC AUTO: 105.7 FL (ref 78–100)
MCV RBC AUTO: 106 FL (ref 78–100)
MCV RBC AUTO: 106.8 FL (ref 78–100)
MCV RBC AUTO: 107.1 FL (ref 78–100)
MCV RBC AUTO: 109.5 FL (ref 78–100)
MCV RBC AUTO: 110.3 FL (ref 78–100)
MCV RBC AUTO: 111.6 FL (ref 78–100)
METHEMOGLOBIN ARTERIAL: 1 %
METHEMOGLOBIN ARTERIAL: 1.1 %
METHEMOGLOBIN ARTERIAL: 1.2 %
METHEMOGLOBIN ARTERIAL: 1.4 %
METHEMOGLOBIN ARTERIAL: 1.4 %
METHEMOGLOBIN ARTERIAL: 1.6 %
METHEMOGLOBIN ARTERIAL: 2.2 %
MITOCHONDRIA M2 IGG SER-ACNC: 4.8 UNITS (ref 0–24.9)
MONOCYTES ABSOLUTE: 0.6 K/CU MM
MONOCYTES ABSOLUTE: 0.9 K/CU MM
MONOCYTES ABSOLUTE: 0.9 K/CU MM
MONOCYTES ABSOLUTE: 1 K/CU MM
MONOCYTES ABSOLUTE: 1.2 K/CU MM
MONOCYTES ABSOLUTE: 1.3 K/CU MM
MONOCYTES ABSOLUTE: 1.6 K/CU MM
MONOCYTES ABSOLUTE: 2 K/CU MM
MONOCYTES RELATIVE PERCENT: 10.5 % (ref 0–4)
MONOCYTES RELATIVE PERCENT: 10.8 % (ref 0–4)
MONOCYTES RELATIVE PERCENT: 10.8 % (ref 0–4)
MONOCYTES RELATIVE PERCENT: 11.2 % (ref 0–4)
MONOCYTES RELATIVE PERCENT: 11.7 % (ref 0–4)
MONOCYTES RELATIVE PERCENT: 12.7 % (ref 0–4)
MONOCYTES RELATIVE PERCENT: 12.8 % (ref 0–4)
MONOCYTES RELATIVE PERCENT: 13.2 % (ref 0–4)
MONOCYTES RELATIVE PERCENT: 8.8 % (ref 0–4)
MONOCYTES RELATIVE PERCENT: 9.8 % (ref 0–4)
MRSA, DNA, NASAL: NEGATIVE
MUCUS: ABNORMAL HPF
NITRITE URINE, QUANTITATIVE: NEGATIVE
NUCLEAR IGG SER QL IA: DETECTED
NUCLEATED RBC %: 0 %
NUCLEATED RBC %: 0.2 %
NUCLEATED RBC %: 1.6 %
NUCLEATED RBC %: 1.7 %
O2 SAT, VEN: 91.6 % (ref 50–70)
O2 SAT, VEN: 94.8 % (ref 50–70)
O2 SATURATION: 85.9 % (ref 96–97)
O2 SATURATION: 87.8 % (ref 96–97)
O2 SATURATION: 91.4 % (ref 96–97)
O2 SATURATION: 92.1 % (ref 96–97)
O2 SATURATION: 92.8 % (ref 96–97)
O2 SATURATION: 93.2 % (ref 96–97)
O2 SATURATION: 95.6 % (ref 96–97)
PCO2 ARTERIAL: 48 MMHG (ref 32–45)
PCO2 ARTERIAL: 52 MMHG (ref 32–45)
PCO2 ARTERIAL: 52 MMHG (ref 32–45)
PCO2 ARTERIAL: 55 MMHG (ref 32–45)
PCO2 ARTERIAL: 57 MMHG (ref 32–45)
PCO2 ARTERIAL: 61 MMHG (ref 32–45)
PCO2 ARTERIAL: 64 MMHG (ref 32–45)
PCO2, VEN: 55 MMHG (ref 38–52)
PCO2, VEN: 65 MMHG (ref 38–52)
PCT TRANSFERRIN: 12 % (ref 10–44)
PDW BLD-RTO: 14.6 % (ref 11.7–14.9)
PDW BLD-RTO: 15 % (ref 11.7–14.9)
PDW BLD-RTO: 15.1 % (ref 11.7–14.9)
PDW BLD-RTO: 15.2 % (ref 11.7–14.9)
PDW BLD-RTO: 15.3 % (ref 11.7–14.9)
PDW BLD-RTO: 15.5 % (ref 11.7–14.9)
PDW BLD-RTO: 15.6 % (ref 11.7–14.9)
PDW BLD-RTO: 15.7 % (ref 11.7–14.9)
PDW BLD-RTO: 15.8 % (ref 11.7–14.9)
PDW BLD-RTO: 15.8 % (ref 11.7–14.9)
PDW BLD-RTO: 15.9 % (ref 11.7–14.9)
PDW BLD-RTO: 16 % (ref 11.7–14.9)
PH BLOOD: 7.24 (ref 7.34–7.45)
PH BLOOD: 7.28 (ref 7.34–7.45)
PH BLOOD: 7.3 (ref 7.34–7.45)
PH BLOOD: 7.32 (ref 7.34–7.45)
PH BLOOD: 7.33 (ref 7.34–7.45)
PH BLOOD: 7.33 (ref 7.34–7.45)
PH BLOOD: 7.35 (ref 7.34–7.45)
PH VENOUS: 7.21 (ref 7.32–7.42)
PH VENOUS: 7.27 (ref 7.32–7.42)
PH, URINE: 5.5 (ref 5–8)
PHOSPHORUS: 2.9 MG/DL (ref 2.5–4.9)
PHOSPHORUS: 3 MG/DL (ref 2.5–4.9)
PHOSPHORUS: 3.7 MG/DL (ref 2.5–4.9)
PLATELET # BLD: 151 K/CU MM (ref 140–440)
PLATELET # BLD: 162 K/CU MM (ref 140–440)
PLATELET # BLD: 164 K/CU MM (ref 140–440)
PLATELET # BLD: 177 K/CU MM (ref 140–440)
PLATELET # BLD: 182 K/CU MM (ref 140–440)
PLATELET # BLD: 184 K/CU MM (ref 140–440)
PLATELET # BLD: 186 K/CU MM (ref 140–440)
PLATELET # BLD: 200 K/CU MM (ref 140–440)
PLATELET # BLD: 214 K/CU MM (ref 140–440)
PLATELET # BLD: 229 K/CU MM (ref 140–440)
PLATELET # BLD: 252 K/CU MM (ref 140–440)
PLATELET # BLD: 85 K/CU MM (ref 140–440)
PMV BLD AUTO: 10.1 FL (ref 7.5–11.1)
PMV BLD AUTO: 10.2 FL (ref 7.5–11.1)
PMV BLD AUTO: 10.2 FL (ref 7.5–11.1)
PMV BLD AUTO: 10.3 FL (ref 7.5–11.1)
PMV BLD AUTO: 10.4 FL (ref 7.5–11.1)
PMV BLD AUTO: 10.5 FL (ref 7.5–11.1)
PMV BLD AUTO: 10.5 FL (ref 7.5–11.1)
PMV BLD AUTO: 9.7 FL (ref 7.5–11.1)
PMV BLD AUTO: 9.8 FL (ref 7.5–11.1)
PMV BLD AUTO: 9.9 FL (ref 7.5–11.1)
PO2 ARTERIAL: 50 MMHG (ref 75–100)
PO2 ARTERIAL: 58 MMHG (ref 75–100)
PO2 ARTERIAL: 70 MMHG (ref 75–100)
PO2 ARTERIAL: 70 MMHG (ref 75–100)
PO2 ARTERIAL: 72 MMHG (ref 75–100)
PO2 ARTERIAL: 85 MMHG (ref 75–100)
PO2 ARTERIAL: 98 MMHG (ref 75–100)
PO2, VEN: 212 MMHG (ref 28–48)
PO2, VEN: 74 MMHG (ref 28–48)
POTASSIUM SERPL-SCNC: 3.8 MMOL/L (ref 3.5–5.1)
POTASSIUM SERPL-SCNC: 3.9 MMOL/L (ref 3.5–5.1)
POTASSIUM SERPL-SCNC: 4 MMOL/L (ref 3.5–5.1)
POTASSIUM SERPL-SCNC: 4.1 MMOL/L (ref 3.5–5.1)
POTASSIUM SERPL-SCNC: 4.2 MMOL/L (ref 3.5–5.1)
POTASSIUM SERPL-SCNC: 4.3 MMOL/L (ref 3.5–5.1)
POTASSIUM SERPL-SCNC: 4.3 MMOL/L (ref 3.5–5.1)
POTASSIUM SERPL-SCNC: 4.4 MMOL/L (ref 3.5–5.1)
POTASSIUM SERPL-SCNC: 4.9 MMOL/L (ref 3.5–5.1)
POTASSIUM SERPL-SCNC: 5.2 MMOL/L (ref 3.5–5.1)
POTASSIUM SERPL-SCNC: 7.3 MMOL/L (ref 3.5–5.1)
POTASSIUM, UR: 32.7 MMOL/L (ref 22–119)
PRO-BNP: 1361 PG/ML
PROCALCITONIN SERPL-MCNC: 0.44 NG/ML
PROCALCITONIN SERPL-MCNC: 0.73 NG/ML
PROT/CREAT RATIO, UR: 0.5
PROTEIN UA: ABNORMAL MG/DL
PROTHROMBIN TIME: 15.1 SECONDS (ref 11.7–14.5)
PROTHROMBIN TIME: 15.6 SECONDS (ref 11.7–14.5)
PROTHROMBIN TIME: 33 SECONDS (ref 11.7–14.5)
PROTHROMBIN TIME: NORMAL SECONDS (ref 11.7–14.5)
RBC # BLD: 2.68 M/CU MM (ref 4.2–5.4)
RBC # BLD: 2.75 M/CU MM (ref 4.2–5.4)
RBC # BLD: 2.81 M/CU MM (ref 4.2–5.4)
RBC # BLD: 2.82 M/CU MM (ref 4.2–5.4)
RBC # BLD: 2.83 M/CU MM (ref 4.2–5.4)
RBC # BLD: 2.91 M/CU MM (ref 4.2–5.4)
RBC # BLD: 2.94 M/CU MM (ref 4.2–5.4)
RBC # BLD: 2.96 M/CU MM (ref 4.2–5.4)
RBC # BLD: 2.96 M/CU MM (ref 4.2–5.4)
RBC # BLD: 3.01 M/CU MM (ref 4.2–5.4)
RBC # BLD: 3.15 M/CU MM (ref 4.2–5.4)
RBC # BLD: 3.24 M/CU MM (ref 4.2–5.4)
RBC URINE: 2 /HPF (ref 0–6)
REASON FOR REJECTION: NORMAL
REJECTED TEST: NORMAL
S PNEUM AG CSF QL: NORMAL
SARS-COV-2 RDRP RESP QL NAA+PROBE: NOT DETECTED
SARS-COV-2 RDRP RESP QL NAA+PROBE: NOT DETECTED
SEGMENTED NEUTROPHILS ABSOLUTE COUNT: 13.8 K/CU MM
SEGMENTED NEUTROPHILS ABSOLUTE COUNT: 14.9 K/CU MM
SEGMENTED NEUTROPHILS ABSOLUTE COUNT: 5.2 K/CU MM
SEGMENTED NEUTROPHILS ABSOLUTE COUNT: 5.8 K/CU MM
SEGMENTED NEUTROPHILS ABSOLUTE COUNT: 6.1 K/CU MM
SEGMENTED NEUTROPHILS ABSOLUTE COUNT: 6.5 K/CU MM
SEGMENTED NEUTROPHILS ABSOLUTE COUNT: 7.1 K/CU MM
SEGMENTED NEUTROPHILS ABSOLUTE COUNT: 7.1 K/CU MM
SEGMENTED NEUTROPHILS ABSOLUTE COUNT: 7.5 K/CU MM
SEGMENTED NEUTROPHILS ABSOLUTE COUNT: 8 K/CU MM
SEGMENTED NEUTROPHILS RELATIVE PERCENT: 69.6 % (ref 36–66)
SEGMENTED NEUTROPHILS RELATIVE PERCENT: 73.1 % (ref 36–66)
SEGMENTED NEUTROPHILS RELATIVE PERCENT: 73.7 % (ref 36–66)
SEGMENTED NEUTROPHILS RELATIVE PERCENT: 73.9 % (ref 36–66)
SEGMENTED NEUTROPHILS RELATIVE PERCENT: 74.9 % (ref 36–66)
SEGMENTED NEUTROPHILS RELATIVE PERCENT: 75.2 % (ref 36–66)
SEGMENTED NEUTROPHILS RELATIVE PERCENT: 76.5 % (ref 36–66)
SEGMENTED NEUTROPHILS RELATIVE PERCENT: 76.8 % (ref 36–66)
SEGMENTED NEUTROPHILS RELATIVE PERCENT: 80.1 % (ref 36–66)
SEGMENTED NEUTROPHILS RELATIVE PERCENT: 82.2 % (ref 36–66)
SMA IGG SER-ACNC: 8 UNITS (ref 0–19)
SODIUM BLD-SCNC: 137 MMOL/L (ref 135–145)
SODIUM BLD-SCNC: 138 MMOL/L (ref 135–145)
SODIUM BLD-SCNC: 139 MMOL/L (ref 135–145)
SODIUM BLD-SCNC: 139 MMOL/L (ref 135–145)
SODIUM BLD-SCNC: 140 MMOL/L (ref 135–145)
SODIUM BLD-SCNC: 140 MMOL/L (ref 135–145)
SODIUM BLD-SCNC: 141 MMOL/L (ref 135–145)
SODIUM BLD-SCNC: 141 MMOL/L (ref 135–145)
SODIUM BLD-SCNC: 145 MMOL/L (ref 135–145)
SODIUM BLD-SCNC: 146 MMOL/L (ref 135–145)
SODIUM BLD-SCNC: 146 MMOL/L (ref 135–145)
SODIUM URINE: 10 MMOL/L (ref 35–167)
SODIUM URINE: 10 MMOL/L (ref 35–167)
SOURCE: NORMAL
SOURCE: NORMAL
SPECIFIC GRAVITY UA: 1.01 (ref 1–1.03)
SPECIMEN DESCRIPTION: NORMAL
SPECIMEN: NORMAL
SPECIMEN: NORMAL
SPEP INTERPRETATION: ABNORMAL
SQUAMOUS EPITHELIAL: 3 /HPF
T4 FREE SERPL-MCNC: 1.56 NG/DL (ref 0.9–1.8)
TOTAL IMMATURE NEUTOROPHIL: 0.03 K/CU MM
TOTAL IMMATURE NEUTOROPHIL: 0.04 K/CU MM
TOTAL IMMATURE NEUTOROPHIL: 0.04 K/CU MM
TOTAL IMMATURE NEUTOROPHIL: 0.06 K/CU MM
TOTAL IMMATURE NEUTOROPHIL: 0.08 K/CU MM
TOTAL IMMATURE NEUTOROPHIL: 0.26 K/CU MM
TOTAL IMMATURE NEUTOROPHIL: 0.3 K/CU MM
TOTAL IRON BINDING CAPACITY: 299 UG/DL (ref 250–450)
TOTAL NUCLEATED RBC: 0 K/CU MM
TOTAL NUCLEATED RBC: 0.3 K/CU MM
TOTAL NUCLEATED RBC: 0.3 K/CU MM
TOTAL PROTEIN: 5.4 GM/DL (ref 6.4–8.2)
TOTAL PROTEIN: 5.7 GM/DL (ref 6.4–8.2)
TOTAL PROTEIN: 5.7 GM/DL (ref 6.4–8.2)
TOTAL PROTEIN: 5.9 GM/DL (ref 6.4–8.2)
TOTAL PROTEIN: 6 GM/DL (ref 6.4–8.2)
TOTAL PROTEIN: 6.1 GM/DL (ref 6.4–8.2)
TOTAL PROTEIN: 6.4 GM/DL (ref 6.4–8.2)
TOTAL PROTEIN: 6.6 GM/DL (ref 6.4–8.2)
TRANSFERRIN SERPL-MCNC: 240 MG/DL (ref 200–360)
TRICHOMONAS: ABNORMAL /HPF
TSH SERPL DL<=0.005 MIU/L-ACNC: 0.42 UIU/ML (ref 0.27–4.2)
UNSATURATED IRON BINDING CAPACITY: 262 UG/DL (ref 110–370)
URINE TOTAL PROTEIN: 50 MG/DL
UROBILINOGEN, URINE: 1 MG/DL (ref 0.2–1)
UUN 24H UR-MCNC: 488 MG/DL
WBC # BLD: 10.4 K/CU MM (ref 4–10.5)
WBC # BLD: 16.7 K/CU MM (ref 4–10.5)
WBC # BLD: 18.6 K/CU MM (ref 4–10.5)
WBC # BLD: 7 K/CU MM (ref 4–10.5)
WBC # BLD: 7.2 K/CU MM (ref 4–10.5)
WBC # BLD: 8.2 K/CU MM (ref 4–10.5)
WBC # BLD: 8.3 K/CU MM (ref 4–10.5)
WBC # BLD: 8.8 K/CU MM (ref 4–10.5)
WBC # BLD: 8.8 K/CU MM (ref 4–10.5)
WBC # BLD: 9.7 K/CU MM (ref 4–10.5)
WBC # BLD: 9.7 K/CU MM (ref 4–10.5)
WBC # BLD: 9.9 K/CU MM (ref 4–10.5)
WBC CLUMP: ABNORMAL /HPF
WBC UA: 39 /HPF (ref 0–5)

## 2023-01-01 PROCEDURE — 2700000000 HC OXYGEN THERAPY PER DAY

## 2023-01-01 PROCEDURE — 80048 BASIC METABOLIC PNL TOTAL CA: CPT

## 2023-01-01 PROCEDURE — 71045 X-RAY EXAM CHEST 1 VIEW: CPT

## 2023-01-01 PROCEDURE — 2709999900 HC NON-CHARGEABLE SUPPLY: Performed by: SPECIALIST

## 2023-01-01 PROCEDURE — 36415 COLL VENOUS BLD VENIPUNCTURE: CPT

## 2023-01-01 PROCEDURE — 82962 GLUCOSE BLOOD TEST: CPT

## 2023-01-01 PROCEDURE — 6370000000 HC RX 637 (ALT 250 FOR IP): Performed by: INTERNAL MEDICINE

## 2023-01-01 PROCEDURE — C9113 INJ PANTOPRAZOLE SODIUM, VIA: HCPCS | Performed by: SPECIALIST

## 2023-01-01 PROCEDURE — 6370000000 HC RX 637 (ALT 250 FOR IP): Performed by: NURSE PRACTITIONER

## 2023-01-01 PROCEDURE — 6360000002 HC RX W HCPCS: Performed by: INTERNAL MEDICINE

## 2023-01-01 PROCEDURE — 82140 ASSAY OF AMMONIA: CPT

## 2023-01-01 PROCEDURE — 6360000002 HC RX W HCPCS: Performed by: FAMILY MEDICINE

## 2023-01-01 PROCEDURE — 82805 BLOOD GASES W/O2 SATURATION: CPT

## 2023-01-01 PROCEDURE — 2580000003 HC RX 258: Performed by: INTERNAL MEDICINE

## 2023-01-01 PROCEDURE — 83735 ASSAY OF MAGNESIUM: CPT

## 2023-01-01 PROCEDURE — 1200000000 HC SEMI PRIVATE

## 2023-01-01 PROCEDURE — 85610 PROTHROMBIN TIME: CPT

## 2023-01-01 PROCEDURE — 6360000002 HC RX W HCPCS: Performed by: NURSE PRACTITIONER

## 2023-01-01 PROCEDURE — 6360000002 HC RX W HCPCS: Performed by: SPECIALIST

## 2023-01-01 PROCEDURE — 84443 ASSAY THYROID STIM HORMONE: CPT

## 2023-01-01 PROCEDURE — 84133 ASSAY OF URINE POTASSIUM: CPT

## 2023-01-01 PROCEDURE — 85730 THROMBOPLASTIN TIME PARTIAL: CPT

## 2023-01-01 PROCEDURE — 84466 ASSAY OF TRANSFERRIN: CPT

## 2023-01-01 PROCEDURE — 2060000000 HC ICU INTERMEDIATE R&B

## 2023-01-01 PROCEDURE — 76937 US GUIDE VASCULAR ACCESS: CPT

## 2023-01-01 PROCEDURE — 87899 AGENT NOS ASSAY W/OPTIC: CPT

## 2023-01-01 PROCEDURE — 84540 ASSAY OF URINE/UREA-N: CPT

## 2023-01-01 PROCEDURE — 6370000000 HC RX 637 (ALT 250 FOR IP): Performed by: SPECIALIST

## 2023-01-01 PROCEDURE — 6370000000 HC RX 637 (ALT 250 FOR IP): Performed by: STUDENT IN AN ORGANIZED HEALTH CARE EDUCATION/TRAINING PROGRAM

## 2023-01-01 PROCEDURE — 6360000002 HC RX W HCPCS: Performed by: STUDENT IN AN ORGANIZED HEALTH CARE EDUCATION/TRAINING PROGRAM

## 2023-01-01 PROCEDURE — 83690 ASSAY OF LIPASE: CPT

## 2023-01-01 PROCEDURE — 99222 1ST HOSP IP/OBS MODERATE 55: CPT | Performed by: INTERNAL MEDICINE

## 2023-01-01 PROCEDURE — P9041 ALBUMIN (HUMAN),5%, 50ML: HCPCS | Performed by: INTERNAL MEDICINE

## 2023-01-01 PROCEDURE — 94640 AIRWAY INHALATION TREATMENT: CPT

## 2023-01-01 PROCEDURE — 6370000000 HC RX 637 (ALT 250 FOR IP)

## 2023-01-01 PROCEDURE — 94660 CPAP INITIATION&MGMT: CPT

## 2023-01-01 PROCEDURE — 2580000003 HC RX 258: Performed by: STUDENT IN AN ORGANIZED HEALTH CARE EDUCATION/TRAINING PROGRAM

## 2023-01-01 PROCEDURE — 99232 SBSQ HOSP IP/OBS MODERATE 35: CPT | Performed by: INTERNAL MEDICINE

## 2023-01-01 PROCEDURE — 94761 N-INVAS EAR/PLS OXIMETRY MLT: CPT

## 2023-01-01 PROCEDURE — 99233 SBSQ HOSP IP/OBS HIGH 50: CPT | Performed by: INTERNAL MEDICINE

## 2023-01-01 PROCEDURE — 6360000002 HC RX W HCPCS

## 2023-01-01 PROCEDURE — C9113 INJ PANTOPRAZOLE SODIUM, VIA: HCPCS | Performed by: INTERNAL MEDICINE

## 2023-01-01 PROCEDURE — 80053 COMPREHEN METABOLIC PANEL: CPT

## 2023-01-01 PROCEDURE — 85027 COMPLETE CBC AUTOMATED: CPT

## 2023-01-01 PROCEDURE — 86705 HEP B CORE ANTIBODY IGM: CPT

## 2023-01-01 PROCEDURE — 85025 COMPLETE CBC W/AUTO DIFF WBC: CPT

## 2023-01-01 PROCEDURE — 86256 FLUORESCENT ANTIBODY TITER: CPT

## 2023-01-01 PROCEDURE — 84156 ASSAY OF PROTEIN URINE: CPT

## 2023-01-01 PROCEDURE — 5A09557 ASSISTANCE WITH RESPIRATORY VENTILATION, GREATER THAN 96 CONSECUTIVE HOURS, CONTINUOUS POSITIVE AIRWAY PRESSURE: ICD-10-PCS | Performed by: INTERNAL MEDICINE

## 2023-01-01 PROCEDURE — 90935 HEMODIALYSIS ONE EVALUATION: CPT

## 2023-01-01 PROCEDURE — 36600 WITHDRAWAL OF ARTERIAL BLOOD: CPT

## 2023-01-01 PROCEDURE — 84100 ASSAY OF PHOSPHORUS: CPT

## 2023-01-01 PROCEDURE — 93010 ELECTROCARDIOGRAM REPORT: CPT | Performed by: INTERNAL MEDICINE

## 2023-01-01 PROCEDURE — 86706 HEP B SURFACE ANTIBODY: CPT

## 2023-01-01 PROCEDURE — 88305 TISSUE EXAM BY PATHOLOGIST: CPT

## 2023-01-01 PROCEDURE — 82803 BLOOD GASES ANY COMBINATION: CPT

## 2023-01-01 PROCEDURE — 93306 TTE W/DOPPLER COMPLETE: CPT

## 2023-01-01 PROCEDURE — 36410 VNPNXR 3YR/> PHY/QHP DX/THER: CPT

## 2023-01-01 PROCEDURE — 87641 MR-STAPH DNA AMP PROBE: CPT

## 2023-01-01 PROCEDURE — APPSS30 APP SPLIT SHARED TIME 16-30 MINUTES

## 2023-01-01 PROCEDURE — 2580000003 HC RX 258

## 2023-01-01 PROCEDURE — 83880 ASSAY OF NATRIURETIC PEPTIDE: CPT

## 2023-01-01 PROCEDURE — 76705 ECHO EXAM OF ABDOMEN: CPT

## 2023-01-01 PROCEDURE — 83550 IRON BINDING TEST: CPT

## 2023-01-01 PROCEDURE — 2709999900 HC NON-CHARGEABLE SUPPLY

## 2023-01-01 PROCEDURE — 51702 INSERT TEMP BLADDER CATH: CPT

## 2023-01-01 PROCEDURE — 86901 BLOOD TYPING SEROLOGIC RH(D): CPT

## 2023-01-01 PROCEDURE — 87040 BLOOD CULTURE FOR BACTERIA: CPT

## 2023-01-01 PROCEDURE — 87635 SARS-COV-2 COVID-19 AMP PRB: CPT

## 2023-01-01 PROCEDURE — 86038 ANTINUCLEAR ANTIBODIES: CPT

## 2023-01-01 PROCEDURE — 82728 ASSAY OF FERRITIN: CPT

## 2023-01-01 PROCEDURE — 93971 EXTREMITY STUDY: CPT

## 2023-01-01 PROCEDURE — 71250 CT THORAX DX C-: CPT

## 2023-01-01 PROCEDURE — 84155 ASSAY OF PROTEIN SERUM: CPT

## 2023-01-01 PROCEDURE — 3609012400 HC EGD TRANSORAL BIOPSY SINGLE/MULTIPLE: Performed by: SPECIALIST

## 2023-01-01 PROCEDURE — 1250000000 HC SEMI PRIVATE HOSPICE R&B

## 2023-01-01 PROCEDURE — 85018 HEMOGLOBIN: CPT

## 2023-01-01 PROCEDURE — 81001 URINALYSIS AUTO W/SCOPE: CPT

## 2023-01-01 PROCEDURE — 94664 DEMO&/EVAL PT USE INHALER: CPT

## 2023-01-01 PROCEDURE — 86850 RBC ANTIBODY SCREEN: CPT

## 2023-01-01 PROCEDURE — 82570 ASSAY OF URINE CREATININE: CPT

## 2023-01-01 PROCEDURE — 70450 CT HEAD/BRAIN W/O DYE: CPT

## 2023-01-01 PROCEDURE — 02H633Z INSERTION OF INFUSION DEVICE INTO RIGHT ATRIUM, PERCUTANEOUS APPROACH: ICD-10-PCS | Performed by: RADIOLOGY

## 2023-01-01 PROCEDURE — 0DB98ZX EXCISION OF DUODENUM, VIA NATURAL OR ARTIFICIAL OPENING ENDOSCOPIC, DIAGNOSTIC: ICD-10-PCS | Performed by: SPECIALIST

## 2023-01-01 PROCEDURE — 84300 ASSAY OF URINE SODIUM: CPT

## 2023-01-01 PROCEDURE — 86803 HEPATITIS C AB TEST: CPT

## 2023-01-01 PROCEDURE — 83540 ASSAY OF IRON: CPT

## 2023-01-01 PROCEDURE — 84439 ASSAY OF FREE THYROXINE: CPT

## 2023-01-01 PROCEDURE — 87340 HEPATITIS B SURFACE AG IA: CPT

## 2023-01-01 PROCEDURE — 85014 HEMATOCRIT: CPT

## 2023-01-01 PROCEDURE — 36556 INSERT NON-TUNNEL CV CATH: CPT

## 2023-01-01 PROCEDURE — 84165 PROTEIN E-PHORESIS SERUM: CPT

## 2023-01-01 PROCEDURE — 99231 SBSQ HOSP IP/OBS SF/LOW 25: CPT | Performed by: INTERNAL MEDICINE

## 2023-01-01 PROCEDURE — 84145 PROCALCITONIN (PCT): CPT

## 2023-01-01 PROCEDURE — 88342 IMHCHEM/IMCYTCHM 1ST ANTB: CPT

## 2023-01-01 PROCEDURE — 86709 HEPATITIS A IGM ANTIBODY: CPT

## 2023-01-01 PROCEDURE — 86704 HEP B CORE ANTIBODY TOTAL: CPT

## 2023-01-01 PROCEDURE — 2500000003 HC RX 250 WO HCPCS: Performed by: INTERNAL MEDICINE

## 2023-01-01 PROCEDURE — 99223 1ST HOSP IP/OBS HIGH 75: CPT | Performed by: INTERNAL MEDICINE

## 2023-01-01 PROCEDURE — 82436 ASSAY OF URINE CHLORIDE: CPT

## 2023-01-01 PROCEDURE — 86708 HEPATITIS A ANTIBODY: CPT

## 2023-01-01 PROCEDURE — 86039 ANTINUCLEAR ANTIBODIES (ANA): CPT

## 2023-01-01 PROCEDURE — 93005 ELECTROCARDIOGRAM TRACING: CPT | Performed by: INTERNAL MEDICINE

## 2023-01-01 PROCEDURE — 87449 NOS EACH ORGANISM AG IA: CPT

## 2023-01-01 PROCEDURE — 3700000001 HC ADD 15 MINUTES (ANESTHESIA): Performed by: SPECIALIST

## 2023-01-01 PROCEDURE — P9045 ALBUMIN (HUMAN), 5%, 250 ML: HCPCS | Performed by: INTERNAL MEDICINE

## 2023-01-01 PROCEDURE — 83516 IMMUNOASSAY NONANTIBODY: CPT

## 2023-01-01 PROCEDURE — 93005 ELECTROCARDIOGRAM TRACING: CPT | Performed by: STUDENT IN AN ORGANIZED HEALTH CARE EDUCATION/TRAINING PROGRAM

## 2023-01-01 PROCEDURE — 3700000000 HC ANESTHESIA ATTENDED CARE: Performed by: SPECIALIST

## 2023-01-01 PROCEDURE — 82150 ASSAY OF AMYLASE: CPT

## 2023-01-01 PROCEDURE — C1752 CATH,HEMODIALYSIS,SHORT-TERM: HCPCS

## 2023-01-01 PROCEDURE — 2580000003 HC RX 258: Performed by: NURSE PRACTITIONER

## 2023-01-01 PROCEDURE — 0DB68ZX EXCISION OF STOMACH, VIA NATURAL OR ARTIFICIAL OPENING ENDOSCOPIC, DIAGNOSTIC: ICD-10-PCS | Performed by: SPECIALIST

## 2023-01-01 PROCEDURE — 86140 C-REACTIVE PROTEIN: CPT

## 2023-01-01 PROCEDURE — 5A1D70Z PERFORMANCE OF URINARY FILTRATION, INTERMITTENT, LESS THAN 6 HOURS PER DAY: ICD-10-PCS | Performed by: INTERNAL MEDICINE

## 2023-01-01 PROCEDURE — 86900 BLOOD TYPING SEROLOGIC ABO: CPT

## 2023-01-01 PROCEDURE — 83605 ASSAY OF LACTIC ACID: CPT

## 2023-01-01 PROCEDURE — 76770 US EXAM ABDO BACK WALL COMP: CPT

## 2023-01-01 RX ORDER — GLYCOPYRROLATE 0.2 MG/ML
0.2 INJECTION INTRAMUSCULAR; INTRAVENOUS EVERY 4 HOURS PRN
Status: CANCELLED | OUTPATIENT
Start: 2023-01-01

## 2023-01-01 RX ORDER — FUROSEMIDE 10 MG/ML
40 INJECTION INTRAMUSCULAR; INTRAVENOUS 2 TIMES DAILY
Status: COMPLETED | OUTPATIENT
Start: 2023-01-01 | End: 2023-01-01

## 2023-01-01 RX ORDER — DIAZEPAM 5 MG/ML
2.5 INJECTION, SOLUTION INTRAMUSCULAR; INTRAVENOUS EVERY 4 HOURS PRN
Status: CANCELLED | OUTPATIENT
Start: 2023-01-01

## 2023-01-01 RX ORDER — ASPIRIN 81 MG/1
81 TABLET ORAL DAILY
Qty: 30 TABLET | Refills: 3 | Status: SHIPPED | OUTPATIENT
Start: 2023-01-01

## 2023-01-01 RX ORDER — SODIUM CHLORIDE 0.9 % (FLUSH) 0.9 %
5-40 SYRINGE (ML) INJECTION PRN
Status: DISCONTINUED | OUTPATIENT
Start: 2023-01-01 | End: 2023-01-01 | Stop reason: HOSPADM

## 2023-01-01 RX ORDER — SODIUM CHLORIDE 9 MG/ML
INJECTION, SOLUTION INTRAVENOUS PRN
Status: DISCONTINUED | OUTPATIENT
Start: 2023-01-01 | End: 2023-01-01 | Stop reason: HOSPADM

## 2023-01-01 RX ORDER — DILTIAZEM HYDROCHLORIDE 60 MG/1
60 TABLET, FILM COATED ORAL EVERY 6 HOURS SCHEDULED
Status: DISCONTINUED | OUTPATIENT
Start: 2023-01-01 | End: 2023-01-01

## 2023-01-01 RX ORDER — SODIUM CHLORIDE 9 MG/ML
INJECTION, SOLUTION INTRAVENOUS PRN
Status: DISCONTINUED | OUTPATIENT
Start: 2023-01-01 | End: 2023-07-14 | Stop reason: HOSPADM

## 2023-01-01 RX ORDER — SPIRONOLACTONE 50 MG/1
25 TABLET, FILM COATED ORAL ONCE
Status: COMPLETED | OUTPATIENT
Start: 2023-01-01 | End: 2023-01-01

## 2023-01-01 RX ORDER — SODIUM CHLORIDE 0.9 % (FLUSH) 0.9 %
5-40 SYRINGE (ML) INJECTION 2 TIMES DAILY
Status: DISCONTINUED | OUTPATIENT
Start: 2023-01-01 | End: 2023-07-14 | Stop reason: HOSPADM

## 2023-01-01 RX ORDER — ACETAMINOPHEN 650 MG/1
650 SUPPOSITORY RECTAL EVERY 6 HOURS PRN
Status: DISCONTINUED | OUTPATIENT
Start: 2023-01-01 | End: 2023-01-01

## 2023-01-01 RX ORDER — ROPINIROLE 1 MG/1
1 TABLET, FILM COATED ORAL NIGHTLY
Status: DISCONTINUED | OUTPATIENT
Start: 2023-01-01 | End: 2023-01-01

## 2023-01-01 RX ORDER — SODIUM CHLORIDE 0.9 % (FLUSH) 0.9 %
5-40 SYRINGE (ML) INJECTION 2 TIMES DAILY
Status: CANCELLED | OUTPATIENT
Start: 2023-01-01

## 2023-01-01 RX ORDER — SUCRALFATE 1 G/1
1 TABLET ORAL 4 TIMES DAILY
Qty: 120 TABLET | Refills: 0 | Status: SHIPPED | OUTPATIENT
Start: 2023-01-01

## 2023-01-01 RX ORDER — FUROSEMIDE 10 MG/ML
40 INJECTION INTRAMUSCULAR; INTRAVENOUS ONCE
Status: COMPLETED | OUTPATIENT
Start: 2023-01-01 | End: 2023-01-01

## 2023-01-01 RX ORDER — INSULIN LISPRO 100 [IU]/ML
0-8 INJECTION, SOLUTION INTRAVENOUS; SUBCUTANEOUS
Status: DISCONTINUED | OUTPATIENT
Start: 2023-01-01 | End: 2023-01-01

## 2023-01-01 RX ORDER — WATER 1000 ML/1000ML
INJECTION, SOLUTION INTRAVENOUS
Status: COMPLETED
Start: 2023-01-01 | End: 2023-01-01

## 2023-01-01 RX ORDER — LACTULOSE 10 G/15ML
20 SOLUTION ORAL 3 TIMES DAILY
Status: DISCONTINUED | OUTPATIENT
Start: 2023-01-01 | End: 2023-01-01

## 2023-01-01 RX ORDER — MIDODRINE HYDROCHLORIDE 5 MG/1
10 TABLET ORAL 3 TIMES DAILY
Status: DISCONTINUED | OUTPATIENT
Start: 2023-01-01 | End: 2023-01-01

## 2023-01-01 RX ORDER — PANTOPRAZOLE SODIUM 40 MG/1
40 TABLET, DELAYED RELEASE ORAL
Qty: 90 TABLET | Refills: 1 | Status: SHIPPED | OUTPATIENT
Start: 2023-01-01 | End: 2023-01-01 | Stop reason: SDUPTHER

## 2023-01-01 RX ORDER — DILTIAZEM HYDROCHLORIDE 240 MG/1
240 CAPSULE, COATED, EXTENDED RELEASE ORAL DAILY
Status: DISCONTINUED | OUTPATIENT
Start: 2023-01-01 | End: 2023-01-01

## 2023-01-01 RX ORDER — GLUCAGON 1 MG/ML
1 KIT INJECTION PRN
Status: DISCONTINUED | OUTPATIENT
Start: 2023-01-01 | End: 2023-01-01

## 2023-01-01 RX ORDER — MORPHINE SULFATE 2 MG/ML
1 INJECTION, SOLUTION INTRAMUSCULAR; INTRAVENOUS ONCE
Status: COMPLETED | OUTPATIENT
Start: 2023-01-01 | End: 2023-01-01

## 2023-01-01 RX ORDER — FUROSEMIDE 10 MG/ML
80 INJECTION INTRAMUSCULAR; INTRAVENOUS 3 TIMES DAILY
Status: DISCONTINUED | OUTPATIENT
Start: 2023-01-01 | End: 2023-01-01

## 2023-01-01 RX ORDER — FUROSEMIDE 10 MG/ML
40 INJECTION INTRAMUSCULAR; INTRAVENOUS 2 TIMES DAILY
Status: DISCONTINUED | OUTPATIENT
Start: 2023-01-01 | End: 2023-01-01

## 2023-01-01 RX ORDER — SODIUM CHLORIDE 0.9 % (FLUSH) 0.9 %
5-40 SYRINGE (ML) INJECTION 2 TIMES DAILY
Status: DISCONTINUED | OUTPATIENT
Start: 2023-01-01 | End: 2023-01-01 | Stop reason: HOSPADM

## 2023-01-01 RX ORDER — ALBUMIN, HUMAN INJ 5% 5 %
50 SOLUTION INTRAVENOUS ONCE
Status: DISCONTINUED | OUTPATIENT
Start: 2023-01-01 | End: 2023-01-01

## 2023-01-01 RX ORDER — GLYCOPYRROLATE 0.2 MG/ML
0.2 INJECTION INTRAMUSCULAR; INTRAVENOUS EVERY 4 HOURS PRN
Status: DISCONTINUED | OUTPATIENT
Start: 2023-01-01 | End: 2023-01-01 | Stop reason: HOSPADM

## 2023-01-01 RX ORDER — SODIUM CHLORIDE 9 MG/ML
INJECTION, SOLUTION INTRAVENOUS PRN
Status: CANCELLED | OUTPATIENT
Start: 2023-01-01

## 2023-01-01 RX ORDER — SUCRALFATE 1 G/1
1 TABLET ORAL EVERY 6 HOURS SCHEDULED
Status: DISCONTINUED | OUTPATIENT
Start: 2023-01-01 | End: 2023-01-01

## 2023-01-01 RX ORDER — HALOPERIDOL 5 MG/ML
2 INJECTION INTRAMUSCULAR EVERY 6 HOURS PRN
Status: DISCONTINUED | OUTPATIENT
Start: 2023-01-01 | End: 2023-01-01 | Stop reason: HOSPADM

## 2023-01-01 RX ORDER — MONTELUKAST SODIUM 10 MG/1
10 TABLET ORAL NIGHTLY
Status: DISCONTINUED | OUTPATIENT
Start: 2023-01-01 | End: 2023-01-01

## 2023-01-01 RX ORDER — LEVOTHYROXINE SODIUM 0.1 MG/1
100 TABLET ORAL DAILY
Status: DISCONTINUED | OUTPATIENT
Start: 2023-01-01 | End: 2023-01-01

## 2023-01-01 RX ORDER — ALBUMIN, HUMAN INJ 5% 5 %
50 SOLUTION INTRAVENOUS ONCE
Status: COMPLETED | OUTPATIENT
Start: 2023-01-01 | End: 2023-01-01

## 2023-01-01 RX ORDER — HYDROXYZINE HYDROCHLORIDE 10 MG/1
10 TABLET, FILM COATED ORAL 3 TIMES DAILY PRN
Status: DISCONTINUED | OUTPATIENT
Start: 2023-01-01 | End: 2023-01-01

## 2023-01-01 RX ORDER — ACETAMINOPHEN 325 MG/1
650 TABLET ORAL EVERY 6 HOURS PRN
Status: DISCONTINUED | OUTPATIENT
Start: 2023-01-01 | End: 2023-01-01

## 2023-01-01 RX ORDER — PANTOPRAZOLE SODIUM 40 MG/1
40 TABLET, DELAYED RELEASE ORAL 2 TIMES DAILY
Qty: 90 TABLET | Refills: 1 | Status: SHIPPED | OUTPATIENT
Start: 2023-01-01

## 2023-01-01 RX ORDER — INSULIN LISPRO 100 [IU]/ML
0-4 INJECTION, SOLUTION INTRAVENOUS; SUBCUTANEOUS NIGHTLY
Status: DISCONTINUED | OUTPATIENT
Start: 2023-01-01 | End: 2023-01-01

## 2023-01-01 RX ORDER — ONDANSETRON 4 MG/1
4 TABLET, ORALLY DISINTEGRATING ORAL EVERY 8 HOURS PRN
Status: DISCONTINUED | OUTPATIENT
Start: 2023-01-01 | End: 2023-01-01

## 2023-01-01 RX ORDER — ROSUVASTATIN CALCIUM 5 MG/1
5 TABLET, COATED ORAL DAILY
Status: DISCONTINUED | OUTPATIENT
Start: 2023-01-01 | End: 2023-01-01

## 2023-01-01 RX ORDER — DIAZEPAM 5 MG/ML
2.5 INJECTION, SOLUTION INTRAMUSCULAR; INTRAVENOUS EVERY 4 HOURS PRN
Status: DISCONTINUED | OUTPATIENT
Start: 2023-01-01 | End: 2023-07-14 | Stop reason: HOSPADM

## 2023-01-01 RX ORDER — ONDANSETRON 2 MG/ML
4 INJECTION INTRAMUSCULAR; INTRAVENOUS EVERY 6 HOURS PRN
Status: DISCONTINUED | OUTPATIENT
Start: 2023-01-01 | End: 2023-01-01

## 2023-01-01 RX ORDER — DEXTROSE MONOHYDRATE 100 MG/ML
INJECTION, SOLUTION INTRAVENOUS CONTINUOUS PRN
Status: DISCONTINUED | OUTPATIENT
Start: 2023-01-01 | End: 2023-01-01

## 2023-01-01 RX ORDER — INSULIN GLARGINE 100 [IU]/ML
30 INJECTION, SOLUTION SUBCUTANEOUS EVERY MORNING
Status: DISCONTINUED | OUTPATIENT
Start: 2023-01-01 | End: 2023-01-01

## 2023-01-01 RX ORDER — ENOXAPARIN SODIUM 100 MG/ML
1 INJECTION SUBCUTANEOUS 2 TIMES DAILY
Status: DISCONTINUED | OUTPATIENT
Start: 2023-01-01 | End: 2023-01-01

## 2023-01-01 RX ORDER — HALOPERIDOL 5 MG/ML
1 INJECTION INTRAMUSCULAR
Status: CANCELLED | OUTPATIENT
Start: 2023-01-01

## 2023-01-01 RX ORDER — ALBUTEROL SULFATE 2.5 MG/3ML
2.5 SOLUTION RESPIRATORY (INHALATION) EVERY 4 HOURS PRN
Status: DISCONTINUED | OUTPATIENT
Start: 2023-01-01 | End: 2023-01-01

## 2023-01-01 RX ORDER — SODIUM CHLORIDE 9 MG/ML
INJECTION, SOLUTION INTRAVENOUS CONTINUOUS
Status: DISCONTINUED | OUTPATIENT
Start: 2023-01-01 | End: 2023-01-01

## 2023-01-01 RX ORDER — FUROSEMIDE 10 MG/ML
40 INJECTION INTRAMUSCULAR; INTRAVENOUS 3 TIMES DAILY
Status: DISCONTINUED | OUTPATIENT
Start: 2023-01-01 | End: 2023-01-01

## 2023-01-01 RX ORDER — SODIUM CHLORIDE 0.9 % (FLUSH) 0.9 %
5-40 SYRINGE (ML) INJECTION PRN
Status: CANCELLED | OUTPATIENT
Start: 2023-01-01

## 2023-01-01 RX ORDER — SODIUM CHLORIDE 0.9 % (FLUSH) 0.9 %
5-40 SYRINGE (ML) INJECTION PRN
Status: DISCONTINUED | OUTPATIENT
Start: 2023-01-01 | End: 2023-07-14 | Stop reason: HOSPADM

## 2023-01-01 RX ORDER — FLUOXETINE 10 MG/1
10 CAPSULE ORAL DAILY
Status: DISCONTINUED | OUTPATIENT
Start: 2023-01-01 | End: 2023-01-01

## 2023-01-01 RX ORDER — ALBUMIN, HUMAN INJ 5% 5 %
25 SOLUTION INTRAVENOUS EVERY 8 HOURS
Status: COMPLETED | OUTPATIENT
Start: 2023-01-01 | End: 2023-01-01

## 2023-01-01 RX ORDER — PANTOPRAZOLE SODIUM 40 MG/10ML
40 INJECTION, POWDER, LYOPHILIZED, FOR SOLUTION INTRAVENOUS 2 TIMES DAILY
Status: DISCONTINUED | OUTPATIENT
Start: 2023-01-01 | End: 2023-01-01

## 2023-01-01 RX ORDER — LORAZEPAM 2 MG/ML
1 INJECTION INTRAMUSCULAR EVERY 6 HOURS PRN
Status: DISCONTINUED | OUTPATIENT
Start: 2023-01-01 | End: 2023-01-01

## 2023-01-01 RX ORDER — OCTREOTIDE ACETATE 100 UG/ML
100 INJECTION, SOLUTION INTRAVENOUS; SUBCUTANEOUS EVERY 8 HOURS
Status: DISCONTINUED | OUTPATIENT
Start: 2023-01-01 | End: 2023-01-01

## 2023-01-01 RX ORDER — SODIUM CHLORIDE 9 MG/ML
INJECTION INTRAVENOUS
Status: COMPLETED
Start: 2023-01-01 | End: 2023-01-01

## 2023-01-01 RX ORDER — LANOLIN ALCOHOL/MO/W.PET/CERES
6 CREAM (GRAM) TOPICAL NIGHTLY PRN
Status: DISCONTINUED | OUTPATIENT
Start: 2023-01-01 | End: 2023-01-01

## 2023-01-01 RX ORDER — GLYCOPYRROLATE 0.2 MG/ML
0.2 INJECTION INTRAMUSCULAR; INTRAVENOUS EVERY 4 HOURS PRN
Status: DISCONTINUED | OUTPATIENT
Start: 2023-01-01 | End: 2023-07-14 | Stop reason: HOSPADM

## 2023-01-01 RX ORDER — HALOPERIDOL 5 MG/ML
1 INJECTION INTRAMUSCULAR
Status: DISCONTINUED | OUTPATIENT
Start: 2023-01-01 | End: 2023-07-14 | Stop reason: HOSPADM

## 2023-01-01 RX ADMIN — FUROSEMIDE 40 MG: 10 INJECTION, SOLUTION INTRAMUSCULAR; INTRAVENOUS at 17:30

## 2023-01-01 RX ADMIN — OCTREOTIDE ACETATE 100 MCG: 100 INJECTION, SOLUTION INTRAVENOUS; SUBCUTANEOUS at 08:36

## 2023-01-01 RX ADMIN — LACTULOSE 20 G: 20 SOLUTION ORAL at 08:33

## 2023-01-01 RX ADMIN — ONDANSETRON 4 MG: 2 INJECTION INTRAMUSCULAR; INTRAVENOUS at 05:46

## 2023-01-01 RX ADMIN — ROPINIROLE HYDROCHLORIDE 1 MG: 1 TABLET, FILM COATED ORAL at 19:55

## 2023-01-01 RX ADMIN — LACTULOSE 20 G: 20 SOLUTION ORAL at 14:26

## 2023-01-01 RX ADMIN — SODIUM CHLORIDE, PRESERVATIVE FREE 10 ML: 5 INJECTION INTRAVENOUS at 23:57

## 2023-01-01 RX ADMIN — SODIUM CHLORIDE, PRESERVATIVE FREE 20 ML: 5 INJECTION INTRAVENOUS at 22:45

## 2023-01-01 RX ADMIN — SODIUM CHLORIDE, PRESERVATIVE FREE 10 ML: 5 INJECTION INTRAVENOUS at 22:49

## 2023-01-01 RX ADMIN — IPRATROPIUM BROMIDE 0.5 MG: 0.5 SOLUTION RESPIRATORY (INHALATION) at 19:50

## 2023-01-01 RX ADMIN — ACETAMINOPHEN 650 MG: 325 TABLET ORAL at 01:58

## 2023-01-01 RX ADMIN — LEVOTHYROXINE SODIUM 100 MCG: 0.1 TABLET ORAL at 06:53

## 2023-01-01 RX ADMIN — LACTULOSE 20 G: 20 SOLUTION ORAL at 13:04

## 2023-01-01 RX ADMIN — SODIUM CHLORIDE: 900 INJECTION INTRAVENOUS at 13:02

## 2023-01-01 RX ADMIN — FLUOXETINE HYDROCHLORIDE 10 MG: 10 CAPSULE ORAL at 08:33

## 2023-01-01 RX ADMIN — FUROSEMIDE 40 MG: 10 INJECTION, SOLUTION INTRAMUSCULAR; INTRAVENOUS at 18:24

## 2023-01-01 RX ADMIN — SODIUM CHLORIDE 5 MG/HR: 900 INJECTION, SOLUTION INTRAVENOUS at 12:27

## 2023-01-01 RX ADMIN — HALOPERIDOL LACTATE 2 MG: 5 INJECTION, SOLUTION INTRAMUSCULAR at 04:12

## 2023-01-01 RX ADMIN — FUROSEMIDE 80 MG: 10 INJECTION, SOLUTION INTRAMUSCULAR; INTRAVENOUS at 14:52

## 2023-01-01 RX ADMIN — PANTOPRAZOLE SODIUM 40 MG: 40 INJECTION, POWDER, FOR SOLUTION INTRAVENOUS at 22:49

## 2023-01-01 RX ADMIN — LACTULOSE 20 G: 20 SOLUTION ORAL at 14:44

## 2023-01-01 RX ADMIN — LEVOTHYROXINE SODIUM 100 MCG: 0.1 TABLET ORAL at 06:18

## 2023-01-01 RX ADMIN — MIDODRINE HYDROCHLORIDE 10 MG: 5 TABLET ORAL at 10:11

## 2023-01-01 RX ADMIN — RIFAXIMIN 550 MG: 550 TABLET ORAL at 21:58

## 2023-01-01 RX ADMIN — ROSUVASTATIN CALCIUM 5 MG: 5 TABLET, FILM COATED ORAL at 12:13

## 2023-01-01 RX ADMIN — FLUOXETINE HYDROCHLORIDE 10 MG: 10 CAPSULE ORAL at 08:56

## 2023-01-01 RX ADMIN — ALBUTEROL SULFATE 2.5 MG: 2.5 SOLUTION RESPIRATORY (INHALATION) at 23:08

## 2023-01-01 RX ADMIN — SUCRALFATE 1 G: 1 TABLET ORAL at 14:26

## 2023-01-01 RX ADMIN — RIFAXIMIN 550 MG: 550 TABLET ORAL at 12:13

## 2023-01-01 RX ADMIN — IPRATROPIUM BROMIDE 0.5 MG: 0.5 SOLUTION RESPIRATORY (INHALATION) at 21:04

## 2023-01-01 RX ADMIN — FLUOXETINE HYDROCHLORIDE 10 MG: 10 CAPSULE ORAL at 09:58

## 2023-01-01 RX ADMIN — FLUOXETINE HYDROCHLORIDE 10 MG: 10 CAPSULE ORAL at 09:54

## 2023-01-01 RX ADMIN — MONTELUKAST 10 MG: 10 TABLET, FILM COATED ORAL at 23:55

## 2023-01-01 RX ADMIN — FLUOXETINE HYDROCHLORIDE 10 MG: 10 CAPSULE ORAL at 09:04

## 2023-01-01 RX ADMIN — CEFEPIME 1000 MG: 1 INJECTION, POWDER, FOR SOLUTION INTRAMUSCULAR; INTRAVENOUS at 00:49

## 2023-01-01 RX ADMIN — RIFAXIMIN 550 MG: 550 TABLET ORAL at 09:08

## 2023-01-01 RX ADMIN — ENOXAPARIN SODIUM 100 MG: 100 INJECTION SUBCUTANEOUS at 22:48

## 2023-01-01 RX ADMIN — SUCRALFATE 1 G: 1 TABLET ORAL at 04:09

## 2023-01-01 RX ADMIN — ALBUMIN (HUMAN) 25 G: 12.5 INJECTION, SOLUTION INTRAVENOUS at 00:09

## 2023-01-01 RX ADMIN — SODIUM CHLORIDE, PRESERVATIVE FREE 10 ML: 5 INJECTION INTRAVENOUS at 07:57

## 2023-01-01 RX ADMIN — MIDODRINE HYDROCHLORIDE 10 MG: 5 TABLET ORAL at 17:31

## 2023-01-01 RX ADMIN — LACTULOSE 20 G: 20 SOLUTION ORAL at 09:43

## 2023-01-01 RX ADMIN — SUCRALFATE 1 G: 1 TABLET ORAL at 17:30

## 2023-01-01 RX ADMIN — FUROSEMIDE 40 MG: 10 INJECTION, SOLUTION INTRAMUSCULAR; INTRAVENOUS at 18:44

## 2023-01-01 RX ADMIN — OCTREOTIDE ACETATE 100 MCG: 100 INJECTION, SOLUTION INTRAVENOUS; SUBCUTANEOUS at 01:35

## 2023-01-01 RX ADMIN — DILTIAZEM HYDROCHLORIDE 60 MG: 60 TABLET, FILM COATED ORAL at 23:34

## 2023-01-01 RX ADMIN — HYDROXYZINE HYDROCHLORIDE 10 MG: 10 TABLET ORAL at 09:04

## 2023-01-01 RX ADMIN — LACTULOSE 20 G: 20 SOLUTION ORAL at 20:13

## 2023-01-01 RX ADMIN — ROSUVASTATIN CALCIUM 5 MG: 5 TABLET, FILM COATED ORAL at 08:33

## 2023-01-01 RX ADMIN — RIVAROXABAN 15 MG: 15 TABLET, FILM COATED ORAL at 17:30

## 2023-01-01 RX ADMIN — LACTULOSE 20 G: 20 SOLUTION ORAL at 13:02

## 2023-01-01 RX ADMIN — SODIUM CHLORIDE, PRESERVATIVE FREE 10 ML: 5 INJECTION INTRAVENOUS at 09:09

## 2023-01-01 RX ADMIN — ROPINIROLE HYDROCHLORIDE 1 MG: 1 TABLET, FILM COATED ORAL at 20:13

## 2023-01-01 RX ADMIN — VANCOMYCIN HYDROCHLORIDE 1000 MG: 1 INJECTION, POWDER, LYOPHILIZED, FOR SOLUTION INTRAVENOUS at 12:10

## 2023-01-01 RX ADMIN — RIFAXIMIN 550 MG: 550 TABLET ORAL at 09:04

## 2023-01-01 RX ADMIN — PANTOPRAZOLE SODIUM 40 MG: 40 INJECTION, POWDER, FOR SOLUTION INTRAVENOUS at 12:13

## 2023-01-01 RX ADMIN — SUCRALFATE 1 G: 1 TABLET ORAL at 23:30

## 2023-01-01 RX ADMIN — SUCRALFATE 1 G: 1 TABLET ORAL at 05:25

## 2023-01-01 RX ADMIN — WATER 10 ML: 1 INJECTION INTRAMUSCULAR; INTRAVENOUS; SUBCUTANEOUS at 22:30

## 2023-01-01 RX ADMIN — RIFAXIMIN 550 MG: 550 TABLET ORAL at 23:55

## 2023-01-01 RX ADMIN — DILTIAZEM HYDROCHLORIDE 240 MG: 240 CAPSULE, EXTENDED RELEASE ORAL at 09:08

## 2023-01-01 RX ADMIN — SUCRALFATE 1 G: 1 TABLET ORAL at 06:00

## 2023-01-01 RX ADMIN — ROPINIROLE HYDROCHLORIDE 1 MG: 1 TABLET, FILM COATED ORAL at 19:39

## 2023-01-01 RX ADMIN — CEFEPIME 2000 MG: 2 INJECTION, POWDER, FOR SOLUTION INTRAVENOUS at 01:49

## 2023-01-01 RX ADMIN — HYDROMORPHONE HYDROCHLORIDE 0.5 MG: 1 INJECTION, SOLUTION INTRAMUSCULAR; INTRAVENOUS; SUBCUTANEOUS at 20:34

## 2023-01-01 RX ADMIN — SODIUM CHLORIDE, PRESERVATIVE FREE 10 ML: 5 INJECTION INTRAVENOUS at 09:04

## 2023-01-01 RX ADMIN — FLUOXETINE HYDROCHLORIDE 10 MG: 10 CAPSULE ORAL at 09:08

## 2023-01-01 RX ADMIN — SUCRALFATE 1 G: 1 TABLET ORAL at 18:16

## 2023-01-01 RX ADMIN — FUROSEMIDE 80 MG: 10 INJECTION, SOLUTION INTRAMUSCULAR; INTRAVENOUS at 19:39

## 2023-01-01 RX ADMIN — FUROSEMIDE 40 MG: 10 INJECTION, SOLUTION INTRAMUSCULAR; INTRAVENOUS at 08:33

## 2023-01-01 RX ADMIN — MONTELUKAST 10 MG: 10 TABLET, FILM COATED ORAL at 19:39

## 2023-01-01 RX ADMIN — FUROSEMIDE 40 MG: 10 INJECTION, SOLUTION INTRAMUSCULAR; INTRAVENOUS at 10:34

## 2023-01-01 RX ADMIN — MONTELUKAST 10 MG: 10 TABLET, FILM COATED ORAL at 22:29

## 2023-01-01 RX ADMIN — IPRATROPIUM BROMIDE 0.5 MG: 0.5 SOLUTION RESPIRATORY (INHALATION) at 08:13

## 2023-01-01 RX ADMIN — SODIUM CHLORIDE, PRESERVATIVE FREE 5 ML: 5 INJECTION INTRAVENOUS at 09:56

## 2023-01-01 RX ADMIN — OCTREOTIDE ACETATE 100 MCG: 100 INJECTION, SOLUTION INTRAVENOUS; SUBCUTANEOUS at 17:30

## 2023-01-01 RX ADMIN — INSULIN GLARGINE 30 UNITS: 100 INJECTION, SOLUTION SUBCUTANEOUS at 08:49

## 2023-01-01 RX ADMIN — RIFAXIMIN 550 MG: 550 TABLET ORAL at 19:39

## 2023-01-01 RX ADMIN — SUCRALFATE 1 G: 1 TABLET ORAL at 00:09

## 2023-01-01 RX ADMIN — ROPINIROLE HYDROCHLORIDE 1 MG: 1 TABLET, FILM COATED ORAL at 22:51

## 2023-01-01 RX ADMIN — PANTOPRAZOLE SODIUM 40 MG: 40 INJECTION, POWDER, FOR SOLUTION INTRAVENOUS at 09:59

## 2023-01-01 RX ADMIN — OCTREOTIDE ACETATE 100 MCG: 100 INJECTION, SOLUTION INTRAVENOUS; SUBCUTANEOUS at 12:13

## 2023-01-01 RX ADMIN — LACTULOSE 20 G: 20 SOLUTION ORAL at 19:54

## 2023-01-01 RX ADMIN — ROSUVASTATIN CALCIUM 5 MG: 5 TABLET, FILM COATED ORAL at 08:56

## 2023-01-01 RX ADMIN — PANTOPRAZOLE SODIUM 8 MG/HR: 40 INJECTION, POWDER, FOR SOLUTION INTRAVENOUS at 18:49

## 2023-01-01 RX ADMIN — OCTREOTIDE ACETATE 100 MCG: 100 INJECTION, SOLUTION INTRAVENOUS; SUBCUTANEOUS at 01:47

## 2023-01-01 RX ADMIN — IPRATROPIUM BROMIDE 0.5 MG: 0.5 SOLUTION RESPIRATORY (INHALATION) at 07:44

## 2023-01-01 RX ADMIN — SODIUM CHLORIDE: 900 INJECTION INTRAVENOUS at 01:08

## 2023-01-01 RX ADMIN — SODIUM CHLORIDE, PRESERVATIVE FREE 10 ML: 5 INJECTION INTRAVENOUS at 07:49

## 2023-01-01 RX ADMIN — CEFEPIME 2000 MG: 2 INJECTION, POWDER, FOR SOLUTION INTRAVENOUS at 01:45

## 2023-01-01 RX ADMIN — MONTELUKAST 10 MG: 10 TABLET, FILM COATED ORAL at 22:45

## 2023-01-01 RX ADMIN — SODIUM CHLORIDE: 900 INJECTION INTRAVENOUS at 14:22

## 2023-01-01 RX ADMIN — PANTOPRAZOLE SODIUM 40 MG: 40 INJECTION, POWDER, FOR SOLUTION INTRAVENOUS at 08:33

## 2023-01-01 RX ADMIN — OCTREOTIDE ACETATE 100 MCG: 100 INJECTION, SOLUTION INTRAVENOUS; SUBCUTANEOUS at 09:59

## 2023-01-01 RX ADMIN — OCTREOTIDE ACETATE 100 MCG: 100 INJECTION, SOLUTION INTRAVENOUS; SUBCUTANEOUS at 02:07

## 2023-01-01 RX ADMIN — ROSUVASTATIN CALCIUM 5 MG: 5 TABLET, FILM COATED ORAL at 09:08

## 2023-01-01 RX ADMIN — RIFAXIMIN 550 MG: 550 TABLET ORAL at 22:50

## 2023-01-01 RX ADMIN — SODIUM CHLORIDE, PRESERVATIVE FREE 10 ML: 5 INJECTION INTRAVENOUS at 08:33

## 2023-01-01 RX ADMIN — HYDROXYZINE HYDROCHLORIDE 10 MG: 10 TABLET ORAL at 22:17

## 2023-01-01 RX ADMIN — ACETAZOLAMIDE 500 MG: 500 INJECTION, POWDER, LYOPHILIZED, FOR SOLUTION INTRAVENOUS at 15:16

## 2023-01-01 RX ADMIN — LACTULOSE 20 G: 20 SOLUTION ORAL at 09:59

## 2023-01-01 RX ADMIN — SUCRALFATE 1 G: 1 TABLET ORAL at 05:38

## 2023-01-01 RX ADMIN — RIFAXIMIN 550 MG: 550 TABLET ORAL at 22:29

## 2023-01-01 RX ADMIN — ALBUTEROL SULFATE 2.5 MG: 2.5 SOLUTION RESPIRATORY (INHALATION) at 23:45

## 2023-01-01 RX ADMIN — FLUOXETINE HYDROCHLORIDE 10 MG: 10 CAPSULE ORAL at 10:10

## 2023-01-01 RX ADMIN — SUCRALFATE 1 G: 1 TABLET ORAL at 13:01

## 2023-01-01 RX ADMIN — PANTOPRAZOLE SODIUM 8 MG/HR: 40 INJECTION, POWDER, FOR SOLUTION INTRAVENOUS at 01:15

## 2023-01-01 RX ADMIN — INSULIN LISPRO 4 UNITS: 100 INJECTION, SOLUTION INTRAVENOUS; SUBCUTANEOUS at 18:16

## 2023-01-01 RX ADMIN — HALOPERIDOL LACTATE 2 MG: 5 INJECTION, SOLUTION INTRAMUSCULAR at 22:45

## 2023-01-01 RX ADMIN — VANCOMYCIN HYDROCHLORIDE 2000 MG: 5 INJECTION, POWDER, LYOPHILIZED, FOR SOLUTION INTRAVENOUS at 11:22

## 2023-01-01 RX ADMIN — PANTOPRAZOLE SODIUM 40 MG: 40 INJECTION, POWDER, FOR SOLUTION INTRAVENOUS at 22:30

## 2023-01-01 RX ADMIN — ONDANSETRON 4 MG: 4 TABLET, ORALLY DISINTEGRATING ORAL at 01:58

## 2023-01-01 RX ADMIN — SODIUM CHLORIDE, PRESERVATIVE FREE 10 ML: 5 INJECTION INTRAVENOUS at 21:58

## 2023-01-01 RX ADMIN — MIDODRINE HYDROCHLORIDE 10 MG: 5 TABLET ORAL at 20:13

## 2023-01-01 RX ADMIN — HYDROXYZINE HYDROCHLORIDE 10 MG: 10 TABLET ORAL at 22:45

## 2023-01-01 RX ADMIN — LACTULOSE 20 G: 20 SOLUTION ORAL at 08:49

## 2023-01-01 RX ADMIN — OCTREOTIDE ACETATE 100 MCG: 100 INJECTION, SOLUTION INTRAVENOUS; SUBCUTANEOUS at 01:50

## 2023-01-01 RX ADMIN — CEFEPIME 1000 MG: 1 INJECTION, POWDER, FOR SOLUTION INTRAMUSCULAR; INTRAVENOUS at 01:47

## 2023-01-01 RX ADMIN — RIFAXIMIN 550 MG: 550 TABLET ORAL at 10:10

## 2023-01-01 RX ADMIN — OCTREOTIDE ACETATE 100 MCG: 100 INJECTION, SOLUTION INTRAVENOUS; SUBCUTANEOUS at 20:01

## 2023-01-01 RX ADMIN — SODIUM CHLORIDE, PRESERVATIVE FREE 10 ML: 5 INJECTION INTRAVENOUS at 19:55

## 2023-01-01 RX ADMIN — RIVAROXABAN 15 MG: 15 TABLET, FILM COATED ORAL at 16:38

## 2023-01-01 RX ADMIN — MEROPENEM 1000 MG: 1 INJECTION, POWDER, FOR SOLUTION INTRAVENOUS at 10:33

## 2023-01-01 RX ADMIN — OCTREOTIDE ACETATE 100 MCG: 100 INJECTION, SOLUTION INTRAVENOUS; SUBCUTANEOUS at 00:10

## 2023-01-01 RX ADMIN — LEVOTHYROXINE SODIUM 100 MCG: 0.1 TABLET ORAL at 06:38

## 2023-01-01 RX ADMIN — FUROSEMIDE 40 MG: 10 INJECTION, SOLUTION INTRAMUSCULAR; INTRAVENOUS at 14:29

## 2023-01-01 RX ADMIN — HYDROXYZINE HYDROCHLORIDE 10 MG: 10 TABLET ORAL at 04:22

## 2023-01-01 RX ADMIN — SODIUM CHLORIDE: 9 INJECTION, SOLUTION INTRAVENOUS at 22:52

## 2023-01-01 RX ADMIN — RIFAXIMIN 550 MG: 550 TABLET ORAL at 08:49

## 2023-01-01 RX ADMIN — SUCRALFATE 1 G: 1 TABLET ORAL at 06:18

## 2023-01-01 RX ADMIN — SODIUM CHLORIDE, PRESERVATIVE FREE 10 ML: 5 INJECTION INTRAVENOUS at 03:45

## 2023-01-01 RX ADMIN — IPRATROPIUM BROMIDE 0.5 MG: 0.5 SOLUTION RESPIRATORY (INHALATION) at 11:33

## 2023-01-01 RX ADMIN — IPRATROPIUM BROMIDE 0.5 MG: 0.5 SOLUTION RESPIRATORY (INHALATION) at 20:13

## 2023-01-01 RX ADMIN — MIDODRINE HYDROCHLORIDE 10 MG: 5 TABLET ORAL at 09:08

## 2023-01-01 RX ADMIN — IPRATROPIUM BROMIDE 0.5 MG: 0.5 SOLUTION RESPIRATORY (INHALATION) at 07:49

## 2023-01-01 RX ADMIN — DILTIAZEM HYDROCHLORIDE 240 MG: 240 CAPSULE, EXTENDED RELEASE ORAL at 12:04

## 2023-01-01 RX ADMIN — LACTULOSE 20 G: 20 SOLUTION ORAL at 19:39

## 2023-01-01 RX ADMIN — SODIUM CHLORIDE, PRESERVATIVE FREE 5 ML: 5 INJECTION INTRAVENOUS at 21:13

## 2023-01-01 RX ADMIN — MONTELUKAST 10 MG: 10 TABLET, FILM COATED ORAL at 21:58

## 2023-01-01 RX ADMIN — DILTIAZEM HYDROCHLORIDE 240 MG: 240 CAPSULE, EXTENDED RELEASE ORAL at 10:10

## 2023-01-01 RX ADMIN — SUCRALFATE 1 G: 1 TABLET ORAL at 12:04

## 2023-01-01 RX ADMIN — SODIUM CHLORIDE: 900 INJECTION INTRAVENOUS at 01:42

## 2023-01-01 RX ADMIN — ROSUVASTATIN CALCIUM 5 MG: 5 TABLET, FILM COATED ORAL at 10:10

## 2023-01-01 RX ADMIN — MIDODRINE HYDROCHLORIDE 10 MG: 5 TABLET ORAL at 12:12

## 2023-01-01 RX ADMIN — IPRATROPIUM BROMIDE 0.5 MG: 0.5 SOLUTION RESPIRATORY (INHALATION) at 15:29

## 2023-01-01 RX ADMIN — MIDODRINE HYDROCHLORIDE 10 MG: 5 TABLET ORAL at 17:29

## 2023-01-01 RX ADMIN — SODIUM CHLORIDE, PRESERVATIVE FREE 10 ML: 5 INJECTION INTRAVENOUS at 22:34

## 2023-01-01 RX ADMIN — RIFAXIMIN 550 MG: 550 TABLET ORAL at 14:44

## 2023-01-01 RX ADMIN — RIFAXIMIN 550 MG: 550 TABLET ORAL at 19:55

## 2023-01-01 RX ADMIN — PANTOPRAZOLE SODIUM 40 MG: 40 INJECTION, POWDER, FOR SOLUTION INTRAVENOUS at 10:10

## 2023-01-01 RX ADMIN — LACTULOSE 20 G: 20 SOLUTION ORAL at 22:30

## 2023-01-01 RX ADMIN — DILTIAZEM HYDROCHLORIDE 60 MG: 60 TABLET, FILM COATED ORAL at 12:57

## 2023-01-01 RX ADMIN — ROPINIROLE HYDROCHLORIDE 1 MG: 1 TABLET, FILM COATED ORAL at 23:55

## 2023-01-01 RX ADMIN — MIDODRINE HYDROCHLORIDE 10 MG: 5 TABLET ORAL at 09:58

## 2023-01-01 RX ADMIN — RIVAROXABAN 15 MG: 15 TABLET, FILM COATED ORAL at 18:16

## 2023-01-01 RX ADMIN — PANTOPRAZOLE SODIUM 40 MG: 40 INJECTION, POWDER, FOR SOLUTION INTRAVENOUS at 07:56

## 2023-01-01 RX ADMIN — DILTIAZEM HYDROCHLORIDE 60 MG: 60 TABLET, FILM COATED ORAL at 06:53

## 2023-01-01 RX ADMIN — PANTOPRAZOLE SODIUM 40 MG: 40 INJECTION, POWDER, FOR SOLUTION INTRAVENOUS at 09:08

## 2023-01-01 RX ADMIN — PANTOPRAZOLE SODIUM 40 MG: 40 INJECTION, POWDER, FOR SOLUTION INTRAVENOUS at 21:58

## 2023-01-01 RX ADMIN — ALBUMIN (HUMAN) 50 G: 12.5 INJECTION, SOLUTION INTRAVENOUS at 15:51

## 2023-01-01 RX ADMIN — RIFAXIMIN 550 MG: 550 TABLET ORAL at 20:13

## 2023-01-01 RX ADMIN — SUCRALFATE 1 G: 1 TABLET ORAL at 22:51

## 2023-01-01 RX ADMIN — MONTELUKAST 10 MG: 10 TABLET, FILM COATED ORAL at 19:55

## 2023-01-01 RX ADMIN — SUCRALFATE 1 G: 1 TABLET ORAL at 22:49

## 2023-01-01 RX ADMIN — LACTULOSE 20 G: 20 SOLUTION ORAL at 14:52

## 2023-01-01 RX ADMIN — IPRATROPIUM BROMIDE 0.5 MG: 0.5 SOLUTION RESPIRATORY (INHALATION) at 12:28

## 2023-01-01 RX ADMIN — DIAZEPAM 2.5 MG: 5 INJECTION, SOLUTION INTRAMUSCULAR; INTRAVENOUS at 16:06

## 2023-01-01 RX ADMIN — CEFEPIME 2000 MG: 2 INJECTION, POWDER, FOR SOLUTION INTRAVENOUS at 12:27

## 2023-01-01 RX ADMIN — SUCRALFATE 1 G: 1 TABLET ORAL at 12:56

## 2023-01-01 RX ADMIN — OCTREOTIDE ACETATE 100 MCG: 100 INJECTION, SOLUTION INTRAVENOUS; SUBCUTANEOUS at 09:09

## 2023-01-01 RX ADMIN — PANTOPRAZOLE SODIUM 40 MG: 40 INJECTION, POWDER, FOR SOLUTION INTRAVENOUS at 20:13

## 2023-01-01 RX ADMIN — ENOXAPARIN SODIUM 100 MG: 100 INJECTION SUBCUTANEOUS at 21:58

## 2023-01-01 RX ADMIN — ACETAMINOPHEN 650 MG: 325 TABLET ORAL at 23:30

## 2023-01-01 RX ADMIN — SUCRALFATE 1 G: 1 TABLET ORAL at 16:38

## 2023-01-01 RX ADMIN — FUROSEMIDE 80 MG: 10 INJECTION, SOLUTION INTRAMUSCULAR; INTRAVENOUS at 20:12

## 2023-01-01 RX ADMIN — SODIUM CHLORIDE, PRESERVATIVE FREE 10 ML: 5 INJECTION INTRAVENOUS at 12:14

## 2023-01-01 RX ADMIN — ROSUVASTATIN CALCIUM 5 MG: 5 TABLET, FILM COATED ORAL at 09:55

## 2023-01-01 RX ADMIN — HYDROXYZINE HYDROCHLORIDE 10 MG: 10 TABLET ORAL at 22:29

## 2023-01-01 RX ADMIN — FUROSEMIDE 40 MG: 10 INJECTION, SOLUTION INTRAMUSCULAR; INTRAVENOUS at 08:56

## 2023-01-01 RX ADMIN — ROSUVASTATIN CALCIUM 5 MG: 5 TABLET, FILM COATED ORAL at 08:49

## 2023-01-01 RX ADMIN — HYDROXYZINE HYDROCHLORIDE 10 MG: 10 TABLET ORAL at 03:13

## 2023-01-01 RX ADMIN — FLUOXETINE HYDROCHLORIDE 10 MG: 10 CAPSULE ORAL at 12:13

## 2023-01-01 RX ADMIN — SODIUM CHLORIDE, PRESERVATIVE FREE 10 ML: 5 INJECTION INTRAVENOUS at 11:53

## 2023-01-01 RX ADMIN — FLUOXETINE HYDROCHLORIDE 10 MG: 10 CAPSULE ORAL at 08:52

## 2023-01-01 RX ADMIN — RIFAXIMIN 550 MG: 550 TABLET ORAL at 09:59

## 2023-01-01 RX ADMIN — HYDROXYZINE HYDROCHLORIDE 10 MG: 10 TABLET ORAL at 23:30

## 2023-01-01 RX ADMIN — SODIUM CHLORIDE, PRESERVATIVE FREE 10 ML: 5 INJECTION INTRAVENOUS at 02:48

## 2023-01-01 RX ADMIN — OCTREOTIDE ACETATE 100 MCG: 100 INJECTION, SOLUTION INTRAVENOUS; SUBCUTANEOUS at 01:44

## 2023-01-01 RX ADMIN — IPRATROPIUM BROMIDE 0.5 MG: 0.5 SOLUTION RESPIRATORY (INHALATION) at 15:45

## 2023-01-01 RX ADMIN — IPRATROPIUM BROMIDE 0.5 MG: 0.5 SOLUTION RESPIRATORY (INHALATION) at 19:32

## 2023-01-01 RX ADMIN — SODIUM CHLORIDE, PRESERVATIVE FREE 10 ML: 5 INJECTION INTRAVENOUS at 10:00

## 2023-01-01 RX ADMIN — ROSUVASTATIN CALCIUM 5 MG: 5 TABLET, FILM COATED ORAL at 09:04

## 2023-01-01 RX ADMIN — ACETAMINOPHEN 650 MG: 325 TABLET ORAL at 22:45

## 2023-01-01 RX ADMIN — MIDODRINE HYDROCHLORIDE 10 MG: 5 TABLET ORAL at 19:39

## 2023-01-01 RX ADMIN — SUCRALFATE 1 G: 1 TABLET ORAL at 12:00

## 2023-01-01 RX ADMIN — SODIUM CHLORIDE, PRESERVATIVE FREE 10 ML: 5 INJECTION INTRAVENOUS at 20:13

## 2023-01-01 RX ADMIN — LACTULOSE 20 G: 20 SOLUTION ORAL at 09:04

## 2023-01-01 RX ADMIN — PANTOPRAZOLE SODIUM 40 MG: 40 INJECTION, POWDER, FOR SOLUTION INTRAVENOUS at 19:55

## 2023-01-01 RX ADMIN — HYDROMORPHONE HYDROCHLORIDE 0.5 MG: 1 INJECTION, SOLUTION INTRAMUSCULAR; INTRAVENOUS; SUBCUTANEOUS at 21:50

## 2023-01-01 RX ADMIN — FUROSEMIDE 80 MG: 10 INJECTION, SOLUTION INTRAMUSCULAR; INTRAVENOUS at 10:12

## 2023-01-01 RX ADMIN — IPRATROPIUM BROMIDE 0.5 MG: 0.5 SOLUTION RESPIRATORY (INHALATION) at 12:30

## 2023-01-01 RX ADMIN — SODIUM CHLORIDE, PRESERVATIVE FREE 10 ML: 5 INJECTION INTRAVENOUS at 19:39

## 2023-01-01 RX ADMIN — RIFAXIMIN 550 MG: 550 TABLET ORAL at 08:33

## 2023-01-01 RX ADMIN — LEVOTHYROXINE SODIUM 100 MCG: 0.1 TABLET ORAL at 05:56

## 2023-01-01 RX ADMIN — OCTREOTIDE ACETATE 100 MCG: 100 INJECTION, SOLUTION INTRAVENOUS; SUBCUTANEOUS at 17:31

## 2023-01-01 RX ADMIN — PANTOPRAZOLE SODIUM 40 MG: 40 INJECTION, POWDER, FOR SOLUTION INTRAVENOUS at 19:39

## 2023-01-01 RX ADMIN — ENOXAPARIN SODIUM 100 MG: 100 INJECTION SUBCUTANEOUS at 08:32

## 2023-01-01 RX ADMIN — HYDROMORPHONE HYDROCHLORIDE 0.5 MG: 1 INJECTION, SOLUTION INTRAMUSCULAR; INTRAVENOUS; SUBCUTANEOUS at 07:49

## 2023-01-01 RX ADMIN — CEFEPIME 1000 MG: 1 INJECTION, POWDER, FOR SOLUTION INTRAMUSCULAR; INTRAVENOUS at 00:17

## 2023-01-01 RX ADMIN — IPRATROPIUM BROMIDE 0.5 MG: 0.5 SOLUTION RESPIRATORY (INHALATION) at 07:30

## 2023-01-01 RX ADMIN — CEFEPIME 1000 MG: 1 INJECTION, POWDER, FOR SOLUTION INTRAMUSCULAR; INTRAVENOUS at 23:35

## 2023-01-01 RX ADMIN — LEVOTHYROXINE SODIUM 100 MCG: 0.1 TABLET ORAL at 05:25

## 2023-01-01 RX ADMIN — ONDANSETRON 4 MG: 2 INJECTION INTRAMUSCULAR; INTRAVENOUS at 00:00

## 2023-01-01 RX ADMIN — OCTREOTIDE ACETATE 100 MCG: 100 INJECTION, SOLUTION INTRAVENOUS; SUBCUTANEOUS at 10:35

## 2023-01-01 RX ADMIN — ROSUVASTATIN CALCIUM 5 MG: 5 TABLET, FILM COATED ORAL at 09:58

## 2023-01-01 RX ADMIN — IPRATROPIUM BROMIDE 0.5 MG: 0.5 SOLUTION RESPIRATORY (INHALATION) at 19:48

## 2023-01-01 RX ADMIN — OCTREOTIDE ACETATE 100 MCG: 100 INJECTION, SOLUTION INTRAVENOUS; SUBCUTANEOUS at 18:16

## 2023-01-01 RX ADMIN — SUCRALFATE 1 G: 1 TABLET ORAL at 23:56

## 2023-01-01 RX ADMIN — ENOXAPARIN SODIUM 100 MG: 100 INJECTION SUBCUTANEOUS at 09:04

## 2023-01-01 RX ADMIN — Medication 6 MG: at 01:35

## 2023-01-01 RX ADMIN — SUCRALFATE 1 G: 1 TABLET ORAL at 12:29

## 2023-01-01 RX ADMIN — SUCRALFATE 1 G: 1 TABLET ORAL at 23:36

## 2023-01-01 RX ADMIN — SODIUM CHLORIDE, PRESERVATIVE FREE 10 ML: 5 INJECTION INTRAVENOUS at 08:49

## 2023-01-01 RX ADMIN — SUCRALFATE 1 G: 1 TABLET ORAL at 00:11

## 2023-01-01 RX ADMIN — IPRATROPIUM BROMIDE 0.5 MG: 0.5 SOLUTION RESPIRATORY (INHALATION) at 20:16

## 2023-01-01 RX ADMIN — FUROSEMIDE 40 MG: 10 INJECTION, SOLUTION INTRAMUSCULAR; INTRAVENOUS at 10:03

## 2023-01-01 RX ADMIN — LACTULOSE 20 G: 20 SOLUTION ORAL at 14:29

## 2023-01-01 RX ADMIN — SUCRALFATE 1 G: 1 TABLET ORAL at 12:13

## 2023-01-01 RX ADMIN — SPIRONOLACTONE 25 MG: 50 TABLET ORAL at 12:56

## 2023-01-01 RX ADMIN — DILTIAZEM HYDROCHLORIDE 240 MG: 240 CAPSULE, EXTENDED RELEASE ORAL at 09:58

## 2023-01-01 RX ADMIN — SODIUM CHLORIDE, PRESERVATIVE FREE 10 ML: 5 INJECTION INTRAVENOUS at 22:51

## 2023-01-01 RX ADMIN — ACETAMINOPHEN 650 MG: 325 TABLET ORAL at 00:10

## 2023-01-01 RX ADMIN — SUCRALFATE 1 G: 1 TABLET ORAL at 16:59

## 2023-01-01 RX ADMIN — CEFEPIME 2000 MG: 2 INJECTION, POWDER, FOR SOLUTION INTRAVENOUS at 13:04

## 2023-01-01 RX ADMIN — INSULIN GLARGINE 30 UNITS: 100 INJECTION, SOLUTION SUBCUTANEOUS at 09:59

## 2023-01-01 RX ADMIN — HYDROXYZINE HYDROCHLORIDE 10 MG: 10 TABLET ORAL at 03:33

## 2023-01-01 RX ADMIN — ROPINIROLE HYDROCHLORIDE 1 MG: 1 TABLET, FILM COATED ORAL at 01:07

## 2023-01-01 RX ADMIN — SUCRALFATE 1 G: 1 TABLET ORAL at 01:44

## 2023-01-01 RX ADMIN — HYDROXYZINE HYDROCHLORIDE 10 MG: 10 TABLET ORAL at 01:58

## 2023-01-01 RX ADMIN — LACTULOSE 20 G: 20 SOLUTION ORAL at 09:08

## 2023-01-01 RX ADMIN — PANTOPRAZOLE SODIUM 40 MG: 40 INJECTION, POWDER, FOR SOLUTION INTRAVENOUS at 22:50

## 2023-01-01 RX ADMIN — SUCRALFATE 1 G: 1 TABLET ORAL at 18:29

## 2023-01-01 RX ADMIN — LACTULOSE 20 G: 20 SOLUTION ORAL at 21:58

## 2023-01-01 RX ADMIN — MORPHINE SULFATE 1 MG: 2 INJECTION, SOLUTION INTRAMUSCULAR; INTRAVENOUS at 03:45

## 2023-01-01 RX ADMIN — SUCRALFATE 1 G: 1 TABLET ORAL at 06:38

## 2023-01-01 RX ADMIN — ROPINIROLE HYDROCHLORIDE 1 MG: 1 TABLET, FILM COATED ORAL at 21:58

## 2023-01-01 RX ADMIN — HYDROMORPHONE HYDROCHLORIDE 0.5 MG: 1 INJECTION, SOLUTION INTRAMUSCULAR; INTRAVENOUS; SUBCUTANEOUS at 02:45

## 2023-01-01 RX ADMIN — LACTULOSE 20 G: 20 SOLUTION ORAL at 22:49

## 2023-01-01 RX ADMIN — ALBUMIN (HUMAN) 50 G: 12.5 INJECTION, SOLUTION INTRAVENOUS at 16:08

## 2023-01-01 RX ADMIN — IPRATROPIUM BROMIDE 0.5 MG: 0.5 SOLUTION RESPIRATORY (INHALATION) at 11:37

## 2023-01-01 RX ADMIN — INSULIN GLARGINE 30 UNITS: 100 INJECTION, SOLUTION SUBCUTANEOUS at 09:10

## 2023-01-01 RX ADMIN — OCTREOTIDE ACETATE 100 MCG: 100 INJECTION, SOLUTION INTRAVENOUS; SUBCUTANEOUS at 16:38

## 2023-01-01 RX ADMIN — ROPINIROLE HYDROCHLORIDE 1 MG: 1 TABLET, FILM COATED ORAL at 22:17

## 2023-01-01 RX ADMIN — OCTREOTIDE ACETATE 100 MCG: 100 INJECTION, SOLUTION INTRAVENOUS; SUBCUTANEOUS at 17:55

## 2023-01-01 RX ADMIN — PANTOPRAZOLE SODIUM 40 MG: 40 INJECTION, POWDER, FOR SOLUTION INTRAVENOUS at 09:05

## 2023-01-01 RX ADMIN — VANCOMYCIN HYDROCHLORIDE 1500 MG: 5 INJECTION, POWDER, LYOPHILIZED, FOR SOLUTION INTRAVENOUS at 14:23

## 2023-01-01 RX ADMIN — MONTELUKAST 10 MG: 10 TABLET, FILM COATED ORAL at 22:50

## 2023-01-01 RX ADMIN — LEVOTHYROXINE SODIUM 100 MCG: 0.1 TABLET ORAL at 04:09

## 2023-01-01 RX ADMIN — ALBUTEROL SULFATE 2.5 MG: 2.5 SOLUTION RESPIRATORY (INHALATION) at 04:25

## 2023-01-01 RX ADMIN — INSULIN LISPRO 2 UNITS: 100 INJECTION, SOLUTION INTRAVENOUS; SUBCUTANEOUS at 16:58

## 2023-01-01 RX ADMIN — IPRATROPIUM BROMIDE 0.5 MG: 0.5 SOLUTION RESPIRATORY (INHALATION) at 16:24

## 2023-01-01 RX ADMIN — ROPINIROLE HYDROCHLORIDE 1 MG: 1 TABLET, FILM COATED ORAL at 22:29

## 2023-01-01 RX ADMIN — HYDROXYZINE HYDROCHLORIDE 10 MG: 10 TABLET ORAL at 21:44

## 2023-01-01 RX ADMIN — SUCRALFATE 1 G: 1 TABLET ORAL at 17:29

## 2023-01-01 RX ADMIN — PANTOPRAZOLE SODIUM 40 MG: 40 INJECTION, POWDER, FOR SOLUTION INTRAVENOUS at 20:01

## 2023-01-01 RX ADMIN — LEVOTHYROXINE SODIUM 100 MCG: 0.1 TABLET ORAL at 06:00

## 2023-01-01 RX ADMIN — LEVOTHYROXINE SODIUM 100 MCG: 0.1 TABLET ORAL at 05:24

## 2023-01-01 RX ADMIN — ROPINIROLE HYDROCHLORIDE 1 MG: 1 TABLET, FILM COATED ORAL at 22:45

## 2023-01-01 RX ADMIN — ONDANSETRON 4 MG: 4 TABLET, ORALLY DISINTEGRATING ORAL at 19:55

## 2023-01-01 RX ADMIN — SUCRALFATE 1 G: 1 TABLET ORAL at 06:53

## 2023-01-01 RX ADMIN — MONTELUKAST 10 MG: 10 TABLET, FILM COATED ORAL at 20:13

## 2023-01-01 RX ADMIN — PANTOPRAZOLE SODIUM 8 MG/HR: 40 INJECTION, POWDER, FOR SOLUTION INTRAVENOUS at 08:58

## 2023-01-01 RX ADMIN — DILTIAZEM HYDROCHLORIDE 60 MG: 60 TABLET, FILM COATED ORAL at 17:29

## 2023-01-01 RX ADMIN — INSULIN GLARGINE 30 UNITS: 100 INJECTION, SOLUTION SUBCUTANEOUS at 09:05

## 2023-01-01 RX ADMIN — HYDROXYZINE HYDROCHLORIDE 10 MG: 10 TABLET ORAL at 16:33

## 2023-01-01 RX ADMIN — ALBUMIN (HUMAN) 25 G: 12.5 INJECTION, SOLUTION INTRAVENOUS at 08:52

## 2023-01-01 RX ADMIN — ALBUMIN (HUMAN) 25 G: 12.5 INJECTION, SOLUTION INTRAVENOUS at 17:45

## 2023-01-01 RX ADMIN — FUROSEMIDE 80 MG: 10 INJECTION, SOLUTION INTRAMUSCULAR; INTRAVENOUS at 14:27

## 2023-01-01 RX ADMIN — SODIUM CHLORIDE 25 ML: 900 INJECTION INTRAVENOUS at 10:33

## 2023-01-01 RX ADMIN — MONTELUKAST 10 MG: 10 TABLET, FILM COATED ORAL at 22:17

## 2023-01-01 RX ADMIN — LEVOTHYROXINE SODIUM 100 MCG: 0.1 TABLET ORAL at 05:38

## 2023-01-01 RX ADMIN — HYDROXYZINE HYDROCHLORIDE 10 MG: 10 TABLET ORAL at 14:52

## 2023-01-01 RX ADMIN — PANTOPRAZOLE SODIUM 8 MG/HR: 40 INJECTION, POWDER, FOR SOLUTION INTRAVENOUS at 05:58

## 2023-01-01 ASSESSMENT — PAIN DESCRIPTION - LOCATION
LOCATION: GENERALIZED
LOCATION: GENERALIZED
LOCATION: LEG
LOCATION: GENERALIZED
LOCATION: HEAD
LOCATION: GENERALIZED

## 2023-01-01 ASSESSMENT — PAIN DESCRIPTION - ORIENTATION
ORIENTATION: RIGHT;LEFT
ORIENTATION: POSTERIOR

## 2023-01-01 ASSESSMENT — PAIN SCALES - WONG BAKER
WONGBAKER_NUMERICALRESPONSE: 0
WONGBAKER_NUMERICALRESPONSE: 6
WONGBAKER_NUMERICALRESPONSE: 6
WONGBAKER_NUMERICALRESPONSE: 4
WONGBAKER_NUMERICALRESPONSE: 4
WONGBAKER_NUMERICALRESPONSE: 2
WONGBAKER_NUMERICALRESPONSE: 0
WONGBAKER_NUMERICALRESPONSE: 4
WONGBAKER_NUMERICALRESPONSE: 6
WONGBAKER_NUMERICALRESPONSE: 0
WONGBAKER_NUMERICALRESPONSE: 6
WONGBAKER_NUMERICALRESPONSE: 4
WONGBAKER_NUMERICALRESPONSE: 0
WONGBAKER_NUMERICALRESPONSE: 4

## 2023-01-01 ASSESSMENT — PAIN SCALES - GENERAL
PAINLEVEL_OUTOF10: 2
PAINLEVEL_OUTOF10: 8
PAINLEVEL_OUTOF10: 8
PAINLEVEL_OUTOF10: 6
PAINLEVEL_OUTOF10: 0
PAINLEVEL_OUTOF10: 4
PAINLEVEL_OUTOF10: 0
PAINLEVEL_OUTOF10: 4
PAINLEVEL_OUTOF10: 0
PAINLEVEL_OUTOF10: 0

## 2023-01-01 ASSESSMENT — ENCOUNTER SYMPTOMS: SHORTNESS OF BREATH: 1

## 2023-01-01 ASSESSMENT — PAIN DESCRIPTION - DESCRIPTORS
DESCRIPTORS: DISCOMFORT
DESCRIPTORS: ACHING

## 2023-01-01 ASSESSMENT — PAIN - FUNCTIONAL ASSESSMENT: PAIN_FUNCTIONAL_ASSESSMENT: PREVENTS OR INTERFERES SOME ACTIVE ACTIVITIES AND ADLS

## 2023-01-23 ENCOUNTER — APPOINTMENT (OUTPATIENT)
Dept: GENERAL RADIOLOGY | Age: 73
End: 2023-01-23
Payer: COMMERCIAL

## 2023-01-23 ENCOUNTER — HOSPITAL ENCOUNTER (EMERGENCY)
Age: 73
Discharge: HOME | End: 2023-01-23
Attending: EMERGENCY MEDICINE
Payer: COMMERCIAL

## 2023-01-23 VITALS
SYSTOLIC BLOOD PRESSURE: 143 MMHG | RESPIRATION RATE: 22 BRPM | TEMPERATURE: 100.8 F | WEIGHT: 217 LBS | DIASTOLIC BLOOD PRESSURE: 113 MMHG | HEART RATE: 98 BPM | BODY MASS INDEX: 38.45 KG/M2 | OXYGEN SATURATION: 96 % | HEIGHT: 63 IN

## 2023-01-23 DIAGNOSIS — N39.0 URINARY TRACT INFECTION WITHOUT HEMATURIA, SITE UNSPECIFIED: Primary | ICD-10-CM

## 2023-01-23 LAB
ALBUMIN SERPL-MCNC: 3.6 GM/DL (ref 3.4–5)
ALP BLD-CCNC: 155 IU/L (ref 40–129)
ALT SERPL-CCNC: 26 U/L (ref 10–40)
ANION GAP SERPL CALCULATED.3IONS-SCNC: 11 MMOL/L (ref 4–16)
AST SERPL-CCNC: 51 IU/L (ref 15–37)
BACTERIA: NORMAL /HPF
BASOPHILS ABSOLUTE: 0.1 K/CU MM
BASOPHILS RELATIVE PERCENT: 0.5 % (ref 0–1)
BILIRUB SERPL-MCNC: 1 MG/DL (ref 0–1)
BILIRUBIN URINE: NEGATIVE
BLOOD, URINE: NORMAL
BUN BLDV-MCNC: 22 MG/DL (ref 6–23)
CALCIUM SERPL-MCNC: 10 MG/DL (ref 8.3–10.6)
CAST TYPE: NORMAL /HPF
CHLORIDE BLD-SCNC: 99 MMOL/L (ref 99–110)
CLARITY: CLEAR
CO2: 29 MMOL/L (ref 21–32)
COLOR: YELLOW
CREAT SERPL-MCNC: 0.9 MG/DL (ref 0.6–1.1)
CRYSTAL TYPE: NEGATIVE /HPF
DIFFERENTIAL TYPE: ABNORMAL
EOSINOPHILS ABSOLUTE: 0.3 K/CU MM
EOSINOPHILS RELATIVE PERCENT: 2.4 % (ref 0–3)
EPITHELIAL CELLS, UA: 5 /HPF
GFR SERPL CREATININE-BSD FRML MDRD: >60 ML/MIN/1.73M2
GLUCOSE BLD-MCNC: 88 MG/DL (ref 70–99)
GLUCOSE, URINE: >1000 MG/DL
HCT VFR BLD CALC: 47 % (ref 37–47)
HEMOGLOBIN: 15.2 GM/DL (ref 12.5–16)
IMMATURE NEUTROPHIL %: 0.2 % (ref 0–0.43)
KETONES, URINE: NEGATIVE MG/DL
LACTIC ACID, SEPSIS: 1.5 MMOL/L (ref 0.5–1.9)
LACTIC ACID, SEPSIS: 2.8 MMOL/L (ref 0.5–1.9)
LEUKOCYTE ESTERASE, URINE: NEGATIVE
LYMPHOCYTES ABSOLUTE: 0.6 K/CU MM
LYMPHOCYTES RELATIVE PERCENT: 4.5 % (ref 24–44)
MCH RBC QN AUTO: 32.7 PG (ref 27–31)
MCHC RBC AUTO-ENTMCNC: 32.3 % (ref 32–36)
MCV RBC AUTO: 101.1 FL (ref 78–100)
MONOCYTES ABSOLUTE: 0.6 K/CU MM
MONOCYTES RELATIVE PERCENT: 4.9 % (ref 0–4)
NITRITE URINE, QUANTITATIVE: POSITIVE
PDW BLD-RTO: 13.6 % (ref 11.7–14.9)
PH, URINE: 6.5
PLATELET # BLD: 170 K/CU MM (ref 140–440)
PMV BLD AUTO: 9.7 FL (ref 7.5–11.1)
POTASSIUM SERPL-SCNC: 5 MMOL/L (ref 3.5–5.1)
PROTEIN UA: NEGATIVE MG/DL
RAPID INFLUENZA  B AGN: NEGATIVE
RAPID INFLUENZA A AGN: NEGATIVE
RBC # BLD: 4.65 M/CU MM (ref 4.2–5.4)
RBC URINE: 5 /HPF
SARS-COV-2, NAAT: NOT DETECTED
SEGMENTED NEUTROPHILS ABSOLUTE COUNT: 10.9 K/CU MM
SEGMENTED NEUTROPHILS RELATIVE PERCENT: 87.5 % (ref 36–66)
SODIUM BLD-SCNC: 139 MMOL/L (ref 135–145)
SOURCE: NORMAL
SPECIFIC GRAVITY UA: 1.01
TOTAL IMMATURE NEUTOROPHIL: 0.03 K/CU MM
TOTAL PROTEIN: 7.9 GM/DL (ref 6.4–8.2)
UROBILINOGEN, URINE: 0.2 MG/DL
WBC # BLD: 12.5 K/CU MM (ref 4–10.5)
WBC UA: 12 /HPF

## 2023-01-23 PROCEDURE — 87186 SC STD MICRODIL/AGAR DIL: CPT

## 2023-01-23 PROCEDURE — 87635 SARS-COV-2 COVID-19 AMP PRB: CPT

## 2023-01-23 PROCEDURE — 99284 EMERGENCY DEPT VISIT MOD MDM: CPT

## 2023-01-23 PROCEDURE — 2580000003 HC RX 258: Performed by: EMERGENCY MEDICINE

## 2023-01-23 PROCEDURE — 96365 THER/PROPH/DIAG IV INF INIT: CPT

## 2023-01-23 PROCEDURE — 6370000000 HC RX 637 (ALT 250 FOR IP): Performed by: EMERGENCY MEDICINE

## 2023-01-23 PROCEDURE — 96366 THER/PROPH/DIAG IV INF ADDON: CPT

## 2023-01-23 PROCEDURE — 80053 COMPREHEN METABOLIC PANEL: CPT

## 2023-01-23 PROCEDURE — 6360000002 HC RX W HCPCS: Performed by: EMERGENCY MEDICINE

## 2023-01-23 PROCEDURE — 87086 URINE CULTURE/COLONY COUNT: CPT

## 2023-01-23 PROCEDURE — 83605 ASSAY OF LACTIC ACID: CPT

## 2023-01-23 PROCEDURE — 87040 BLOOD CULTURE FOR BACTERIA: CPT

## 2023-01-23 PROCEDURE — 87804 INFLUENZA ASSAY W/OPTIC: CPT

## 2023-01-23 PROCEDURE — 81001 URINALYSIS AUTO W/SCOPE: CPT

## 2023-01-23 PROCEDURE — 71045 X-RAY EXAM CHEST 1 VIEW: CPT

## 2023-01-23 PROCEDURE — 85025 COMPLETE CBC W/AUTO DIFF WBC: CPT

## 2023-01-23 PROCEDURE — 87077 CULTURE AEROBIC IDENTIFY: CPT

## 2023-01-23 RX ORDER — SODIUM CHLORIDE 9 MG/ML
INJECTION, SOLUTION INTRAVENOUS PRN
Status: DISCONTINUED | OUTPATIENT
Start: 2023-01-23 | End: 2023-01-23 | Stop reason: HOSPADM

## 2023-01-23 RX ORDER — SODIUM CHLORIDE 0.9 % (FLUSH) 0.9 %
5-40 SYRINGE (ML) INJECTION PRN
Status: DISCONTINUED | OUTPATIENT
Start: 2023-01-23 | End: 2023-01-23 | Stop reason: HOSPADM

## 2023-01-23 RX ORDER — CEPHALEXIN 500 MG/1
500 CAPSULE ORAL 3 TIMES DAILY
Qty: 30 CAPSULE | Refills: 0 | Status: SHIPPED | OUTPATIENT
Start: 2023-01-23 | End: 2023-02-02

## 2023-01-23 RX ORDER — SODIUM CHLORIDE 0.9 % (FLUSH) 0.9 %
5-40 SYRINGE (ML) INJECTION EVERY 12 HOURS SCHEDULED
Status: DISCONTINUED | OUTPATIENT
Start: 2023-01-23 | End: 2023-01-23 | Stop reason: HOSPADM

## 2023-01-23 RX ORDER — SODIUM CHLORIDE, SODIUM LACTATE, POTASSIUM CHLORIDE, AND CALCIUM CHLORIDE .6; .31; .03; .02 G/100ML; G/100ML; G/100ML; G/100ML
30 INJECTION, SOLUTION INTRAVENOUS ONCE
Status: COMPLETED | OUTPATIENT
Start: 2023-01-23 | End: 2023-01-23

## 2023-01-23 RX ORDER — ACETAMINOPHEN 500 MG
1000 TABLET ORAL ONCE
Status: COMPLETED | OUTPATIENT
Start: 2023-01-23 | End: 2023-01-23

## 2023-01-23 RX ADMIN — ACETAMINOPHEN 1000 MG: 500 TABLET, FILM COATED ORAL at 16:50

## 2023-01-23 RX ADMIN — SODIUM CHLORIDE, POTASSIUM CHLORIDE, SODIUM LACTATE AND CALCIUM CHLORIDE 2952 ML: 600; 310; 30; 20 INJECTION, SOLUTION INTRAVENOUS at 17:55

## 2023-01-23 RX ADMIN — CEFTRIAXONE SODIUM 1000 MG: 1 INJECTION, POWDER, FOR SOLUTION INTRAMUSCULAR; INTRAVENOUS at 19:20

## 2023-01-23 ASSESSMENT — LIFESTYLE VARIABLES: HOW OFTEN DO YOU HAVE A DRINK CONTAINING ALCOHOL: NEVER

## 2023-01-23 ASSESSMENT — PAIN DESCRIPTION - DESCRIPTORS: DESCRIPTORS: CRAMPING

## 2023-01-23 ASSESSMENT — PAIN DESCRIPTION - FREQUENCY: FREQUENCY: CONTINUOUS

## 2023-01-23 ASSESSMENT — PAIN SCALES - GENERAL
PAINLEVEL_OUTOF10: 7

## 2023-01-23 ASSESSMENT — PAIN DESCRIPTION - PAIN TYPE: TYPE: ACUTE PAIN

## 2023-01-23 ASSESSMENT — ENCOUNTER SYMPTOMS: RESPIRATORY NEGATIVE: 1

## 2023-01-23 ASSESSMENT — PAIN DESCRIPTION - LOCATION: LOCATION: BACK;LEG

## 2023-01-23 ASSESSMENT — PAIN - FUNCTIONAL ASSESSMENT: PAIN_FUNCTIONAL_ASSESSMENT: 0-10
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
## 2023-01-23 NOTE — ED NOTES
Lactic Acid : 2.8 reported by  - Dr. Lerma Edu notified.      Sandra Hatfield RN  01/23/23 Alejandro Manjarrez

## 2023-01-24 NOTE — ED PROVIDER NOTES
Emergency Department Encounter  Location: [address]    Patient: Galo Louie  MRN: [id number]  : 1950  Date of evaluation: 2023  ED Provider: Oniel Hernadez MD    1900:p.mAlex Louie was checked out to me by Dr. Fahad Aparicio. Please see his/her initial documentation for details of the patient's initial ED presentation, physical exam and completed studies. In brief, Galo Louie is a 67 y.o. female that presented to the emergency department with chills and fever.     I have reviewed and interpreted all of the currently available lab results and diagnostics from this visit:  Results for orders placed or performed during the hospital encounter of 23   COVID-19, Rapid    Specimen: Nasopharyngeal   Result Value Ref Range    Source NASOPHARYNGEAL SWAB     SARS-CoV-2, NAAT NOT DETECTED NOT DETECTED   Rapid Flu Swab    Specimen: Nasopharyngeal   Result Value Ref Range    Rapid Influenza A Ag NEGATIVE NEGATIVE    Rapid Influenza B Ag NEGATIVE NEGATIVE   Lactate, Sepsis   Result Value Ref Range    Lactic Acid, Sepsis 2.8 (HH) 0.5 - 1.9 mMOL/L   Lactate, Sepsis   Result Value Ref Range    Lactic Acid, Sepsis 1.5 0.5 - 1.9 mMOL/L   Urinalysis   Result Value Ref Range    Color, UA YELLOW     Clarity, UA CLEAR     Glucose, Urine >1,000 MG/DL    Bilirubin Urine NEGATIVE     Ketones, Urine NEGATIVE MG/DL    Specific Gravity, UA 1.010     Blood, Urine TRACE     pH, Urine 6.5     Protein, UA NEGATIVE MG/DL    Urobilinogen, Urine 0.2 MG/DL    Nitrite Urine, Quantitative POSITIVE     Leukocyte Esterase, Urine NEGATIVE    CBC with Auto Differential   Result Value Ref Range    WBC 12.5 (H) 4.0 - 10.5 K/CU MM    RBC 4.65 4.2 - 5.4 M/CU MM    Hemoglobin 15.2 12.5 - 16.0 GM/DL    Hematocrit 47.0 37 - 47 %    .1 (H) 78 - 100 FL    MCH 32.7 (H) 27 - 31 PG    MCHC 32.3 32.0 - 36.0 %    RDW 13.6 11.7 - 14.9 %    Platelets 545 856 - 887 K/CU MM    MPV 9.7 7.5 - 11.1 FL    Differential Type AUTOMATED DIFFERENTIAL     Segs Relative 87.5 (H) 36 - 66 %    Lymphocytes % 4.5 (L) 24 - 44 %    Monocytes % 4.9 (H) 0 - 4 %    Eosinophils % 2.4 0 - 3 %    Basophils % 0.5 0 - 1 %    Segs Absolute 10.9 K/CU MM    Lymphocytes Absolute 0.6 K/CU MM    Monocytes Absolute 0.6 K/CU MM    Eosinophils Absolute 0.3 K/CU MM    Basophils Absolute 0.1 K/CU MM    Immature Neutrophil % 0.2 0 - 0.43 %    Total Immature Neutrophil 0.03 K/CU MM   Comprehensive Metabolic Panel w/ Reflex to MG   Result Value Ref Range    Sodium 139 135 - 145 MMOL/L    Potassium 5.0 3.5 - 5.1 MMOL/L    Chloride 99 99 - 110 mMol/L    CO2 29 21 - 32 MMOL/L    BUN 22 6 - 23 MG/DL    Creatinine 0.9 0.6 - 1.1 MG/DL    Est, Glom Filt Rate >60 >60 mL/min/1.73m2    Glucose 88 70 - 99 MG/DL    Calcium 10.0 8.3 - 10.6 MG/DL    Albumin 3.6 3.4 - 5.0 GM/DL    Total Protein 7.9 6.4 - 8.2 GM/DL    Total Bilirubin 1.0 0.0 - 1.0 MG/DL    ALT 26 10 - 40 U/L    AST 51 (H) 15 - 37 IU/L    Alkaline Phosphatase 155 (H) 40 - 129 IU/L    Anion Gap 11 4 - 16   Microscopic Urinalysis   Result Value Ref Range    RBC, UA 5 /HPF    WBC, UA 12 /HPF    Epithelial Cells, UA 5 /HPF    Cast Type NO CAST FORMS SEEN /HPF    Bacteria, UA OCCASIONAL /HPF    Crystal Type NEGATIVE /HPF     XR CHEST PORTABLE    Result Date: 1/23/2023  EXAMINATION: ONE XRAY VIEW OF THE CHEST 1/23/2023 5:01 pm COMPARISON: Chest radiograph 09/19/2022. HISTORY: ORDERING SYSTEM PROVIDED HISTORY: rigors; ? sepsis TECHNOLOGIST PROVIDED HISTORY: Reason for exam:->rigors; ? sepsis Additional signs and symptoms: rigors; ? sepsis FINDINGS: The cardiomediastinal silhouette is unchanged. Unchanged asymmetric elevation of the right hemidiaphragm. No pneumothorax, vascular congestion, consolidation, or pleural effusion is identified. No acute osseous abnormality. No acute process.       Final ED Course and MDM:    CC/HPI Summary, DDx, ED Course, and Reassessment: Patient presents as above with fever and chills. Urinalysis suspicious for acute cystitis. Patient does not complain of any severe abdominal or flank pain. COVID and flu testing negative here. Urine culture sent patient started on Rocephin. Metabolic work-up largely unremarkable for any significant electrolyte disturbances or renal insufficiency. Initial lactate was 2.8. Following IV fluids and recheck, lactate is normal.  Patient remains hemodynamically stable and has improvement in symptoms on reevaluation. Appropriate for discharge home with oral antibiotic therapy and return for any new or worsening symptoms. Is this patient to be included in the SEP-1 Core Measure due to severe sepsis or septic shock?    No   Exclusion criteria - the patient is NOT to be included for SEP-1 Core Measure due to:  May have criteria for sepsis, but does not meet criteria for severe sepsis or septic shock      History from : Patient    Limitations to history : None    Patient was given the following medications:  Medications   sodium chloride flush 0.9 % injection 5-40 mL (has no administration in time range)   sodium chloride flush 0.9 % injection 5-40 mL (has no administration in time range)   0.9 % sodium chloride infusion (has no administration in time range)   lactated ringers bolus (0 mLs IntraVENous Stopped 1/23/23 2114)   acetaminophen (TYLENOL) tablet 1,000 mg (1,000 mg Oral Given 1/23/23 1650)   cefTRIAXone (ROCEPHIN) 1,000 mg in dextrose 5 % 50 mL IVPB mini-bag (0 mg IntraVENous Stopped 1/23/23 2113)       Independent Imaging Interpretation by me:     EKG (if obtained): (All EKG's are interpreted by myself in the absence of a cardiologist)     Chronic conditions affecting care: DM, HTN    Discussion with Other Profesionals : None    Social Determinants : None    Admission for observation considered but given hemodynamic stability, improving labs, patient's controlled symptoms, she is appropriate for discharge on antibiotics and return for any worsening symptoms. I am the Primary Clinician of Record. Final Impression      1.  Urinary tract infection without hematuria, site unspecified        DISPOSITION Decision To Discharge 01/23/2023 09:09:45 PM     (Please note that portions of this note may have been completed with a voice recognition program. Efforts were made to edit the dictations but occasionally words are mis-transcribed.)    Kendrick Gonsalves MD  [facility] Steven Greco MD  01/23/23 9874

## 2023-01-24 NOTE — ED NOTES
Pt discharged with instructions and prescriptions. Discussed when and how to take medications and pt stated understanding.   Pt wheeled out of the [id number], RN  01/23/23 2120

## 2023-01-26 LAB
CULTURE: ABNORMAL
CULTURE: ABNORMAL
Lab: ABNORMAL
SPECIMEN: ABNORMAL

## 2023-01-28 LAB
CULTURE: NORMAL
Lab: NORMAL
SPECIMEN: NORMAL

## 2023-02-25 ENCOUNTER — HOSPITAL ENCOUNTER (OUTPATIENT)
Age: 73
Discharge: HOME | End: 2023-02-25
Payer: MEDICARE

## 2023-02-25 LAB
ALBUMIN SERPL-MCNC: 3.4 GM/DL (ref 3.4–5)
ALP BLD-CCNC: 139 IU/L (ref 40–129)
ALT SERPL-CCNC: 26 U/L (ref 10–40)
ANION GAP SERPL CALCULATED.3IONS-SCNC: 7 MMOL/L (ref 4–16)
AST SERPL-CCNC: 52 IU/L (ref 15–37)
BASOPHILS ABSOLUTE: 0.1 K/CU MM
BASOPHILS RELATIVE PERCENT: 1.4 % (ref 0–1)
BILIRUB SERPL-MCNC: 0.8 MG/DL (ref 0–1)
BUN SERPL-MCNC: 29 MG/DL (ref 6–23)
CALCIUM SERPL-MCNC: 9.6 MG/DL (ref 8.3–10.6)
CHLORIDE BLD-SCNC: 105 MMOL/L (ref 99–110)
CHOLEST SERPL-MCNC: 149 MG/DL
CO2: 30 MMOL/L (ref 21–32)
CREAT SERPL-MCNC: 0.8 MG/DL (ref 0.6–1.1)
CREAT UR-MCNC: 62.8 MG/DL (ref 28–217)
DIFFERENTIAL TYPE: ABNORMAL
EOSINOPHILS ABSOLUTE: 0.4 K/CU MM
EOSINOPHILS RELATIVE PERCENT: 6.7 % (ref 0–3)
ESTIMATED AVERAGE GLUCOSE: 157 MG/DL
GFR SERPL CREATININE-BSD FRML MDRD: >60 ML/MIN/1.73M2
GLUCOSE SERPL-MCNC: 76 MG/DL (ref 70–99)
HBA1C MFR BLD: 7.1 % (ref 4.2–6.3)
HCT VFR BLD CALC: 43.1 % (ref 37–47)
HDLC SERPL-MCNC: 53 MG/DL
HEMOGLOBIN: 13.6 GM/DL (ref 12.5–16)
IMMATURE NEUTROPHIL %: 0.5 % (ref 0–0.43)
LDLC SERPL CALC-MCNC: 82 MG/DL
LYMPHOCYTES ABSOLUTE: 1.3 K/CU MM
LYMPHOCYTES RELATIVE PERCENT: 21.3 % (ref 24–44)
MCH RBC QN AUTO: 31.9 PG (ref 27–31)
MCHC RBC AUTO-ENTMCNC: 31.6 % (ref 32–36)
MCV RBC AUTO: 100.9 FL (ref 78–100)
MICROALBUMIN 24H UR-MCNC: 1.6 MG/DL
MICROALBUMIN/CREAT UR-RTO: 25.5 MG/G CREAT (ref 0–30)
MONOCYTES ABSOLUTE: 0.5 K/CU MM
MONOCYTES RELATIVE PERCENT: 8.3 % (ref 0–4)
PDW BLD-RTO: 14.5 % (ref 11.7–14.9)
PLATELET # BLD: 175 K/CU MM (ref 140–440)
PMV BLD AUTO: 9.9 FL (ref 7.5–11.1)
POTASSIUM SERPL-SCNC: 5 MMOL/L (ref 3.5–5.1)
RBC # BLD: 4.27 M/CU MM (ref 4.2–5.4)
SEGMENTED NEUTROPHILS ABSOLUTE COUNT: 3.9 K/CU MM
SEGMENTED NEUTROPHILS RELATIVE PERCENT: 61.8 % (ref 36–66)
SODIUM BLD-SCNC: 142 MMOL/L (ref 135–145)
TOTAL IMMATURE NEUTOROPHIL: 0.03 K/CU MM
TOTAL PROTEIN: 7.5 GM/DL (ref 6.4–8.2)
TRIGL SERPL-MCNC: 71 MG/DL
WBC # BLD: 6.3 K/CU MM (ref 4–10.5)

## 2023-02-25 PROCEDURE — 80061 LIPID PANEL: CPT

## 2023-02-25 PROCEDURE — 82043 UR ALBUMIN QUANTITATIVE: CPT

## 2023-02-25 PROCEDURE — 85025 COMPLETE CBC W/AUTO DIFF WBC: CPT

## 2023-02-25 PROCEDURE — 83036 HEMOGLOBIN GLYCOSYLATED A1C: CPT

## 2023-02-25 PROCEDURE — 80053 COMPREHEN METABOLIC PANEL: CPT

## 2023-02-25 PROCEDURE — 36415 COLL VENOUS BLD VENIPUNCTURE: CPT

## 2023-02-25 PROCEDURE — 82570 ASSAY OF URINE CREATININE: CPT

## 2023-03-23 ENCOUNTER — HOSPITAL ENCOUNTER (EMERGENCY)
Age: 73
Discharge: HOME | End: 2023-03-23
Attending: STUDENT IN AN ORGANIZED HEALTH CARE EDUCATION/TRAINING PROGRAM
Payer: COMMERCIAL

## 2023-03-23 ENCOUNTER — APPOINTMENT (OUTPATIENT)
Dept: GENERAL RADIOLOGY | Age: 73
End: 2023-03-23
Payer: COMMERCIAL

## 2023-03-23 ENCOUNTER — APPOINTMENT (OUTPATIENT)
Dept: CT IMAGING | Age: 73
End: 2023-03-23
Payer: COMMERCIAL

## 2023-03-23 VITALS
HEIGHT: 63 IN | BODY MASS INDEX: 38.8 KG/M2 | DIASTOLIC BLOOD PRESSURE: 64 MMHG | WEIGHT: 219 LBS | OXYGEN SATURATION: 96 % | HEART RATE: 73 BPM | TEMPERATURE: 98.4 F | RESPIRATION RATE: 14 BRPM | SYSTOLIC BLOOD PRESSURE: 130 MMHG

## 2023-03-23 DIAGNOSIS — R07.89 ATYPICAL CHEST PAIN: Primary | ICD-10-CM

## 2023-03-23 LAB
ANION GAP SERPL CALCULATED.3IONS-SCNC: 8 MMOL/L (ref 4–16)
BASOPHILS ABSOLUTE: 0 K/CU MM
BASOPHILS RELATIVE PERCENT: 0.5 % (ref 0–1)
BUN SERPL-MCNC: 20 MG/DL (ref 6–23)
CALCIUM SERPL-MCNC: 9 MG/DL (ref 8.3–10.6)
CHLORIDE BLD-SCNC: 103 MMOL/L (ref 99–110)
CO2: 29 MMOL/L (ref 21–32)
CREAT SERPL-MCNC: 0.8 MG/DL (ref 0.6–1.1)
DIFFERENTIAL TYPE: ABNORMAL
EOSINOPHILS ABSOLUTE: 0.4 K/CU MM
EOSINOPHILS RELATIVE PERCENT: 4.7 % (ref 0–3)
GFR SERPL CREATININE-BSD FRML MDRD: >60 ML/MIN/1.73M2
GLUCOSE SERPL-MCNC: 101 MG/DL (ref 70–99)
HCT VFR BLD CALC: 44.9 % (ref 37–47)
HEMOGLOBIN: 14 GM/DL (ref 12.5–16)
IMMATURE NEUTROPHIL %: 0.4 % (ref 0–0.43)
LYMPHOCYTES ABSOLUTE: 0.6 K/CU MM
LYMPHOCYTES RELATIVE PERCENT: 7.5 % (ref 24–44)
MCH RBC QN AUTO: 32 PG (ref 27–31)
MCHC RBC AUTO-ENTMCNC: 31.2 % (ref 32–36)
MCV RBC AUTO: 102.7 FL (ref 78–100)
MONOCYTES ABSOLUTE: 0.5 K/CU MM
MONOCYTES RELATIVE PERCENT: 6.2 % (ref 0–4)
PDW BLD-RTO: 14.2 % (ref 11.7–14.9)
PLATELET # BLD: 129 K/CU MM (ref 140–440)
PMV BLD AUTO: 9.6 FL (ref 7.5–11.1)
POTASSIUM SERPL-SCNC: 4.3 MMOL/L (ref 3.5–5.1)
PRO-BNP: 238.4 PG/ML
RBC # BLD: 4.37 M/CU MM (ref 4.2–5.4)
SEGMENTED NEUTROPHILS ABSOLUTE COUNT: 6.5 K/CU MM
SEGMENTED NEUTROPHILS RELATIVE PERCENT: 80.7 % (ref 36–66)
SODIUM BLD-SCNC: 140 MMOL/L (ref 135–145)
TOTAL IMMATURE NEUTOROPHIL: 0.03 K/CU MM
TROPONIN T: <0.01 NG/ML
WBC # BLD: 8.1 K/CU MM (ref 4–10.5)

## 2023-03-23 PROCEDURE — 99285 EMERGENCY DEPT VISIT HI MDM: CPT

## 2023-03-23 PROCEDURE — 85025 COMPLETE CBC W/AUTO DIFF WBC: CPT

## 2023-03-23 PROCEDURE — 83880 ASSAY OF NATRIURETIC PEPTIDE: CPT

## 2023-03-23 PROCEDURE — 71045 X-RAY EXAM CHEST 1 VIEW: CPT

## 2023-03-23 PROCEDURE — 84484 ASSAY OF TROPONIN QUANT: CPT

## 2023-03-23 PROCEDURE — 74174 CTA ABD&PLVS W/CONTRAST: CPT

## 2023-03-23 PROCEDURE — 93005 ELECTROCARDIOGRAM TRACING: CPT | Performed by: STUDENT IN AN ORGANIZED HEALTH CARE EDUCATION/TRAINING PROGRAM

## 2023-03-23 PROCEDURE — 80048 BASIC METABOLIC PNL TOTAL CA: CPT

## 2023-03-23 PROCEDURE — 6360000004 HC RX CONTRAST MEDICATION: Performed by: STUDENT IN AN ORGANIZED HEALTH CARE EDUCATION/TRAINING PROGRAM

## 2023-03-23 RX ADMIN — IOPAMIDOL 100 ML: 755 INJECTION, SOLUTION INTRAVENOUS at 17:03

## 2023-03-23 ASSESSMENT — PAIN DESCRIPTION - LOCATION: LOCATION: CHEST;BACK;NECK;ARM

## 2023-03-23 ASSESSMENT — PAIN SCALES - GENERAL: PAINLEVEL_OUTOF10: 6

## 2023-03-23 ASSESSMENT — PAIN - FUNCTIONAL ASSESSMENT: PAIN_FUNCTIONAL_ASSESSMENT: NONE - DENIES PAIN

## 2023-03-23 NOTE — DISCHARGE INSTRUCTIONS
Follow-up with primary care as soon as possible  Follow-up with cardiology per referral  Take OTC medication as needed for pain  Return to the ED symptoms worsen.

## 2023-03-23 NOTE — ED PROVIDER NOTES
78 - 100 FL    MCH 32.0 (H) 27 - 31 PG    MCHC 31.2 (L) 32.0 - 36.0 %    RDW 14.2 11.7 - 14.9 %    Platelets 003 (L) 185 - 440 K/CU MM    MPV 9.6 7.5 - 11.1 FL    Differential Type AUTOMATED DIFFERENTIAL     Segs Relative 80.7 (H) 36 - 66 %    Lymphocytes % 7.5 (L) 24 - 44 %    Monocytes % 6.2 (H) 0 - 4 %    Eosinophils % 4.7 (H) 0 - 3 %    Basophils % 0.5 0 - 1 %    Segs Absolute 6.5 K/CU MM    Lymphocytes Absolute 0.6 K/CU MM    Monocytes Absolute 0.5 K/CU MM    Eosinophils Absolute 0.4 K/CU MM    Basophils Absolute 0.0 K/CU MM    Immature Neutrophil % 0.4 0 - 0.43 %    Total Immature Neutrophil 0.03 K/CU MM   Troponin   Result Value Ref Range    Troponin T <0.010 <0.01 NG/ML   Basic Metabolic Panel   Result Value Ref Range    Sodium 140 135 - 145 MMOL/L    Potassium 4.3 3.5 - 5.1 MMOL/L    Chloride 103 99 - 110 mMol/L    CO2 29 21 - 32 MMOL/L    Anion Gap 8 4 - 16    BUN 20 6 - 23 MG/DL    Creatinine 0.8 0.6 - 1.1 MG/DL    Est, Glom Filt Rate >60 >60 mL/min/1.73m2    Glucose 101 (H) 70 - 99 MG/DL    Calcium 9.0 8.3 - 10.6 MG/DL   Brain Natriuretic Peptide   Result Value Ref Range    Pro-.4 <300 PG/ML   EKG 12 Lead   Result Value Ref Range    Ventricular Rate 88 BPM    Atrial Rate 88 BPM    P-R Interval 170 ms    QRS Duration 90 ms    Q-T Interval 370 ms    QTc Calculation (Bazett) 447 ms    P Axis -4 degrees    R Axis -23 degrees    T Axis 26 degrees    Diagnosis       Normal sinus rhythm  Minimal voltage criteria for LVH, may be normal variant  Borderline ECG  When compared with ECG of 17-FEB-2022 16:06,  No significant change was found        Radiographs (if obtained):  Radiologist's Report Reviewed:    CTA CHEST ABDOMEN PELVIS W CONTRAST   Final Result   No evidence of aortic dissection or aneurysm. Mild calcific aorto iliac   atherosclerotic disease. No acute finding in the chest, abdomen or pelvis. Few nonobstructing left renal calculi      Severe sigmoid colon diverticulosis.       4 mm Disposition referral (if applicable): Pop Parker,     Schedule an appointment as soon as possible for a visit       Blanche Tsai MD  [address] [address]  [phone]    Schedule an appointment as soon as possible for a visit     Disposition medications (if applicable):  New Prescriptions    No medications on file     ED Provider Disposition Time  DISPOSITION Decision To Discharge 03/23/2023 06:38:04 PM      Comment: Please note this report has been produced using speech recognition software and may contain errors related to that system including errors in grammar, punctuation, and spelling, as well as words and phrases that may be inappropriate. Efforts were made to edit the dictations.         [address] [facility], DO  03/26/23 7520

## 2023-03-24 LAB
EKG ATRIAL RATE: 88 BPM
EKG DIAGNOSIS: NORMAL
EKG P AXIS: -4 DEGREES
EKG P-R INTERVAL: 170 MS
EKG Q-T INTERVAL: 370 MS
EKG QRS DURATION: 90 MS
EKG QTC CALCULATION (BAZETT): 447 MS
EKG R AXIS: -23 DEGREES
EKG T AXIS: 26 DEGREES
EKG VENTRICULAR RATE: 88 BPM

## 2023-03-24 PROCEDURE — 93010 ELECTROCARDIOGRAM REPORT: CPT | Performed by: INTERNAL MEDICINE

## 2023-03-28 NOTE — PROGRESS NOTES
Physical Therapy Evaluation    Visit Type: Initial Evaluation  Visit: 1  Referring Provider: Michael D D'Amico, MD  Medical Diagnosis (from order): Diagnosis Information    Diagnosis  719.46, 338.29 (ICD-9-CM) - M25.562, G89.29 (ICD-10-CM) - Chronic pain of left knee       Treatment Diagnosis: left knee with increased pain/symptoms, impaired range of motion, impaired muscle length/flexibility, impaired joint play/mobility, impaired mobility, impaired balance, impaired motor function/performance/coordination, impaired strength, impaired tissue mobility, impaired activity tolerance and impaired body mechanics.  Onset  - Date of onset:  2 years  Patient alert and oriented X3.  Chart reviewed at time of initial evaluation (relevant co-morbidities, allergies, tests and medications listed):   - Diagnostic tests reviewed: X-Ray  hypertension and diabetes      SUBJECTIVE                                                                                                               Patient states that she has a lot of issues in left knee and reports bone spurs in both feet with reports of plantar fasciitis. Patient states that pain in the knee has progressed since a slip and fall about 2 years ago. Patient state that she sits most of the day and getting up has been more challenging.     Pain / Symptoms  - Pain rating (out of 10): Current: 2 ; Best: 2; Worst: 8  - Location: left knee     - Quality / Description: sharp, throbbing     - Giving out   Creaking   - Alleviating Factors: topical agents/patch, over-the-counter medication  - Progression since onset: no change    Function:   Limitations / Exacerbation Factors:   - Patient reports pain and difficulty with function reported below.  - bed mobility, sleep disturbed, house/yard work, standing tasks, bending/squatting/lifting and lifting/carrying, all types of transfers, community distances, walking quickly as required to cross a street/exit a building rapidly, stairs  Prior  Level of Function: no limitation in involved extremity,    Patient Goals: decreased pain, increased motion, increased strength and return to sport/leisure activities.    Prior treatment  - no therapies  - Discharged from hospital, home health, or skilled nursing facility in last 30 days: no  Home Environment   - Patient lives with: significant other  - Type of home: condominium  - Assistance available: as needed  - Denies 2 or more falls or an unexplained fall with injury in the last year.  - Feel safe at home / work / school: yes      OBJECTIVE                                                                                                                    Posture:  LLE: pronated foot position, toe out, genu valgum  RLE: pronated foot position, toe out    Patellar Assessment:   - Static Left: lateral tilt    Observation   bilateral lower extremity swelling, left knee small pocket fluid in the lateral sulcus      Range of Motion (ROM)   (degrees unless noted; active unless noted; norms in ( ); negative=lacking to 0, positive=beyond 0)  Knee:   - Flexion (150):      • Left:  115       • Right:  135    - Extension (0-10):      • Left:  0       • Right:  0     Strength  (out of 5 unless noted, standard test position unless noted)   Hip:    - Flexion:        • Left: 4-        • Right: 4-    - Extension:        • Left: 4-        • Right: 4-    - Abduction:        • Left: 4-        • Right: 4-    - Adduction:        • Left: 4-        • Right: 4-  Knee:    - Flexion:        • Left: pain, 4-        • Right: 4    - Extension:        • Left: 4        • Right: 4         Palpation  Knee: [place]/Tendon/Bone  - Lateral Patella: - Left: tenderness  - Patellar Tendon: - Left: tenderness     Special Tests  Knee: Ligament  - Valgus Stress Test at 0 Degrees:  Right: positive  - Valgus Stress Test at 30 Degrees:  Right: positive  Knee: Meniscus   - Mellisa's Test:  Right: positive  - Leticia's Test:  Right: positive  Knee:  Patella  - Patellar Apprehension Test:  Right: positive  - Patellar Compression:  Right: positive        Functional Testing  Double Leg Squat  - Left: knee valgus, pronated foot position, anterior tibial translation over toes and reduced dynamic knee control  - Right: pronated foot position       Outcome/Assessments  Outcome Measures:   Lower Extremity Functional Scale: LEFS Calculated Total: 45 (0=extreme difficulty; 80=no difficulty) see flowsheet for additional documentation        Treatment     Pt educated on evaluation findings, pathophysiology of current condition, prognosis, treatment options, plan of care, home exercises, and postural awareness. Pt verbalizes understanding. Pt allowed to ask questions and it is felt these questions were answered during this session.    Skilled input: as detailed above    Writer verbally educated and received verbal consent for hand placement, positioning of patient, and techniques to be performed today from patient for therapist position for techniques and hand placement and palpation for techniques as described above and how they are pertinent to the patient's plan of care.    Home Exercise Program  Access Code: [email or web address]  URL: [email or web address]  Date: 03/28/2023  Prepared by: Bony Contreras    Exercises  - Supine Quad Set  - 1-2 x daily - 5 x weekly - 3 sets - 10 reps - 5 hold  - Supine Heel Slide  - 1-2 x daily - 5 x weekly - 3 sets - 10 reps - 5 hold  - Clamshell  - 1-2 x daily - 5 x weekly - 3 sets - 10 reps - 5 hold  - Sidelying Hip Adduction  - 1-2 x daily - 5 x weekly - 3 sets - 10 reps - 5 hold  - Straight Leg Raise with External Rotation  - 1-2 x daily - 5 x weekly - 3 sets - 10 reps - 5 hold      ASSESSMENT                                                                                                          64 year old patient has reported functional limitations listed above impacted by signs and symptoms consistent with treatment  cleanser;Rinsed/Irrigated with saline 1/23/2018  9:08 AM   Wound Length (cm) 3.5 cm 1/23/2018  9:08 AM   Wound Width (cm) 21 cm 1/23/2018  9:08 AM   Wound Depth (cm)  3.0 1/23/2018  9:08 AM   Calculated Wound Size (cm^2) (l*w) 73.5 cm^2 1/23/2018  9:08 AM   Change in Wound Size % (l*w) 60.38 1/23/2018  9:08 AM   Distance Tunneling (cm) 1.4 cm 1/23/2018  9:08 AM   Tunneling Position ___ O'Clock 0300 1/23/2018  9:08 AM   Undermining Starts ___ O'Clock 0 1/23/2018  9:08 AM   Undermining Ends___ O'Clock 0 1/23/2018  9:08 AM   Undermining [place]ium Distance (cm) 0 1/23/2018  9:08 AM   Wound Assessment Hyper granulation tissue; Red 1/23/2018  9:08 AM   Drainage Amount Large 1/23/2018  9:08 AM   Drainage Description [place];Serosanguinous 1/23/2018  9:08 AM   Odor None 1/23/2018  9:08 AM   Margins Defined edges; Unattached edges 1/23/2018  9:08 AM   Tiffany-wound Assessment [place];Pink 1/23/2018  9:08 AM   Non-staged Wound Description Full thickness 1/23/2018  9:08 AM   [place]%Wound Bed 0 1/23/2018  9:08 AM   Red%Wound Bed 100 1/23/2018  9:08 AM   Yellow%Wound Bed 0 1/23/2018  9:08 AM   Black%Wound Bed 0 1/23/2018  9:08 AM   Purple%Wound Bed 0 1/23/2018  9:08 AM   Other%Wound Bed 0 1/23/2018  9:08 AM   Debridement per physician Subcutaneous 1/9/2018 10:00 AM   Number of days: 62       Procedural Pain: 0  / 10     Post Procedural Pain: 0 / 10     Response to treatment: Well tolerated by patient. Status of wound progress and description from last visit:  Clean, hypergranulation now in wound bed. Apply liquid silver nitrate and start NPWT in 3 days. Plan:     Discharge instructions:  Discharge Instructions         Note:   PHYSICIAN ORDERS AND DISCHARGE INSTRUCTIONS     NOTE: Upon discharge from the [address], you will receive a patient experience survey.  We would be grateful if you would take the time to fill this survey out.     Wound care order history:                 ERASMO's   Right       Left               Date none--abd diagnosis below.  Treatment Diagnosis:   - Involved: left knee.  - Symptoms/impairments: increased pain/symptoms, impaired range of motion, impaired muscle length/flexibility, impaired joint play/mobility, impaired mobility, impaired balance, impaired motor function/performance/coordination, impaired strength, impaired tissue mobility, impaired activity tolerance and impaired body mechanics.    Patient presents with signs and symptoms that coincide with diagnosis of left knee nonspecific pain with instability and poor mechanics affecting mobility.  Patient is believed to be a good candidate for skilled services at this time to facilitate a return to previous level of functioning.       Prognosis: Patient will benefit from skilled therapy.  Rehabilitative potential is: good.  Predicted patient presentation: Moderate (evolving) - Patient comorbidities and complexities, as defined above, may have varying impact on steady progress for prescribed plan of care.    Education:   - Present and ready to learn: patient  - Results of above outlined education: Verbalizes understanding and Demonstrates understanding    PLAN                                                                                                                         The following skilled interventions to be implemented to achieve goals listed below:  Neuromuscular Re-Education (19162)  Therapeutic Activity (76355)  Therapeutic Exercise (02081)  Manual Therapy (91782)  Heat/Cold (66689)  Electrical Stimulation Unattended (81945 or )  Ultrasound/Phonophoresis (41767)  Dry Needling    Frequency / Duration  2 times per week tapering as patient progresses for 6 weeks for an estimated total of 12 visits    Patient involved in and agreed to plan of care and goals.  Patient given attendance policy at time of initial evaluation.    Suggestions for next session as indicated: Progress per plan of care continue lower extremity strengthening        Goals  Decrease pain/symptoms to 2/10  Improve involved strength to within functional limits   Improve involved ROM to within functional limits   The above improvements in impairments to assist in obtaining goals listed below  Long Term Goals: to be met by end of plan of care  1. Patient will demonstrate ability to negotiate level and unlevel surfaces at variable velocities, including change of direction without increased pain or instability to return to age appropriate and community activities at prior level of function.  2. Patient will ascend and descend 1 flight of steps, with one hand rail, using reciprocal pattern, without pain and without difficulty for home navigation  3. Patient will complete sit-stand transfer, low surface transfer and car transfer without increased pain for tasks for independent living  4. Lower Extremity Functional Scale: Patient will complete form to reflect an improved raw score to greater than or equal to 60/80 to indicate patient reported improvement in function/disability/impairment (minimal detectable change: 9 points).      Therapy procedure time and total treatment time can be found documented on the Time Entry flowsheet     wound              Vascular studies:   Date NA abd wound              Imaging:   Date               Cultures:   Date               Labs/ HbA1c:   Date UfB3k--51.0-              Grafts:  Date               HBO:  To be determined              Antibiotics: bactrim--11/10/17              Earlier Wound care treatments:                Authorizations:                        Consults:   Date                           PCP: Earle     Continuing wound care orders and information:              Residence: Home              Continue home health care with: [address] Freemany-              Your wound-care supplies will be provided by: Dinorah Phillips provider:              TORIQJUOSWALDOJMICHAEL with  Zane Bean loading:  Date               [facility] Meds: Jenny FISHMAN cleansing:                           VV not scrub or use excessive force.                          Wash hands with soap and water before and after dressing changes.                           Prior to applying a clean dressing, cleanse wound with normal saline,                          wound cleanser, or mild soap and water.                           Ask your physician or nurse before getting the wound(s) wet in the shower.              Daily Wound management:                          Keep weight off wounds and reposition every 2 hours.                          Avoid standing for long periods of time.                          Apply wraps/stockings in AM and remove at bedtime.                          Elevate legs to the level of the heart or above for 30 minutes 4-5 times a day and/or when sitting.                                               When taking antibiotics take entire prescription as ordered by MD do not stop taking until medicine is all gone.                                                                 Orders for this week:  1/23/18              Lower abd incision--apply liquid silver nitrate 4x4 to entire wound bed and depth of wound, COVER WITH ABD AND VAC DRAPE-leave dressing in place until Thursday-on Thursday remove dressing and Reapply WOUND VAC THERAPY:      sorbact to wound bed-- please tuck sorbact to depth of wound as well, cover with BLACK FOAM TO WOUND BED--DO NOT TUCK BLACK FOAM INTO DEEP AREA JUST SINGLE LAYER ON TOP OF WOUND,  PLEASE USE DUODERM TO PERIWOUND FOR PROTECTION.      SET WOUND VAC  CONTINUOUS SUCTION. CANISTER CHANGE WITH EACH DRESSING CHANGE OR ACCORDING TO VOLUME OF DRAINAGE.     WOUND VAC DRESSING TO BE CHANGED Saturday per homecare and clinic to change on Tuesday during doctors visit      Follow up with Dr Kody Pugh 1 week in the wound care center     Call [phone] for any questions or concerns.   Date__________   Time____________                                         Treatment Note      Written Patient Dismissal Instructions Given         Electronically signed by Satinder Hoffman MD on 1/23/2018 at 9:39 AM

## 2023-03-29 ENCOUNTER — APPOINTMENT (OUTPATIENT)
Dept: CT IMAGING | Age: 73
End: 2023-03-29
Payer: COMMERCIAL

## 2023-03-29 ENCOUNTER — HOSPITAL ENCOUNTER (EMERGENCY)
Age: 73
Discharge: HOME | End: 2023-03-29
Attending: EMERGENCY MEDICINE
Payer: COMMERCIAL

## 2023-03-29 VITALS
DIASTOLIC BLOOD PRESSURE: 50 MMHG | WEIGHT: 219 LBS | SYSTOLIC BLOOD PRESSURE: 118 MMHG | OXYGEN SATURATION: 97 % | RESPIRATION RATE: 18 BRPM | BODY MASS INDEX: 38.79 KG/M2 | HEART RATE: 61 BPM | TEMPERATURE: 97.7 F

## 2023-03-29 DIAGNOSIS — N13.30 HYDRONEPHROSIS, UNSPECIFIED HYDRONEPHROSIS TYPE: ICD-10-CM

## 2023-03-29 DIAGNOSIS — N13.4: Primary | ICD-10-CM

## 2023-03-29 DIAGNOSIS — N39.0 URINARY TRACT INFECTION WITHOUT HEMATURIA, SITE UNSPECIFIED: ICD-10-CM

## 2023-03-29 DIAGNOSIS — R10.31 ABDOMINAL PAIN, RIGHT LOWER QUADRANT: ICD-10-CM

## 2023-03-29 LAB
ALBUMIN SERPL-MCNC: 3.6 GM/DL (ref 3.4–5)
ALP BLD-CCNC: 156 IU/L (ref 40–129)
ALT SERPL-CCNC: 36 U/L (ref 10–40)
ANION GAP SERPL CALCULATED.3IONS-SCNC: 9 MMOL/L (ref 4–16)
AST SERPL-CCNC: 57 IU/L (ref 15–37)
BACTERIA: ABNORMAL /HPF
BASOPHILS ABSOLUTE: 0.1 K/CU MM
BASOPHILS RELATIVE PERCENT: 0.8 % (ref 0–1)
BILIRUB SERPL-MCNC: 1 MG/DL (ref 0–1)
BILIRUBIN URINE: NEGATIVE MG/DL
BLOOD, URINE: NEGATIVE
BUN SERPL-MCNC: 17 MG/DL (ref 6–23)
CALCIUM SERPL-MCNC: 9.5 MG/DL (ref 8.3–10.6)
CAST TYPE: ABNORMAL /HPF
CHLORIDE BLD-SCNC: 102 MMOL/L (ref 99–110)
CLARITY: CLEAR
CO2: 30 MMOL/L (ref 21–32)
COLOR: YELLOW
CREAT SERPL-MCNC: 0.7 MG/DL (ref 0.6–1.1)
CRYSTAL TYPE: NEGATIVE /HPF
DIFFERENTIAL TYPE: ABNORMAL
EOSINOPHILS ABSOLUTE: 0.4 K/CU MM
EOSINOPHILS RELATIVE PERCENT: 5.3 % (ref 0–3)
EPITHELIAL CELLS, UA: 5 /HPF
GFR SERPL CREATININE-BSD FRML MDRD: >60 ML/MIN/1.73M2
GLUCOSE SERPL-MCNC: 69 MG/DL (ref 70–99)
GLUCOSE, URINE: 100 MG/DL
HCT VFR BLD CALC: 45.4 % (ref 37–47)
HEMOGLOBIN: 14.2 GM/DL (ref 12.5–16)
IMMATURE NEUTROPHIL %: 0.5 % (ref 0–0.43)
KETONES, URINE: NEGATIVE MG/DL
LEUKOCYTE ESTERASE, URINE: ABNORMAL
LIPASE: 32 IU/L (ref 13–60)
LYMPHOCYTES ABSOLUTE: 1 K/CU MM
LYMPHOCYTES RELATIVE PERCENT: 11.9 % (ref 24–44)
MCH RBC QN AUTO: 31.8 PG (ref 27–31)
MCHC RBC AUTO-ENTMCNC: 31.3 % (ref 32–36)
MCV RBC AUTO: 101.6 FL (ref 78–100)
MONOCYTES ABSOLUTE: 0.7 K/CU MM
MONOCYTES RELATIVE PERCENT: 8.6 % (ref 0–4)
NITRITE URINE, QUANTITATIVE: NEGATIVE
PDW BLD-RTO: 14.1 % (ref 11.7–14.9)
PH, URINE: 6.5 (ref 5–8)
PLATELET # BLD: 189 K/CU MM (ref 140–440)
PMV BLD AUTO: 9.2 FL (ref 7.5–11.1)
POTASSIUM SERPL-SCNC: 4.7 MMOL/L (ref 3.5–5.1)
PROTEIN UA: NEGATIVE MG/DL
RBC # BLD: 4.47 M/CU MM (ref 4.2–5.4)
RBC URINE: 5 /HPF (ref 0–6)
SEGMENTED NEUTROPHILS ABSOLUTE COUNT: 5.8 K/CU MM
SEGMENTED NEUTROPHILS RELATIVE PERCENT: 72.9 % (ref 36–66)
SODIUM BLD-SCNC: 141 MMOL/L (ref 135–145)
SPECIFIC GRAVITY UA: 1.01 (ref 1–1.03)
TOTAL IMMATURE NEUTOROPHIL: 0.04 K/CU MM
TOTAL PROTEIN: 7.7 GM/DL (ref 6.4–8.2)
UROBILINOGEN, URINE: 0.2 MG/DL (ref 0.2–1)
WBC # BLD: 8 K/CU MM (ref 4–10.5)
WBC UA: 25 /HPF (ref 0–5)

## 2023-03-29 PROCEDURE — 96374 THER/PROPH/DIAG INJ IV PUSH: CPT

## 2023-03-29 PROCEDURE — 99284 EMERGENCY DEPT VISIT MOD MDM: CPT

## 2023-03-29 PROCEDURE — 6360000002 HC RX W HCPCS: Performed by: EMERGENCY MEDICINE

## 2023-03-29 PROCEDURE — 80053 COMPREHEN METABOLIC PANEL: CPT

## 2023-03-29 PROCEDURE — 85025 COMPLETE CBC W/AUTO DIFF WBC: CPT

## 2023-03-29 PROCEDURE — 83690 ASSAY OF LIPASE: CPT

## 2023-03-29 PROCEDURE — 74176 CT ABD & PELVIS W/O CONTRAST: CPT

## 2023-03-29 PROCEDURE — 2580000003 HC RX 258: Performed by: EMERGENCY MEDICINE

## 2023-03-29 PROCEDURE — 81001 URINALYSIS AUTO W/SCOPE: CPT

## 2023-03-29 RX ORDER — FENTANYL CITRATE 50 UG/ML
50 INJECTION, SOLUTION INTRAMUSCULAR; INTRAVENOUS ONCE
Status: COMPLETED | OUTPATIENT
Start: 2023-03-29 | End: 2023-03-29

## 2023-03-29 RX ORDER — 0.9 % SODIUM CHLORIDE 0.9 %
1000 INTRAVENOUS SOLUTION INTRAVENOUS ONCE
Status: COMPLETED | OUTPATIENT
Start: 2023-03-29 | End: 2023-03-29

## 2023-03-29 RX ORDER — CIPROFLOXACIN 500 MG/1
500 TABLET, FILM COATED ORAL 2 TIMES DAILY
Qty: 20 TABLET | Refills: 0 | Status: SHIPPED | OUTPATIENT
Start: 2023-03-29 | End: 2023-04-08

## 2023-03-29 RX ORDER — HYDROCODONE BITARTRATE AND ACETAMINOPHEN 5; 325 MG/1; MG/1
1 TABLET ORAL EVERY 6 HOURS PRN
Qty: 10 TABLET | Refills: 0 | Status: SHIPPED | OUTPATIENT
Start: 2023-03-29 | End: 2023-04-01

## 2023-03-29 RX ORDER — NAPROXEN 500 MG/1
500 TABLET ORAL 2 TIMES DAILY WITH MEALS
Qty: 60 TABLET | Refills: 0 | Status: SHIPPED | OUTPATIENT
Start: 2023-03-29

## 2023-03-29 RX ADMIN — FENTANYL CITRATE 50 MCG: 50 INJECTION, SOLUTION INTRAMUSCULAR; INTRAVENOUS at 12:00

## 2023-03-29 RX ADMIN — SODIUM CHLORIDE 1000 ML: 9 INJECTION, SOLUTION INTRAVENOUS at 11:58

## 2023-03-29 ASSESSMENT — PAIN SCALES - GENERAL
PAINLEVEL_OUTOF10: 3
PAINLEVEL_OUTOF10: 4

## 2023-03-29 ASSESSMENT — PAIN DESCRIPTION - DESCRIPTORS: DESCRIPTORS: SHARP

## 2023-03-29 ASSESSMENT — PAIN DESCRIPTION - ORIENTATION: ORIENTATION: RIGHT

## 2023-03-29 ASSESSMENT — PAIN - FUNCTIONAL ASSESSMENT: PAIN_FUNCTIONAL_ASSESSMENT: 0-10

## 2023-03-29 ASSESSMENT — PAIN DESCRIPTION - LOCATION: LOCATION: FLANK

## 2023-03-29 NOTE — ED PROVIDER NOTES
Cirrhosis, ESRD on HD 1 pt each  DM, CHF, COPD, CAD, KENZIE 0.5 pt each    Patient has a discharge risk calculator score of: 1.5     And as a result do feel the patient is appropriate for discharge at this time. I discussed the discharge plan as well as potential etiologies with the patient and they are agreeable to this plan          DISPOSITION/PLAN   DISPOSITION Decision To Discharge 03/29/2023 12:47:02 PM      PATIENT REFERRED TO:  DO Roger Cárdenas Davidmokiana  [phone]    Call in 1 day      DISCHARGE MEDICATIONS:  Discharge Medication List as of 3/29/2023  1:04 PM        START taking these medications    Details   naproxen (NAPROSYN) 500 MG tablet Take 1 tablet by mouth 2 times daily (with meals), Disp-60 tablet, R-0Normal      ciprofloxacin (CIPRO) 500 MG tablet Take 1 tablet by mouth 2 times daily for 10 days, Disp-20 tablet, R-0Normal      HYDROcodone-acetaminophen (NORCO) 5-325 MG per tablet Take 1 tablet by mouth every 6 hours as needed for Pain for up to 3 days. Intended supply: 3 days. Take lowest dose possible to manage pain Max Daily Amount: 4 tablets, Disp-10 tablet, R-0Normal           Controlled Substances Monitoring:     No flowsheet data found.     Francisco Lee MD (electronically signed)  Attending Emergency Physician            Francisco Lee MD  03/29/23 [id number]

## 2023-04-03 ENCOUNTER — OFFICE VISIT (OUTPATIENT)
Dept: CARDIOLOGY CLINIC | Age: 73
End: 2023-04-03
Payer: COMMERCIAL

## 2023-04-03 VITALS
HEART RATE: 66 BPM | WEIGHT: 226 LBS | SYSTOLIC BLOOD PRESSURE: 124 MMHG | HEIGHT: 63 IN | BODY MASS INDEX: 40.04 KG/M2 | DIASTOLIC BLOOD PRESSURE: 60 MMHG

## 2023-04-03 DIAGNOSIS — Z79.4 DIABETES MELLITUS TYPE 2, INSULIN DEPENDENT (HCC): ICD-10-CM

## 2023-04-03 DIAGNOSIS — E78.00 PURE HYPERCHOLESTEROLEMIA: ICD-10-CM

## 2023-04-03 DIAGNOSIS — I87.303 VENOUS HYPERTENSION OF BOTH LOWER EXTREMITIES: ICD-10-CM

## 2023-04-03 DIAGNOSIS — I25.10 ASCVD (ARTERIOSCLEROTIC CARDIOVASCULAR DISEASE): Primary | ICD-10-CM

## 2023-04-03 DIAGNOSIS — E11.9 DIABETES MELLITUS TYPE 2, INSULIN DEPENDENT (HCC): ICD-10-CM

## 2023-04-03 PROBLEM — M79.89 LEG SWELLING: Status: ACTIVE | Noted: 2023-04-03

## 2023-04-03 PROCEDURE — 99214 OFFICE O/P EST MOD 30 MIN: CPT | Performed by: INTERNAL MEDICINE

## 2023-04-03 PROCEDURE — 3051F HG A1C>EQUAL 7.0%<8.0%: CPT | Performed by: INTERNAL MEDICINE

## 2023-04-03 PROCEDURE — 1124F ACP DISCUSS-NO DSCNMKR DOCD: CPT | Performed by: INTERNAL MEDICINE

## 2023-04-03 RX ORDER — MONTELUKAST SODIUM 10 MG/1
10 TABLET ORAL NIGHTLY
COMMUNITY

## 2023-04-03 RX ORDER — FLUOXETINE 10 MG/1
10 CAPSULE ORAL DAILY
COMMUNITY

## 2023-04-03 NOTE — PATIENT INSTRUCTIONS
Continue current cardiovascular medications which have been reviewed and discussed individually with you. Primary prevention is the goal by aggressive risk modification, healthy and therapeutic life style changes for cardiovascular risk reduction. Various goals are discussed and questions answered. Appropriate prescriptions if needed on this visit are addressed. After visit summery is provided. Questions answered and patient verbalizes understanding. Follow up with PCP and see me needed.

## 2023-04-03 NOTE — PROGRESS NOTES
system including errors in grammar, punctuation, and spelling, as well as words and phrases that may be inappropriate. If there are any questions or concerns please feel free to contact the dictating provider for clarification.

## 2023-05-02 ENCOUNTER — HOSPITAL ENCOUNTER (EMERGENCY)
Age: 73
Discharge: HOME | End: 2023-05-02
Attending: EMERGENCY MEDICINE
Payer: COMMERCIAL

## 2023-05-02 VITALS
SYSTOLIC BLOOD PRESSURE: 126 MMHG | TEMPERATURE: 97.5 F | RESPIRATION RATE: 18 BRPM | WEIGHT: 217 LBS | HEART RATE: 83 BPM | BODY MASS INDEX: 38.44 KG/M2 | DIASTOLIC BLOOD PRESSURE: 57 MMHG | OXYGEN SATURATION: 99 %

## 2023-05-02 DIAGNOSIS — R10.11 RIGHT UPPER QUADRANT ABDOMINAL PAIN: Primary | ICD-10-CM

## 2023-05-02 LAB
BACTERIA: ABNORMAL /HPF
BILIRUBIN URINE: NEGATIVE MG/DL
BLOOD, URINE: ABNORMAL
CAST TYPE: ABNORMAL /HPF
CLARITY: CLEAR
COLOR: YELLOW
CRYSTAL TYPE: NEGATIVE /HPF
EPITHELIAL CELLS, UA: 5 /HPF
GLUCOSE, URINE: >1000 MG/DL
KETONES, URINE: NEGATIVE MG/DL
LEUKOCYTE ESTERASE, URINE: ABNORMAL
NITRITE URINE, QUANTITATIVE: NEGATIVE
PH, URINE: 6 (ref 5–8)
PROTEIN UA: NEGATIVE MG/DL
RBC URINE: NEGATIVE /HPF (ref 0–6)
SPECIFIC GRAVITY UA: 1.01 (ref 1–1.03)
UROBILINOGEN, URINE: 0.2 MG/DL (ref 0.2–1)
WBC UA: 50 /HPF (ref 0–5)

## 2023-05-02 PROCEDURE — 99283 EMERGENCY DEPT VISIT LOW MDM: CPT

## 2023-05-02 PROCEDURE — 81001 URINALYSIS AUTO W/SCOPE: CPT

## 2023-05-02 PROCEDURE — 87077 CULTURE AEROBIC IDENTIFY: CPT

## 2023-05-02 PROCEDURE — 87186 SC STD MICRODIL/AGAR DIL: CPT

## 2023-05-02 PROCEDURE — 6370000000 HC RX 637 (ALT 250 FOR IP): Performed by: EMERGENCY MEDICINE

## 2023-05-02 PROCEDURE — 87086 URINE CULTURE/COLONY COUNT: CPT

## 2023-05-02 RX ORDER — HYDROCODONE BITARTRATE AND ACETAMINOPHEN 5; 325 MG/1; MG/1
1 TABLET ORAL EVERY 8 HOURS PRN
Qty: 10 TABLET | Refills: 0 | Status: SHIPPED | OUTPATIENT
Start: 2023-05-02 | End: 2023-05-05

## 2023-05-02 RX ORDER — HYDROCODONE BITARTRATE AND ACETAMINOPHEN 5; 325 MG/1; MG/1
1 TABLET ORAL ONCE
Status: COMPLETED | OUTPATIENT
Start: 2023-05-02 | End: 2023-05-02

## 2023-05-02 RX ADMIN — HYDROCODONE BITARTRATE AND ACETAMINOPHEN 1 TABLET: 5; 325 TABLET ORAL at 17:13

## 2023-05-02 ASSESSMENT — PAIN - FUNCTIONAL ASSESSMENT
PAIN_FUNCTIONAL_ASSESSMENT: 0-10
PAIN_FUNCTIONAL_ASSESSMENT: 0-10

## 2023-05-02 ASSESSMENT — PAIN DESCRIPTION - LOCATION
LOCATION: ABDOMEN
LOCATION: ABDOMEN

## 2023-05-02 ASSESSMENT — PAIN DESCRIPTION - DESCRIPTORS: DESCRIPTORS: ACHING;DISCOMFORT

## 2023-05-02 ASSESSMENT — PAIN SCALES - GENERAL
PAINLEVEL_OUTOF10: 8
PAINLEVEL_OUTOF10: 8
PAINLEVEL_OUTOF10: 3
PAINLEVEL_OUTOF10: 3

## 2023-05-02 ASSESSMENT — PAIN DESCRIPTION - ORIENTATION: ORIENTATION: RIGHT;LOWER

## 2023-05-02 ASSESSMENT — ENCOUNTER SYMPTOMS: ABDOMINAL PAIN: 1

## 2023-05-02 ASSESSMENT — PAIN DESCRIPTION - PAIN TYPE: TYPE: ACUTE PAIN

## 2023-05-02 NOTE — ED PROVIDER NOTES
[id number]  [phone]    Schedule an appointment as soon as possible for a visit in 1 week  7:45 am WED am !, If symptoms worsen    Disposition medications (if applicable):  New Prescriptions    HYDROCODONE-ACETAMINOPHEN (NORCO) 5-325 MG PER TABLET    Take 1 tablet by mouth every 8 hours as needed for Pain for up to 3 days. Intended supply: 3 days. Take lowest dose possible to manage pain Max Daily Amount: 3 tablets           Bebeto Wallis DO, FACEP      Comment: Please note this report has been produced using speech recognition software and maycontain errors related to that system including errors in grammar, punctuation, and spelling, as well as words and phrases that may be inappropriate. If there are any questions or concerns please feel free to contact thedictating provider for clarification.         Chery Barr DO  05/02/23 7172

## 2023-05-04 LAB
CULTURE: ABNORMAL
CULTURE: ABNORMAL
Lab: ABNORMAL
SPECIMEN: ABNORMAL

## 2023-06-05 ENCOUNTER — HOSPITAL ENCOUNTER (OUTPATIENT)
Dept: CT IMAGING | Age: 73
Discharge: HOME | End: 2023-06-05
Attending: UROLOGY
Payer: COMMERCIAL

## 2023-06-05 DIAGNOSIS — R31.0 GROSS HEMATURIA: ICD-10-CM

## 2023-06-05 LAB
EGFR, POC: >60 ML/MIN/1.73M2
POC CREATININE: 0.8 MG/DL (ref 0.6–1.1)

## 2023-06-05 PROCEDURE — 74178 CT ABD&PLV WO CNTR FLWD CNTR: CPT

## 2023-06-05 PROCEDURE — 82565 ASSAY OF CREATININE: CPT

## 2023-06-05 PROCEDURE — 6360000004 HC RX CONTRAST MEDICATION: Performed by: UROLOGY

## 2023-06-05 RX ADMIN — IOPAMIDOL 120 ML: 755 INJECTION, SOLUTION INTRAVENOUS at 17:45
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
## 2023-06-16 ENCOUNTER — HOSPITAL ENCOUNTER (EMERGENCY)
Age: 73
Discharge: HOME | End: 2023-06-16
Attending: EMERGENCY MEDICINE
Payer: COMMERCIAL

## 2023-06-16 ENCOUNTER — APPOINTMENT (OUTPATIENT)
Dept: CT IMAGING | Age: 73
End: 2023-06-16
Payer: COMMERCIAL

## 2023-06-16 VITALS
BODY MASS INDEX: 39.87 KG/M2 | HEIGHT: 63 IN | DIASTOLIC BLOOD PRESSURE: 53 MMHG | HEART RATE: 80 BPM | OXYGEN SATURATION: 96 % | SYSTOLIC BLOOD PRESSURE: 115 MMHG | TEMPERATURE: 97.9 F | WEIGHT: 225 LBS | RESPIRATION RATE: 16 BRPM

## 2023-06-16 DIAGNOSIS — S00.83XA FACIAL CONTUSION, INITIAL ENCOUNTER: ICD-10-CM

## 2023-06-16 DIAGNOSIS — S09.90XA CLOSED HEAD INJURY, INITIAL ENCOUNTER: Primary | ICD-10-CM

## 2023-06-16 PROCEDURE — 70486 CT MAXILLOFACIAL W/O DYE: CPT

## 2023-06-16 PROCEDURE — 70450 CT HEAD/BRAIN W/O DYE: CPT

## 2023-06-16 PROCEDURE — 99284 EMERGENCY DEPT VISIT MOD MDM: CPT

## 2023-06-16 PROCEDURE — 72125 CT NECK SPINE W/O DYE: CPT

## 2023-06-16 ASSESSMENT — PAIN DESCRIPTION - DESCRIPTORS: DESCRIPTORS: DISCOMFORT

## 2023-06-16 ASSESSMENT — ENCOUNTER SYMPTOMS
EYES NEGATIVE: 1
BOWEL INCONTINENCE: 0
VOMITING: 0
RESPIRATORY NEGATIVE: 1
ABDOMINAL PAIN: 0
VISUAL CHANGE: 0
GASTROINTESTINAL NEGATIVE: 1
NAUSEA: 0

## 2023-06-16 ASSESSMENT — LIFESTYLE VARIABLES
HOW MANY STANDARD DRINKS CONTAINING ALCOHOL DO YOU HAVE ON A TYPICAL DAY: PATIENT DOES NOT DRINK
HOW OFTEN DO YOU HAVE A DRINK CONTAINING ALCOHOL: NEVER

## 2023-06-16 ASSESSMENT — PAIN DESCRIPTION - ONSET: ONSET: SUDDEN

## 2023-06-16 ASSESSMENT — VISUAL ACUITY: OU: 1

## 2023-06-16 ASSESSMENT — PAIN DESCRIPTION - LOCATION: LOCATION: FACE

## 2023-06-16 ASSESSMENT — PAIN DESCRIPTION - ORIENTATION: ORIENTATION: RIGHT

## 2023-06-16 ASSESSMENT — PAIN DESCRIPTION - FREQUENCY: FREQUENCY: CONTINUOUS

## 2023-06-16 ASSESSMENT — PAIN - FUNCTIONAL ASSESSMENT: PAIN_FUNCTIONAL_ASSESSMENT: 0-10

## 2023-06-16 ASSESSMENT — PAIN SCALES - GENERAL: PAINLEVEL_OUTOF10: 5

## 2023-06-16 NOTE — ED PROVIDER NOTES
daily -8/29/2021 upon discharge from the hospital today after a stay for Covid, do not resume this medication until September 6, 2021, and do so only if OK with your family doctor 8/29/21   Tito Adair MD   MAG64 64 MG TBEC extended release tablet TAKE 1 TABLET BY MOUTH EVERYDAY AT BEDTIME 8/6/21   Historical Provider, MD   rOPINIRole (REQUIP) 1 MG tablet TAKE 1 TABLET BY MOUTH 1 3 HOUR BEFORE BEDTIME 6/11/21   Historical Provider, MD   albuterol (PROVENTIL) (2.5 MG/3ML) 0.083% nebulizer solution 2.5 MG (3 ML) INHALATION 4 TIMES A DAY AS NEEDED FOR SHORTNESS OF BREATH OR WHEEZING 7/3/21   Historical Provider, MD   Cholecalciferol (VITAMIN D) 50 MCG (2000 UT) CAPS capsule Take by mouth     Historical Provider, MD   albuterol sulfate  (90 Base) MCG/ACT inhaler INHALE 2 PUFFS EVERY 4 TO 6 HOURS AS NEEDED FOR SHORTNESS OF BREATH OR WHEEZING 5/15/20   Historical Provider, MD   rosuvastatin (CRESTOR) 5 MG tablet Take 1 tablet by mouth daily    Historical Provider, MD   insulin aspart (NOVOLOG) 100 UNIT/ML injection pen Inject into the skin 4 times daily Patient uses a sliding scale for the amount of insulin    Historical Provider, MD   Insulin Degludec 100 UNIT/ML SOPN Inject 68 Units into the skin daily    Historical Provider, MD   Multiple Vitamins-Minerals (THERAPEUTIC MULTIVITAMIN-MINERALS) tablet Take 1 tablet by mouth daily    Historical Provider, MD   levothyroxine (SYNTHROID) 100 MCG tablet Take 1 tablet by mouth daily    Historical Provider, MD       BP (!) 115/53   Pulse 80   Temp 97.9 °F (36.6 °C) (Oral)   Resp 16   Ht 5' 3\" (1.6 m)   Wt 225 lb (102.1 kg)   SpO2 96%   BMI 39.86 kg/m²     Physical Exam  Vitals and nursing note reviewed. Constitutional:       Appearance: She is well-developed. HENT:      Head: Normocephalic.         Comments: Soft tissue swelling     Right Ear: External ear normal.      Left Ear: External ear normal.      Nose: Nose normal.   Eyes:      General: Lids are

## 2023-06-16 NOTE — ED NOTES
Was carrying groceries in and lost her balance and fell. Hit rt side of face. No LOC.      Matthew Aguilera, BRISEYDA  06/16/23 1956

## 2023-07-01 ENCOUNTER — APPOINTMENT (OUTPATIENT)
Dept: GENERAL RADIOLOGY | Age: 73
End: 2023-07-01
Payer: COMMERCIAL

## 2023-07-01 ENCOUNTER — HOSPITAL ENCOUNTER (EMERGENCY)
Age: 73
Discharge: ANOTHER HOSPITAL | End: 2023-07-01
Attending: EMERGENCY MEDICINE
Payer: COMMERCIAL

## 2023-07-01 VITALS
OXYGEN SATURATION: 92 % | RESPIRATION RATE: 22 BRPM | DIASTOLIC BLOOD PRESSURE: 40 MMHG | SYSTOLIC BLOOD PRESSURE: 118 MMHG | TEMPERATURE: 98 F | HEART RATE: 92 BPM | HEIGHT: 63 IN | BODY MASS INDEX: 39.87 KG/M2 | WEIGHT: 225 LBS

## 2023-07-01 DIAGNOSIS — K92.2 UPPER GI BLEED: ICD-10-CM

## 2023-07-01 DIAGNOSIS — R06.09 DYSPNEA ON EXERTION: Primary | ICD-10-CM

## 2023-07-01 DIAGNOSIS — R60.0 BILATERAL LOWER EXTREMITY EDEMA: ICD-10-CM

## 2023-07-01 LAB
ALBUMIN SERPL-MCNC: 2.6 GM/DL (ref 3.4–5)
ALP BLD-CCNC: 115 IU/L (ref 40–129)
ALT SERPL-CCNC: 28 U/L (ref 10–40)
ANION GAP SERPL CALCULATED.3IONS-SCNC: 6 MMOL/L (ref 4–16)
AST SERPL-CCNC: 94 IU/L (ref 15–37)
BASOPHILS ABSOLUTE: 0.1 K/CU MM
BASOPHILS RELATIVE PERCENT: 1 % (ref 0–1)
BILIRUB SERPL-MCNC: 0.7 MG/DL (ref 0–1)
BUN SERPL-MCNC: 65 MG/DL (ref 6–23)
CALCIUM SERPL-MCNC: 9.6 MG/DL (ref 8.3–10.6)
CHLORIDE BLD-SCNC: 106 MMOL/L (ref 99–110)
CO2: 28 MMOL/L (ref 21–32)
CREAT SERPL-MCNC: 1.1 MG/DL (ref 0.6–1.1)
DIFFERENTIAL TYPE: ABNORMAL
EKG ATRIAL RATE: 86 BPM
EKG DIAGNOSIS: NORMAL
EKG P AXIS: -17 DEGREES
EKG P-R INTERVAL: 166 MS
EKG Q-T INTERVAL: 376 MS
EKG QRS DURATION: 92 MS
EKG QTC CALCULATION (BAZETT): 449 MS
EKG R AXIS: -16 DEGREES
EKG T AXIS: 15 DEGREES
EKG VENTRICULAR RATE: 86 BPM
EOSINOPHILS ABSOLUTE: 0.3 K/CU MM
EOSINOPHILS RELATIVE PERCENT: 4.6 % (ref 0–3)
GFR SERPL CREATININE-BSD FRML MDRD: 53 ML/MIN/1.73M2
GLUCOSE SERPL-MCNC: 227 MG/DL (ref 70–99)
HCT VFR BLD CALC: 34.6 % (ref 37–47)
HEMOGLOBIN: 10.6 GM/DL (ref 12.5–16)
IMMATURE NEUTROPHIL %: 0.3 % (ref 0–0.43)
LACTIC ACID, SEPSIS: 1.6 MMOL/L (ref 0.5–1.9)
LYMPHOCYTES ABSOLUTE: 1 K/CU MM
LYMPHOCYTES RELATIVE PERCENT: 13.6 % (ref 24–44)
MCH RBC QN AUTO: 31.3 PG (ref 27–31)
MCHC RBC AUTO-ENTMCNC: 30.6 % (ref 32–36)
MCV RBC AUTO: 102.1 FL (ref 78–100)
MONOCYTES ABSOLUTE: 0.6 K/CU MM
MONOCYTES RELATIVE PERCENT: 9 % (ref 0–4)
PDW BLD-RTO: 14.7 % (ref 11.7–14.9)
PLATELET # BLD: 173 K/CU MM (ref 140–440)
PMV BLD AUTO: 10.4 FL (ref 7.5–11.1)
POTASSIUM SERPL-SCNC: 5.7 MMOL/L (ref 3.5–5.1)
PRO-BNP: 219.9 PG/ML
RBC # BLD: 3.39 M/CU MM (ref 4.2–5.4)
SEGMENTED NEUTROPHILS ABSOLUTE COUNT: 5 K/CU MM
SEGMENTED NEUTROPHILS RELATIVE PERCENT: 71.5 % (ref 36–66)
SODIUM BLD-SCNC: 140 MMOL/L (ref 135–145)
TOTAL IMMATURE NEUTOROPHIL: 0.02 K/CU MM
TOTAL PROTEIN: 6.6 GM/DL (ref 6.4–8.2)
TROPONIN T: <0.01 NG/ML
WBC # BLD: 7 K/CU MM (ref 4–10.5)

## 2023-07-01 PROCEDURE — 94664 DEMO&/EVAL PT USE INHALER: CPT

## 2023-07-01 PROCEDURE — 2580000003 HC RX 258: Performed by: EMERGENCY MEDICINE

## 2023-07-01 PROCEDURE — 83605 ASSAY OF LACTIC ACID: CPT

## 2023-07-01 PROCEDURE — 71046 X-RAY EXAM CHEST 2 VIEWS: CPT

## 2023-07-01 PROCEDURE — C9113 INJ PANTOPRAZOLE SODIUM, VIA: HCPCS | Performed by: EMERGENCY MEDICINE

## 2023-07-01 PROCEDURE — 85025 COMPLETE CBC W/AUTO DIFF WBC: CPT

## 2023-07-01 PROCEDURE — 96374 THER/PROPH/DIAG INJ IV PUSH: CPT

## 2023-07-01 PROCEDURE — 73562 X-RAY EXAM OF KNEE 3: CPT

## 2023-07-01 PROCEDURE — 71045 X-RAY EXAM CHEST 1 VIEW: CPT

## 2023-07-01 PROCEDURE — 84484 ASSAY OF TROPONIN QUANT: CPT

## 2023-07-01 PROCEDURE — 93005 ELECTROCARDIOGRAM TRACING: CPT | Performed by: EMERGENCY MEDICINE

## 2023-07-01 PROCEDURE — 6360000002 HC RX W HCPCS: Performed by: EMERGENCY MEDICINE

## 2023-07-01 PROCEDURE — 99285 EMERGENCY DEPT VISIT HI MDM: CPT

## 2023-07-01 PROCEDURE — 96375 TX/PRO/DX INJ NEW DRUG ADDON: CPT

## 2023-07-01 PROCEDURE — A4216 STERILE WATER/SALINE, 10 ML: HCPCS | Performed by: EMERGENCY MEDICINE

## 2023-07-01 PROCEDURE — 6370000000 HC RX 637 (ALT 250 FOR IP): Performed by: EMERGENCY MEDICINE

## 2023-07-01 PROCEDURE — 83880 ASSAY OF NATRIURETIC PEPTIDE: CPT

## 2023-07-01 PROCEDURE — 80053 COMPREHEN METABOLIC PANEL: CPT

## 2023-07-01 PROCEDURE — 94640 AIRWAY INHALATION TREATMENT: CPT

## 2023-07-01 RX ORDER — FUROSEMIDE 10 MG/ML
40 INJECTION INTRAMUSCULAR; INTRAVENOUS ONCE
Status: COMPLETED | OUTPATIENT
Start: 2023-07-01 | End: 2023-07-01

## 2023-07-01 RX ORDER — IPRATROPIUM BROMIDE AND ALBUTEROL SULFATE 2.5; .5 MG/3ML; MG/3ML
1 SOLUTION RESPIRATORY (INHALATION) ONCE
Status: COMPLETED | OUTPATIENT
Start: 2023-07-01 | End: 2023-07-01

## 2023-07-01 RX ORDER — ORPHENADRINE CITRATE 30 MG/ML
60 INJECTION INTRAMUSCULAR; INTRAVENOUS ONCE
Status: COMPLETED | OUTPATIENT
Start: 2023-07-01 | End: 2023-07-01

## 2023-07-01 RX ORDER — SODIUM CHLORIDE 9 MG/ML
INJECTION, SOLUTION INTRAVENOUS CONTINUOUS
Status: DISCONTINUED | OUTPATIENT
Start: 2023-07-01 | End: 2023-07-01 | Stop reason: HOSPADM

## 2023-07-01 RX ORDER — PANTOPRAZOLE SODIUM 40 MG/10ML
40 INJECTION, POWDER, LYOPHILIZED, FOR SOLUTION INTRAVENOUS ONCE
Status: DISCONTINUED | OUTPATIENT
Start: 2023-07-01 | End: 2023-07-01

## 2023-07-01 RX ADMIN — SODIUM CHLORIDE: 9 INJECTION, SOLUTION INTRAVENOUS at 18:14

## 2023-07-01 RX ADMIN — SODIUM CHLORIDE 80 MG: 9 INJECTION INTRAMUSCULAR; INTRAVENOUS; SUBCUTANEOUS at 18:00

## 2023-07-01 RX ADMIN — IPRATROPIUM BROMIDE AND ALBUTEROL SULFATE 1 DOSE: .5; 3 SOLUTION RESPIRATORY (INHALATION) at 14:58

## 2023-07-01 RX ADMIN — ORPHENADRINE CITRATE 60 MG: 60 INJECTION INTRAMUSCULAR; INTRAVENOUS at 18:34

## 2023-07-01 RX ADMIN — FUROSEMIDE 40 MG: 10 INJECTION, SOLUTION INTRAMUSCULAR; INTRAVENOUS at 15:13

## 2023-07-01 RX ADMIN — PANTOPRAZOLE SODIUM 8 MG/HR: 40 INJECTION, POWDER, FOR SOLUTION INTRAVENOUS at 17:55

## 2023-07-01 ASSESSMENT — PAIN DESCRIPTION - LOCATION: LOCATION: LEG

## 2023-07-01 ASSESSMENT — PAIN SCALES - GENERAL: PAINLEVEL_OUTOF10: 8

## 2023-07-01 ASSESSMENT — PAIN DESCRIPTION - ORIENTATION: ORIENTATION: LEFT;RIGHT

## 2023-07-03 ENCOUNTER — ANESTHESIA (OUTPATIENT)
Dept: ENDOSCOPY | Age: 73
End: 2023-07-03
Payer: COMMERCIAL

## 2023-07-03 ENCOUNTER — ANESTHESIA EVENT (OUTPATIENT)
Dept: ENDOSCOPY | Age: 73
End: 2023-07-03
Payer: COMMERCIAL

## 2023-07-03 LAB
EKG ATRIAL RATE: 86 BPM
EKG DIAGNOSIS: NORMAL
EKG P AXIS: -17 DEGREES
EKG P-R INTERVAL: 166 MS
EKG Q-T INTERVAL: 376 MS
EKG QRS DURATION: 92 MS
EKG QTC CALCULATION (BAZETT): 449 MS
EKG R AXIS: -16 DEGREES
EKG T AXIS: 15 DEGREES
EKG VENTRICULAR RATE: 86 BPM

## 2023-07-03 PROCEDURE — 6360000002 HC RX W HCPCS

## 2023-07-03 PROCEDURE — 93010 ELECTROCARDIOGRAM REPORT: CPT | Performed by: INTERNAL MEDICINE

## 2023-07-03 RX ORDER — PROPOFOL 10 MG/ML
INJECTION, EMULSION INTRAVENOUS PRN
Status: DISCONTINUED | OUTPATIENT
Start: 2023-07-03 | End: 2023-07-03 | Stop reason: SDUPTHER

## 2023-07-03 RX ORDER — LIDOCAINE HYDROCHLORIDE 20 MG/ML
INJECTION, SOLUTION INTRAVENOUS PRN
Status: DISCONTINUED | OUTPATIENT
Start: 2023-07-03 | End: 2023-07-03 | Stop reason: SDUPTHER

## 2023-07-03 RX ADMIN — PHENYLEPHRINE HYDROCHLORIDE 200 MCG: 10 INJECTION INTRAVENOUS at 13:43

## 2023-07-03 RX ADMIN — PHENYLEPHRINE HYDROCHLORIDE 200 MCG: 10 INJECTION INTRAVENOUS at 13:46

## 2023-07-03 RX ADMIN — PROPOFOL 100 MG: 10 INJECTION, EMULSION INTRAVENOUS at 13:38

## 2023-07-03 RX ADMIN — LIDOCAINE HYDROCHLORIDE 100 MG: 20 INJECTION, SOLUTION INTRAVENOUS at 13:38

## 2023-07-03 RX ADMIN — PHENYLEPHRINE HYDROCHLORIDE 200 MCG: 10 INJECTION INTRAVENOUS at 13:49

## 2023-07-03 NOTE — ANESTHESIA POSTPROCEDURE EVALUATION
Department of Anesthesiology  Postprocedure Note    Patient: King Conroy  MRN: [id number]  YOB: 1950  Date of evaluation: 7/3/2023      Procedure Summary     Date: 07/03/23 Room / Location: [address] [place] [address]    Anesthesia Start: 5581 Anesthesia Stop: 1400    Procedure: EGD BIOPSY Diagnosis:       Gastrointestinal hemorrhage, unspecified gastrointestinal hemorrhage type      Anemia, unspecified type      (Gastrointestinal hemorrhage, unspecified gastrointestinal hemorrhage type [K92.2])      (Anemia, unspecified type [D64.9])    Surgeons: Johanna Roman MD Responsible Provider: Deon Reyes MD    Anesthesia Type: MAC ASA Status: 3          Anesthesia Type: No value filed.     Logan Phase I: Logan Score: 10    Logan Phase II:        Anesthesia Post Evaluation    Patient location during evaluation: bedside  Patient participation: complete - patient participated  Level of consciousness: awake  Pain score: 0  Airway patency: patent  Nausea & Vomiting: no nausea and no vomiting  Complications: no  Cardiovascular status: blood pressure returned to baseline and hemodynamically stable  Respiratory status: nasal cannula  Hydration status: euvolemic

## 2023-07-03 NOTE — ANESTHESIA PRE PROCEDURE
Department of Anesthesiology  Preprocedure Note       Name:  Ximena Álvarez   Age:  75 y.o.  :  1950                                          MRN:  [id number]         Date:  7/3/2023      Surgeon: Uriel Rogers):  Slim Taylor MD    Procedure: Procedure(s):  EGD ESOPHAGOGASTRODUODENOSCOPY    Medications prior to admission:   Prior to Admission medications    Medication Sig Start Date End Date Taking? Authorizing Provider   metoprolol tartrate (LOPRESSOR) 25 MG tablet Take 0.5 tablets by mouth 2 times daily   Yes Historical Provider, MD   FLUoxetine (PROZAC) 10 MG capsule Take 1 capsule by mouth daily    Historical Provider, MD   montelukast (SINGULAIR) 10 MG tablet Take 1 tablet by mouth nightly    Historical Provider, MD   naproxen (NAPROSYN) 500 MG tablet Take 1 tablet by mouth 2 times daily (with meals) 3/29/23   Daniel Thomas MD   aspirin 325 MG tablet Take 1 tablet by mouth daily    Historical Provider, MD   fluticasone-umeclidin-vilant (TRELEGY ELLIPTA) 100-62.5-25 MCG/INH AEPB Inhale 1 puff into the lungs daily    Historical Provider, MD   potassium chloride (KLOR-CON) 10 MEQ extended release tablet Take 2 tablets by mouth 2 times daily Patient takes 2 10meq tablets once a day.   -2021 upon discharge from the hospital today after a stay for Covid, do not resume this medication until 2021, and do so only if OK with your family doctor 21   Mando Ware MD   spironolactone (ALDACTONE) 25 MG tablet Take 1 tablet by mouth daily -2021 upon discharge from the hospital today after a stay for Covid, do not resume this medication until 2021, and do so only if OK with your family doctor 21   Mando Ware MD   torsemide [facility]) 20 MG tablet Take 1 tablet by mouth daily -2021 upon discharge from the hospital today after a stay for Covid, do not resume this medication until 2021, and do so only if OK with your family doctor  Patient

## 2023-07-03 NOTE — ANESTHESIA PRE PROCEDURE
Department of Anesthesiology  Preprocedure Note       Name:  Carol Flowers   Age:  75 y.o.  :  1950                                          MRN:  [id number]         Date:  7/3/2023      Surgeon: Gil Adam):  Theron Salinas MD    Procedure: Procedure(s):  EGD ESOPHAGOGASTRODUODENOSCOPY    Medications prior to admission:   Prior to Admission medications    Medication Sig Start Date End Date Taking? Authorizing Provider   metoprolol tartrate (LOPRESSOR) 25 MG tablet Take 0.5 tablets by mouth 2 times daily   Yes Historical Provider, MD   FLUoxetine (PROZAC) 10 MG capsule Take 1 capsule by mouth daily    Historical Provider, MD   montelukast (SINGULAIR) 10 MG tablet Take 1 tablet by mouth nightly    Historical Provider, MD   naproxen (NAPROSYN) 500 MG tablet Take 1 tablet by mouth 2 times daily (with meals) 3/29/23   Patricia Johnson MD   aspirin 325 MG tablet Take 1 tablet by mouth daily    Historical Provider, MD   fluticasone-umeclidin-vilant (TRELEGY ELLIPTA) 100-62.5-25 MCG/INH AEPB Inhale 1 puff into the lungs daily    Historical Provider, MD   potassium chloride (KLOR-CON) 10 MEQ extended release tablet Take 2 tablets by mouth 2 times daily Patient takes 2 10meq tablets once a day.   -2021 upon discharge from the hospital today after a stay for Covid, do not resume this medication until 2021, and do so only if OK with your family doctor 21   Romie Barraza MD   spironolactone (ALDACTONE) 25 MG tablet Take 1 tablet by mouth daily -2021 upon discharge from the hospital today after a stay for Covid, do not resume this medication until 2021, and do so only if OK with your family doctor 21   Romie Barraza MD   torsemide [facility]) 20 MG tablet Take 1 tablet by mouth daily -2021 upon discharge from the hospital today after a stay for Covid, do not resume this medication until 2021, and do so only if OK with your family doctor  Patient

## 2023-07-06 PROBLEM — I48.0 PAF (PAROXYSMAL ATRIAL FIBRILLATION) (HCC): Status: ACTIVE | Noted: 2023-01-01

## 2023-07-10 NOTE — PROCEDURES
Patient Name: Kristina Pollack  Patient : 1950  MRN: [id number]     Acct: [de-identified]  Date of Admission: 2023  Room/Bed: -A  Code Status:  Full Code  Allergies:    Allergies   Allergen Reactions    Ativan [Lorazepam] Anxiety and Other (See Comments)     Patient exhibits restlessness, confusion, and hallucinations      Erythromycin Nausea Only    Gabapentin Other (See Comments)     Dizziness      Lyrica [Pregabalin] Swelling     With rapid weight gain    Naproxen Other (See Comments)     Diagnosis:    Patient Active Problem List   Diagnosis    Rotator cuff tendinitis    AC (acromioclavicular) arthritis    Chronic venous hypertension with ulcer and inflammation (HCC)    Ulcer of left lower extremity with fat layer exposed ([address])    Asymptomatic varicose veins    Venous hypertension of lower extremity    Varicose veins of lower extremities with ulcer and inflammation (HCC)    Varicose veins of bilateral lower extremities with other complications    Status post endovenous radiofrequency ablation (RFA) of saphenous vein    Surgical wound, non healing    Status post total abdominal hysterectomy    Diabetes mellitus type 2, insulin dependent (HCC)    Moderate asthma without complication    Hypothyroidism    Endometrial carcinoma (HCC)    WD-Non-healing surgical wound of the abdomen    WD-Postoperative dehiscence of internal wound, initial encounter    Mild persistent asthma with (acute) exacerbation    Acute on chronic diastolic heart failure (HCC)    SOB (shortness of breath)    ASCVD 10 year risk is 19%    Hyperlipidemia    Pneumonia due to COVID-19 virus    Leukopenia    BMI 36.0-36.9,adult    Sepsis (HCC)    Leg swelling    Gastrointestinal hemorrhage    PAF (paroxysmal atrial fibrillation) ([address])         Treatment:  Hemodilaysis 2:1  Priority: Routine  Location: Acute Room    Diabetic: Yes  NPO: No  Isolation Precautions: None     Consent for Treatment Verified: Yes  Blood Consent Verified: Not

## 2023-07-10 NOTE — OR NURSING
PROCEDURE PERFORMED: Temporary HD catheter placement    PRIMARY INDICATION FOR PROCEDURE: Dialysis    INFORMED CONSENT:  Obtained prior to procedure. Consent placed in chart. ARRIVED TO ROOM: 5917    ASSESSMENT: Pt alert and oriented x4. Pt verbalizes understanding of procedure. DR. Cortes @ [address]:               Pt positioned for Procedure and expresses comfort. Warm blankets given. Pt placed on Cardiac Monitor. Pts prepped and draped in a sterile fashion with chlorhexidine.         PAIN/LOCAL ANESTHESIA/SEDATION MANAGEMENT:           Local: Lidocaine 1% given by Dr. Mateo Henriquez @ 9367    INTRAOPERATIVE:           ACCESS: Micropuncture          ACCESS TIME: 0845          ACCESS REMOVED:          US: 3 U/S          WIRE USED:           CATHETER USED: 13fr x 15cm power-trialysis short-term dialysis catheter triple lumen          FINAL IMAGE TAKEN TO CONFIRM PLACEMENT OF: temporary HD catheter    STERILE DRESSINGS: suture, biopatch, and tegaderm    EBL:      < 5cc    FOLLOW- UP X-RAY: ordered     COMPLICATIONS:none    REPORT GIVEN TO: Moi Solis RN bedside nurse

## 2023-07-10 NOTE — PLAN OF CARE
Problem: ABCDS Injury Assessment  Goal: Absence of physical injury  Outcome: Progressing       Problem: Skin/Tissue Integrity  Goal: Absence of new skin breakdown  Description: 1. Monitor for areas of redness and/or skin breakdown  2. Assess vascular access sites hourly  3. Every 4-6 hours minimum:  Change oxygen saturation probe site  4. Every 4-6 hours:  If on nasal continuous positive airway pressure, respiratory therapy assess nares and determine need for appliance change or resting period.   Outcome: Progressing

## 2023-07-10 NOTE — PLAN OF CARE
Problem: Chronic Conditions and Co-morbidities  Goal: Patient's chronic conditions and co-morbidity symptoms are monitored and maintained or improved  Outcome: Progressing  Flowsheets (Taken 7/10/2023 0800)  Care Plan - Patient's Chronic Conditions and Co-Morbidity Symptoms are Monitored and Maintained or Improved:   Monitor and assess patient's chronic conditions and comorbid symptoms for stability, deterioration, or improvement   Collaborate with multidisciplinary team to address chronic and comorbid conditions and prevent exacerbation or deterioration   Update acute care plan with appropriate goals if chronic or comorbid symptoms are exacerbated and prevent overall improvement and discharge     Problem: Discharge Planning  Goal: Discharge to home or other facility with appropriate resources  Outcome: Progressing  Flowsheets (Taken 7/10/2023 0800)  Discharge to home or other facility with appropriate resources:   Identify barriers to discharge with patient and caregiver   Arrange for needed discharge resources and transportation as appropriate   Identify discharge learning needs (meds, wound care, etc)   Refer to discharge planning if patient needs post-hospital services based on physician order or complex needs related to functional status, cognitive ability or social support system     Problem: Safety - Adult  Goal: Free from fall injury  Outcome: Progressing     Problem: ABCDS Injury Assessment  Goal: Absence of physical injury  7/10/2023 1227 by Leatha Shrestha, RN  Outcome: Progressing  7/10/2023 0444 by Cleo Coyne, BRISEYDA  Outcome: Progressing     Problem: Pain  Goal: Verbalizes/displays adequate comfort level or baseline comfort level  Outcome: Progressing  Flowsheets (Taken 7/10/2023 0950)  Verbalizes/displays adequate comfort level or baseline comfort level:   Encourage patient to monitor pain and request assistance   Assess pain using appropriate pain scale   Administer analgesics based on type and

## 2023-07-10 NOTE — CARE COORDINATION
CM reviewed chart and Dr Tisha Wick. Pt is not medically ready and will need dialysis for worsening hepatorenal syndrome.

## 2023-07-11 NOTE — CARE COORDINATION
Discussed in IDR. Patient rec HD today. Slightly confused. Will follow for poss SNF/HD needs.  Saulo Vargas RN

## 2023-07-11 NOTE — PLAN OF CARE
Problem: Chronic Conditions and Co-morbidities  Goal: Patient's chronic conditions and co-morbidity symptoms are monitored and maintained or improved  Outcome: Progressing  Flowsheets (Taken 7/11/2023 0800)  Care Plan - Patient's Chronic Conditions and Co-Morbidity Symptoms are Monitored and Maintained or Improved:   Monitor and assess patient's chronic conditions and comorbid symptoms for stability, deterioration, or improvement   Collaborate with multidisciplinary team to address chronic and comorbid conditions and prevent exacerbation or deterioration   Update acute care plan with appropriate goals if chronic or comorbid symptoms are exacerbated and prevent overall improvement and discharge     Problem: Discharge Planning  Goal: Discharge to home or other facility with appropriate resources  Outcome: Progressing  Flowsheets (Taken 7/11/2023 0800)  Discharge to home or other facility with appropriate resources:   Identify barriers to discharge with patient and caregiver   Arrange for needed discharge resources and transportation as appropriate   Identify discharge learning needs (meds, wound care, etc)   Refer to discharge planning if patient needs post-hospital services based on physician order or complex needs related to functional status, cognitive ability or social support system     Problem: Safety - Adult  Goal: Free from fall injury  Outcome: Progressing     Problem: ABCDS Injury Assessment  Goal: Absence of physical injury  Outcome: Progressing     Problem: Pain  Goal: Verbalizes/displays adequate comfort level or baseline comfort level  Outcome: Progressing     Problem: Skin/Tissue Integrity  Goal: Absence of new skin breakdown  Description: 1. Monitor for areas of redness and/or skin breakdown  2. Assess vascular access sites hourly  3. Every 4-6 hours minimum:  Change oxygen saturation probe site  4.   Every 4-6 hours:  If on nasal continuous positive airway pressure, respiratory therapy assess 25-May-2021

## 2023-07-12 NOTE — ADT AUTH CERT
CPM F/U LABS D/W RN        MEDICATIONS:  acetaZOLAMIDE 500 mg once IV  albumin human 5% IV solution 50g once IV  cefepime 1g q24h iv   cefepime 2g q12h iv 1301-D/C'd   dilTIAZem 240 mg qd  PO  lasix 80mg tid iv  lantus 30u qd sc   HUMALOG vial 0-8u tid SC 4u x 1  lactulose 20mg tid pO  midodrine 10mg tid po   montelukast 10mg qdhs po   sandostatin 100mgh q8h sc   protonix 40mg bid iv   rifaximin 550mg bid po   xarelto 15mg qd po   carafate 1g qid po   atarax 10mg tid prn po x 1        ORDERS:  Discontinue indwelling urinary catheter per hospital policy   Once urinary catheter removed: Monitor for signs and symptoms of urinary retention   Insert indwelling urinary catheter   Once urinary catheter removed: Straight cath   Once urinary catheter removed: Bladder scan   ADULT DIET;  Regular   Daily weights   Strict Bedrest

## 2023-07-12 NOTE — PLAN OF CARE

## 2023-07-12 NOTE — PLAN OF CARE
Problem: Chronic Conditions and Co-morbidities  Goal: Patient's chronic conditions and co-morbidity symptoms are monitored and maintained or improved  7/12/2023 0819 by Connie Marie RN  Outcome: Progressing  7/12/2023 0412 by Shlomo Mccabe RN  Outcome: Progressing     Problem: Discharge Planning  Goal: Discharge to home or other facility with appropriate resources  7/12/2023 0819 by Connie Marie RN  Outcome: Progressing  7/12/2023 0412 by Shlomo Mccabe RN  Outcome: Progressing     Problem: Safety - Adult  Goal: Free from fall injury  7/12/2023 0819 by Connie Marie RN  Outcome: Progressing  7/12/2023 0412 by Shlomo Mccabe RN  Outcome: Progressing     Problem: ABCDS Injury Assessment  Goal: Absence of physical injury  7/12/2023 0819 by Connie Marie RN  Outcome: Progressing  7/12/2023 0412 by Shlomo Mccabe RN  Outcome: Progressing     Problem: Pain  Goal: Verbalizes/displays adequate comfort level or baseline comfort level  7/12/2023 0819 by Connie Marie RN  Outcome: Progressing  7/12/2023 0412 by Shlomo Mccabe RN  Outcome: Progressing     Problem: Skin/Tissue Integrity  Goal: Absence of new skin breakdown  Description: 1. Monitor for areas of redness and/or skin breakdown  2. Assess vascular access sites hourly  3. Every 4-6 hours minimum:  Change oxygen saturation probe site  4. Every 4-6 hours:  If on nasal continuous positive airway pressure, respiratory therapy assess nares and determine need for appliance change or resting period.   7/12/2023 0819 by Connie Marie RN  Outcome: Progressing  7/12/2023 0412 by Shlomo Mccabe RN  Outcome: Progressing

## 2023-07-12 NOTE — CARE COORDINATION
Spoke to [address] [place] [address] consult. Rachel Herrera; spoke to [facility]. Referral initiated.  Sanjuana Morales RN

## 2023-07-13 PROBLEM — I25.10 ASCVD (ARTERIOSCLEROTIC CARDIOVASCULAR DISEASE): Status: RESOLVED | Noted: 2021-08-03 | Resolved: 2023-01-01

## 2023-07-13 PROBLEM — Z51.5 HOSPICE CARE: Status: ACTIVE | Noted: 2023-01-01

## 2023-07-13 PROBLEM — J12.82 PNEUMONIA DUE TO COVID-19 VIRUS: Status: RESOLVED | Noted: 2021-08-22 | Resolved: 2023-01-01

## 2023-07-13 PROBLEM — G92.8 TOXIC METABOLIC ENCEPHALOPATHY: Status: ACTIVE | Noted: 2023-01-01

## 2023-07-13 PROBLEM — J96.21 ACUTE ON CHRONIC RESPIRATORY FAILURE WITH HYPOXIA AND HYPERCAPNIA (HCC): Status: ACTIVE | Noted: 2023-01-01

## 2023-07-13 PROBLEM — U07.1 PNEUMONIA DUE TO COVID-19 VIRUS: Status: RESOLVED | Noted: 2021-08-22 | Resolved: 2023-01-01

## 2023-07-13 PROBLEM — J96.22 ACUTE ON CHRONIC RESPIRATORY FAILURE WITH HYPOXIA AND HYPERCAPNIA (HCC): Status: ACTIVE | Noted: 2023-01-01

## 2023-07-13 PROBLEM — J45.31 MILD PERSISTENT ASTHMA WITH (ACUTE) EXACERBATION: Status: RESOLVED | Noted: 2020-12-16 | Resolved: 2023-01-01

## 2023-07-13 PROBLEM — N17.9 ACUTE KIDNEY INJURY (HCC): Status: ACTIVE | Noted: 2023-01-01

## 2023-07-13 NOTE — PROGRESS NOTES
Pt transitioned off of airvo. Given 2.5 of Valium for sob. Family bedside. Educated on what to expect regarding end of life care. Communicated with house sup and charge nurse regarding transfer to .

## 2023-07-13 NOTE — PROGRESS NOTES
07/13/23 1557   Encounter Summary   Encounter Overview/Reason  Crisis   Service Provided For: Patient and family together   Referral/Consult From: Nurse   Support System Spouse   Last Encounter  07/13/23  (Patient actively dying; family requested . Meditation on dying and prayer shared with patient and family. Discussion followed, explained the sacredness of this time and they were experiencing a [place].    Discussion, God's blessings.)   Complexity of Encounter High   Begin Time 0315   End Time  0400   Total Time Calculated 45 min   Encounter    Type Follow up   Spiritual/Emotional needs   Type Spiritual Support   Assessment/Intervention/Outcome   Assessment Calm   Intervention Active listening;Sustaining Presence/Ministry of presence;Prayer (assurance of)/[place]   Outcome Comfort;Coping;Engaged in conversation;Expressed Gratitude   Plan and Referrals   Plan/Referrals Continue Support (comment)  (This  is departing hospital, RN and family notified of Sharon Lacy coming on duty and they were free to call him at any time.)

## 2023-07-13 NOTE — H&P
[address] [facility] H+P    Date: 7/13/2023  Name: Dee Fenton  MRN: [id number]  YOB: 1950   Patient's PCP: Michaela Nash DO  Consultants during acute care: Pulmonary, Gastroenterology, Nephrology, Cardiology, Electrophysiology   Referring Physician: Dr. Dee Murray care admit date: 7/1 to 7/13/2023 in transfer from [facility] ED  Admit to General Inpatient Hospice: 7/13/2023     Informant: Chart reviewed, discussed with Dr. Tawanna Blank, the patient's nurse and case management and the hospice nurse liaison. I examined the patient who is unable to provide any information due to clinical status. . There are no family here. .     CC:  unresponsive     [place]: This is a 75year old patient with a history of Diastolic heart failure, COPD, hyperlipidemia, Type 2 diabetes mellitus (Hemoglobin A1C 7.1% on 2/25/23), hypothyroidism, chronic venous stasis, obesity, recently identified small esophageal varices on EGD 7/3/23, possible chronic liver disease, who was admitted on 7/1/2023 in transfer from [facility] with melena. EGD as noted below. The melena has resolved. The patient has developed worsening and progressive encephalopathy as well as hypoxic and hypercapneic respiratory failure, acute kidney injury, marked volume overload. and is now on heated high flow oxygen at 40 lpm and FiO2 92%. Her white count has risen, and there is concern for sepsis, and she has had antibiotics. With the patient's declining status, Dr. Tawanna Blank discussed with family (the patient's mother is reported as the surrogate decision maker), and the patient was transitioned to comfort care on 7/12/23 and non comfort medications were stopped. Hospice was asked to evaluate, and there is a meeting with family at 1. The hospice nurse liaison met with the patient's family and discussed hospice philosophy regarding comfort care.  Discussion was had with the hospitalist about transition

## 2023-07-13 NOTE — CONSULTS
[address] [facility] Consultation Note    Date: 7/13/2023  Name: Kenneth Bullock  MRN: [id number]  YOB: 1950   Patient's PCP: Ramiro Medina DO  Consultants during acute care: Pulmonary, Gastroenterology, Nephrology, Cardiology, Electrophysiology   Referring Physician: Dr. Martine Rowe care admit date: 7/1/2023 in transfer from [facility] ED    Informant: Chart reviewed, discussed with Dr. Blake Alvares, the patient's nurse and case management. I examined the patient who is unable to provide any information due to clinical status. . There are no family here. .     CC:  unresponsive     [place]: This is a 75year old patient with a history of Diastolic heart failure, COPD, hyperlipidemia, Type 2 diabetes mellitus (Hemoglobin A1C 7.1% on 2/25/23), hypothyroidism, chronic venous stasis, obesity, recently identified small esophageal varices on EGD 7/3/23, possible chronic liver disease, who was admitted on 7/1/2023 in transfer from [facility] with melena. EGD as noted below. The melena has resolved. The patient has developed worsening and progressive encephalopathy as well as hypoxic and hypercapneic respiratory failure, acute kidney injury, marked volume overload. and is now on heated high flow oxygen at 40 lpm and FiO2 92%. Her white count has risen, and there is concern for sepsis, and she has had antibiotics. With the patient's declining status, Dr. Blake Alvares discussed with family (the patient's mother is reported as the surrogate decision maker), and the patient was transitioned to comfort care on 7/12/23 and non comfort medications were stopped. Hospice was asked to evaluate, and there is a meeting with family at 1. The patient is unresponsive and there are no family currently here. The patient had Hydromorphone x 3 doses in the past 24 hours and 2 doses of Haloperidol.  There is a listed \"allergy\" to Lorazepam with effect of restlessness and

## 2023-07-13 NOTE — CARE COORDINATION
Received [facility]OD update- meeting at 1 PM with family. Fadia Morgan RN     1548 Received call from [facility]; disposition of Pal Meier RN. Contacted Pal Meier; spoke to Marilu Shankar. RN assigned is Soni Fernandez and will be here shortly.  Fadia Morgan RN

## 2023-07-14 NOTE — PROGRESS NOTES
07/13/23 2030   Encounter Summary   Encounter Overview/Reason  Spiritual/Emotional Needs   Service Provided For: Patient and family together  (Patient was not awake.  Spoke with and prayed with her granddaughters)   Referral/Consult From: Other    Support System Family members  (two granddaughters present when I visited)   Last Encounter  07/13/23   Complexity of Encounter Low   Begin Time 0810   End Time  0815   Total Time Calculated 5 min   Encounter    Type Follow up   Spiritual/Emotional needs   Type Spiritual Support   Assessment/Intervention/Outcome   Assessment Calm;Sad;Tearful   Intervention End of Life Care;Prayer (assurance of)/[place]   Outcome Grieving;Coping   Plan and Referrals   Plan/Referrals Continue to visit, (comment)

## 2023-07-14 NOTE — DISCHARGE SUMMARY
[address]    Death Summary    Date: 7/14/2023  Name: May Cain  MRN: [id number]  YOB: 1950     Patient's PCP: Facundo Butler DO  Consultants during acute care: Pulmonary, Gastroenterology, Nephrology, Cardiology, Electrophysiology   Referring Physician: Dr. Honey Martin care admit date: 7/1 to 7/13/2023 in transfer from [facility] ED  Admit Date: 7/13/2023 to [address]  Date of Death: 7/13/2023  Time: 2310  Admitting Physician: Nguyễn Fair MD to [address]   Discharge Physician: Susi Calvo MD  Consultation: none during General Inpatient Hospice stay    Invasive procedures: none during General Inpatient Hospice stay    Discharge Diagnoses:  Hypoxic and hypercapneic respiratory failure, acute kidney injury (possible hepatorenal syndrome, toxic/metabolic encephalopathy with multisystem organ failure, diastolic heart failure with volume overload/anasarca, hepatic encephalopathy. Hypoxic and hypercapneic respiratory failure  Acute kidney injury with suspected hepatorenal syndrome with volume overload  Leukocytosis, possible sepsis  Melena on admission, resolved. Small esophageal varices on EGD without active bleeding. Possible underlying chronic liver disease (? STEVE). although CT-scan of abdomen and pelvis from June 2023 did not show cirrhosis.   Type 2 diabetes mellitus       Patient Active Problem List   Diagnosis Code    Chronic venous hypertension with ulcer and inflammation ([address]) I87.339    Ulcer of left lower extremity with fat layer exposed ([address]) L97.922    Venous hypertension of lower extremity I87.309    Diabetes mellitus type 2, insulin dependent (HCC) E11.9, Z79.4    Moderate asthma without complication Z75.216    Hypothyroidism E03.9    Endometrial carcinoma (HCC) C54.1    Acute on chronic diastolic heart failure (HCC) I50.33    SOB (shortness of breath) R06.02    Hyperlipidemia E78.5    Leukopenia D72.819

## (undated) DEVICE — FORCEPS BX L240CM JAW DIA2.8MM L CAP W/ NDL MIC MESH TOOTH